# Patient Record
Sex: MALE | Race: BLACK OR AFRICAN AMERICAN | NOT HISPANIC OR LATINO | Employment: UNEMPLOYED | URBAN - METROPOLITAN AREA
[De-identification: names, ages, dates, MRNs, and addresses within clinical notes are randomized per-mention and may not be internally consistent; named-entity substitution may affect disease eponyms.]

---

## 2018-07-19 ENCOUNTER — HOSPITAL ENCOUNTER (OUTPATIENT)
Dept: MEDSURG UNIT | Facility: HOSPITAL | Age: 33
End: 2018-07-20
Attending: INTERNAL MEDICINE | Admitting: INTERNAL MEDICINE

## 2018-07-19 LAB — POC TROPONIN: 0.01 NG/ML (ref 0–0.08)

## 2018-07-20 LAB
ABS NEUT (OLG): 3.4 X10(3)/MCL (ref 2.1–9.2)
BASOPHILS NFR BLD AUTO: 0 % (ref 0–2)
BUN SERPL-MCNC: 16 MG/DL (ref 7–18)
CALCIUM SERPL-MCNC: 9.1 MG/DL (ref 8.5–10.1)
CHLORIDE SERPL-SCNC: 105 MMOL/L (ref 98–107)
CO2 SERPL-SCNC: 26 MMOL/L (ref 21–32)
CREAT SERPL-MCNC: 1.02 MG/DL (ref 0.6–1.3)
CREAT/UREA NIT SERPL: 16
EOSINOPHIL # BLD AUTO: 0.2 X10(3)/MCL
EOSINOPHIL NFR BLD AUTO: 2 %
ERYTHROCYTE [DISTWIDTH] IN BLOOD BY AUTOMATED COUNT: 13.8 % (ref 11.5–17)
GLUCOSE SERPL-MCNC: 121 MG/DL (ref 74–106)
HCT VFR BLD AUTO: 39.4 % (ref 42–52)
HGB BLD-MCNC: 12.9 GM/DL (ref 14–18)
LYMPHOCYTES # BLD AUTO: 2.8 X10(3)/MCL
LYMPHOCYTES NFR BLD AUTO: 39 % (ref 13–40)
MCH RBC QN AUTO: 31.9 PG (ref 27–31)
MCHC RBC AUTO-ENTMCNC: 32.7 GM/DL (ref 33–36)
MCV RBC AUTO: 97.7 FL (ref 80–94)
MONOCYTES # BLD AUTO: 0.8 X10(3)/MCL
MONOCYTES NFR BLD AUTO: 11 % (ref 2–11)
NEUTROPHILS # BLD AUTO: 3.4 X10(3)/MCL (ref 2.1–9.2)
NEUTROPHILS NFR BLD AUTO: 47 % (ref 47–80)
PLATELET # BLD AUTO: 260 X10(3)/MCL (ref 130–400)
PMV BLD AUTO: 8 FL (ref 7.4–10.4)
POTASSIUM SERPL-SCNC: 3.9 MMOL/L (ref 3.5–5.1)
RBC # BLD AUTO: 4.03 X10(6)/MCL (ref 4.7–6.1)
SODIUM SERPL-SCNC: 140 MMOL/L (ref 136–145)
TROPONIN I SERPL-MCNC: <0.02 NG/ML (ref 0.02–0.06)
WBC # SPEC AUTO: 7.2 X10(3)/MCL (ref 4.5–11.5)

## 2019-01-03 ENCOUNTER — HOSPITAL ENCOUNTER (OUTPATIENT)
Dept: MEDSURG UNIT | Facility: HOSPITAL | Age: 34
End: 2019-01-04
Attending: INTERNAL MEDICINE | Admitting: INTERNAL MEDICINE

## 2019-01-03 LAB
ABS NEUT (OLG): 3.77 X10(3)/MCL (ref 2.1–9.2)
ALBUMIN SERPL-MCNC: 3.6 GM/DL (ref 3.4–5)
ALBUMIN/GLOB SERPL: 1.1 RATIO (ref 1.1–2)
ALP SERPL-CCNC: 57 UNIT/L (ref 50–136)
ALT SERPL-CCNC: 19 UNIT/L (ref 12–78)
APPEARANCE, UA: CLEAR
AST SERPL-CCNC: 10 UNIT/L (ref 15–37)
BACTERIA SPEC CULT: ABNORMAL /HPF
BASOPHILS # BLD AUTO: 0 X10(3)/MCL (ref 0–0.2)
BASOPHILS NFR BLD AUTO: 1 %
BILIRUB SERPL-MCNC: 0.2 MG/DL (ref 0.2–1)
BILIRUB UR QL STRIP: NEGATIVE
BILIRUBIN DIRECT+TOT PNL SERPL-MCNC: 0 MG/DL (ref 0–0.5)
BILIRUBIN DIRECT+TOT PNL SERPL-MCNC: 0.2 MG/DL (ref 0–0.8)
BUN SERPL-MCNC: 8 MG/DL (ref 7–18)
CALCIUM SERPL-MCNC: 8.8 MG/DL (ref 8.5–10.1)
CHLORIDE SERPL-SCNC: 107 MMOL/L (ref 98–107)
CK MB SERPL-MCNC: <0.5 NG/ML (ref 0.5–3.6)
CK SERPL-CCNC: 63 UNIT/L (ref 39–308)
CO2 SERPL-SCNC: 23 MMOL/L (ref 21–32)
COLOR UR: YELLOW
CREAT SERPL-MCNC: 1.03 MG/DL (ref 0.7–1.3)
EOSINOPHIL # BLD AUTO: 0.2 X10(3)/MCL (ref 0–0.9)
EOSINOPHIL NFR BLD AUTO: 3 %
ERYTHROCYTE [DISTWIDTH] IN BLOOD BY AUTOMATED COUNT: 13.6 % (ref 11.5–17)
GLOBULIN SER-MCNC: 3.4 GM/DL (ref 2.4–3.5)
GLUCOSE (UA): NEGATIVE
GLUCOSE SERPL-MCNC: 105 MG/DL (ref 74–106)
HCT VFR BLD AUTO: 42 % (ref 42–52)
HGB BLD-MCNC: 13 GM/DL (ref 14–18)
HGB UR QL STRIP: NEGATIVE
INR PPP: 1 (ref 0–1.3)
KETONES UR QL STRIP: ABNORMAL
LEUKOCYTE ESTERASE UR QL STRIP: ABNORMAL
LYMPHOCYTES # BLD AUTO: 2.5 X10(3)/MCL (ref 0.6–4.6)
LYMPHOCYTES NFR BLD AUTO: 36 %
MAGNESIUM SERPL-MCNC: 2.1 MG/DL (ref 1.8–2.4)
MCH RBC QN AUTO: 31.2 PG (ref 27–31)
MCHC RBC AUTO-ENTMCNC: 31 GM/DL (ref 33–36)
MCV RBC AUTO: 100.7 FL (ref 80–94)
MONOCYTES # BLD AUTO: 0.5 X10(3)/MCL (ref 0.1–1.3)
MONOCYTES NFR BLD AUTO: 7 %
NEUTROPHILS # BLD AUTO: 3.77 X10(3)/MCL (ref 2.1–9.2)
NEUTROPHILS NFR BLD AUTO: 53 %
NITRITE UR QL STRIP: NEGATIVE
PH UR STRIP: 5.5 [PH] (ref 5–9)
PLATELET # BLD AUTO: 305 X10(3)/MCL (ref 130–400)
PMV BLD AUTO: 9.6 FL (ref 9.4–12.4)
POC TROPONIN: 0 NG/ML (ref 0–0.08)
POC TROPONIN: 0 NG/ML (ref 0–0.08)
POTASSIUM SERPL-SCNC: 3.9 MMOL/L (ref 3.5–5.1)
PROT SERPL-MCNC: 7 GM/DL (ref 6.4–8.2)
PROT UR QL STRIP: ABNORMAL
PROTHROMBIN TIME: 13.1 SECOND(S) (ref 12.2–14.7)
RBC # BLD AUTO: 4.17 X10(6)/MCL (ref 4.7–6.1)
RBC #/AREA URNS HPF: 1 /HPF (ref 0–2)
SODIUM SERPL-SCNC: 140 MMOL/L (ref 136–145)
SP GR UR STRIP: 1.03 (ref 1–1.03)
SQUAMOUS EPITHELIAL, UA: 5 /HPF (ref 0–4)
TROPONIN I SERPL-MCNC: <0.02 NG/ML (ref 0.02–0.49)
TROPONIN I SERPL-MCNC: <0.02 NG/ML (ref 0.02–0.49)
UROBILINOGEN UR STRIP-ACNC: 1
WBC # SPEC AUTO: 7 X10(3)/MCL (ref 4.5–11.5)
WBC #/AREA URNS HPF: 45 /HPF (ref 0–3)

## 2019-01-04 LAB — TROPONIN I SERPL-MCNC: <0.02 NG/ML (ref 0.02–0.49)

## 2021-06-09 ENCOUNTER — HOSPITAL ENCOUNTER (OUTPATIENT)
Dept: MEDSURG UNIT | Facility: HOSPITAL | Age: 36
End: 2021-06-10
Attending: SURGERY | Admitting: SURGERY

## 2021-06-09 LAB
ABS NEUT (OLG): 10.31 X10(3)/MCL (ref 2.1–9.2)
ALBUMIN SERPL-MCNC: 4.5 GM/DL (ref 3.5–5)
ALBUMIN/GLOB SERPL: 1 RATIO (ref 1.1–2)
ALP SERPL-CCNC: 68 UNIT/L (ref 40–150)
ALT SERPL-CCNC: 24 UNIT/L (ref 0–55)
AMPHET UR QL SCN: NEGATIVE
AMYLASE SERPL-CCNC: 69 UNIT/L (ref 25–125)
APPEARANCE, UA: CLEAR
AST SERPL-CCNC: 29 UNIT/L (ref 5–34)
BACTERIA SPEC CULT: ABNORMAL
BARBITURATE SCN PRESENT UR: NEGATIVE
BASOPHILS # BLD AUTO: 0.04 X10(3)/MCL (ref 0–0.2)
BASOPHILS NFR BLD AUTO: 0.3 % (ref 0–0.9)
BENZODIAZ UR QL SCN: NEGATIVE
BILIRUB SERPL-MCNC: 0.3 MG/DL (ref 0.2–1.2)
BILIRUB UR QL STRIP: NEGATIVE
BILIRUBIN DIRECT+TOT PNL SERPL-MCNC: <0.1 MG/DL (ref 0–0.5)
BILIRUBIN DIRECT+TOT PNL SERPL-MCNC: >0.2 MG/DL (ref 0–0.8)
BUN SERPL-MCNC: 10.1 MG/DL (ref 8.9–20.6)
CALCIUM SERPL-MCNC: 10.1 MG/DL (ref 8.4–10.2)
CANNABINOIDS UR QL SCN: ABNORMAL
CHLORIDE SERPL-SCNC: 104 MMOL/L (ref 98–107)
CO2 SERPL-SCNC: 23 MMOL/L (ref 22–29)
COCAINE UR QL SCN: NEGATIVE
COLOR UR: YELLOW
CREAT SERPL-MCNC: 1.05 MG/DL (ref 0.72–1.25)
EOSINOPHIL # BLD AUTO: 0.07 X10(3)/MCL (ref 0–0.9)
EOSINOPHIL NFR BLD AUTO: 0.5 % (ref 0–6.5)
ERYTHROCYTE [DISTWIDTH] IN BLOOD BY AUTOMATED COUNT: 14.9 % (ref 11.5–17)
GLOBULIN SER-MCNC: 4.7 GM/DL (ref 2.4–3.5)
GLUCOSE (UA): NEGATIVE
GLUCOSE SERPL-MCNC: 129 MG/DL (ref 74–100)
HCT VFR BLD AUTO: 49.7 % (ref 42–52)
HGB BLD-MCNC: 16.1 GM/DL (ref 14–18)
HGB UR QL STRIP: NEGATIVE
IMM GRANULOCYTES # BLD AUTO: 0.08 10*3/UL (ref 0–0.02)
IMM GRANULOCYTES NFR BLD AUTO: 0.6 % (ref 0–0.43)
KETONES UR QL STRIP: NEGATIVE
LACTATE SERPL-SCNC: 1.4 MMOL/L (ref 0.5–2.2)
LEUKOCYTE ESTERASE UR QL STRIP: NEGATIVE
LIPASE SERPL-CCNC: 14 U/L
LYMPHOCYTES # BLD AUTO: 2.35 X10(3)/MCL (ref 0.6–4.6)
LYMPHOCYTES NFR BLD AUTO: 17.7 % (ref 16.2–38.3)
MCH RBC QN AUTO: 31.8 PG (ref 27–31)
MCHC RBC AUTO-ENTMCNC: 32.4 GM/DL (ref 33–36)
MCV RBC AUTO: 98 FL (ref 80–94)
MDMA UR QL SCN: NEGATIVE
METHADONE UR QL SCN: NEGATIVE
MONOCYTES # BLD AUTO: 0.46 X10(3)/MCL (ref 0.1–1.3)
MONOCYTES NFR BLD AUTO: 3.5 % (ref 4.7–11.3)
MUCOUS THREADS URNS QL MICRO: ABNORMAL /LPF
NEUTROPHILS # BLD AUTO: 10.31 X10(3)/MCL (ref 2.1–9.2)
NEUTROPHILS NFR BLD AUTO: 77.4 % (ref 49.1–73.4)
NITRITE UR QL STRIP: NEGATIVE
NRBC BLD AUTO-RTO: 0 % (ref 0–0.2)
OPIATES UR QL SCN: ABNORMAL
PCP UR QL: NEGATIVE
PH UR STRIP.AUTO: 5.5 [PH] (ref 5–8)
PH UR STRIP: 5.5 [PH] (ref 5–7)
PLATELET # BLD AUTO: 198 X10(3)/MCL (ref 130–400)
PMV BLD AUTO: 9.9 FL (ref 7.4–10.4)
POTASSIUM SERPL-SCNC: 4.9 MMOL/L (ref 3.5–5.1)
PROT SERPL-MCNC: 9.2 GM/DL (ref 6.4–8.3)
PROT UR QL STRIP: ABNORMAL
RBC # BLD AUTO: 5.07 X10(6)/MCL (ref 4.7–6.1)
RBC #/AREA URNS HPF: 0 /[HPF]
SARS-COV-2 AG RESP QL IA.RAPID: NEGATIVE
SODIUM SERPL-SCNC: 140 MMOL/L (ref 136–145)
SP GR UR STRIP: 1.02 (ref 1–1.03)
SQUAMOUS EPITHELIAL, UA: ABNORMAL /LPF
UROBILINOGEN UR STRIP-ACNC: NEGATIVE
WBC # SPEC AUTO: 13.3 X10(3)/MCL (ref 4.5–11.5)
WBC #/AREA URNS HPF: ABNORMAL /HPF

## 2021-06-10 LAB
ABS NEUT (OLG): 8.47 X10(3)/MCL (ref 2.1–9.2)
ALBUMIN SERPL-MCNC: 3.7 GM/DL (ref 3.5–5)
ALBUMIN/GLOB SERPL: 1.1 RATIO (ref 1.1–2)
ALP SERPL-CCNC: 64 UNIT/L (ref 40–150)
ALT SERPL-CCNC: 18 UNIT/L (ref 0–55)
AST SERPL-CCNC: 15 UNIT/L (ref 5–34)
BASOPHILS # BLD AUTO: 0 X10(3)/MCL (ref 0–0.2)
BASOPHILS NFR BLD AUTO: 0 %
BILIRUB SERPL-MCNC: 0.5 MG/DL
BILIRUBIN DIRECT+TOT PNL SERPL-MCNC: 0.2 MG/DL (ref 0–0.5)
BILIRUBIN DIRECT+TOT PNL SERPL-MCNC: 0.3 MG/DL (ref 0–0.8)
BUN SERPL-MCNC: 9.3 MG/DL (ref 8.9–20.6)
CALCIUM SERPL-MCNC: 9.7 MG/DL (ref 8.4–10.2)
CHLORIDE SERPL-SCNC: 102 MMOL/L (ref 98–107)
CO2 SERPL-SCNC: 23 MMOL/L (ref 22–29)
CREAT SERPL-MCNC: 0.83 MG/DL (ref 0.73–1.18)
EOSINOPHIL # BLD AUTO: 0 X10(3)/MCL (ref 0–0.9)
EOSINOPHIL NFR BLD AUTO: 0 %
ERYTHROCYTE [DISTWIDTH] IN BLOOD BY AUTOMATED COUNT: 14.2 % (ref 11.5–17)
GLOBULIN SER-MCNC: 3.4 GM/DL (ref 2.4–3.5)
GLUCOSE SERPL-MCNC: 96 MG/DL (ref 74–100)
HCT VFR BLD AUTO: 42.2 % (ref 42–52)
HGB BLD-MCNC: 14.1 GM/DL (ref 14–18)
LACTATE SERPL-SCNC: 1 MMOL/L (ref 0.5–2.2)
LYMPHOCYTES # BLD AUTO: 3.6 X10(3)/MCL (ref 0.6–4.6)
LYMPHOCYTES NFR BLD AUTO: 27 %
MCH RBC QN AUTO: 31.7 PG (ref 27–31)
MCHC RBC AUTO-ENTMCNC: 33.4 GM/DL (ref 33–36)
MCV RBC AUTO: 94.8 FL (ref 80–94)
MONOCYTES # BLD AUTO: 0.9 X10(3)/MCL (ref 0.1–1.3)
MONOCYTES NFR BLD AUTO: 7 %
NEUTROPHILS # BLD AUTO: 8.47 X10(3)/MCL (ref 2.1–9.2)
NEUTROPHILS NFR BLD AUTO: 65 %
PLATELET # BLD AUTO: 272 X10(3)/MCL (ref 130–400)
PMV BLD AUTO: 10 FL (ref 9.4–12.4)
POTASSIUM SERPL-SCNC: 3.7 MMOL/L (ref 3.5–5.1)
PROT SERPL-MCNC: 7.1 GM/DL (ref 6.4–8.3)
RBC # BLD AUTO: 4.45 X10(6)/MCL (ref 4.7–6.1)
SODIUM SERPL-SCNC: 135 MMOL/L (ref 136–145)
WBC # SPEC AUTO: 13.1 X10(3)/MCL (ref 4.5–11.5)

## 2022-04-06 ENCOUNTER — HISTORICAL (OUTPATIENT)
Dept: ADMINISTRATIVE | Facility: HOSPITAL | Age: 37
End: 2022-04-06

## 2022-04-11 ENCOUNTER — HISTORICAL (OUTPATIENT)
Dept: ADMINISTRATIVE | Facility: HOSPITAL | Age: 37
End: 2022-04-11
Payer: MEDICAID

## 2022-04-28 VITALS
DIASTOLIC BLOOD PRESSURE: 57 MMHG | BODY MASS INDEX: 20.97 KG/M2 | SYSTOLIC BLOOD PRESSURE: 101 MMHG | WEIGHT: 130.5 LBS | HEIGHT: 66 IN
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
## 2023-03-23 ENCOUNTER — HOSPITAL ENCOUNTER (INPATIENT)
Facility: HOSPITAL | Age: 38
LOS: 4 days | Discharge: HOME | DRG: 167 | End: 2023-03-27
Attending: EMERGENCY MEDICINE | Admitting: SURGERY
Payer: MEDICAID

## 2023-03-23 ENCOUNTER — ANESTHESIA (OUTPATIENT)
Dept: SURGERY | Facility: HOSPITAL | Age: 38
DRG: 167 | End: 2023-03-23
Payer: MEDICAID

## 2023-03-23 ENCOUNTER — ANESTHESIA EVENT (OUTPATIENT)
Dept: SURGERY | Facility: HOSPITAL | Age: 38
DRG: 167 | End: 2023-03-23
Payer: MEDICAID
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
## 2023-03-23 LAB
ABO + RH BLD: NORMAL
ABO + RH BLD: NORMAL
ABORH RETYPE: NORMAL
ALBUMIN SERPL-MCNC: 2.8 G/DL (ref 3.5–5)
ALBUMIN/GLOB SERPL: 0.8 RATIO (ref 1.1–2)
ALP SERPL-CCNC: 59 UNIT/L (ref 40–150)
ALT SERPL-CCNC: 14 UNIT/L (ref 0–55)
APTT PPP: 29.7 SECONDS (ref 23.2–33.7)
AST SERPL-CCNC: 13 UNIT/L (ref 5–34)
BASOPHILS # BLD AUTO: 0.02 X10(3)/MCL (ref 0–0.2)
BASOPHILS NFR BLD AUTO: 0.2 %
BILIRUBIN DIRECT+TOT PNL SERPL-MCNC: 0.2 MG/DL
BLD PROD TYP BPU: NORMAL
BLD PROD TYP BPU: NORMAL
BLOOD UNIT EXPIRATION DATE: NORMAL
BLOOD UNIT EXPIRATION DATE: NORMAL
BLOOD UNIT TYPE CODE: 5100
BLOOD UNIT TYPE CODE: 5100
BUN SERPL-MCNC: 12.9 MG/DL (ref 8.9–20.6)
CALCIUM SERPL-MCNC: 8.6 MG/DL (ref 8.4–10.2)
CHLORIDE SERPL-SCNC: 106 MMOL/L (ref 98–107)
CO2 SERPL-SCNC: 20 MMOL/L (ref 22–29)
CREAT SERPL-MCNC: 1.2 MG/DL (ref 0.73–1.18)
CROSSMATCH INTERPRETATION: NORMAL
CROSSMATCH INTERPRETATION: NORMAL
DISPENSE STATUS: NORMAL
DISPENSE STATUS: NORMAL
EOSINOPHIL # BLD AUTO: 0.13 X10(3)/MCL (ref 0–0.9)
EOSINOPHIL NFR BLD AUTO: 1.5 %
ERYTHROCYTE [DISTWIDTH] IN BLOOD BY AUTOMATED COUNT: 14.3 % (ref 11.5–17)
ETHANOL SERPL-MCNC: <10 MG/DL
GFR SERPLBLD CREATININE-BSD FMLA CKD-EPI: >60 MLS/MIN/1.73/M2
GLOBULIN SER-MCNC: 3.6 GM/DL (ref 2.4–3.5)
GLUCOSE SERPL-MCNC: 185 MG/DL (ref 74–100)
GROUP & RH: NORMAL
HCT VFR BLD AUTO: 33.6 % (ref 42–52)
HGB BLD-MCNC: 11.1 G/DL (ref 14–18)
IMM GRANULOCYTES # BLD AUTO: 0.03 X10(3)/MCL (ref 0–0.04)
IMM GRANULOCYTES NFR BLD AUTO: 0.3 %
INDIRECT COOMBS GEL: NORMAL
INR BLD: 1.19 (ref 0–1.3)
LACTATE SERPL-SCNC: 5 MMOL/L (ref 0.5–2.2)
LYMPHOCYTES # BLD AUTO: 3.16 X10(3)/MCL (ref 0.6–4.6)
LYMPHOCYTES NFR BLD AUTO: 36.4 %
MCH RBC QN AUTO: 30.6 PG (ref 27–31)
MCHC RBC AUTO-ENTMCNC: 33 G/DL (ref 33–36)
MCV RBC AUTO: 92.6 FL (ref 80–94)
MONOCYTES # BLD AUTO: 0.85 X10(3)/MCL (ref 0.1–1.3)
MONOCYTES NFR BLD AUTO: 9.8 %
NEUTROPHILS # BLD AUTO: 4.48 X10(3)/MCL (ref 2.1–9.2)
NEUTROPHILS NFR BLD AUTO: 51.8 %
NRBC BLD AUTO-RTO: 0 %
PLATELET # BLD AUTO: 320 X10(3)/MCL (ref 130–400)
PMV BLD AUTO: 9.1 FL (ref 7.4–10.4)
POTASSIUM SERPL-SCNC: 3.5 MMOL/L (ref 3.5–5.1)
PROT SERPL-MCNC: 6.4 GM/DL (ref 6.4–8.3)
PROTHROMBIN TIME: 15 SECONDS (ref 12.5–14.5)
RBC # BLD AUTO: 3.63 X10(6)/MCL (ref 4.7–6.1)
SODIUM SERPL-SCNC: 140 MMOL/L (ref 136–145)
SPECIMEN OUTDATE: NORMAL
UNIT NUMBER: NORMAL
UNIT NUMBER: NORMAL
WBC # SPEC AUTO: 8.7 X10(3)/MCL (ref 4.5–11.5)

## 2023-03-23 PROCEDURE — 85730 THROMBOPLASTIN TIME PARTIAL: CPT | Performed by: SURGERY

## 2023-03-23 PROCEDURE — 36430 TRANSFUSION BLD/BLD COMPNT: CPT

## 2023-03-23 PROCEDURE — 90471 IMMUNIZATION ADMIN: CPT | Performed by: SURGERY

## 2023-03-23 PROCEDURE — 99152 MOD SED SAME PHYS/QHP 5/>YRS: CPT

## 2023-03-23 PROCEDURE — 71000039 HC RECOVERY, EACH ADD'L HOUR: Performed by: SURGERY

## 2023-03-23 PROCEDURE — 86850 RBC ANTIBODY SCREEN: CPT | Performed by: SURGERY

## 2023-03-23 PROCEDURE — 99291 CRITICAL CARE FIRST HOUR: CPT

## 2023-03-23 PROCEDURE — 63600175 PHARM REV CODE 636 W HCPCS: Performed by: SURGERY

## 2023-03-23 PROCEDURE — P9016 RBC LEUKOCYTES REDUCED: HCPCS | Performed by: SURGERY

## 2023-03-23 PROCEDURE — 82077 ASSAY SPEC XCP UR&BREATH IA: CPT | Performed by: SURGERY

## 2023-03-23 PROCEDURE — 63600175 PHARM REV CODE 636 W HCPCS: Performed by: ANESTHESIOLOGY

## 2023-03-23 PROCEDURE — 25000003 PHARM REV CODE 250: Performed by: NURSE ANESTHETIST, CERTIFIED REGISTERED

## 2023-03-23 PROCEDURE — 96374 THER/PROPH/DIAG INJ IV PUSH: CPT

## 2023-03-23 PROCEDURE — 36000707: Performed by: SURGERY

## 2023-03-23 PROCEDURE — 85610 PROTHROMBIN TIME: CPT | Performed by: SURGERY

## 2023-03-23 PROCEDURE — 90715 TDAP VACCINE 7 YRS/> IM: CPT | Performed by: SURGERY

## 2023-03-23 PROCEDURE — 37000009 HC ANESTHESIA EA ADD 15 MINS: Performed by: SURGERY

## 2023-03-23 PROCEDURE — 32551 INSERTION OF CHEST TUBE: CPT | Mod: LT

## 2023-03-23 PROCEDURE — 63600175 PHARM REV CODE 636 W HCPCS: Performed by: NURSE ANESTHETIST, CERTIFIED REGISTERED

## 2023-03-23 PROCEDURE — G0390 TRAUMA RESPONS W/HOSP CRITI: HCPCS

## 2023-03-23 PROCEDURE — 36000706: Performed by: SURGERY

## 2023-03-23 PROCEDURE — 86923 COMPATIBILITY TEST ELECTRIC: CPT | Mod: 91 | Performed by: SURGERY

## 2023-03-23 PROCEDURE — 80053 COMPREHEN METABOLIC PANEL: CPT | Performed by: SURGERY

## 2023-03-23 PROCEDURE — 71000033 HC RECOVERY, INTIAL HOUR: Performed by: SURGERY

## 2023-03-23 PROCEDURE — 11000001 HC ACUTE MED/SURG PRIVATE ROOM

## 2023-03-23 PROCEDURE — 85025 COMPLETE CBC W/AUTO DIFF WBC: CPT | Performed by: SURGERY

## 2023-03-23 PROCEDURE — 25500020 PHARM REV CODE 255: Performed by: SURGERY

## 2023-03-23 PROCEDURE — 83605 ASSAY OF LACTIC ACID: CPT | Performed by: SURGERY

## 2023-03-23 PROCEDURE — 37000008 HC ANESTHESIA 1ST 15 MINUTES: Performed by: SURGERY

## 2023-03-23 RX ORDER — PROPOFOL 10 MG/ML
VIAL (ML) INTRAVENOUS
Status: DISPENSED
Start: 2023-03-23 | End: 2023-03-24

## 2023-03-23 RX ORDER — TALC
6 POWDER (GRAM) TOPICAL NIGHTLY PRN
Status: DISCONTINUED | OUTPATIENT
Start: 2023-03-23 | End: 2023-03-27 | Stop reason: HOSPADM

## 2023-03-23 RX ORDER — SODIUM CHLORIDE 0.9 % (FLUSH) 0.9 %
10 SYRINGE (ML) INJECTION
Status: DISCONTINUED | OUTPATIENT
Start: 2023-03-23 | End: 2023-03-24 | Stop reason: HOSPADM

## 2023-03-23 RX ORDER — GABAPENTIN 300 MG/1
300 CAPSULE ORAL 3 TIMES DAILY
Status: DISCONTINUED | OUTPATIENT
Start: 2023-03-23 | End: 2023-03-27 | Stop reason: HOSPADM

## 2023-03-23 RX ORDER — HYDROMORPHONE HYDROCHLORIDE 2 MG/ML
0.2 INJECTION, SOLUTION INTRAMUSCULAR; INTRAVENOUS; SUBCUTANEOUS EVERY 5 MIN PRN
Status: DISCONTINUED | OUTPATIENT
Start: 2023-03-23 | End: 2023-03-24 | Stop reason: HOSPADM

## 2023-03-23 RX ORDER — PHENYLEPHRINE HCL IN 0.9% NACL 1 MG/10 ML
SYRINGE (ML) INTRAVENOUS
Status: DISCONTINUED | OUTPATIENT
Start: 2023-03-23 | End: 2023-03-23

## 2023-03-23 RX ORDER — SODIUM CHLORIDE, SODIUM LACTATE, POTASSIUM CHLORIDE, CALCIUM CHLORIDE 600; 310; 30; 20 MG/100ML; MG/100ML; MG/100ML; MG/100ML
INJECTION, SOLUTION INTRAVENOUS
Status: COMPLETED | OUTPATIENT
Start: 2023-03-23 | End: 2023-03-23

## 2023-03-23 RX ORDER — DOCUSATE SODIUM 100 MG/1
100 CAPSULE, LIQUID FILLED ORAL 2 TIMES DAILY
Status: DISCONTINUED | OUTPATIENT
Start: 2023-03-23 | End: 2023-03-27 | Stop reason: HOSPADM

## 2023-03-23 RX ORDER — POLYETHYLENE GLYCOL 3350 17 G/17G
17 POWDER, FOR SOLUTION ORAL 2 TIMES DAILY
Status: DISCONTINUED | OUTPATIENT
Start: 2023-03-23 | End: 2023-03-25

## 2023-03-23 RX ORDER — DEXAMETHASONE SODIUM PHOSPHATE 4 MG/ML
INJECTION, SOLUTION INTRA-ARTICULAR; INTRALESIONAL; INTRAMUSCULAR; INTRAVENOUS; SOFT TISSUE
Status: DISCONTINUED | OUTPATIENT
Start: 2023-03-23 | End: 2023-03-23

## 2023-03-23 RX ORDER — MIDAZOLAM HYDROCHLORIDE 1 MG/ML
INJECTION INTRAMUSCULAR; INTRAVENOUS
Status: DISCONTINUED | OUTPATIENT
Start: 2023-03-23 | End: 2023-03-23

## 2023-03-23 RX ORDER — ENOXAPARIN SODIUM 100 MG/ML
40 INJECTION SUBCUTANEOUS EVERY 12 HOURS
Status: DISCONTINUED | OUTPATIENT
Start: 2023-03-23 | End: 2023-03-27 | Stop reason: HOSPADM

## 2023-03-23 RX ORDER — PROPOFOL 10 MG/ML
VIAL (ML) INTRAVENOUS
Status: DISCONTINUED | OUTPATIENT
Start: 2023-03-23 | End: 2023-03-23

## 2023-03-23 RX ORDER — FENTANYL CITRATE 50 UG/ML
INJECTION, SOLUTION INTRAMUSCULAR; INTRAVENOUS
Status: DISCONTINUED | OUTPATIENT
Start: 2023-03-23 | End: 2023-03-23

## 2023-03-23 RX ORDER — ONDANSETRON HYDROCHLORIDE 2 MG/ML
INJECTION, SOLUTION INTRAMUSCULAR; INTRAVENOUS
Status: DISCONTINUED | OUTPATIENT
Start: 2023-03-23 | End: 2023-03-23

## 2023-03-23 RX ORDER — LIDOCAINE HYDROCHLORIDE 20 MG/ML
INJECTION, SOLUTION INFILTRATION; PERINEURAL
Status: DISPENSED
Start: 2023-03-23 | End: 2023-03-24

## 2023-03-23 RX ORDER — CEFAZOLIN SODIUM 2 G/50ML
SOLUTION INTRAVENOUS
Status: COMPLETED | OUTPATIENT
Start: 2023-03-23 | End: 2023-03-23

## 2023-03-23 RX ORDER — OXYCODONE HYDROCHLORIDE 5 MG/1
10 TABLET ORAL EVERY 4 HOURS PRN
Status: DISCONTINUED | OUTPATIENT
Start: 2023-03-23 | End: 2023-03-27 | Stop reason: HOSPADM

## 2023-03-23 RX ORDER — ROCURONIUM BROMIDE 10 MG/ML
INJECTION, SOLUTION INTRAVENOUS
Status: DISCONTINUED | OUTPATIENT
Start: 2023-03-23 | End: 2023-03-23

## 2023-03-23 RX ORDER — ONDANSETRON 2 MG/ML
4 INJECTION INTRAMUSCULAR; INTRAVENOUS DAILY PRN
Status: DISCONTINUED | OUTPATIENT
Start: 2023-03-23 | End: 2023-03-24 | Stop reason: HOSPADM

## 2023-03-23 RX ORDER — ACETAMINOPHEN 325 MG/1
650 TABLET ORAL EVERY 4 HOURS
Status: DISCONTINUED | OUTPATIENT
Start: 2023-03-23 | End: 2023-03-27 | Stop reason: HOSPADM

## 2023-03-23 RX ORDER — CEFAZOLIN SODIUM 1 G/3ML
INJECTION, POWDER, FOR SOLUTION INTRAMUSCULAR; INTRAVENOUS
Status: DISPENSED
Start: 2023-03-23 | End: 2023-03-24

## 2023-03-23 RX ORDER — ADHESIVE BANDAGE
30 BANDAGE TOPICAL DAILY PRN
Status: DISCONTINUED | OUTPATIENT
Start: 2023-03-23 | End: 2023-03-27 | Stop reason: HOSPADM

## 2023-03-23 RX ORDER — PROPOFOL 10 MG/ML
INJECTION, EMULSION INTRAVENOUS
Status: COMPLETED | OUTPATIENT
Start: 2023-03-23 | End: 2023-03-23

## 2023-03-23 RX ORDER — METHOCARBAMOL 750 MG/1
750 TABLET, FILM COATED ORAL 3 TIMES DAILY
Status: DISCONTINUED | OUTPATIENT
Start: 2023-03-23 | End: 2023-03-27 | Stop reason: HOSPADM

## 2023-03-23 RX ORDER — OXYCODONE HYDROCHLORIDE 5 MG/1
5 TABLET ORAL EVERY 4 HOURS PRN
Status: DISCONTINUED | OUTPATIENT
Start: 2023-03-23 | End: 2023-03-27 | Stop reason: HOSPADM

## 2023-03-23 RX ORDER — SODIUM CHLORIDE 9 MG/ML
INJECTION, SOLUTION INTRAVENOUS CONTINUOUS
Status: DISCONTINUED | OUTPATIENT
Start: 2023-03-23 | End: 2023-03-27 | Stop reason: HOSPADM

## 2023-03-23 RX ORDER — SUCCINYLCHOLINE CHLORIDE 20 MG/ML
INJECTION INTRAMUSCULAR; INTRAVENOUS
Status: DISCONTINUED | OUTPATIENT
Start: 2023-03-23 | End: 2023-03-23

## 2023-03-23 RX ORDER — FENTANYL CITRATE 50 UG/ML
INJECTION, SOLUTION INTRAMUSCULAR; INTRAVENOUS
Status: DISCONTINUED
Start: 2023-03-23 | End: 2023-03-23 | Stop reason: WASHOUT

## 2023-03-23 RX ADMIN — MIDAZOLAM HYDROCHLORIDE 5 MG: 1 INJECTION, SOLUTION INTRAMUSCULAR; INTRAVENOUS at 09:03

## 2023-03-23 RX ADMIN — HYDROMORPHONE HYDROCHLORIDE 0.2 MG: 2 INJECTION INTRAMUSCULAR; INTRAVENOUS; SUBCUTANEOUS at 10:03

## 2023-03-23 RX ADMIN — PROPOFOL 120 MG: 10 INJECTION, EMULSION INTRAVENOUS at 08:03

## 2023-03-23 RX ADMIN — ROCURONIUM BROMIDE 45 MG: 10 SOLUTION INTRAVENOUS at 09:03

## 2023-03-23 RX ADMIN — PROPOFOL 70 MG: 10 INJECTION, EMULSION INTRAVENOUS at 09:03

## 2023-03-23 RX ADMIN — HYDROMORPHONE HYDROCHLORIDE 0.2 MG: 2 INJECTION INTRAMUSCULAR; INTRAVENOUS; SUBCUTANEOUS at 11:03

## 2023-03-23 RX ADMIN — ONDANSETRON HYDROCHLORIDE 4 MG: 2 INJECTION, SOLUTION INTRAMUSCULAR; INTRAVENOUS at 09:03

## 2023-03-23 RX ADMIN — FENTANYL CITRATE 100 MCG: 50 INJECTION, SOLUTION INTRAMUSCULAR; INTRAVENOUS at 09:03

## 2023-03-23 RX ADMIN — Medication 100 MCG: at 09:03

## 2023-03-23 RX ADMIN — SODIUM CHLORIDE, SODIUM GLUCONATE, SODIUM ACETATE, POTASSIUM CHLORIDE AND MAGNESIUM CHLORIDE: 526; 502; 368; 37; 30 INJECTION, SOLUTION INTRAVENOUS at 09:03

## 2023-03-23 RX ADMIN — CEFAZOLIN SODIUM 2 G: 2 SOLUTION INTRAVENOUS at 08:03

## 2023-03-23 RX ADMIN — SODIUM CHLORIDE, POTASSIUM CHLORIDE, SODIUM LACTATE AND CALCIUM CHLORIDE 1000 ML: 600; 310; 30; 20 INJECTION, SOLUTION INTRAVENOUS at 08:03

## 2023-03-23 RX ADMIN — DEXAMETHASONE SODIUM PHOSPHATE 4 MG: 4 INJECTION, SOLUTION INTRA-ARTICULAR; INTRALESIONAL; INTRAMUSCULAR; INTRAVENOUS; SOFT TISSUE at 09:03

## 2023-03-23 RX ADMIN — SUCCINYLCHOLINE CHLORIDE 160 MG: 20 INJECTION, SOLUTION INTRAMUSCULAR; INTRAVENOUS at 09:03

## 2023-03-23 RX ADMIN — TETANUS TOXOID, REDUCED DIPHTHERIA TOXOID AND ACELLULAR PERTUSSIS VACCINE, ADSORBED 0.5 ML: 5; 2.5; 8; 8; 2.5 SUSPENSION INTRAMUSCULAR at 08:03

## 2023-03-23 RX ADMIN — ROCURONIUM BROMIDE 5 MG: 10 SOLUTION INTRAVENOUS at 09:03

## 2023-03-23 RX ADMIN — IOPAMIDOL 100 ML: 755 INJECTION, SOLUTION INTRAVENOUS at 09:03

## 2023-03-23 RX ADMIN — SUGAMMADEX 200 MG: 100 INJECTION, SOLUTION INTRAVENOUS at 09:03

## 2023-03-24 LAB
ALBUMIN SERPL-MCNC: 3 G/DL (ref 3.5–5)
ALBUMIN/GLOB SERPL: 0.8 RATIO (ref 1.1–2)
ALP SERPL-CCNC: 64 UNIT/L (ref 40–150)
ALT SERPL-CCNC: 19 UNIT/L (ref 0–55)
AST SERPL-CCNC: 22 UNIT/L (ref 5–34)
BASOPHILS # BLD AUTO: 0.03 X10(3)/MCL (ref 0–0.2)
BASOPHILS NFR BLD AUTO: 0.2 %
BILIRUBIN DIRECT+TOT PNL SERPL-MCNC: 0.4 MG/DL
BUN SERPL-MCNC: 7.2 MG/DL (ref 8.9–20.6)
CALCIUM SERPL-MCNC: 8.6 MG/DL (ref 8.4–10.2)
CHLORIDE SERPL-SCNC: 108 MMOL/L (ref 98–107)
CO2 SERPL-SCNC: 26 MMOL/L (ref 22–29)
CREAT SERPL-MCNC: 0.82 MG/DL (ref 0.73–1.18)
EOSINOPHIL # BLD AUTO: 0 X10(3)/MCL (ref 0–0.9)
EOSINOPHIL NFR BLD AUTO: 0 %
ERYTHROCYTE [DISTWIDTH] IN BLOOD BY AUTOMATED COUNT: 15.2 % (ref 11.5–17)
GFR SERPLBLD CREATININE-BSD FMLA CKD-EPI: >60 MLS/MIN/1.73/M2
GLOBULIN SER-MCNC: 3.6 GM/DL (ref 2.4–3.5)
GLUCOSE SERPL-MCNC: 163 MG/DL (ref 74–100)
HCT VFR BLD AUTO: 35.4 % (ref 42–52)
HGB BLD-MCNC: 11.8 G/DL (ref 14–18)
IMM GRANULOCYTES # BLD AUTO: 0.08 X10(3)/MCL (ref 0–0.04)
IMM GRANULOCYTES NFR BLD AUTO: 0.4 %
LACTATE SERPL-SCNC: 2.5 MMOL/L (ref 0.5–2.2)
LACTATE SERPL-SCNC: 4.1 MMOL/L (ref 0.5–2.2)
LYMPHOCYTES # BLD AUTO: 1.33 X10(3)/MCL (ref 0.6–4.6)
LYMPHOCYTES NFR BLD AUTO: 7.3 %
MAGNESIUM SERPL-MCNC: 2.1 MG/DL (ref 1.6–2.6)
MCH RBC QN AUTO: 30.4 PG (ref 27–31)
MCHC RBC AUTO-ENTMCNC: 33.3 G/DL (ref 33–36)
MCV RBC AUTO: 91.2 FL (ref 80–94)
MONOCYTES # BLD AUTO: 0.95 X10(3)/MCL (ref 0.1–1.3)
MONOCYTES NFR BLD AUTO: 5.2 %
NEUTROPHILS # BLD AUTO: 15.72 X10(3)/MCL (ref 2.1–9.2)
NEUTROPHILS NFR BLD AUTO: 86.9 %
NRBC BLD AUTO-RTO: 0 %
PHOSPHATE SERPL-MCNC: 3.3 MG/DL (ref 2.3–4.7)
PLATELET # BLD AUTO: 341 X10(3)/MCL (ref 130–400)
PMV BLD AUTO: 9.4 FL (ref 7.4–10.4)
POTASSIUM SERPL-SCNC: 4.4 MMOL/L (ref 3.5–5.1)
PROT SERPL-MCNC: 6.6 GM/DL (ref 6.4–8.3)
RBC # BLD AUTO: 3.88 X10(6)/MCL (ref 4.7–6.1)
SODIUM SERPL-SCNC: 142 MMOL/L (ref 136–145)
WBC # SPEC AUTO: 18.1 X10(3)/MCL (ref 4.5–11.5)

## 2023-03-24 PROCEDURE — 85025 COMPLETE CBC W/AUTO DIFF WBC: CPT | Performed by: NURSE PRACTITIONER

## 2023-03-24 PROCEDURE — 25000003 PHARM REV CODE 250: Performed by: NURSE PRACTITIONER

## 2023-03-24 PROCEDURE — 83605 ASSAY OF LACTIC ACID: CPT | Performed by: STUDENT IN AN ORGANIZED HEALTH CARE EDUCATION/TRAINING PROGRAM

## 2023-03-24 PROCEDURE — 27000221 HC OXYGEN, UP TO 24 HOURS

## 2023-03-24 PROCEDURE — 80053 COMPREHEN METABOLIC PANEL: CPT | Performed by: NURSE PRACTITIONER

## 2023-03-24 PROCEDURE — 83605 ASSAY OF LACTIC ACID: CPT | Performed by: NURSE PRACTITIONER

## 2023-03-24 PROCEDURE — 83735 ASSAY OF MAGNESIUM: CPT | Performed by: NURSE PRACTITIONER

## 2023-03-24 PROCEDURE — 63600175 PHARM REV CODE 636 W HCPCS: Performed by: NURSE PRACTITIONER

## 2023-03-24 PROCEDURE — 25000003 PHARM REV CODE 250: Performed by: STUDENT IN AN ORGANIZED HEALTH CARE EDUCATION/TRAINING PROGRAM

## 2023-03-24 PROCEDURE — 11000001 HC ACUTE MED/SURG PRIVATE ROOM

## 2023-03-24 PROCEDURE — 84100 ASSAY OF PHOSPHORUS: CPT | Performed by: NURSE PRACTITIONER

## 2023-03-24 PROCEDURE — 94761 N-INVAS EAR/PLS OXIMETRY MLT: CPT

## 2023-03-24 RX ORDER — KETOROLAC TROMETHAMINE 30 MG/ML
15 INJECTION, SOLUTION INTRAMUSCULAR; INTRAVENOUS EVERY 6 HOURS
Status: COMPLETED | OUTPATIENT
Start: 2023-03-24 | End: 2023-03-26

## 2023-03-24 RX ORDER — MORPHINE SULFATE 4 MG/ML
4 INJECTION, SOLUTION INTRAMUSCULAR; INTRAVENOUS EVERY 4 HOURS PRN
Status: DISCONTINUED | OUTPATIENT
Start: 2023-03-24 | End: 2023-03-26

## 2023-03-24 RX ORDER — MUPIROCIN 20 MG/G
OINTMENT TOPICAL 2 TIMES DAILY
Status: DISCONTINUED | OUTPATIENT
Start: 2023-03-24 | End: 2023-03-27 | Stop reason: HOSPADM

## 2023-03-24 RX ADMIN — GABAPENTIN 300 MG: 300 CAPSULE ORAL at 03:03

## 2023-03-24 RX ADMIN — KETOROLAC TROMETHAMINE 15 MG: 30 INJECTION, SOLUTION INTRAMUSCULAR at 12:03

## 2023-03-24 RX ADMIN — GABAPENTIN 300 MG: 300 CAPSULE ORAL at 10:03

## 2023-03-24 RX ADMIN — ENOXAPARIN SODIUM 40 MG: 40 INJECTION SUBCUTANEOUS at 10:03

## 2023-03-24 RX ADMIN — Medication 6 MG: at 12:03

## 2023-03-24 RX ADMIN — ACETAMINOPHEN 325MG 650 MG: 325 TABLET ORAL at 01:03

## 2023-03-24 RX ADMIN — POLYETHYLENE GLYCOL 3350 17 G: 17 POWDER, FOR SOLUTION ORAL at 10:03

## 2023-03-24 RX ADMIN — DOCUSATE SODIUM 100 MG: 100 CAPSULE, LIQUID FILLED ORAL at 10:03

## 2023-03-24 RX ADMIN — KETOROLAC TROMETHAMINE 15 MG: 30 INJECTION, SOLUTION INTRAMUSCULAR at 06:03

## 2023-03-24 RX ADMIN — GABAPENTIN 300 MG: 300 CAPSULE ORAL at 12:03

## 2023-03-24 RX ADMIN — SODIUM CHLORIDE: 9 INJECTION, SOLUTION INTRAVENOUS at 12:03

## 2023-03-24 RX ADMIN — ACETAMINOPHEN 325MG 650 MG: 325 TABLET ORAL at 06:03

## 2023-03-24 RX ADMIN — Medication 6 MG: at 10:03

## 2023-03-24 RX ADMIN — METHOCARBAMOL 750 MG: 750 TABLET ORAL at 09:03

## 2023-03-24 RX ADMIN — METHOCARBAMOL 750 MG: 750 TABLET ORAL at 10:03

## 2023-03-24 RX ADMIN — OXYCODONE HYDROCHLORIDE 10 MG: 5 TABLET ORAL at 03:03

## 2023-03-24 RX ADMIN — MUPIROCIN: 20 OINTMENT TOPICAL at 10:03

## 2023-03-24 RX ADMIN — METHOCARBAMOL 750 MG: 750 TABLET ORAL at 12:03

## 2023-03-24 RX ADMIN — MORPHINE SULFATE 4 MG: 4 INJECTION, SOLUTION INTRAMUSCULAR; INTRAVENOUS at 07:03

## 2023-03-24 RX ADMIN — METHOCARBAMOL 750 MG: 750 TABLET ORAL at 03:03

## 2023-03-24 RX ADMIN — SODIUM CHLORIDE: 9 INJECTION, SOLUTION INTRAVENOUS at 10:03

## 2023-03-24 RX ADMIN — ACETAMINOPHEN 325MG 650 MG: 325 TABLET ORAL at 10:03

## 2023-03-24 RX ADMIN — SODIUM CHLORIDE 1000 ML: 9 INJECTION, SOLUTION INTRAVENOUS at 05:03

## 2023-03-24 RX ADMIN — MORPHINE SULFATE 4 MG: 4 INJECTION, SOLUTION INTRAMUSCULAR; INTRAVENOUS at 10:03

## 2023-03-24 RX ADMIN — MORPHINE SULFATE 4 MG: 4 INJECTION, SOLUTION INTRAMUSCULAR; INTRAVENOUS at 01:03

## 2023-03-24 RX ADMIN — KETOROLAC TROMETHAMINE 15 MG: 30 INJECTION, SOLUTION INTRAMUSCULAR at 01:03

## 2023-03-24 RX ADMIN — KETOROLAC TROMETHAMINE 15 MG: 30 INJECTION, SOLUTION INTRAMUSCULAR at 05:03

## 2023-03-24 RX ADMIN — SODIUM CHLORIDE: 9 INJECTION, SOLUTION INTRAVENOUS at 06:03

## 2023-03-24 RX ADMIN — OXYCODONE HYDROCHLORIDE 5 MG: 5 TABLET ORAL at 08:03

## 2023-03-24 NOTE — TRANSFER OF CARE
"Anesthesia Transfer of Care Note    Patient: Mikayla Rivera Unkn    Procedure(s) Performed: Procedure(s) (LRB):  LAPAROTOMY, EXPLORATORY (N/A)    Patient location: PACU    Anesthesia Type: general    Transport from OR: Transported from OR on room air with adequate spontaneous ventilation    Post pain: adequate analgesia    Post assessment: no apparent anesthetic complications    Post vital signs: stable    Level of consciousness: awake    Nausea/Vomiting: no nausea/vomiting    Complications: none    Transfer of care protocol was followed      Last vitals:   Visit Vitals  /76   Pulse 99   Temp 36.5 °C (97.7 °F)   Resp 20   Ht 5' 8" (1.727 m)   Wt 68 kg (150 lb)   SpO2 99%   BMI 22.81 kg/m²     "

## 2023-03-24 NOTE — ANESTHESIA POSTPROCEDURE EVALUATION
Anesthesia Post Evaluation    Patient: Ranjeet Ball    Procedure(s) Performed: Procedure(s) (LRB):  LAPAROTOMY, EXPLORATORY (N/A)    Final Anesthesia Type: general      Patient location during evaluation: PACU  Patient participation: Yes- Able to Participate  Level of consciousness: awake and alert  Post-procedure vital signs: reviewed and stable  Pain management: adequate  Airway patency: patent  PAULETTE mitigation strategies: Multimodal analgesia    Anesthetic complications: no      Cardiovascular status: stable  Respiratory status: unassisted  Hydration status: euvolemic  Follow-up not needed.          Vitals Value Taken Time   /75 03/24/23 1226   Temp 36.4 °C (97.6 °F) 03/24/23 1337   Pulse 76 03/24/23 1226   Resp 20 03/24/23 1226   SpO2 99 % 03/24/23 1226         Event Time   Out of Recovery 03/24/2023 00:00:00         Pain/Rocio Score: Pain Rating Prior to Med Admin: 2 (3/24/2023  1:38 PM)  Pain Rating Post Med Admin: 10 (3/24/2023  2:13 AM)  Rocio Score: 9 (3/23/2023 11:13 PM)

## 2023-03-24 NOTE — ED NOTES
36 Straight CT place with 250ml of dark red drainage noted in CT container that is connected to 20cm of suction
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
## 2023-03-24 NOTE — SUBJECTIVE & OBJECTIVE
No current facility-administered medications on file prior to encounter.     No current outpatient medications on file prior to encounter.       Review of patient's allergies indicates:  Not on File    No past medical history on file.  No past surgical history on file.  Family History    None       Tobacco Use    Smoking status: Not on file    Smokeless tobacco: Not on file   Substance and Sexual Activity    Alcohol use: Not on file    Drug use: Not on file    Sexual activity: Not on file     Review of Systems   Constitutional:  Negative for chills and fever.   HENT:  Negative for ear pain and trouble swallowing.    Eyes:  Negative for pain and redness.   Respiratory:  Positive for chest tightness and shortness of breath. Negative for cough.    Cardiovascular:  Positive for chest pain. Negative for palpitations and leg swelling.   Gastrointestinal:  Negative for abdominal distention, abdominal pain, nausea and vomiting.   Genitourinary:  Negative for difficulty urinating.   Musculoskeletal:  Negative for back pain and neck pain.   Skin:  Negative for color change, pallor and wound.   Neurological:  Negative for dizziness, syncope, speech difficulty, weakness, light-headedness, numbness and headaches.   Psychiatric/Behavioral:  Negative for agitation and suicidal ideas.    All other systems reviewed and are negative.  Objective:     Vital Signs (Most Recent):    Vital Signs (24h Range):           There is no height or weight on file to calculate BMI.    Physical Exam  Constitutional:       Appearance: Normal appearance.   HENT:      Head: Normocephalic and atraumatic.      Nose: Nose normal.   Eyes:      Pupils: Pupils are equal, round, and reactive to light.   Cardiovascular:      Rate and Rhythm: Tachycardia present.      Pulses: Normal pulses.      Comments: Normal peripheral pulses  Pulmonary:      Effort: No respiratory distress.      Comments: Absent L lung sounds  Chest:      Chest wall: Tenderness present.    Abdominal:      General: Abdomen is flat. Bowel sounds are normal. There is no distension.      Palpations: Abdomen is soft.      Tenderness: There is no abdominal tenderness.   Musculoskeletal:         General: Tenderness present. No swelling, deformity or signs of injury.      Cervical back: Normal range of motion and neck supple. No tenderness.      Comments: ribs   Skin:     General: Skin is warm and dry.      Capillary Refill: Capillary refill takes less than 2 seconds.      Findings: No lesion.   Neurological:      General: No focal deficit present.      Mental Status: He is alert and oriented to person, place, and time. Mental status is at baseline.   Psychiatric:         Mood and Affect: Mood normal.         Behavior: Behavior normal.         Thought Content: Thought content normal.         Judgment: Judgment normal.       Significant Labs:  I have reviewed all pertinent lab results within the past 24 hours.    Significant Diagnostics:  I have reviewed all pertinent imaging results/findings within the past 24 hours.
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
## 2023-03-24 NOTE — ANESTHESIA PROCEDURE NOTES
Intubation    Date/Time: 3/23/2023 9:18 PM  Performed by: Mir Naranjo CRNA  Authorized by: Luis Hodge MD     Intubation:     Induction:  Rapid sequence induction    Intubated:  Postinduction    Mask Ventilation:  Not attempted    Attempts:  1    Attempted By:  CRNA    Method of Intubation:  Direct    Blade:  John 2    Laryngeal View Grade: Grade I - full view of cords      Difficult Airway Encountered?: No      Complications:  None    Airway Device:  Oral endotracheal tube    Airway Device Size:  8.0    Style/Cuff Inflation:  Cuffed    Tube secured:  23    Secured at:  The lips    Placement Verified By:  Capnometry    Complicating Factors:  None    Findings Post-Intubation:  BS equal bilateral
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
## 2023-03-24 NOTE — PLAN OF CARE
Transported to CT x2 staff members and then to room 837. Report given to XIN Hughes. Informed by ER that family was present and they were going to send them to the waiting room. An attempt was made to locate them in waiting room by XIN Vale. No family present. ER provided contact number of sister Franca. It was updated on pt demographics.

## 2023-03-25 LAB
ALBUMIN SERPL-MCNC: 2.4 G/DL (ref 3.5–5)
ALBUMIN/GLOB SERPL: 0.9 RATIO (ref 1.1–2)
ALP SERPL-CCNC: 52 UNIT/L (ref 40–150)
ALT SERPL-CCNC: 14 UNIT/L (ref 0–55)
AST SERPL-CCNC: 17 UNIT/L (ref 5–34)
BASOPHILS # BLD AUTO: 0.02 X10(3)/MCL (ref 0–0.2)
BASOPHILS NFR BLD AUTO: 0.2 %
BILIRUBIN DIRECT+TOT PNL SERPL-MCNC: 0.3 MG/DL
BUN SERPL-MCNC: 4.6 MG/DL (ref 8.9–20.6)
CALCIUM SERPL-MCNC: 7.9 MG/DL (ref 8.4–10.2)
CHLORIDE SERPL-SCNC: 108 MMOL/L (ref 98–107)
CO2 SERPL-SCNC: 24 MMOL/L (ref 22–29)
CREAT SERPL-MCNC: 0.66 MG/DL (ref 0.73–1.18)
EOSINOPHIL # BLD AUTO: 0.1 X10(3)/MCL (ref 0–0.9)
EOSINOPHIL NFR BLD AUTO: 0.9 %
ERYTHROCYTE [DISTWIDTH] IN BLOOD BY AUTOMATED COUNT: 14.9 % (ref 11.5–17)
GFR SERPLBLD CREATININE-BSD FMLA CKD-EPI: >60 MLS/MIN/1.73/M2
GLOBULIN SER-MCNC: 2.7 GM/DL (ref 2.4–3.5)
GLUCOSE SERPL-MCNC: 88 MG/DL (ref 74–100)
HCT VFR BLD AUTO: 32.4 % (ref 42–52)
HGB BLD-MCNC: 10.6 G/DL (ref 14–18)
IMM GRANULOCYTES # BLD AUTO: 0.03 X10(3)/MCL (ref 0–0.04)
IMM GRANULOCYTES NFR BLD AUTO: 0.3 %
LACTATE SERPL-SCNC: 1.2 MMOL/L (ref 0.5–2.2)
LYMPHOCYTES # BLD AUTO: 3.4 X10(3)/MCL (ref 0.6–4.6)
LYMPHOCYTES NFR BLD AUTO: 32 %
MCH RBC QN AUTO: 30.2 PG (ref 27–31)
MCHC RBC AUTO-ENTMCNC: 32.7 G/DL (ref 33–36)
MCV RBC AUTO: 92.3 FL (ref 80–94)
MONOCYTES # BLD AUTO: 0.69 X10(3)/MCL (ref 0.1–1.3)
MONOCYTES NFR BLD AUTO: 6.5 %
NEUTROPHILS # BLD AUTO: 6.37 X10(3)/MCL (ref 2.1–9.2)
NEUTROPHILS NFR BLD AUTO: 60.1 %
NRBC BLD AUTO-RTO: 0 %
PLATELET # BLD AUTO: 327 X10(3)/MCL (ref 130–400)
PMV BLD AUTO: 10.4 FL (ref 7.4–10.4)
POTASSIUM SERPL-SCNC: 3.9 MMOL/L (ref 3.5–5.1)
PROT SERPL-MCNC: 5.1 GM/DL (ref 6.4–8.3)
RBC # BLD AUTO: 3.51 X10(6)/MCL (ref 4.7–6.1)
SODIUM SERPL-SCNC: 140 MMOL/L (ref 136–145)
WBC # SPEC AUTO: 10.6 X10(3)/MCL (ref 4.5–11.5)

## 2023-03-25 PROCEDURE — 25000003 PHARM REV CODE 250: Performed by: NURSE PRACTITIONER

## 2023-03-25 PROCEDURE — 83605 ASSAY OF LACTIC ACID: CPT | Performed by: NURSE PRACTITIONER

## 2023-03-25 PROCEDURE — 25000003 PHARM REV CODE 250: Performed by: STUDENT IN AN ORGANIZED HEALTH CARE EDUCATION/TRAINING PROGRAM

## 2023-03-25 PROCEDURE — 11000001 HC ACUTE MED/SURG PRIVATE ROOM

## 2023-03-25 PROCEDURE — 80053 COMPREHEN METABOLIC PANEL: CPT | Performed by: NURSE PRACTITIONER

## 2023-03-25 PROCEDURE — 85025 COMPLETE CBC W/AUTO DIFF WBC: CPT | Performed by: NURSE PRACTITIONER

## 2023-03-25 PROCEDURE — 97165 OT EVAL LOW COMPLEX 30 MIN: CPT

## 2023-03-25 PROCEDURE — 97162 PT EVAL MOD COMPLEX 30 MIN: CPT

## 2023-03-25 PROCEDURE — 25000003 PHARM REV CODE 250

## 2023-03-25 PROCEDURE — 63600175 PHARM REV CODE 636 W HCPCS: Performed by: NURSE PRACTITIONER

## 2023-03-25 RX ORDER — POLYETHYLENE GLYCOL 3350 17 G/17G
34 POWDER, FOR SOLUTION ORAL 2 TIMES DAILY
Status: DISCONTINUED | OUTPATIENT
Start: 2023-03-25 | End: 2023-03-27 | Stop reason: HOSPADM

## 2023-03-25 RX ADMIN — METHOCARBAMOL 750 MG: 750 TABLET ORAL at 02:03

## 2023-03-25 RX ADMIN — MUPIROCIN: 20 OINTMENT TOPICAL at 08:03

## 2023-03-25 RX ADMIN — MUPIROCIN: 20 OINTMENT TOPICAL at 09:03

## 2023-03-25 RX ADMIN — KETOROLAC TROMETHAMINE 15 MG: 30 INJECTION, SOLUTION INTRAMUSCULAR at 01:03

## 2023-03-25 RX ADMIN — ENOXAPARIN SODIUM 40 MG: 40 INJECTION SUBCUTANEOUS at 08:03

## 2023-03-25 RX ADMIN — GABAPENTIN 300 MG: 300 CAPSULE ORAL at 02:03

## 2023-03-25 RX ADMIN — POLYETHYLENE GLYCOL 3350 34 G: 17 POWDER, FOR SOLUTION ORAL at 09:03

## 2023-03-25 RX ADMIN — ACETAMINOPHEN 325MG 650 MG: 325 TABLET ORAL at 05:03

## 2023-03-25 RX ADMIN — DOCUSATE SODIUM 100 MG: 100 CAPSULE, LIQUID FILLED ORAL at 09:03

## 2023-03-25 RX ADMIN — GABAPENTIN 300 MG: 300 CAPSULE ORAL at 09:03

## 2023-03-25 RX ADMIN — ENOXAPARIN SODIUM 40 MG: 40 INJECTION SUBCUTANEOUS at 09:03

## 2023-03-25 RX ADMIN — KETOROLAC TROMETHAMINE 15 MG: 30 INJECTION, SOLUTION INTRAMUSCULAR at 07:03

## 2023-03-25 RX ADMIN — KETOROLAC TROMETHAMINE 15 MG: 30 INJECTION, SOLUTION INTRAMUSCULAR at 12:03

## 2023-03-25 RX ADMIN — METHOCARBAMOL 750 MG: 750 TABLET ORAL at 09:03

## 2023-03-25 RX ADMIN — GABAPENTIN 300 MG: 300 CAPSULE ORAL at 08:03

## 2023-03-25 RX ADMIN — METHOCARBAMOL 750 MG: 750 TABLET ORAL at 08:03

## 2023-03-25 RX ADMIN — ACETAMINOPHEN 325MG 650 MG: 325 TABLET ORAL at 09:03

## 2023-03-25 RX ADMIN — Medication 6 MG: at 08:03

## 2023-03-25 RX ADMIN — ACETAMINOPHEN 325MG 650 MG: 325 TABLET ORAL at 02:03

## 2023-03-25 RX ADMIN — DOCUSATE SODIUM 100 MG: 100 CAPSULE, LIQUID FILLED ORAL at 08:03

## 2023-03-25 RX ADMIN — POLYETHYLENE GLYCOL 3350 34 G: 17 POWDER, FOR SOLUTION ORAL at 08:03

## 2023-03-25 RX ADMIN — SODIUM CHLORIDE: 9 INJECTION, SOLUTION INTRAVENOUS at 03:03

## 2023-03-25 RX ADMIN — KETOROLAC TROMETHAMINE 15 MG: 30 INJECTION, SOLUTION INTRAMUSCULAR at 05:03

## 2023-03-25 RX ADMIN — OXYCODONE HYDROCHLORIDE 5 MG: 5 TABLET ORAL at 05:03
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
## 2023-03-25 NOTE — PLAN OF CARE
Problem: Physical Therapy  Goal: Physical Therapy Goal  Description: Goals to be met by: 23     Patient will increase functional independence with mobility by performin. Sit to stand transfer with Modified [place]  2. Bed to chair transfer with Modified [place] using No Assistive Device  3. Gait  x 350 feet with Modified [place] using No Assistive Device.     Outcome: Ongoing, Progressing
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
## 2023-03-26 LAB
ALBUMIN SERPL-MCNC: 2.5 G/DL (ref 3.5–5)
ALBUMIN/GLOB SERPL: 0.9 RATIO (ref 1.1–2)
ALP SERPL-CCNC: 58 UNIT/L (ref 40–150)
ALT SERPL-CCNC: 12 UNIT/L (ref 0–55)
AST SERPL-CCNC: 13 UNIT/L (ref 5–34)
BASOPHILS # BLD AUTO: 0.04 X10(3)/MCL (ref 0–0.2)
BASOPHILS NFR BLD AUTO: 0.4 %
BILIRUBIN DIRECT+TOT PNL SERPL-MCNC: 0.4 MG/DL
BUN SERPL-MCNC: 4 MG/DL (ref 8.9–20.6)
CALCIUM SERPL-MCNC: 8.5 MG/DL (ref 8.4–10.2)
CHLORIDE SERPL-SCNC: 108 MMOL/L (ref 98–107)
CO2 SERPL-SCNC: 24 MMOL/L (ref 22–29)
CREAT SERPL-MCNC: 0.62 MG/DL (ref 0.73–1.18)
EOSINOPHIL # BLD AUTO: 0.11 X10(3)/MCL (ref 0–0.9)
EOSINOPHIL NFR BLD AUTO: 1 %
ERYTHROCYTE [DISTWIDTH] IN BLOOD BY AUTOMATED COUNT: 14.6 % (ref 11.5–17)
GFR SERPLBLD CREATININE-BSD FMLA CKD-EPI: >60 MLS/MIN/1.73/M2
GLOBULIN SER-MCNC: 2.9 GM/DL (ref 2.4–3.5)
GLUCOSE SERPL-MCNC: 98 MG/DL (ref 74–100)
HCT VFR BLD AUTO: 33.9 % (ref 42–52)
HGB BLD-MCNC: 11.2 G/DL (ref 14–18)
IMM GRANULOCYTES # BLD AUTO: 0.03 X10(3)/MCL (ref 0–0.04)
IMM GRANULOCYTES NFR BLD AUTO: 0.3 %
LYMPHOCYTES # BLD AUTO: 3.19 X10(3)/MCL (ref 0.6–4.6)
LYMPHOCYTES NFR BLD AUTO: 29.8 %
MCH RBC QN AUTO: 30.5 PG (ref 27–31)
MCHC RBC AUTO-ENTMCNC: 33 G/DL (ref 33–36)
MCV RBC AUTO: 92.4 FL (ref 80–94)
MONOCYTES # BLD AUTO: 0.7 X10(3)/MCL (ref 0.1–1.3)
MONOCYTES NFR BLD AUTO: 6.5 %
NEUTROPHILS # BLD AUTO: 6.64 X10(3)/MCL (ref 2.1–9.2)
NEUTROPHILS NFR BLD AUTO: 62 %
NRBC BLD AUTO-RTO: 0 %
PLATELET # BLD AUTO: 356 X10(3)/MCL (ref 130–400)
PMV BLD AUTO: 10.3 FL (ref 7.4–10.4)
POTASSIUM SERPL-SCNC: 3.9 MMOL/L (ref 3.5–5.1)
PROT SERPL-MCNC: 5.4 GM/DL (ref 6.4–8.3)
RBC # BLD AUTO: 3.67 X10(6)/MCL (ref 4.7–6.1)
SODIUM SERPL-SCNC: 141 MMOL/L (ref 136–145)
WBC # SPEC AUTO: 10.7 X10(3)/MCL (ref 4.5–11.5)

## 2023-03-26 PROCEDURE — 25000003 PHARM REV CODE 250: Performed by: NURSE PRACTITIONER

## 2023-03-26 PROCEDURE — 80053 COMPREHEN METABOLIC PANEL: CPT | Performed by: NURSE PRACTITIONER

## 2023-03-26 PROCEDURE — 25000003 PHARM REV CODE 250: Performed by: STUDENT IN AN ORGANIZED HEALTH CARE EDUCATION/TRAINING PROGRAM

## 2023-03-26 PROCEDURE — 85025 COMPLETE CBC W/AUTO DIFF WBC: CPT | Performed by: NURSE PRACTITIONER

## 2023-03-26 PROCEDURE — 25000003 PHARM REV CODE 250

## 2023-03-26 PROCEDURE — 11000001 HC ACUTE MED/SURG PRIVATE ROOM

## 2023-03-26 PROCEDURE — 63600175 PHARM REV CODE 636 W HCPCS: Performed by: NURSE PRACTITIONER

## 2023-03-26 RX ADMIN — ACETAMINOPHEN 325MG 650 MG: 325 TABLET ORAL at 05:03

## 2023-03-26 RX ADMIN — METHOCARBAMOL 750 MG: 750 TABLET ORAL at 08:03

## 2023-03-26 RX ADMIN — KETOROLAC TROMETHAMINE 15 MG: 30 INJECTION, SOLUTION INTRAMUSCULAR at 05:03

## 2023-03-26 RX ADMIN — SODIUM CHLORIDE: 9 INJECTION, SOLUTION INTRAVENOUS at 06:03

## 2023-03-26 RX ADMIN — GABAPENTIN 300 MG: 300 CAPSULE ORAL at 09:03

## 2023-03-26 RX ADMIN — METHOCARBAMOL 750 MG: 750 TABLET ORAL at 09:03

## 2023-03-26 RX ADMIN — OXYCODONE HYDROCHLORIDE 5 MG: 5 TABLET ORAL at 12:03

## 2023-03-26 RX ADMIN — GABAPENTIN 300 MG: 300 CAPSULE ORAL at 02:03

## 2023-03-26 RX ADMIN — ENOXAPARIN SODIUM 40 MG: 40 INJECTION SUBCUTANEOUS at 08:03

## 2023-03-26 RX ADMIN — KETOROLAC TROMETHAMINE 15 MG: 30 INJECTION, SOLUTION INTRAMUSCULAR at 12:03

## 2023-03-26 RX ADMIN — ACETAMINOPHEN 325MG 650 MG: 325 TABLET ORAL at 08:03

## 2023-03-26 RX ADMIN — ENOXAPARIN SODIUM 40 MG: 40 INJECTION SUBCUTANEOUS at 09:03

## 2023-03-26 RX ADMIN — OXYCODONE HYDROCHLORIDE 5 MG: 5 TABLET ORAL at 02:03

## 2023-03-26 RX ADMIN — SODIUM CHLORIDE: 9 INJECTION, SOLUTION INTRAVENOUS at 12:03

## 2023-03-26 RX ADMIN — OXYCODONE HYDROCHLORIDE 10 MG: 5 TABLET ORAL at 08:03

## 2023-03-26 RX ADMIN — POLYETHYLENE GLYCOL 3350 34 G: 17 POWDER, FOR SOLUTION ORAL at 09:03

## 2023-03-26 RX ADMIN — OXYCODONE HYDROCHLORIDE 5 MG: 5 TABLET ORAL at 05:03

## 2023-03-26 RX ADMIN — MUPIROCIN: 20 OINTMENT TOPICAL at 08:03

## 2023-03-26 RX ADMIN — DOCUSATE SODIUM 100 MG: 100 CAPSULE, LIQUID FILLED ORAL at 08:03

## 2023-03-26 RX ADMIN — DOCUSATE SODIUM 100 MG: 100 CAPSULE, LIQUID FILLED ORAL at 09:03

## 2023-03-26 RX ADMIN — GABAPENTIN 300 MG: 300 CAPSULE ORAL at 08:03

## 2023-03-26 RX ADMIN — KETOROLAC TROMETHAMINE 15 MG: 30 INJECTION, SOLUTION INTRAMUSCULAR at 06:03

## 2023-03-26 RX ADMIN — METHOCARBAMOL 750 MG: 750 TABLET ORAL at 02:03

## 2023-03-26 RX ADMIN — MUPIROCIN: 20 OINTMENT TOPICAL at 09:03

## 2023-03-26 RX ADMIN — ACETAMINOPHEN 325MG 650 MG: 325 TABLET ORAL at 02:03

## 2023-03-26 RX ADMIN — ACETAMINOPHEN 325MG 650 MG: 325 TABLET ORAL at 12:03
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
## 2023-03-27 VITALS
HEIGHT: 68 IN | DIASTOLIC BLOOD PRESSURE: 92 MMHG | HEART RATE: 84 BPM | OXYGEN SATURATION: 99 % | RESPIRATION RATE: 20 BRPM | BODY MASS INDEX: 22.73 KG/M2 | TEMPERATURE: 98 F | WEIGHT: 150 LBS | SYSTOLIC BLOOD PRESSURE: 148 MMHG

## 2023-03-27 LAB
ALBUMIN SERPL-MCNC: 2.4 G/DL (ref 3.5–5)
ALBUMIN/GLOB SERPL: 0.6 RATIO (ref 1.1–2)
ALP SERPL-CCNC: 69 UNIT/L (ref 40–150)
ALT SERPL-CCNC: 28 UNIT/L (ref 0–55)
AST SERPL-CCNC: 32 UNIT/L (ref 5–34)
BASOPHILS # BLD AUTO: 0.04 X10(3)/MCL (ref 0–0.2)
BASOPHILS NFR BLD AUTO: 0.4 %
BILIRUBIN DIRECT+TOT PNL SERPL-MCNC: 0.3 MG/DL
BUN SERPL-MCNC: 6 MG/DL (ref 8.9–20.6)
CALCIUM SERPL-MCNC: 8.6 MG/DL (ref 8.4–10.2)
CHLORIDE SERPL-SCNC: 106 MMOL/L (ref 98–107)
CO2 SERPL-SCNC: 24 MMOL/L (ref 22–29)
CREAT SERPL-MCNC: 0.71 MG/DL (ref 0.73–1.18)
CRP SERPL-MCNC: 20.5 MG/L
EOSINOPHIL # BLD AUTO: 0.14 X10(3)/MCL (ref 0–0.9)
EOSINOPHIL NFR BLD AUTO: 1.3 %
ERYTHROCYTE [DISTWIDTH] IN BLOOD BY AUTOMATED COUNT: 14.3 % (ref 11.5–17)
GFR SERPLBLD CREATININE-BSD FMLA CKD-EPI: >60 MLS/MIN/1.73/M2
GLOBULIN SER-MCNC: 3.7 GM/DL (ref 2.4–3.5)
GLUCOSE SERPL-MCNC: 125 MG/DL (ref 74–100)
HCT VFR BLD AUTO: 35 % (ref 42–52)
HGB BLD-MCNC: 11.4 G/DL (ref 14–18)
IMM GRANULOCYTES # BLD AUTO: 0.05 X10(3)/MCL (ref 0–0.04)
IMM GRANULOCYTES NFR BLD AUTO: 0.5 %
LYMPHOCYTES # BLD AUTO: 2.59 X10(3)/MCL (ref 0.6–4.6)
LYMPHOCYTES NFR BLD AUTO: 23.7 %
MCH RBC QN AUTO: 29.8 PG (ref 27–31)
MCHC RBC AUTO-ENTMCNC: 32.6 G/DL (ref 33–36)
MCV RBC AUTO: 91.4 FL (ref 80–94)
MONOCYTES # BLD AUTO: 0.6 X10(3)/MCL (ref 0.1–1.3)
MONOCYTES NFR BLD AUTO: 5.5 %
NEUTROPHILS # BLD AUTO: 7.49 X10(3)/MCL (ref 2.1–9.2)
NEUTROPHILS NFR BLD AUTO: 68.6 %
NRBC BLD AUTO-RTO: 0 %
PLATELET # BLD AUTO: 371 X10(3)/MCL (ref 130–400)
PMV BLD AUTO: 10.6 FL (ref 7.4–10.4)
POTASSIUM SERPL-SCNC: 4 MMOL/L (ref 3.5–5.1)
PREALB SERPL-MCNC: 11.9 MG/DL (ref 18–45)
PROT SERPL-MCNC: 6.1 GM/DL (ref 6.4–8.3)
RBC # BLD AUTO: 3.83 X10(6)/MCL (ref 4.7–6.1)
SODIUM SERPL-SCNC: 138 MMOL/L (ref 136–145)
WBC # SPEC AUTO: 10.9 X10(3)/MCL (ref 4.5–11.5)

## 2023-03-27 PROCEDURE — 85025 COMPLETE CBC W/AUTO DIFF WBC: CPT | Performed by: NURSE PRACTITIONER

## 2023-03-27 PROCEDURE — 86140 C-REACTIVE PROTEIN: CPT | Performed by: NURSE PRACTITIONER

## 2023-03-27 PROCEDURE — 84134 ASSAY OF PREALBUMIN: CPT | Performed by: NURSE PRACTITIONER

## 2023-03-27 PROCEDURE — 25000003 PHARM REV CODE 250: Performed by: NURSE PRACTITIONER

## 2023-03-27 PROCEDURE — 63600175 PHARM REV CODE 636 W HCPCS: Performed by: NURSE PRACTITIONER

## 2023-03-27 PROCEDURE — 80053 COMPREHEN METABOLIC PANEL: CPT | Performed by: NURSE PRACTITIONER

## 2023-03-27 PROCEDURE — 99223 PR INITIAL HOSPITAL CARE,LEVL III: ICD-10-PCS | Mod: ,,, | Performed by: SURGERY

## 2023-03-27 PROCEDURE — 99223 1ST HOSP IP/OBS HIGH 75: CPT | Mod: ,,, | Performed by: SURGERY

## 2023-03-27 PROCEDURE — 97116 GAIT TRAINING THERAPY: CPT | Mod: CQ

## 2023-03-27 RX ORDER — GABAPENTIN 300 MG/1
300 CAPSULE ORAL 3 TIMES DAILY
Qty: 21 CAPSULE | Refills: 0 | Status: SHIPPED | OUTPATIENT
Start: 2023-03-27 | End: 2023-11-07 | Stop reason: ALTCHOICE

## 2023-03-27 RX ORDER — OXYCODONE HYDROCHLORIDE 10 MG/1
10 TABLET ORAL EVERY 4 HOURS PRN
Qty: 10 TABLET | Refills: 0 | Status: SHIPPED | OUTPATIENT
Start: 2023-03-27 | End: 2023-11-07 | Stop reason: ALTCHOICE

## 2023-03-27 RX ORDER — METHOCARBAMOL 750 MG/1
750 TABLET, FILM COATED ORAL 3 TIMES DAILY
Qty: 21 TABLET | Refills: 0 | Status: SHIPPED | OUTPATIENT
Start: 2023-03-27 | End: 2023-04-03

## 2023-03-27 RX ADMIN — ENOXAPARIN SODIUM 40 MG: 40 INJECTION SUBCUTANEOUS at 09:03

## 2023-03-27 RX ADMIN — MUPIROCIN: 20 OINTMENT TOPICAL at 09:03

## 2023-03-27 RX ADMIN — SODIUM CHLORIDE: 9 INJECTION, SOLUTION INTRAVENOUS at 01:03

## 2023-03-27 RX ADMIN — METHOCARBAMOL 750 MG: 750 TABLET ORAL at 09:03

## 2023-03-27 RX ADMIN — GABAPENTIN 300 MG: 300 CAPSULE ORAL at 09:03

## 2023-03-27 RX ADMIN — METHOCARBAMOL 750 MG: 750 TABLET ORAL at 05:03

## 2023-03-27 RX ADMIN — OXYCODONE HYDROCHLORIDE 10 MG: 5 TABLET ORAL at 04:03

## 2023-03-27 RX ADMIN — GABAPENTIN 300 MG: 300 CAPSULE ORAL at 05:03

## 2023-03-27 RX ADMIN — OXYCODONE HYDROCHLORIDE 10 MG: 5 TABLET ORAL at 09:03

## 2023-03-27 RX ADMIN — ACETAMINOPHEN 325MG 650 MG: 325 TABLET ORAL at 03:03

## 2023-03-27 RX ADMIN — DOCUSATE SODIUM 100 MG: 100 CAPSULE, LIQUID FILLED ORAL at 09:03

## 2023-03-27 RX ADMIN — OXYCODONE HYDROCHLORIDE 10 MG: 5 TABLET ORAL at 01:03

## 2023-03-27 RX ADMIN — ACETAMINOPHEN 325MG 650 MG: 325 TABLET ORAL at 04:03

## 2023-03-27 NOTE — NURSING
Patient discharged home. All post op instructions, Rx and follow ups given and explained to patient. Patient stated he understood instructions. Prescriptions were given to patient to be filled. Extra wound care supplies was given to patient and I educated patient. Patient in stable condition. All questions answered.
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
## 2023-03-29 ENCOUNTER — PATIENT OUTREACH (OUTPATIENT)
Dept: ADMINISTRATIVE | Facility: CLINIC | Age: 38
End: 2023-03-29
Payer: MEDICAID

## 2023-03-29 DIAGNOSIS — K40.90 INGUINAL HERNIA: Primary | ICD-10-CM

## 2023-03-29 NOTE — PROGRESS NOTES
C3 nurse attempted to contact Ranjeet Ball for a TCC post hospital discharge follow up call but number on file is invalid.  C3 nurse contacted alternate contact which is patient's sister and provided contact information for patient to return the call.  Patient's sister stated she would give him the message. The patient has a scheduled HOS appointment with [facility] Surgery on 4/4/23 @ 1000 for staple removal.

## 2023-03-29 NOTE — PROGRESS NOTES
Patient returned call to TCM voicemail system. C3 nurse attempted to reach patient but there was no answer.  Voicemail left.

## 2023-03-30 NOTE — PROGRESS NOTES
C3 nurse attempted to contact Ranjeet Lizzy for a TCC post hospital discharge follow up call at sister's number.  No answer, left voicemail. The patient has a scheduled HOSFU appointment with [facility] Surgery on 4/4/23 @ 1000 for staple removal.

## 2023-04-04 ENCOUNTER — DOCUMENTATION ONLY (OUTPATIENT)
Dept: SURGERY | Facility: HOSPITAL | Age: 38
End: 2023-04-04
Payer: MEDICAID
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
## 2023-04-18 ENCOUNTER — DOCUMENTATION ONLY (OUTPATIENT)
Dept: SURGERY | Facility: HOSPITAL | Age: 38
End: 2023-04-18
Payer: MEDICAID

## 2023-04-18 NOTE — PROGRESS NOTES
Presented to clinic for suture visible in superior aspect of midline incision.  Trimmed small portion of suture.  Wound well healed.  Knot not visible.  Abdomen is soft nontender.  No other complaints.

## 2023-05-02 ENCOUNTER — OFFICE VISIT (OUTPATIENT)
Dept: INTERNAL MEDICINE | Facility: CLINIC | Age: 38
End: 2023-05-02
Payer: MEDICAID

## 2023-05-02 ENCOUNTER — OFFICE VISIT (OUTPATIENT)
Dept: SURGERY | Facility: CLINIC | Age: 38
End: 2023-05-02
Payer: MEDICAID

## 2023-05-02 VITALS
OXYGEN SATURATION: 99 % | HEART RATE: 102 BPM | DIASTOLIC BLOOD PRESSURE: 82 MMHG | BODY MASS INDEX: 18.84 KG/M2 | TEMPERATURE: 99 F | HEIGHT: 66 IN | SYSTOLIC BLOOD PRESSURE: 130 MMHG | WEIGHT: 117.19 LBS

## 2023-05-02 VITALS
BODY MASS INDEX: 19.19 KG/M2 | DIASTOLIC BLOOD PRESSURE: 78 MMHG | WEIGHT: 119.38 LBS | TEMPERATURE: 98 F | HEIGHT: 66 IN | HEART RATE: 108 BPM | OXYGEN SATURATION: 100 % | RESPIRATION RATE: 18 BRPM | SYSTOLIC BLOOD PRESSURE: 124 MMHG

## 2023-05-02 DIAGNOSIS — R39.11 URINARY HESITANCY: ICD-10-CM

## 2023-05-02 DIAGNOSIS — Z00.00 WELLNESS EXAMINATION: ICD-10-CM

## 2023-05-02 DIAGNOSIS — F41.9 ANXIETY: Primary | ICD-10-CM

## 2023-05-02 DIAGNOSIS — N50.819 PAIN IN TESTICLE, UNSPECIFIED LATERALITY: Primary | ICD-10-CM

## 2023-05-02 PROCEDURE — 99214 OFFICE O/P EST MOD 30 MIN: CPT | Mod: PBBFAC

## 2023-05-02 PROCEDURE — 3074F PR MOST RECENT SYSTOLIC BLOOD PRESSURE < 130 MM HG: ICD-10-PCS | Mod: CPTII,,, | Performed by: NURSE PRACTITIONER

## 2023-05-02 PROCEDURE — 99203 OFFICE O/P NEW LOW 30 MIN: CPT | Mod: S$PBB,,, | Performed by: NURSE PRACTITIONER

## 2023-05-02 PROCEDURE — 1159F PR MEDICATION LIST DOCUMENTED IN MEDICAL RECORD: ICD-10-PCS | Mod: CPTII,,, | Performed by: NURSE PRACTITIONER

## 2023-05-02 PROCEDURE — 3008F BODY MASS INDEX DOCD: CPT | Mod: CPTII,,, | Performed by: NURSE PRACTITIONER

## 2023-05-02 PROCEDURE — 3078F PR MOST RECENT DIASTOLIC BLOOD PRESSURE < 80 MM HG: ICD-10-PCS | Mod: CPTII,,, | Performed by: NURSE PRACTITIONER

## 2023-05-02 PROCEDURE — 3008F PR BODY MASS INDEX (BMI) DOCUMENTED: ICD-10-PCS | Mod: CPTII,,, | Performed by: NURSE PRACTITIONER

## 2023-05-02 PROCEDURE — 3074F SYST BP LT 130 MM HG: CPT | Mod: CPTII,,, | Performed by: NURSE PRACTITIONER

## 2023-05-02 PROCEDURE — 1160F PR REVIEW ALL MEDS BY PRESCRIBER/CLIN PHARMACIST DOCUMENTED: ICD-10-PCS | Mod: CPTII,,, | Performed by: NURSE PRACTITIONER

## 2023-05-02 PROCEDURE — 1160F RVW MEDS BY RX/DR IN RCRD: CPT | Mod: CPTII,,, | Performed by: NURSE PRACTITIONER

## 2023-05-02 PROCEDURE — 1159F MED LIST DOCD IN RCRD: CPT | Mod: CPTII,,, | Performed by: NURSE PRACTITIONER

## 2023-05-02 PROCEDURE — 99214 OFFICE O/P EST MOD 30 MIN: CPT | Mod: PBBFAC,27 | Performed by: NURSE PRACTITIONER

## 2023-05-02 PROCEDURE — 3078F DIAST BP <80 MM HG: CPT | Mod: CPTII,,, | Performed by: NURSE PRACTITIONER

## 2023-05-02 PROCEDURE — 99203 PR OFFICE/OUTPT VISIT, NEW, LEVL III, 30-44 MIN: ICD-10-PCS | Mod: S$PBB,,, | Performed by: NURSE PRACTITIONER

## 2023-05-02 NOTE — PROGRESS NOTES
I have reviewed the notes, assessments, and/or procedures performed by the resident, I concur with her/his documentation of Ranjeet Ball.     Nafisa Villatoro MD
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
## 2023-05-02 NOTE — PROGRESS NOTES
Pt in Surgery clinic today for R inguinal hernia.  Evaluated today per Dr. JOE Dickey.  Will have scrotal U/S and RTC in 1 month.
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
## 2023-05-23 ENCOUNTER — APPOINTMENT (OUTPATIENT)
Dept: LAB | Facility: HOSPITAL | Age: 38
End: 2023-05-23
Payer: MEDICAID

## 2023-05-23 LAB
APPEARANCE UR: CLEAR
BACTERIA #/AREA URNS AUTO: ABNORMAL /HPF
BILIRUB UR QL STRIP.AUTO: NEGATIVE MG/DL
COLOR UR: ABNORMAL
GLUCOSE UR QL STRIP.AUTO: NORMAL MG/DL
HYALINE CASTS #/AREA URNS LPF: ABNORMAL /LPF
KETONES UR QL STRIP.AUTO: NEGATIVE MG/DL
LEUKOCYTE ESTERASE UR QL STRIP.AUTO: 75 UNIT/L
MUCOUS THREADS URNS QL MICRO: ABNORMAL /LPF
NITRITE UR QL STRIP.AUTO: NEGATIVE
PH UR STRIP.AUTO: 7 [PH]
PROT UR QL STRIP.AUTO: NEGATIVE MG/DL
RBC #/AREA URNS AUTO: ABNORMAL /HPF
RBC UR QL AUTO: NEGATIVE UNIT/L
SP GR UR STRIP.AUTO: 1.02
SQUAMOUS #/AREA URNS LPF: ABNORMAL /HPF
UROBILINOGEN UR STRIP-ACNC: NORMAL MG/DL
WBC #/AREA URNS AUTO: ABNORMAL /HPF

## 2023-05-25 LAB — BACTERIA UR CULT: NORMAL

## 2023-06-06 ENCOUNTER — OFFICE VISIT (OUTPATIENT)
Dept: SURGERY | Facility: CLINIC | Age: 38
End: 2023-06-06
Payer: MEDICAID

## 2023-06-06 VITALS
HEART RATE: 72 BPM | RESPIRATION RATE: 18 BRPM | SYSTOLIC BLOOD PRESSURE: 122 MMHG | HEIGHT: 65 IN | DIASTOLIC BLOOD PRESSURE: 78 MMHG | WEIGHT: 123.63 LBS | BODY MASS INDEX: 20.6 KG/M2 | OXYGEN SATURATION: 96 %

## 2023-06-06 DIAGNOSIS — N50.89 TESTICULAR SWELLING: Primary | ICD-10-CM

## 2023-06-06 PROCEDURE — 99214 OFFICE O/P EST MOD 30 MIN: CPT | Mod: PBBFAC
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
## 2023-06-06 NOTE — PROGRESS NOTES
Pt seen by Dr. Tinsley. Pt will be referred to Urology and also have orders placed for CT scan to be done. Pt education given both written and verbal.

## 2023-06-14 ENCOUNTER — HOSPITAL ENCOUNTER (OUTPATIENT)
Dept: RADIOLOGY | Facility: HOSPITAL | Age: 38
Discharge: HOME | End: 2023-06-14
Payer: MEDICAID

## 2023-06-14 DIAGNOSIS — N50.819 PAIN IN TESTICLE, UNSPECIFIED LATERALITY: ICD-10-CM

## 2023-06-14 PROCEDURE — 76870 US EXAM SCROTUM: CPT | Mod: TC

## 2023-08-18 ENCOUNTER — OFFICE VISIT (OUTPATIENT)
Dept: UROLOGY | Facility: CLINIC | Age: 38
End: 2023-08-18
Payer: MEDICAID

## 2023-08-18 VITALS
DIASTOLIC BLOOD PRESSURE: 67 MMHG | HEART RATE: 69 BPM | RESPIRATION RATE: 20 BRPM | BODY MASS INDEX: 20.43 KG/M2 | WEIGHT: 122.63 LBS | OXYGEN SATURATION: 99 % | TEMPERATURE: 98 F | SYSTOLIC BLOOD PRESSURE: 102 MMHG | HEIGHT: 65 IN

## 2023-08-18 DIAGNOSIS — N50.89 TESTICULAR SWELLING: ICD-10-CM

## 2023-08-18 DIAGNOSIS — N45.1 EPIDIDYMITIS: Primary | ICD-10-CM

## 2023-08-18 PROCEDURE — 99214 OFFICE O/P EST MOD 30 MIN: CPT | Mod: S$PBB,,, | Performed by: NURSE PRACTITIONER

## 2023-08-18 PROCEDURE — 99214 PR OFFICE/OUTPT VISIT, EST, LEVL IV, 30-39 MIN: ICD-10-PCS | Mod: S$PBB,,, | Performed by: NURSE PRACTITIONER

## 2023-08-18 PROCEDURE — 3044F HG A1C LEVEL LT 7.0%: CPT | Mod: CPTII,,, | Performed by: NURSE PRACTITIONER

## 2023-08-18 PROCEDURE — 3008F PR BODY MASS INDEX (BMI) DOCUMENTED: ICD-10-PCS | Mod: CPTII,,, | Performed by: NURSE PRACTITIONER

## 2023-08-18 PROCEDURE — 1159F MED LIST DOCD IN RCRD: CPT | Mod: CPTII,,, | Performed by: NURSE PRACTITIONER

## 2023-08-18 PROCEDURE — 3078F PR MOST RECENT DIASTOLIC BLOOD PRESSURE < 80 MM HG: ICD-10-PCS | Mod: CPTII,,, | Performed by: NURSE PRACTITIONER

## 2023-08-18 PROCEDURE — 3074F PR MOST RECENT SYSTOLIC BLOOD PRESSURE < 130 MM HG: ICD-10-PCS | Mod: CPTII,,, | Performed by: NURSE PRACTITIONER

## 2023-08-18 PROCEDURE — 3008F BODY MASS INDEX DOCD: CPT | Mod: CPTII,,, | Performed by: NURSE PRACTITIONER

## 2023-08-18 PROCEDURE — 99214 OFFICE O/P EST MOD 30 MIN: CPT | Mod: PBBFAC | Performed by: NURSE PRACTITIONER

## 2023-08-18 PROCEDURE — 3044F PR MOST RECENT HEMOGLOBIN A1C LEVEL <7.0%: ICD-10-PCS | Mod: CPTII,,, | Performed by: NURSE PRACTITIONER

## 2023-08-18 PROCEDURE — 3074F SYST BP LT 130 MM HG: CPT | Mod: CPTII,,, | Performed by: NURSE PRACTITIONER

## 2023-08-18 PROCEDURE — 3078F DIAST BP <80 MM HG: CPT | Mod: CPTII,,, | Performed by: NURSE PRACTITIONER

## 2023-08-18 PROCEDURE — 1159F PR MEDICATION LIST DOCUMENTED IN MEDICAL RECORD: ICD-10-PCS | Mod: CPTII,,, | Performed by: NURSE PRACTITIONER

## 2023-08-18 RX ORDER — LEVOFLOXACIN 500 MG/1
500 TABLET, FILM COATED ORAL DAILY
Qty: 7 TABLET | Refills: 0 | Status: SHIPPED | OUTPATIENT
Start: 2023-08-18 | End: 2023-08-18 | Stop reason: SDUPTHER

## 2023-08-18 RX ORDER — LEVOFLOXACIN 500 MG/1
500 TABLET, FILM COATED ORAL DAILY
Qty: 7 TABLET | Refills: 0 | Status: SHIPPED | OUTPATIENT
Start: 2023-08-18 | End: 2023-11-07 | Stop reason: ALTCHOICE
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
## 2023-11-07 ENCOUNTER — HOSPITAL ENCOUNTER (OUTPATIENT)
Dept: RADIOLOGY | Facility: HOSPITAL | Age: 38
Discharge: HOME | End: 2023-11-07
Attending: NURSE PRACTITIONER
Payer: MEDICAID

## 2023-11-07 ENCOUNTER — OFFICE VISIT (OUTPATIENT)
Dept: FAMILY MEDICINE | Facility: CLINIC | Age: 38
End: 2023-11-07
Payer: MEDICAID

## 2023-11-07 ENCOUNTER — TELEPHONE (OUTPATIENT)
Dept: FAMILY MEDICINE | Facility: CLINIC | Age: 38
End: 2023-11-07

## 2023-11-07 VITALS
TEMPERATURE: 98 F | WEIGHT: 122.38 LBS | DIASTOLIC BLOOD PRESSURE: 68 MMHG | HEART RATE: 73 BPM | SYSTOLIC BLOOD PRESSURE: 106 MMHG | OXYGEN SATURATION: 99 % | RESPIRATION RATE: 16 BRPM | BODY MASS INDEX: 19.67 KG/M2 | HEIGHT: 66 IN

## 2023-11-07 DIAGNOSIS — M25.539 PAIN IN WRIST, UNSPECIFIED LATERALITY: ICD-10-CM

## 2023-11-07 DIAGNOSIS — M25.539 PAIN IN WRIST, UNSPECIFIED LATERALITY: Primary | ICD-10-CM

## 2023-11-07 DIAGNOSIS — M25.539 WRIST PAIN: ICD-10-CM

## 2023-11-07 DIAGNOSIS — Z76.89 ENCOUNTER TO ESTABLISH CARE: ICD-10-CM

## 2023-11-07 DIAGNOSIS — I25.2 HISTORY OF MI (MYOCARDIAL INFARCTION): ICD-10-CM

## 2023-11-07 PROCEDURE — 3008F PR BODY MASS INDEX (BMI) DOCUMENTED: ICD-10-PCS | Mod: CPTII,,, | Performed by: NURSE PRACTITIONER

## 2023-11-07 PROCEDURE — 73110 X-RAY EXAM OF WRIST: CPT | Mod: TC,RT

## 2023-11-07 PROCEDURE — 1159F PR MEDICATION LIST DOCUMENTED IN MEDICAL RECORD: ICD-10-PCS | Mod: CPTII,,, | Performed by: NURSE PRACTITIONER

## 2023-11-07 PROCEDURE — 3044F PR MOST RECENT HEMOGLOBIN A1C LEVEL <7.0%: ICD-10-PCS | Mod: CPTII,,, | Performed by: NURSE PRACTITIONER

## 2023-11-07 PROCEDURE — 1159F MED LIST DOCD IN RCRD: CPT | Mod: CPTII,,, | Performed by: NURSE PRACTITIONER

## 2023-11-07 PROCEDURE — 3044F HG A1C LEVEL LT 7.0%: CPT | Mod: CPTII,,, | Performed by: NURSE PRACTITIONER

## 2023-11-07 PROCEDURE — 3074F SYST BP LT 130 MM HG: CPT | Mod: CPTII,,, | Performed by: NURSE PRACTITIONER

## 2023-11-07 PROCEDURE — 3074F PR MOST RECENT SYSTOLIC BLOOD PRESSURE < 130 MM HG: ICD-10-PCS | Mod: CPTII,,, | Performed by: NURSE PRACTITIONER

## 2023-11-07 PROCEDURE — 73110 X-RAY EXAM OF WRIST: CPT | Mod: TC,LT

## 2023-11-07 PROCEDURE — 3008F BODY MASS INDEX DOCD: CPT | Mod: CPTII,,, | Performed by: NURSE PRACTITIONER

## 2023-11-07 PROCEDURE — 99214 PR OFFICE/OUTPT VISIT, EST, LEVL IV, 30-39 MIN: ICD-10-PCS | Mod: ,,, | Performed by: NURSE PRACTITIONER

## 2023-11-07 PROCEDURE — 3078F DIAST BP <80 MM HG: CPT | Mod: CPTII,,, | Performed by: NURSE PRACTITIONER

## 2023-11-07 PROCEDURE — 3078F PR MOST RECENT DIASTOLIC BLOOD PRESSURE < 80 MM HG: ICD-10-PCS | Mod: CPTII,,, | Performed by: NURSE PRACTITIONER

## 2023-11-07 PROCEDURE — 99214 OFFICE O/P EST MOD 30 MIN: CPT | Mod: ,,, | Performed by: NURSE PRACTITIONER

## 2023-11-07 RX ORDER — ASPIRIN 81 MG/1
81 TABLET ORAL DAILY
COMMUNITY

## 2023-11-07 RX ORDER — ATORVASTATIN CALCIUM 40 MG/1
40 TABLET, FILM COATED ORAL DAILY
COMMUNITY

## 2023-11-07 NOTE — PROGRESS NOTES
SUBJECTIVE:     History of Present Illness      Chief Complaint: Establish Care (No complaints at this time)    HPI:  Patient is a 37 y.o. year old male who presents to clinic new patient.  Patient reports history of MI x2 in 2018 18 does follow up with Cardiology Dr. Mandel.  On statin and aspirin.  Today patient is complaining of bilateral wrist  pain breaking his wrist approximately a year ago    Review of Systems:    Review of Systems    12 point review of systems conducted, negative except as stated in the history of present illness. See HPI for details.     Previous History      Review of patient's allergies indicates:  No Known Allergies    Past Medical History:   Diagnosis Date    Hyperlipidemia     Myocardial infarction      Current Outpatient Medications   Medication Instructions    aspirin (ECOTRIN) 81 mg, Oral, Daily    atorvastatin (LIPITOR) 40 mg, Oral, Daily     Past Surgical History:   Procedure Laterality Date    CORONARY ARTERY BYPASS GRAFT      LAPAROTOMY, EXPLORATORY N/A 03/23/2023    Procedure: LAPAROTOMY, EXPLORATORY;  Surgeon: Alex Juárez MD;  Location: [facility];  Service: General;  Laterality: N/A;     Family History   Problem Relation Age of Onset    Diabetes Mother     Heart disease Mother     Cancer Father     Diabetes Sister     Heart disease Brother     Stroke Maternal Grandmother     Heart disease Maternal Grandfather        Social History     Tobacco Use    Smoking status: Every Day     Current packs/day: 0.15     Types: Cigarettes    Smokeless tobacco: Never    Tobacco comments:     States smokes 1-2 cigarettes per day   Substance Use Topics    Alcohol use: Yes     Comment: 1-2 times yearly    Drug use: Yes     Frequency: 7.0 times per week     Types: Marijuana     Comment: daily        Health Maintenance      Health Maintenance   Topic Date Due    Hepatitis C Screening  Never done    Lipid Panel  05/23/2028    TETANUS VACCINE  03/23/2033       OBJECTIVE:     Physical Exam      Vital  "Signs Reviewed   Visit Vitals  /68 (BP Location: Left arm, Patient Position: Sitting)   Pulse 73   Temp 97.9 °F (36.6 °C) (Oral)   Resp 16   Ht 5' 6" (1.676 m)   Wt 55.5 kg (122 lb 6.4 oz)   SpO2 99%   BMI 19.76 kg/m²       Physical Exam    Physical Exam:  General: Alert, well nourished, no acute distress, non-toxic appearing.   Eyes: Anicteric sclera, without conjunctival injection, normal lids, no purulent drainage, EOMs grossly intact.   Ears: No tragal tenderness. Tympanic membranes intact, pearly grey, without effusion or erythema and with a positive light reflex.   Mouth: Posterior pharynx without erythema. No exudate, ulcerations, or lesion. No tonsillar swelling.   Neck: Supple, full ROM, no rigidity, no cervical adenopathy.   Cardio: Normal rate and rhythm    Resp: Respirations even and unlabored, clear to auscultation bilaterally.   Abd: No ecchymosis or distension. Normal bowel sounds in all 4 quadrants. No tenderness to palpation. No rebound tenderness or guarding. No CVA tenderness.   Skin: No rashes or open lesions noted.   MSK: No swelling. No abrasions or signs of trauma. Ambulating without assistance.   Neuro: Alert,oriented No focal deficits noted. Facial expressions even.   Psych: Cooperative, Normal affect      Procedures    Procedures     Labs     Results for orders placed or performed in visit on 05/23/23   Comprehensive Metabolic Panel   Result Value Ref Range    Sodium Level 140 136 - 145 mmol/L    Potassium Level 4.0 3.5 - 5.1 mmol/L    Chloride 107 98 - 107 mmol/L    Carbon Dioxide 28 22 - 29 mmol/L    Glucose Level 84 74 - 100 mg/dL    Blood Urea Nitrogen 11.6 8.9 - 20.6 mg/dL    Creatinine 0.89 0.73 - 1.18 mg/dL    Calcium Level Total 9.7 8.4 - 10.2 mg/dL    Protein Total 7.4 6.4 - 8.3 gm/dL    Albumin Level 3.8 3.5 - 5.0 g/dL    Globulin 3.6 (H) 2.4 - 3.5 gm/dL    Albumin/Globulin Ratio 1.1 1.1 - 2.0 ratio    Bilirubin Total 0.1 <=1.5 mg/dL    Alkaline Phosphatase 70 40 - 150 " unit/L    Alanine Aminotransferase 25 0 - 55 unit/L    Aspartate Aminotransferase 16 5 - 34 unit/L    eGFR >60 mls/min/1.73/m2   Hemoglobin A1C   Result Value Ref Range    Hemoglobin A1c 5.0 <=7.0 %    Estimated Average Glucose 96.8 mg/dL   Lipid Panel   Result Value Ref Range    Cholesterol Total 148 <=200 mg/dL    HDL Cholesterol 37 35 - 60 mg/dL    Triglyceride 92 34 - 140 mg/dL    Cholesterol/HDL Ratio 4 0 - 5    Very Low Density Lipoprotein 18     LDL Cholesterol 93.00 50.00 - 140.00 mg/dL   Vitamin D   Result Value Ref Range    Vit D 25 OH 15.5 (L) 30.0 - 80.0 ng/mL   TSH   Result Value Ref Range    TSH 0.715 0.350 - 4.940 uIU/mL   PSA, Screening   Result Value Ref Range    Prostate Specific Antigen 0.40 <=4.00 ng/mL   CBC with Differential   Result Value Ref Range    WBC 9.03 4.50 - 11.50 x10(3)/mcL    RBC 4.37 (L) 4.70 - 6.10 x10(6)/mcL    Hgb 13.1 (L) 14.0 - 18.0 g/dL    Hct 42.2 42.0 - 52.0 %    MCV 96.6 (H) 80.0 - 94.0 fL    MCH 30.0 27.0 - 31.0 pg    MCHC 31.0 (L) 33.0 - 36.0 g/dL    RDW 13.6 11.5 - 17.0 %    Platelet 403 (H) 130 - 400 x10(3)/mcL    MPV 9.7 7.4 - 10.4 fL    Neut % 47.4 %    Lymph % 44.2 %    Mono % 5.2 %    Eos % 2.5 %    Basophil % 0.4 %    Lymph # 3.99 0.6 - 4.6 x10(3)/mcL    Neut # 4.27 2.1 - 9.2 x10(3)/mcL    Mono # 0.47 0.1 - 1.3 x10(3)/mcL    Eos # 0.23 0 - 0.9 x10(3)/mcL    Baso # 0.04 <=0.2 x10(3)/mcL    IG# 0.03 0 - 0.04 x10(3)/mcL    IG% 0.3 %    NRBC% 0.0 %   Microalbumin/Creatinine Ratio, Urine   Result Value Ref Range    Urine Microalbumin <5.0 <=30.0 ug/ml    Urine Creatinine 90.8 63.0 - 166.0 mg/dL    Microalbumin Creatinine Ratio <5.5 0.0 - 30.0 mg/gm Cr       Chemistry:  Lab Results   Component Value Date     05/23/2023    K 4.0 05/23/2023    CHLORIDE 107 05/23/2023    BUN 11.6 05/23/2023    CREATININE 0.89 05/23/2023    EGFRNORACEVR >60 05/23/2023    GLUCOSE 84 05/23/2023    CALCIUM 9.7 05/23/2023    ALKPHOS 70 05/23/2023    LABPROT 7.4 05/23/2023    ALBUMIN 3.8  05/23/2023    AST 16 05/23/2023    ALT 25 05/23/2023    MG 2.10 03/24/2023    PHOS 3.3 03/24/2023    MPBKVCOH74MT 15.5 (L) 05/23/2023    TSH 0.715 05/23/2023    PSA 0.40 05/23/2023        Lab Results   Component Value Date    HGBA1C 5.0 05/23/2023        Hematology:  Lab Results   Component Value Date    WBC 9.03 05/23/2023    HGB 13.1 (L) 05/23/2023    HCT 42.2 05/23/2023     (H) 05/23/2023       Lipid Panel:  Lab Results   Component Value Date    CHOL 148 05/23/2023    HDL 37 05/23/2023    LDL 93.00 05/23/2023    TRIG 92 05/23/2023    TOTALCHOLEST 4 05/23/2023        Urine:  Lab Results   Component Value Date    COLORUA Light-Yellow 05/23/2023    APPEARANCEUA Clear 05/23/2023    SGUA 1.017 05/23/2023    PHUA 7.0 05/23/2023    PROTEINUA Negative 05/23/2023    GLUCOSEUA Normal 05/23/2023    KETONESUA Negative 05/23/2023    BLOODUA Negative 05/23/2023    NITRITESUA Negative 05/23/2023    LEUKOCYTESUR 75 (A) 05/23/2023    RBCUA 0-5 05/23/2023    WBCUA 21-50 (A) 05/23/2023    BACTERIA None Seen 05/23/2023    SQEPUA Few (A) 05/23/2023    HYALINECASTS None Seen 05/23/2023    CREATRANDUR 90.8 05/23/2023         Assessment            ICD-10-CM ICD-9-CM   1. Pain in wrist, unspecified laterality  M25.539 719.43   2. Encounter to establish care  Z76.89 V65.8   3. History of MI (myocardial infarction)  I25.2 412       Plan       1. Pain in wrist, unspecified laterality  - X-Ray Wrist Complete Bilateral; Future    2. Encounter to establish care  - CBC Auto Differential; Future  - Comprehensive Metabolic Panel; Future  - Lipid Panel; Future  - TSH; Future  - Hemoglobin A1C; Future  - Vitamin D; Future    3. History of MI (myocardial infarction)  Continue statin and aspirin per cardiologist.  Patient reports last cardiology appointment was 1 month ago.  Denies any chest pain shortness and breath  Orders Placed This Encounter    X-Ray Wrist Complete Bilateral    CBC Auto Differential    Comprehensive Metabolic Panel     Lipid Panel    TSH    Hemoglobin A1C    Vitamin D      Medication List with Changes/Refills   Current Medications    ASPIRIN (ECOTRIN) 81 MG EC TABLET    Take 81 mg by mouth once daily.    ATORVASTATIN (LIPITOR) 40 MG TABLET    Take 40 mg by mouth once daily.   Discontinued Medications    GABAPENTIN (NEURONTIN) 300 MG CAPSULE    Take 1 capsule (300 mg total) by mouth 3 (three) times daily. for 7 days    LEVOFLOXACIN (LEVAQUIN) 500 MG TABLET    Take 1 tablet (500 mg total) by mouth once daily.    OXYCODONE (ROXICODONE) 10 MG TAB IMMEDIATE RELEASE TABLET    Take 1 tablet (10 mg total) by mouth every 4 (four) hours as needed for Pain.       Follow up in about 5 weeks (around 12/12/2023) for Wellness, LABS Prior.   Follow up in about 5 weeks (around 12/12/2023) for Wellness, LABS Prior. In addition to their scheduled follow up, the patient has also been instructed to follow up on as needed basis.   Future Appointments   Date Time Provider Department Center   11/27/2023  1:30 PM Sonu Romano NP [facility] [place]    12/14/2023  8:00 AM Matty Gomez, FNP [facility]

## 2023-11-07 NOTE — TELEPHONE ENCOUNTER
----- Message from CANDELARIO Tompkins sent at 11/7/2023  2:05 PM CST -----  Please inform patient of results.    1.There is an old healed fracture of the distal radius.  There is deformity at the fracture.  No acute fractures are seen.   post traumatic arthritis recommend over-the-counter Tylenol /ibuprofen    All other results within acceptable ranges.

## 2023-11-07 NOTE — PROGRESS NOTES
Please inform patient of results.    1.There is an old healed fracture of the distal radius.  There is deformity at the fracture.  No acute fractures are seen.   post traumatic arthritis recommend over-the-counter Tylenol /ibuprofen    All other results within acceptable ranges. Ipledge Number (Optional): [id number] Ipledge Number (Optional): [id number]

## 2023-11-07 NOTE — TELEPHONE ENCOUNTER
Reported to patient xray results.  Instructed may alternate ibuprofen/tylenol.  Voices understanding and agrees.

## 2023-11-27 ENCOUNTER — HOSPITAL ENCOUNTER (EMERGENCY)
Facility: HOSPITAL | Age: 38
Discharge: HOME | End: 2023-11-27
Attending: EMERGENCY MEDICINE
Payer: MEDICAID

## 2023-11-27 ENCOUNTER — OFFICE VISIT (OUTPATIENT)
Dept: UROLOGY | Facility: CLINIC | Age: 38
End: 2023-11-27
Payer: MEDICAID

## 2023-11-27 VITALS
SYSTOLIC BLOOD PRESSURE: 105 MMHG | TEMPERATURE: 98 F | BODY MASS INDEX: 19.74 KG/M2 | DIASTOLIC BLOOD PRESSURE: 68 MMHG | OXYGEN SATURATION: 97 % | WEIGHT: 122.81 LBS | HEART RATE: 67 BPM | HEIGHT: 66 IN

## 2023-11-27 VITALS
TEMPERATURE: 98 F | BODY MASS INDEX: 20.09 KG/M2 | HEIGHT: 66 IN | DIASTOLIC BLOOD PRESSURE: 69 MMHG | SYSTOLIC BLOOD PRESSURE: 113 MMHG | HEART RATE: 96 BPM | RESPIRATION RATE: 18 BRPM | WEIGHT: 125 LBS | OXYGEN SATURATION: 100 %

## 2023-11-27 DIAGNOSIS — S52.309A CLOSED FRACTURE OF SHAFT OF RADIUS (ALONE): Primary | ICD-10-CM

## 2023-11-27 DIAGNOSIS — S52.321A CLOSED DISPLACED TRANSVERSE FRACTURE OF SHAFT OF RIGHT RADIUS, INITIAL ENCOUNTER: Primary | ICD-10-CM

## 2023-11-27 DIAGNOSIS — R39.11 URINARY HESITANCY: Primary | ICD-10-CM

## 2023-11-27 DIAGNOSIS — N45.1 EPIDIDYMITIS: ICD-10-CM

## 2023-11-27 DIAGNOSIS — T14.90XA TRAUMA: ICD-10-CM

## 2023-11-27 LAB
BILIRUB SERPL-MCNC: NORMAL MG/DL
BLOOD URINE, POC: NEGATIVE
COLOR, POC UA: NORMAL
GLUCOSE UR QL STRIP: NEGATIVE
KETONES UR QL STRIP: NEGATIVE
LEUKOCYTE ESTERASE URINE, POC: NEGATIVE
NITRITE, POC UA: NEGATIVE
PH, POC UA: 6
PROTEIN, POC: 100
SPECIFIC GRAVITY, POC UA: 1.03
UROBILINOGEN, POC UA: 0.2

## 2023-11-27 PROCEDURE — 3074F PR MOST RECENT SYSTOLIC BLOOD PRESSURE < 130 MM HG: ICD-10-PCS | Mod: CPTII,,, | Performed by: NURSE PRACTITIONER

## 2023-11-27 PROCEDURE — 99284 EMERGENCY DEPT VISIT MOD MDM: CPT | Mod: 25,27

## 2023-11-27 PROCEDURE — 25000003 PHARM REV CODE 250

## 2023-11-27 PROCEDURE — 1160F PR REVIEW ALL MEDS BY PRESCRIBER/CLIN PHARMACIST DOCUMENTED: ICD-10-PCS | Mod: CPTII,,, | Performed by: NURSE PRACTITIONER

## 2023-11-27 PROCEDURE — 1159F MED LIST DOCD IN RCRD: CPT | Mod: CPTII,,, | Performed by: NURSE PRACTITIONER

## 2023-11-27 PROCEDURE — 3078F DIAST BP <80 MM HG: CPT | Mod: CPTII,,, | Performed by: NURSE PRACTITIONER

## 2023-11-27 PROCEDURE — 3078F PR MOST RECENT DIASTOLIC BLOOD PRESSURE < 80 MM HG: ICD-10-PCS | Mod: CPTII,,, | Performed by: NURSE PRACTITIONER

## 2023-11-27 PROCEDURE — 29105 APPLICATION LONG ARM SPLINT: CPT | Mod: RT

## 2023-11-27 PROCEDURE — 3008F PR BODY MASS INDEX (BMI) DOCUMENTED: ICD-10-PCS | Mod: CPTII,,, | Performed by: NURSE PRACTITIONER

## 2023-11-27 PROCEDURE — 1159F PR MEDICATION LIST DOCUMENTED IN MEDICAL RECORD: ICD-10-PCS | Mod: CPTII,,, | Performed by: NURSE PRACTITIONER

## 2023-11-27 PROCEDURE — 3008F BODY MASS INDEX DOCD: CPT | Mod: CPTII,,, | Performed by: NURSE PRACTITIONER

## 2023-11-27 PROCEDURE — 3074F SYST BP LT 130 MM HG: CPT | Mod: CPTII,,, | Performed by: NURSE PRACTITIONER

## 2023-11-27 PROCEDURE — 3044F HG A1C LEVEL LT 7.0%: CPT | Mod: CPTII,,, | Performed by: NURSE PRACTITIONER

## 2023-11-27 PROCEDURE — 3044F PR MOST RECENT HEMOGLOBIN A1C LEVEL <7.0%: ICD-10-PCS | Mod: CPTII,,, | Performed by: NURSE PRACTITIONER

## 2023-11-27 PROCEDURE — 1160F RVW MEDS BY RX/DR IN RCRD: CPT | Mod: CPTII,,, | Performed by: NURSE PRACTITIONER

## 2023-11-27 PROCEDURE — 99213 OFFICE O/P EST LOW 20 MIN: CPT | Mod: S$PBB,,, | Performed by: NURSE PRACTITIONER

## 2023-11-27 PROCEDURE — 99214 OFFICE O/P EST MOD 30 MIN: CPT | Mod: PBBFAC | Performed by: NURSE PRACTITIONER

## 2023-11-27 PROCEDURE — 99213 PR OFFICE/OUTPT VISIT, EST, LEVL III, 20-29 MIN: ICD-10-PCS | Mod: S$PBB,,, | Performed by: NURSE PRACTITIONER

## 2023-11-27 PROCEDURE — 81001 URINALYSIS AUTO W/SCOPE: CPT | Mod: PBBFAC | Performed by: NURSE PRACTITIONER

## 2023-11-27 RX ORDER — HYDROCODONE BITARTRATE AND ACETAMINOPHEN 10; 325 MG/1; MG/1
1 TABLET ORAL
Status: COMPLETED | OUTPATIENT
Start: 2023-11-27 | End: 2023-11-27

## 2023-11-27 RX ORDER — LEVOFLOXACIN 500 MG/1
500 TABLET, FILM COATED ORAL DAILY
Qty: 7 TABLET | Refills: 0 | Status: SHIPPED | OUTPATIENT
Start: 2023-11-27 | End: 2023-12-14 | Stop reason: ALTCHOICE

## 2023-11-27 RX ORDER — HYDROCODONE BITARTRATE AND ACETAMINOPHEN 7.5; 325 MG/1; MG/1
1 TABLET ORAL EVERY 6 HOURS PRN
Qty: 20 TABLET | Refills: 0 | Status: SHIPPED | OUTPATIENT
Start: 2023-11-27 | End: 2023-12-02

## 2023-11-27 RX ADMIN — HYDROCODONE BITARTRATE AND ACETAMINOPHEN 1 TABLET: 10; 325 TABLET ORAL at 05:11

## 2023-11-27 NOTE — Clinical Note
"Ranjeet Ratliffalan Ball was seen and treated in our emergency department on 11/27/2023.  He may return to work on 12/04/2023.       If you have any questions or concerns, please don't hesitate to call.      per Dr Buckley/ AB, RN    "

## 2023-11-27 NOTE — ED PROVIDER NOTES
Encounter Date: 11/27/2023       History     Chief Complaint   Patient presents with    Arm Pain     Pt c/o R arm pain with swelling after a fall on 11/24/23.      This 36 y/o presents to the ED with c/o right arm pain and swelling after falling and striking it on the 25th.       Review of patient's allergies indicates:  No Known Allergies  Past Medical History:   Diagnosis Date    Hyperlipidemia     Myocardial infarction      Past Surgical History:   Procedure Laterality Date    CORONARY ARTERY BYPASS GRAFT      LAPAROTOMY, EXPLORATORY N/A 03/23/2023    Procedure: LAPAROTOMY, EXPLORATORY;  Surgeon: Alex Juárez MD;  Location: [facility];  Service: General;  Laterality: N/A;     Family History   Problem Relation Age of Onset    Diabetes Mother     Heart disease Mother     Cancer Father     Diabetes Sister     Heart disease Brother     Stroke Maternal Grandmother     Heart disease Maternal Grandfather      Social History     Tobacco Use    Smoking status: Every Day     Current packs/day: 0.15     Types: Cigarettes    Smokeless tobacco: Never    Tobacco comments:     States smokes 1-2 cigarettes per day   Substance Use Topics    Alcohol use: Yes     Comment: 1-2 times yearly    Drug use: Yes     Frequency: 7.0 times per week     Types: Marijuana     Comment: daily     Review of Systems   Constitutional:  Negative for fever.   HENT:  Negative for sore throat.    Respiratory:  Negative for shortness of breath.    Cardiovascular:  Negative for chest pain.   Gastrointestinal:  Negative for nausea.   Genitourinary:  Negative for dysuria.   Musculoskeletal:  Positive for joint swelling. Negative for back pain.   Skin:  Negative for rash.   Neurological:  Negative for weakness.   Hematological:  Does not bruise/bleed easily.       Physical Exam     Initial Vitals [11/27/23 1629]   BP Pulse Resp Temp SpO2   113/69 96 18 98.1 °F (36.7 °C) 100 %      MAP       --         Physical Exam    Nursing note and vitals  reviewed.  Constitutional: He appears well-developed and well-nourished.   HENT:   Head: Normocephalic and atraumatic.   Mouth/Throat: Mucous membranes are normal.   Eyes: EOM are normal. Pupils are equal, round, and reactive to light.   Neck: Neck supple.   Normal range of motion.  Cardiovascular:  Normal rate, regular rhythm, normal heart sounds and intact distal pulses.           Pulmonary/Chest: Breath sounds normal.   Abdominal: Abdomen is soft. Bowel sounds are normal.   Musculoskeletal:         General: Tenderness (right elbow and swelling to proximal forearm) present. Normal range of motion.      Cervical back: Normal range of motion and neck supple.     Neurological: He is alert and oriented to person, place, and time. He has normal strength.   Skin: Skin is warm and dry. Capillary refill takes less than 2 seconds.   Psychiatric: He has a normal mood and affect. His behavior is normal. Judgment and thought content normal.         ED Course   Procedures  Labs Reviewed - No data to display       Imaging Results              X-Ray Forearm Right (Final result)  Result time 11/27/23 17:07:26      Final result by Nacho Coley MD (11/27/23 17:07:26)                   Impression:      Mildly displaced fracture of the proximal radial shaft.      Electronically signed by: Nacho Coley MD  Date:    11/27/2023  Time:    17:07               Narrative:    EXAMINATION:  Right elbow three views and right forearm two views    CLINICAL HISTORY:  Arm pain and swelling status post fall    COMPARISON:  None    FINDINGS:  There is displaced transverse fracture of the proximal radial shaft.  Distal fracture fragment is displaced medially and dorsally in relation to the proximal radius.  There is chronic deformity of the distal humerus noted.  There are degenerative changes of the ulnohumeral joint noted.                                       X-Ray Elbow Complete Right (Final result)  Result time 11/27/23 17:07:26       Final result by Nacho Coley MD (11/27/23 17:07:26)                   Impression:      Mildly displaced fracture of the proximal radial shaft.      Electronically signed by: Nacho Coley MD  Date:    11/27/2023  Time:    17:07               Narrative:    EXAMINATION:  Right elbow three views and right forearm two views    CLINICAL HISTORY:  Arm pain and swelling status post fall    COMPARISON:  None    FINDINGS:  There is displaced transverse fracture of the proximal radial shaft.  Distal fracture fragment is displaced medially and dorsally in relation to the proximal radius.  There is chronic deformity of the distal humerus noted.  There are degenerative changes of the ulnohumeral joint noted.                                       Medications   HYDROcodone-acetaminophen  mg per tablet 1 tablet (1 tablet Oral Given 11/27/23 1709)     Medical Decision Making  Amount and/or Complexity of Data Reviewed  Radiology: ordered.  Discussion of management or test interpretation with external provider(s): A long arm splint was applied by the RN and checked by myself afterward. Position is good with good distal CMS.    Risk  Prescription drug management.                                      Clinical Impression:  Final diagnoses:  [T14.90XA] Trauma  [S52.321A] Closed displaced transverse fracture of shaft of right radius, initial encounter (Primary)          ED Disposition Condition    Discharge Stable          ED Prescriptions       Medication Sig Dispense Start Date End Date Auth. Provider    HYDROcodone-acetaminophen (NORCO) 7.5-325 mg per tablet Take 1 tablet by mouth every 6 (six) hours as needed for Pain. 20 tablet 11/27/2023 12/2/2023 Elton Buckley MD          Follow-up Information       Follow up With Specialties Details Why Contact Info    A referral has been sent to [facility]ics. You should here from them in the next 2-3 days. If you do not go to the ER there.                  Elton Buckley MD  11/27/23 2054
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
## 2023-11-27 NOTE — PROGRESS NOTES
Patient seen by Manuel Romano NP will return in 3 months. Written and verbal discharge instructions given.

## 2023-12-04 ENCOUNTER — OFFICE VISIT (OUTPATIENT)
Dept: ORTHOPEDICS | Facility: CLINIC | Age: 38
End: 2023-12-04
Payer: MEDICAID

## 2023-12-04 VITALS
TEMPERATURE: 98 F | DIASTOLIC BLOOD PRESSURE: 62 MMHG | HEART RATE: 73 BPM | WEIGHT: 119 LBS | HEIGHT: 66 IN | SYSTOLIC BLOOD PRESSURE: 102 MMHG | BODY MASS INDEX: 19.13 KG/M2

## 2023-12-04 DIAGNOSIS — S52.309A CLOSED FRACTURE OF SHAFT OF RADIUS (ALONE): ICD-10-CM

## 2023-12-04 PROCEDURE — 3078F DIAST BP <80 MM HG: CPT | Mod: CPTII,,, | Performed by: SPECIALIST

## 2023-12-04 PROCEDURE — 3074F PR MOST RECENT SYSTOLIC BLOOD PRESSURE < 130 MM HG: ICD-10-PCS | Mod: CPTII,,, | Performed by: SPECIALIST

## 2023-12-04 PROCEDURE — 99499 UNLISTED E&M SERVICE: CPT | Mod: S$PBB,,, | Performed by: SPECIALIST

## 2023-12-04 PROCEDURE — 3044F PR MOST RECENT HEMOGLOBIN A1C LEVEL <7.0%: ICD-10-PCS | Mod: CPTII,,, | Performed by: SPECIALIST

## 2023-12-04 PROCEDURE — 3008F BODY MASS INDEX DOCD: CPT | Mod: CPTII,,, | Performed by: SPECIALIST

## 2023-12-04 PROCEDURE — 99214 OFFICE O/P EST MOD 30 MIN: CPT | Mod: PBBFAC

## 2023-12-04 PROCEDURE — 3078F PR MOST RECENT DIASTOLIC BLOOD PRESSURE < 80 MM HG: ICD-10-PCS | Mod: CPTII,,, | Performed by: SPECIALIST

## 2023-12-04 PROCEDURE — 3008F PR BODY MASS INDEX (BMI) DOCUMENTED: ICD-10-PCS | Mod: CPTII,,, | Performed by: SPECIALIST

## 2023-12-04 PROCEDURE — 3074F SYST BP LT 130 MM HG: CPT | Mod: CPTII,,, | Performed by: SPECIALIST

## 2023-12-04 PROCEDURE — 3044F HG A1C LEVEL LT 7.0%: CPT | Mod: CPTII,,, | Performed by: SPECIALIST

## 2023-12-04 PROCEDURE — 99499 NO LOS: ICD-10-PCS | Mod: S$PBB,,, | Performed by: SPECIALIST

## 2023-12-04 RX ORDER — HYDROCODONE BITARTRATE AND ACETAMINOPHEN 5; 325 MG/1; MG/1
1 TABLET ORAL EVERY 6 HOURS PRN
Qty: 20 TABLET | Refills: 0 | Status: ON HOLD | OUTPATIENT
Start: 2023-12-04 | End: 2023-12-07 | Stop reason: HOSPADM

## 2023-12-04 RX ORDER — HYDROCODONE BITARTRATE AND ACETAMINOPHEN 7.5; 325 MG/1; MG/1
1 TABLET ORAL EVERY 6 HOURS PRN
COMMUNITY
End: 2023-12-04

## 2023-12-04 RX ORDER — MUPIROCIN 20 MG/G
OINTMENT TOPICAL
Status: CANCELLED | OUTPATIENT
Start: 2023-12-04

## 2023-12-04 NOTE — H&P (VIEW-ONLY)
[facility]  Established Patient Office Visit  12/04/2023       Patient ID: Ranjeet Ball  YOB: 1985  MRN: [id number]    Chief Complaint: Pain of the Right Forearm (Patient is here today for  Right forearm/elbow pain.  It has been going on since Thanksgiving Night. He states he was jumping off of something and he fell and broke it. Describes his pain as constant aching/burning. Is currently taking Norco 7.5 and ibuprofen.   )      Past Orthopaedic Surgeries: none     HPI:  Ranjeet Ball is a 37 y.o. male with with history of left distal radius fracture with malunion now presenting with right radial shaft fracture sustained after falling on thanksgiving. He is left hand dominant. Denies any paresthesias or motor dysfunction in the hand or wrist. No open wounds at time of injury. He has a prior history of myocardial infarction with stents in place. He says he has occasional chest pain but is not on any blood thinners.     Past Medical History:    Past Medical History:   Diagnosis Date    Hyperlipidemia     Myocardial infarction      Past Surgical History:   Procedure Laterality Date    CORONARY ARTERY BYPASS GRAFT      LAPAROTOMY, EXPLORATORY N/A 03/23/2023    Procedure: LAPAROTOMY, EXPLORATORY;  Surgeon: Alex Juárez MD;  Location: [facility];  Service: General;  Laterality: N/A;     Family History   Problem Relation Age of Onset    Diabetes Mother     Heart disease Mother     Cancer Father     Diabetes Sister     Heart disease Brother     Stroke Maternal Grandmother     Heart disease Maternal Grandfather      Social History     Socioeconomic History    Marital status: Single    Number of children: 1   Tobacco Use    Smoking status: Every Day     Current packs/day: 0.15     Types: Cigarettes    Smokeless tobacco: Never    Tobacco comments:     States smokes 1-2 cigarettes per day   Substance and Sexual Activity    Alcohol use: Yes     Comment: 1-2 times yearly    Drug  use: Yes     Frequency: 7.0 times per week     Types: Marijuana     Comment: daily    Sexual activity: Yes     Partners: Female     Birth control/protection: Condom     Social Determinants of Health     Financial Resource Strain: High Risk (5/2/2023)    Overall Financial Resource Strain (CARDIA)     Difficulty of Paying Living Expenses: Very hard   Food Insecurity: Food Insecurity Present (5/2/2023)    Hunger Vital Sign     Worried About Running Out of Food in the Last Year: Often true     Ran Out of Food in the Last Year: Sometimes true   Transportation Needs: Unmet Transportation Needs (5/2/2023)    PRAPARE - Transportation     Lack of Transportation (Medical): Yes     Lack of Transportation (Non-Medical): Yes   Physical Activity: Sufficiently Active (5/2/2023)    Exercise Vital Sign     Days of Exercise per Week: 7 days     Minutes of Exercise per Session: 30 min   Stress: Stress Concern Present (5/2/2023)    [employer] [place] [employer] - Occupational Stress Questionnaire     Feeling of Stress : Rather much   Social Connections: Moderately Isolated (5/2/2023)    Social Connection and Isolation Panel [NHANES]     Frequency of Communication with Friends and Family: More than three times a week     Frequency of Social Gatherings with Friends and Family: Once a week     Attends Rastafari Services: 1 to 4 times per year     Active Member of Clubs or Organizations: No     Attends Club or Organization Meetings: Never     Marital Status: Never    Housing Stability: High Risk (5/2/2023)    Housing Stability Vital Sign     Unable to Pay for Housing in the Last Year: Yes     Number of Places Lived in the Last Year: 2     Unstable Housing in the Last Year: Yes     Medication List with Changes/Refills   Current Medications    ASPIRIN (ECOTRIN) 81 MG EC TABLET    Take 81 mg by mouth once daily.    ATORVASTATIN (LIPITOR) 40 MG TABLET    Take 40 mg by mouth once daily.    HYDROCODONE-ACETAMINOPHEN (NORCO)  7.5-325 MG PER TABLET    Take 1 tablet by mouth every 6 (six) hours as needed for Pain.    LEVOFLOXACIN (LEVAQUIN) 500 MG TABLET    Take 1 tablet (500 mg total) by mouth once daily.     Review of patient's allergies indicates:  No Known Allergies    Physical Exam:    Left wrist  Gross deformity to the left wrist with dorsal angulation   No tenderness to palpation  ROM limited by stiffness and deformity   AIN pin ulnar nerve intact   Sensory intact to all distributions   2+ radial pulse     Right upper extremity   No gross deformity, open wounds, abrasions, effusions  Significant swelling and tenderness to the forearm   Compartments soft   Motor intact AIN pin ulnar nerve   Sensation intact all distributions   ROM limited by pain   No pain at elbow or wrist   2+ radial pulse     Imaging:  Xr shows proximal radial shaft fracture with reduced radiocapitellar and ulno humeral joint, patient also has malunion of the left distal radius     Assessment and Plan:    Ranjeet Ball is a 37 y.o. 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 We will address the acute radial shaft fracture first. Patient understanding of risks benefits and alternatives to surgery. Understands risk of damage to neurovascular structures including PIN palsy with dorsal approach. He would like to move forward with surgery. He has a significant cardiac history.     Herman Alexandre MD  [employer] Orthopaedic Surgery PGY-3

## 2023-12-04 NOTE — PROGRESS NOTES
[facility]  Established Patient Office Visit  12/04/2023       Patient ID: Ranjeet Ball  YOB: 1985  MRN: [id number]    Chief Complaint: Pain of the Right Forearm (Patient is here today for  Right forearm/elbow pain.  It has been going on since Thanksgiving Night. He states he was jumping off of something and he fell and broke it. Describes his pain as constant aching/burning. Is currently taking Norco 7.5 and ibuprofen.   )      Past Orthopaedic Surgeries: none     HPI:  Ranjeet Ball is a 37 y.o. male with with history of left distal radius fracture with malunion now presenting with right radial shaft fracture sustained after falling on thanksgiving. He is left hand dominant. Denies any paresthesias or motor dysfunction in the hand or wrist. No open wounds at time of injury. He has a prior history of myocardial infarction with stents in place. He says he has occasional chest pain but is not on any blood thinners.     Past Medical History:    Past Medical History:   Diagnosis Date    Hyperlipidemia     Myocardial infarction      Past Surgical History:   Procedure Laterality Date    CORONARY ARTERY BYPASS GRAFT      LAPAROTOMY, EXPLORATORY N/A 03/23/2023    Procedure: LAPAROTOMY, EXPLORATORY;  Surgeon: Alex Juárez MD;  Location: [facility];  Service: General;  Laterality: N/A;     Family History   Problem Relation Age of Onset    Diabetes Mother     Heart disease Mother     Cancer Father     Diabetes Sister     Heart disease Brother     Stroke Maternal Grandmother     Heart disease Maternal Grandfather      Social History     Socioeconomic History    Marital status: Single    Number of children: 1   Tobacco Use    Smoking status: Every Day     Current packs/day: 0.15     Types: Cigarettes    Smokeless tobacco: Never    Tobacco comments:     States smokes 1-2 cigarettes per day   Substance and Sexual Activity    Alcohol use: Yes     Comment: 1-2 times yearly    Drug  use: Yes     Frequency: 7.0 times per week     Types: Marijuana     Comment: daily    Sexual activity: Yes     Partners: Female     Birth control/protection: Condom     Social Determinants of Health     Financial Resource Strain: High Risk (5/2/2023)    Overall Financial Resource Strain (CARDIA)     Difficulty of Paying Living Expenses: Very hard   Food Insecurity: Food Insecurity Present (5/2/2023)    Hunger Vital Sign     Worried About Running Out of Food in the Last Year: Often true     Ran Out of Food in the Last Year: Sometimes true   Transportation Needs: Unmet Transportation Needs (5/2/2023)    PRAPARE - Transportation     Lack of Transportation (Medical): Yes     Lack of Transportation (Non-Medical): Yes   Physical Activity: Sufficiently Active (5/2/2023)    Exercise Vital Sign     Days of Exercise per Week: 7 days     Minutes of Exercise per Session: 30 min   Stress: Stress Concern Present (5/2/2023)    [employer]ational [employer] - Occupational Stress Questionnaire     Feeling of Stress : Rather much   Social Connections: Moderately Isolated (5/2/2023)    Social Connection and Isolation Panel [NHANES]     Frequency of Communication with Friends and Family: More than three times a week     Frequency of Social Gatherings with Friends and Family: Once a week     Attends Quaker Services: 1 to 4 times per year     Active Member of Clubs or Organizations: No     Attends Club or Organization Meetings: Never     Marital Status: Never    Housing Stability: High Risk (5/2/2023)    Housing Stability Vital Sign     Unable to Pay for Housing in the Last Year: Yes     Number of Places Lived in the Last Year: 2     Unstable Housing in the Last Year: Yes     Medication List with Changes/Refills   Current Medications    ASPIRIN (ECOTRIN) 81 MG EC TABLET    Take 81 mg by mouth once daily.    ATORVASTATIN (LIPITOR) 40 MG TABLET    Take 40 mg by mouth once daily.    HYDROCODONE-ACETAMINOPHEN (NORCO)  7.5-325 MG PER TABLET    Take 1 tablet by mouth every 6 (six) hours as needed for Pain.    LEVOFLOXACIN (LEVAQUIN) 500 MG TABLET    Take 1 tablet (500 mg total) by mouth once daily.     Review of patient's allergies indicates:  No Known Allergies    Physical Exam:    Left wrist  Gross deformity to the left wrist with dorsal angulation   No tenderness to palpation  ROM limited by stiffness and deformity   AIN pin ulnar nerve intact   Sensory intact to all distributions   2+ radial pulse     Right upper extremity   No gross deformity, open wounds, abrasions, effusions  Significant swelling and tenderness to the forearm   Compartments soft   Motor intact AIN pin ulnar nerve   Sensation intact all distributions   ROM limited by pain   No pain at elbow or wrist   2+ radial pulse     Imaging:  Xr shows proximal radial shaft fracture with reduced radiocapitellar and ulno humeral joint, patient also has malunion of the left distal radius     Assessment and Plan:    Ranjeet Ball is a 37 y.o. 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 We will address the acute radial shaft fracture first. Patient understanding of risks benefits and alternatives to surgery. Understands risk of damage to neurovascular structures including PIN palsy with dorsal approach. He would like to move forward with surgery. He has a significant cardiac history.     Herman Alexandre MD  [employer] Orthopaedic Surgery PGY-3

## 2023-12-05 ENCOUNTER — ANESTHESIA EVENT (OUTPATIENT)
Dept: SURGERY | Facility: HOSPITAL | Age: 38
End: 2023-12-05
Payer: MEDICAID
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
## 2023-12-06 RX ORDER — OXYCODONE AND ACETAMINOPHEN 5; 325 MG/1; MG/1
2 TABLET ORAL ONCE
Status: CANCELLED | OUTPATIENT
Start: 2023-12-06 | End: 2023-12-06

## 2023-12-06 RX ORDER — HYDROMORPHONE HYDROCHLORIDE 1 MG/ML
0.2 INJECTION, SOLUTION INTRAMUSCULAR; INTRAVENOUS; SUBCUTANEOUS EVERY 5 MIN PRN
Status: CANCELLED | OUTPATIENT
Start: 2023-12-06

## 2023-12-06 RX ORDER — DIPHENHYDRAMINE HYDROCHLORIDE 50 MG/ML
25 INJECTION INTRAMUSCULAR; INTRAVENOUS ONCE AS NEEDED
Status: CANCELLED | OUTPATIENT
Start: 2023-12-06 | End: 2035-05-04

## 2023-12-06 RX ORDER — IPRATROPIUM BROMIDE AND ALBUTEROL SULFATE 2.5; .5 MG/3ML; MG/3ML
3 SOLUTION RESPIRATORY (INHALATION) ONCE AS NEEDED
Status: CANCELLED | OUTPATIENT
Start: 2023-12-06 | End: 2035-05-04

## 2023-12-06 RX ORDER — HYDROMORPHONE HYDROCHLORIDE 1 MG/ML
0.5 INJECTION, SOLUTION INTRAMUSCULAR; INTRAVENOUS; SUBCUTANEOUS EVERY 5 MIN PRN
Status: CANCELLED | OUTPATIENT
Start: 2023-12-06

## 2023-12-06 RX ORDER — MEPERIDINE HYDROCHLORIDE 25 MG/ML
12.5 INJECTION INTRAMUSCULAR; INTRAVENOUS; SUBCUTANEOUS ONCE
Status: CANCELLED | OUTPATIENT
Start: 2023-12-06 | End: 2023-12-06

## 2023-12-06 RX ORDER — ONDANSETRON 2 MG/ML
4 INJECTION INTRAMUSCULAR; INTRAVENOUS ONCE
Status: CANCELLED | OUTPATIENT
Start: 2023-12-06 | End: 2023-12-06

## 2023-12-06 RX ORDER — PROCHLORPERAZINE EDISYLATE 5 MG/ML
5 INJECTION INTRAMUSCULAR; INTRAVENOUS ONCE AS NEEDED
Status: CANCELLED | OUTPATIENT
Start: 2023-12-06 | End: 2035-05-04

## 2023-12-06 NOTE — DISCHARGE INSTRUCTIONS
· Keep follow up appointment at [facility].    · Take pain medication as prescribed.    · Keep arm elevated on pillow.    · No driving or consuming alcohol for the next 24 hours or while taking narcotic pain medicine.    · May apply ice pack to surgical area for 20 minutes at a time 6-8 times per day.    · Dressing: Leave clean and dry until follow up appointment.    · NO LIFTING OR PULLING WITH AFFECTED ARM until cleared by MD.    · Notify MD of any moderate to severe pain unrelieved by pain medicine or for any signs of infection including fever above 100.4, excessive redness or swelling, yellow/green foul- smelling drainage, nausea or vomiting. Call clinic at: [phone]. After business hours, if you are unable to reach a doctor on call at [phone] or your concern is an emergency, call 911 or report to your nearest emergency room.    ·  Thanks for choosing [facility]! Have a smooth recovery!

## 2023-12-07 ENCOUNTER — HOSPITAL ENCOUNTER (OUTPATIENT)
Facility: HOSPITAL | Age: 38
Discharge: HOME | End: 2023-12-07
Attending: ORTHOPAEDIC SURGERY | Admitting: ORTHOPAEDIC SURGERY
Payer: MEDICAID

## 2023-12-07 ENCOUNTER — ANESTHESIA (OUTPATIENT)
Dept: SURGERY | Facility: HOSPITAL | Age: 38
End: 2023-12-07
Payer: MEDICAID

## 2023-12-07 DIAGNOSIS — S52.309A CLOSED FRACTURE OF SHAFT OF RADIUS (ALONE): ICD-10-CM

## 2023-12-07 PROCEDURE — 25000003 PHARM REV CODE 250: Performed by: ANESTHESIOLOGY

## 2023-12-07 PROCEDURE — 27201423 OPTIME MED/SURG SUP & DEVICES STERILE SUPPLY: Performed by: STUDENT IN AN ORGANIZED HEALTH CARE EDUCATION/TRAINING PROGRAM

## 2023-12-07 PROCEDURE — 25515 OPTX RADIAL SHAFT FRACTURE: CPT | Mod: AS,RT,,

## 2023-12-07 PROCEDURE — 25000003 PHARM REV CODE 250: Performed by: NURSE ANESTHETIST, CERTIFIED REGISTERED

## 2023-12-07 PROCEDURE — 71000016 HC POSTOP RECOV ADDL HR: Performed by: STUDENT IN AN ORGANIZED HEALTH CARE EDUCATION/TRAINING PROGRAM

## 2023-12-07 PROCEDURE — 63600175 PHARM REV CODE 636 W HCPCS: Performed by: ANESTHESIOLOGY

## 2023-12-07 PROCEDURE — D9220A PRA ANESTHESIA: Mod: ,,, | Performed by: NURSE ANESTHETIST, CERTIFIED REGISTERED

## 2023-12-07 PROCEDURE — 25515 OPTX RADIAL SHAFT FRACTURE: CPT | Mod: RT,,, | Performed by: STUDENT IN AN ORGANIZED HEALTH CARE EDUCATION/TRAINING PROGRAM

## 2023-12-07 PROCEDURE — 36000711: Performed by: STUDENT IN AN ORGANIZED HEALTH CARE EDUCATION/TRAINING PROGRAM

## 2023-12-07 PROCEDURE — 25515 PR OPEN TREATMENT RADIAL SHAFT FRACTURE: ICD-10-PCS | Mod: RT,,, | Performed by: STUDENT IN AN ORGANIZED HEALTH CARE EDUCATION/TRAINING PROGRAM

## 2023-12-07 PROCEDURE — 71000015 HC POSTOP RECOV 1ST HR: Performed by: STUDENT IN AN ORGANIZED HEALTH CARE EDUCATION/TRAINING PROGRAM

## 2023-12-07 PROCEDURE — 63600175 PHARM REV CODE 636 W HCPCS: Performed by: STUDENT IN AN ORGANIZED HEALTH CARE EDUCATION/TRAINING PROGRAM

## 2023-12-07 PROCEDURE — D9220A PRA ANESTHESIA: ICD-10-PCS | Mod: ,,, | Performed by: NURSE ANESTHETIST, CERTIFIED REGISTERED

## 2023-12-07 PROCEDURE — 25515 PR OPEN TREATMENT RADIAL SHAFT FRACTURE: ICD-10-PCS | Mod: AS,RT,,

## 2023-12-07 PROCEDURE — 63600175 PHARM REV CODE 636 W HCPCS: Performed by: NURSE ANESTHETIST, CERTIFIED REGISTERED

## 2023-12-07 PROCEDURE — 76942 ECHO GUIDE FOR BIOPSY: CPT | Performed by: ANESTHESIOLOGY

## 2023-12-07 PROCEDURE — 37000008 HC ANESTHESIA 1ST 15 MINUTES: Performed by: STUDENT IN AN ORGANIZED HEALTH CARE EDUCATION/TRAINING PROGRAM

## 2023-12-07 PROCEDURE — 37000009 HC ANESTHESIA EA ADD 15 MINS: Performed by: STUDENT IN AN ORGANIZED HEALTH CARE EDUCATION/TRAINING PROGRAM

## 2023-12-07 PROCEDURE — 36000710: Performed by: STUDENT IN AN ORGANIZED HEALTH CARE EDUCATION/TRAINING PROGRAM

## 2023-12-07 PROCEDURE — C1713 ANCHOR/SCREW BN/BN,TIS/BN: HCPCS | Performed by: STUDENT IN AN ORGANIZED HEALTH CARE EDUCATION/TRAINING PROGRAM

## 2023-12-07 DEVICE — SCREW CORTEX 3.5MM X 18MM: Type: IMPLANTABLE DEVICE | Site: RADIUS | Status: FUNCTIONAL

## 2023-12-07 DEVICE — PLATE 8-HOLE LCP: Type: IMPLANTABLE DEVICE | Site: RADIUS | Status: FUNCTIONAL

## 2023-12-07 DEVICE — SCREW CORTEX 3.5MM X 16MM: Type: IMPLANTABLE DEVICE | Site: RADIUS | Status: FUNCTIONAL

## 2023-12-07 RX ORDER — ONDANSETRON 2 MG/ML
INJECTION INTRAMUSCULAR; INTRAVENOUS
Status: DISCONTINUED | OUTPATIENT
Start: 2023-12-07 | End: 2023-12-07

## 2023-12-07 RX ORDER — HYDROCODONE BITARTRATE AND ACETAMINOPHEN 5; 325 MG/1; MG/1
1 TABLET ORAL EVERY 6 HOURS PRN
Qty: 28 TABLET | Refills: 0 | Status: SHIPPED | OUTPATIENT
Start: 2023-12-07 | End: 2023-12-14

## 2023-12-07 RX ORDER — KETOROLAC TROMETHAMINE 30 MG/ML
INJECTION, SOLUTION INTRAMUSCULAR; INTRAVENOUS
Status: DISCONTINUED | OUTPATIENT
Start: 2023-12-07 | End: 2023-12-07

## 2023-12-07 RX ORDER — MIDAZOLAM HYDROCHLORIDE 1 MG/ML
2 INJECTION INTRAMUSCULAR; INTRAVENOUS ONCE
Status: COMPLETED | OUTPATIENT
Start: 2023-12-07 | End: 2023-12-07

## 2023-12-07 RX ORDER — DEXAMETHASONE SODIUM PHOSPHATE 4 MG/ML
INJECTION, SOLUTION INTRA-ARTICULAR; INTRALESIONAL; INTRAMUSCULAR; INTRAVENOUS; SOFT TISSUE
Status: DISCONTINUED | OUTPATIENT
Start: 2023-12-07 | End: 2023-12-07

## 2023-12-07 RX ORDER — GABAPENTIN 300 MG/1
300 CAPSULE ORAL NIGHTLY
Qty: 30 CAPSULE | Refills: 0 | Status: SHIPPED | OUTPATIENT
Start: 2023-12-07 | End: 2024-01-25 | Stop reason: SDUPTHER

## 2023-12-07 RX ORDER — PROPOFOL 10 MG/ML
INJECTION, EMULSION INTRAVENOUS CONTINUOUS PRN
Status: DISCONTINUED | OUTPATIENT
Start: 2023-12-07 | End: 2023-12-07

## 2023-12-07 RX ORDER — SODIUM CHLORIDE 9 MG/ML
INJECTION, SOLUTION INTRAVENOUS CONTINUOUS
Status: ACTIVE | OUTPATIENT
Start: 2023-12-07

## 2023-12-07 RX ORDER — SODIUM CHLORIDE, SODIUM LACTATE, POTASSIUM CHLORIDE, CALCIUM CHLORIDE 600; 310; 30; 20 MG/100ML; MG/100ML; MG/100ML; MG/100ML
INJECTION, SOLUTION INTRAVENOUS CONTINUOUS
Status: DISCONTINUED | OUTPATIENT
Start: 2023-12-07 | End: 2023-12-07 | Stop reason: HOSPADM

## 2023-12-07 RX ORDER — MIDAZOLAM HYDROCHLORIDE 1 MG/ML
4 INJECTION INTRAMUSCULAR; INTRAVENOUS ONCE
Status: COMPLETED | OUTPATIENT
Start: 2023-12-07 | End: 2023-12-07

## 2023-12-07 RX ORDER — LIDOCAINE HYDROCHLORIDE 10 MG/ML
1 INJECTION, SOLUTION EPIDURAL; INFILTRATION; INTRACAUDAL; PERINEURAL ONCE
Status: ACTIVE | OUTPATIENT
Start: 2023-12-07

## 2023-12-07 RX ORDER — CEFAZOLIN SODIUM 1 G/3ML
2 INJECTION, POWDER, FOR SOLUTION INTRAMUSCULAR; INTRAVENOUS
Status: COMPLETED | OUTPATIENT
Start: 2023-12-07 | End: 2023-12-07

## 2023-12-07 RX ORDER — ROPIVACAINE HYDROCHLORIDE 5 MG/ML
INJECTION, SOLUTION EPIDURAL; INFILTRATION; PERINEURAL
Status: COMPLETED | OUTPATIENT
Start: 2023-12-07 | End: 2023-12-07

## 2023-12-07 RX ORDER — NAPROXEN SODIUM 220 MG/1
81 TABLET, FILM COATED ORAL ONCE
Status: COMPLETED | OUTPATIENT
Start: 2023-12-07 | End: 2023-12-07

## 2023-12-07 RX ORDER — MIDAZOLAM HYDROCHLORIDE 1 MG/ML
INJECTION INTRAMUSCULAR; INTRAVENOUS
Status: DISCONTINUED | OUTPATIENT
Start: 2023-12-07 | End: 2023-12-07

## 2023-12-07 RX ORDER — MUPIROCIN 20 MG/G
OINTMENT TOPICAL
Status: DISCONTINUED | OUTPATIENT
Start: 2023-12-07 | End: 2023-12-07 | Stop reason: HOSPADM

## 2023-12-07 RX ORDER — ROCURONIUM BROMIDE 10 MG/ML
INJECTION, SOLUTION INTRAVENOUS
Status: DISCONTINUED | OUTPATIENT
Start: 2023-12-07 | End: 2023-12-07

## 2023-12-07 RX ORDER — METHOCARBAMOL 500 MG/1
500 TABLET, FILM COATED ORAL 4 TIMES DAILY
Qty: 52 TABLET | Refills: 0 | Status: SHIPPED | OUTPATIENT
Start: 2023-12-07 | End: 2023-12-20

## 2023-12-07 RX ORDER — FENTANYL CITRATE 50 UG/ML
INJECTION, SOLUTION INTRAMUSCULAR; INTRAVENOUS
Status: DISCONTINUED | OUTPATIENT
Start: 2023-12-07 | End: 2023-12-07

## 2023-12-07 RX ORDER — DEXMEDETOMIDINE HYDROCHLORIDE 100 UG/ML
INJECTION, SOLUTION INTRAVENOUS
Status: DISCONTINUED | OUTPATIENT
Start: 2023-12-07 | End: 2023-12-07

## 2023-12-07 RX ADMIN — DEXMEDETOMIDINE 20 MCG: 200 INJECTION, SOLUTION INTRAVENOUS at 01:12

## 2023-12-07 RX ADMIN — MIDAZOLAM 2 MG: 1 INJECTION INTRAMUSCULAR; INTRAVENOUS at 12:12

## 2023-12-07 RX ADMIN — MIDAZOLAM HYDROCHLORIDE 2 MG: 1 INJECTION, SOLUTION INTRAMUSCULAR; INTRAVENOUS at 11:12

## 2023-12-07 RX ADMIN — KETOROLAC TROMETHAMINE 30 MG: 30 INJECTION, SOLUTION INTRAMUSCULAR at 12:12

## 2023-12-07 RX ADMIN — ASPIRIN 81 MG: 81 TABLET, CHEWABLE ORAL at 11:12

## 2023-12-07 RX ADMIN — ROPIVACAINE HYDROCHLORIDE 30 ML: 5 INJECTION, SOLUTION EPIDURAL; INFILTRATION; PERINEURAL at 11:12

## 2023-12-07 RX ADMIN — CEFAZOLIN 2 G: 330 INJECTION, POWDER, FOR SOLUTION INTRAMUSCULAR; INTRAVENOUS at 12:12

## 2023-12-07 RX ADMIN — DEXMEDETOMIDINE 20 MCG: 200 INJECTION, SOLUTION INTRAVENOUS at 12:12

## 2023-12-07 RX ADMIN — MIDAZOLAM HYDROCHLORIDE 4 MG: 1 INJECTION, SOLUTION INTRAMUSCULAR; INTRAVENOUS at 11:12

## 2023-12-07 RX ADMIN — PROPOFOL 50 MCG/KG/MIN: 10 INJECTION, EMULSION INTRAVENOUS at 12:12

## 2023-12-07 RX ADMIN — DEXAMETHASONE SODIUM PHOSPHATE 8 MG: 4 INJECTION, SOLUTION INTRA-ARTICULAR; INTRALESIONAL; INTRAMUSCULAR; INTRAVENOUS; SOFT TISSUE at 12:12

## 2023-12-07 RX ADMIN — SODIUM CHLORIDE, POTASSIUM CHLORIDE, SODIUM LACTATE AND CALCIUM CHLORIDE: 600; 310; 30; 20 INJECTION, SOLUTION INTRAVENOUS at 11:12

## 2023-12-07 RX ADMIN — FENTANYL CITRATE 100 MCG: 50 INJECTION INTRAMUSCULAR; INTRAVENOUS at 12:12

## 2023-12-07 RX ADMIN — ONDANSETRON 4 MG: 2 INJECTION INTRAMUSCULAR; INTRAVENOUS at 12:12

## 2023-12-07 NOTE — ANESTHESIA POSTPROCEDURE EVALUATION
Anesthesia Post Evaluation    Patient: Ranjeet Ball    Procedure(s) Performed: Procedure(s) (LRB):  ORIF, FRACTURE, RADIUS OR ULNA (Right)    Final Anesthesia Type: MAC      Patient location during evaluation: OPS  Patient participation: Yes- Able to Participate  Level of consciousness: awake and alert  Post-procedure vital signs: reviewed and stable  Pain management: adequate  Airway patency: patent    PONV status at discharge: No PONV  Anesthetic complications: no      Cardiovascular status: hemodynamically stable  Respiratory status: spontaneous ventilation  Hydration status: euvolemic  Follow-up not needed.              Vitals Value Taken Time   /82 12/07/23 0912   Temp 36.5 °C (97.7 °F) 12/07/23 0912   Pulse 63 12/07/23 0912   Resp 18 12/07/23 0912   SpO2 100 % 12/07/23 0912         No case tracking events are documented in the log.      Pain/Rocio Score: No data recorded

## 2023-12-07 NOTE — TRANSFER OF CARE
Anesthesia Transfer of Care Note    Patient: Ranjeet Ball    Procedure(s) Performed: Procedure(s) (LRB):  ORIF, FRACTURE, RADIUS OR ULNA (Right)    Patient location: OPS    Anesthesia Type: MAC and regional    Transport from OR: Transported from OR on room air with adequate spontaneous ventilation    Post pain: adequate analgesia    Post assessment: no apparent anesthetic complications    Post vital signs: stable    Level of consciousness: awake    Nausea/Vomiting: no nausea/vomiting    Complications: none    Transfer of care protocol was followed      Last vitals: Visit Vitals  BP (!) 144/82   Pulse 63   Temp 36.5 °C (97.7 °F) (Oral)   Resp 18   SpO2 100%

## 2023-12-07 NOTE — ANESTHESIA PROCEDURE NOTES
Peripheral Block    Patient location during procedure: pre-op    Reason for block: primary anesthetic    Diagnosis: ORIF Rt distal radius   Start time: 12/7/2023 11:39 AM  Timeout: 12/7/2023 11:38 AM   End time: 12/7/2023 11:44 AM    Staffing  Authorizing Provider: Bindu Knox MD  Performing Provider: Bindu Knox MD    Staffing  Performed by: Bindu Knox MD  Authorized by: Bindu Knox MD    Preanesthetic Checklist  Completed: patient identified, IV checked, site marked, risks and benefits discussed, surgical consent, monitors and equipment checked, pre-op evaluation and timeout performed  Peripheral Block  Patient position: sitting  Prep: ChloraPrep  Patient monitoring: heart rate, cardiac monitor, continuous pulse ox, continuous capnometry and frequent blood pressure checks  Block type: infraclavicular  Laterality: right  Injection technique: single shot  Needle  Needle type: Stimuplex   Needle gauge: 21 G  Needle length: 4 in  Needle localization: ultrasound guidance and anatomical landmarks   -ultrasound image captured on disc.  Assessment  Injection assessment: negative aspiration and negative parasthesia  Paresthesia pain: none  Heart rate change: no  Slow fractionated injection: yes  Pain Tolerance: comfortable throughout block and no complaints  Medications:    Medications: ropivacaine (NAROPIN) injection 0.5% - Perineural   30 mL - 12/7/2023 11:39:00 AM    Additional Notes  VSS.  [facility] RN monitoring vitals throughout procedure.  Patient tolerated procedure well.

## 2023-12-07 NOTE — BRIEF OP NOTE
[facility] - Periop Services  Discharge Note  Short Stay    Procedure(s) (LRB):  ORIF, FRACTURE, RADIUS OR ULNA (Right)      OUTCOME: Patient tolerated treatment/procedure well without complication and is now ready for discharge.    DISPOSITION: Home or Self Care    FINAL DIAGNOSIS:  Radius fracture    FOLLOWUP: In clinic    DISCHARGE INSTRUCTIONS:    Discharge Procedure Orders   Lifting restrictions   Order Comments: No lifting right arm     Leave dressing on - Keep it clean, dry, and intact until clinic visit         Clinical Reference Documents Added to Patient Instructions         Document    OPEN REDUCTION AND INTERNAL FIXATION SURGERY DISCHARGE INSTRUCTIONS (ENGLISH)            TIME SPENT ON DISCHARGE: 5 minutes

## 2023-12-07 NOTE — OP NOTE
Operative Note    Patient Information:  Ranjeet Ball    Date of Surgery:  12/07/2023    Surgeon:  Khoa Abdalla MD    Assistant:  Herman Alexandre MD PGY 3  Chrissy Beard PA-C  who was necessary and essential for all aspects of the operation, including but not limited to patient positioning, surgical exposure, wound closure, and dressing placement.        Pre-operative Diagnosis:  Right radius shaft fracture    Post-operative Diagnosis:  same    Procedure Performed:  Open reduction internal fixation right radius shaft CPT 97070     Anesthesia:  See anesthesia record    Complications:  None    Blood Loss:  See anesthesia record    Specimens:  none    Implants:  Implant Name Type Inv. Item Serial No.  Lot No. LRB No. Used Action   SCREW CORTEX 3.5MM X 16MM - [id number]  SCREW CORTEX 3.5MM X 16MM  SYNTHES  Right 4 Implanted   SCREW CORTEX 3.5MM X 18MM - [id number]  SCREW CORTEX 3.5MM X 18MM  [employer]NTHES  Right 2 Implanted   PLATE 8-HOLE LCP - [id number]  PLATE 8-HOLE LCP  SYNTHES  Right 1 Implanted        Indications for Procedure:  Ranjeet Ball is a 37 y.o. male that sustained a right radial shaft fracture after falling on thanksgiving when he jumped from a height.    Risks and benefits of the procedure were discussed with the patient. I explained that surgery and the nature of their condition are not without risks. These include, but are not limited to, bleeding, infection, neurovascular compromise, malunion, nonunion, hardware complications, wound complications, scarring, cosmetic defects, need for later and/or repeated surgeries, pain, loss of ROM, loss of function, PTOA, deformity, functional abnormalities, stiffness, thromboembolic complications, compartment syndrome, loss of limb, loss of life, anesthetic complications, and other imponderables. I explained that these can occur despite the adequacy of treatments rendered, and that their risks are heightened given the nature of their condition. They  verbalized understanding. They would like to continue with surgery at this time. If appropriate family was involved with surgical discussion. The patient expressed understanding of and agreement with the plan. Informed consent was obtained and signed prior going to the operative room.    Procedure in Detail:  Patient was brought to the operating room by the anesthesia team. Anesthesia was administered per the anesthesia record. The patient was left on the stretcher and a hand table was placed on the side of the bed.     Time out was performed and all parties present agreed with correct patient, correct procedure, correct side, correct site. The operative extremity was prepped and draped in standard normal fashion.     Pre-incision antibiotics were administered prior to skin incision. The tourniquet was inflated to 250mm HG.     A longitudinal incision was made along the volar aspect of the forearm.  Dissection was carried through subcutaneous tissue to the fascia.  The fascia was carefully incised in the interval between the brachioradialis and the flexor carpi radialis muscles was developed.  Deep interval between the radial artery and radial nerve was developed.  The pronator teres muscle was peeled off of the radius in the radius shaft was exposed.  Fracture was reduced with clamps.  A Synthes 8 hole LCP plate was placed onto the bone and held with clamps.  Two cortical screws were placed proximally in neutral fashion.  Distally 1 cortical screw was placed and compression.  Then 2 cortical screws were placed distally in neutral and 1 remaining screw was placed proximally in neutral.  Fluoroscopy confirmed good fracture reduction and hardware placement.  The distal radioulnar joint was stressed and found to be stable.  The tourniquet was let down and hemostasis was achieved.  The skin was closed with 3-0 Monocryl suture for the subcutaneous layer and 3-0 nylon suture for the skin.  A sterile dressing was applied        Tourniquet was deflated. Patient was awoken from anesthesia without complications and transferred to the recovery room in stable condition.       Post-operative Plan:  Come to clinic in 2 weeks for wound check.  No lifting pushing pulling greater than 1 lb with the right upper extremity.  He can work on range of motion as tolerated.

## 2023-12-08 VITALS
TEMPERATURE: 97 F | RESPIRATION RATE: 15 BRPM | HEART RATE: 54 BPM | OXYGEN SATURATION: 99 % | DIASTOLIC BLOOD PRESSURE: 61 MMHG | SYSTOLIC BLOOD PRESSURE: 96 MMHG

## 2023-12-13 NOTE — PROGRESS NOTES
SUBJECTIVE:     History of Present Illness      Chief Complaint: Follow-up (Follow up to discuss broken arm, requesting pain medication.)    HPI:  Patient is a 37 y.o. year old male who presents to clinic follow-up after ORIF fracture of the right radius with orthopedic December 7.  Patient states that he is still in pain. He is  currently taking Norco , Robaxin and gabapentin.   Last filled 12/11/23.   Appointment with orthopedic next week.    Review of Systems:    Review of Systems    12 point review of systems conducted, negative except as stated in the history of present illness. See HPI for details.     Previous History    Matty Gomez, SYDNIP  Review of patient's allergies indicates:  No Known Allergies    Past Medical History:   Diagnosis Date    Hyperlipidemia     Myocardial infarction      Current Outpatient Medications   Medication Instructions    aspirin (ECOTRIN) 81 mg, Oral, Daily    atorvastatin (LIPITOR) 40 mg, Oral, Daily    gabapentin (NEURONTIN) 300 mg, Oral, Nightly    HYDROcodone-acetaminophen (NORCO) 5-325 mg per tablet 1 tablet, Oral, Every 6 hours PRN    ibuprofen (ADVIL,MOTRIN) 800 mg, Oral, 3 times daily    methocarbamoL (ROBAXIN) 500 mg, Oral, 4 times daily     Past Surgical History:   Procedure Laterality Date    CORONARY ARTERY BYPASS GRAFT      LAPAROTOMY, EXPLORATORY N/A 03/23/2023    Procedure: LAPAROTOMY, EXPLORATORY;  Surgeon: Alex Juárez MD;  Location: [facility];  Service: General;  Laterality: N/A;    ORIF FOREARM FRACTURE Right 12/7/2023    Procedure: ORIF, FRACTURE, RADIUS OR ULNA;  Surgeon: Khoa Abdalla MD;  Location: [facility];  Service: Orthopedics;  Laterality: Right;  Call Wilmer     Family History   Problem Relation Age of Onset    Diabetes Mother     Heart disease Mother     Cancer Father     Diabetes Sister     Heart disease Brother     Stroke Maternal Grandmother     Heart disease Maternal Grandfather        Social History     Tobacco Use    Smoking status: Every Day  "    Current packs/day: 0.15     Average packs/day: 0.2 packs/day for 24.0 years (3.6 ttl pk-yrs)     Types: Cigarettes     Start date: 12/4/1999    Smokeless tobacco: Never    Tobacco comments:     States smokes 1-2 cigarettes per day   Substance Use Topics    Alcohol use: Yes     Comment: 1-2 times yearly    Drug use: Yes     Frequency: 7.0 times per week     Types: Marijuana     Comment: daily        Health Maintenance      Health Maintenance   Topic Date Due    Hepatitis C Screening  Never done    Lipid Panel  05/23/2028    TETANUS VACCINE  03/23/2033       OBJECTIVE:     Physical Exam      Vital Signs Reviewed   Visit Vitals  BP 99/64 (BP Location: Left arm, Patient Position: Sitting)   Pulse 77   Temp 97.4 °F (36.3 °C) (Oral)   Resp 17   Ht 5' 6" (1.676 m)   Wt 55.2 kg (121 lb 9.6 oz)   SpO2 97%   BMI 19.63 kg/m²       Physical Exam    Physical Exam:  General: Alert, well nourished, no acute distress, non-toxic appearing.   Eyes: Anicteric sclera, without conjunctival injection, normal lids, no purulent drainage, EOMs grossly intact.   Ears: No tragal tenderness. Tympanic membranes intact, pearly grey, without effusion or erythema and with a positive light reflex.   Mouth: Posterior pharynx without erythema. No exudate, ulcerations, or lesion. No tonsillar swelling.   Neck: Supple, full ROM, no rigidity, no cervical adenopathy.   Cardio: Normal rate and rhythm    Resp: Respirations even and unlabored, clear to auscultation bilaterally.   Abd: No ecchymosis or distension. Normal bowel sounds in all 4 quadrants. No tenderness to palpation. No rebound tenderness or guarding. No CVA tenderness.   Skin:  Right arm in sling MSK: No swelling. No abrasions or signs of trauma. Ambulating without assistance.   Neuro: Alert,oriented No focal deficits noted. Facial expressions even.   Psych: Cooperative, Normal affect      Procedures    Procedures     Labs     Results for orders placed or performed in visit on 11/27/23 "   POCT URINE DIPSTICK WITH MICROSCOPE, AUTOMATED   Result Value Ref Range    Color, UA Dark Yellow     Spec Grav UA 1.030     pH, UA 6.0     WBC, UA Negative     Nitrite, UA Negative     Protein,      Glucose, UA Negative     Ketones, UA Negative     Urobilinogen, UA 0.2     Bilirubin, POC Small     Blood, UA Negative        Chemistry:  Lab Results   Component Value Date     05/23/2023    K 4.0 05/23/2023    CHLORIDE 107 05/23/2023    BUN 11.6 05/23/2023    CREATININE 0.89 05/23/2023    EGFRNORACEVR >60 05/23/2023    GLUCOSE 84 05/23/2023    CALCIUM 9.7 05/23/2023    ALKPHOS 70 05/23/2023    LABPROT 7.4 05/23/2023    ALBUMIN 3.8 05/23/2023    AST 16 05/23/2023    ALT 25 05/23/2023    MG 2.10 03/24/2023    PHOS 3.3 03/24/2023    JHMCFPPL19FI 15.5 (L) 05/23/2023    TSH 0.715 05/23/2023    PSA 0.40 05/23/2023        Lab Results   Component Value Date    HGBA1C 5.0 05/23/2023        Hematology:  Lab Results   Component Value Date    WBC 9.03 05/23/2023    HGB 13.1 (L) 05/23/2023    HCT 42.2 05/23/2023     (H) 05/23/2023       Lipid Panel:  Lab Results   Component Value Date    CHOL 148 05/23/2023    HDL 37 05/23/2023    LDL 93.00 05/23/2023    TRIG 92 05/23/2023    TOTALCHOLEST 4 05/23/2023        Urine:  Lab Results   Component Value Date    COLORUA Light-Yellow 05/23/2023    APPEARANCEUA Clear 05/23/2023    SGUA 1.017 05/23/2023    PHUA 7.0 05/23/2023    PROTEINUA Negative 05/23/2023    GLUCOSEUA Normal 05/23/2023    KETONESUA Negative 05/23/2023    BLOODUA Negative 05/23/2023    NITRITESUA Negative 05/23/2023    LEUKOCYTESUR 75 (A) 05/23/2023    RBCUA 0-5 05/23/2023    WBCUA 21-50 (A) 05/23/2023    BACTERIA None Seen 05/23/2023    SQEPUA Few (A) 05/23/2023    HYALINECASTS None Seen 05/23/2023    CREATRANALESSIAR 90.8 05/23/2023         Assessment            ICD-10-CM ICD-9-CM   1. S/P ORIF (open reduction internal fixation) fracture  Z98.890 V45.89    Z87.81 V15.51       Plan       1. S/P ORIF (open  reduction internal fixation) fracture    Follow up with Orthopedic as scheduled next week.  Continue gabapentin Norco and muscle relaxer for pain management we will add on ibuprofen to help with inflammation and pain control    - ibuprofen (ADVIL,MOTRIN) 800 MG tablet; Take 1 tablet (800 mg total) by mouth 3 (three) times daily.  Dispense: 90 tablet; Refill: 0  Orders Placed This Encounter    ibuprofen (ADVIL,MOTRIN) 800 MG tablet      Medication List with Changes/Refills   New Medications    IBUPROFEN (ADVIL,MOTRIN) 800 MG TABLET    Take 1 tablet (800 mg total) by mouth 3 (three) times daily.   Current Medications    ASPIRIN (ECOTRIN) 81 MG EC TABLET    Take 81 mg by mouth once daily.    ATORVASTATIN (LIPITOR) 40 MG TABLET    Take 40 mg by mouth once daily.    GABAPENTIN (NEURONTIN) 300 MG CAPSULE    Take 1 capsule (300 mg total) by mouth every evening.    HYDROCODONE-ACETAMINOPHEN (NORCO) 5-325 MG PER TABLET    Take 1 tablet by mouth every 6 (six) hours as needed for Pain.    METHOCARBAMOL (ROBAXIN) 500 MG TAB    Take 1 tablet (500 mg total) by mouth 4 (four) times daily. for 13 days   Discontinued Medications    LEVOFLOXACIN (LEVAQUIN) 500 MG TABLET    Take 1 tablet (500 mg total) by mouth once daily.       Follow up in about 6 weeks (around 1/25/2024) for Wellness.   Follow up in about 6 weeks (around 1/25/2024) for Wellness. In addition to their scheduled follow up, the patient has also been instructed to follow up on as needed basis.   Future Appointments   Date Time Provider Department Center   12/20/2023 12:50 PM RESIDENT 2, [facility] ORTHOPEDICS [facility] ORTHO Salomon Melendez   1/25/2024  2:00 PM Matty Gomez FNP [facility]   2/27/2024 10:30 AM Sonu Romano, MARGARITO [facility] UROLO Salomon Melendez

## 2023-12-14 ENCOUNTER — OFFICE VISIT (OUTPATIENT)
Dept: FAMILY MEDICINE | Facility: CLINIC | Age: 38
End: 2023-12-14
Payer: MEDICAID

## 2023-12-14 VITALS
DIASTOLIC BLOOD PRESSURE: 64 MMHG | RESPIRATION RATE: 17 BRPM | OXYGEN SATURATION: 97 % | HEART RATE: 77 BPM | TEMPERATURE: 97 F | HEIGHT: 66 IN | BODY MASS INDEX: 19.55 KG/M2 | SYSTOLIC BLOOD PRESSURE: 99 MMHG | WEIGHT: 121.63 LBS

## 2023-12-14 DIAGNOSIS — Z98.890 S/P ORIF (OPEN REDUCTION INTERNAL FIXATION) FRACTURE: Primary | ICD-10-CM

## 2023-12-14 DIAGNOSIS — Z87.81 S/P ORIF (OPEN REDUCTION INTERNAL FIXATION) FRACTURE: Primary | ICD-10-CM

## 2023-12-14 PROCEDURE — 3074F SYST BP LT 130 MM HG: CPT | Mod: CPTII,,, | Performed by: NURSE PRACTITIONER

## 2023-12-14 PROCEDURE — 3044F PR MOST RECENT HEMOGLOBIN A1C LEVEL <7.0%: ICD-10-PCS | Mod: CPTII,,, | Performed by: NURSE PRACTITIONER

## 2023-12-14 PROCEDURE — 99213 PR OFFICE/OUTPT VISIT, EST, LEVL III, 20-29 MIN: ICD-10-PCS | Mod: ,,, | Performed by: NURSE PRACTITIONER

## 2023-12-14 PROCEDURE — 99213 OFFICE O/P EST LOW 20 MIN: CPT | Mod: ,,, | Performed by: NURSE PRACTITIONER

## 2023-12-14 PROCEDURE — 3044F HG A1C LEVEL LT 7.0%: CPT | Mod: CPTII,,, | Performed by: NURSE PRACTITIONER

## 2023-12-14 PROCEDURE — 3078F PR MOST RECENT DIASTOLIC BLOOD PRESSURE < 80 MM HG: ICD-10-PCS | Mod: CPTII,,, | Performed by: NURSE PRACTITIONER

## 2023-12-14 PROCEDURE — 3078F DIAST BP <80 MM HG: CPT | Mod: CPTII,,, | Performed by: NURSE PRACTITIONER

## 2023-12-14 PROCEDURE — 1159F MED LIST DOCD IN RCRD: CPT | Mod: CPTII,,, | Performed by: NURSE PRACTITIONER

## 2023-12-14 PROCEDURE — 3008F BODY MASS INDEX DOCD: CPT | Mod: CPTII,,, | Performed by: NURSE PRACTITIONER

## 2023-12-14 PROCEDURE — 1159F PR MEDICATION LIST DOCUMENTED IN MEDICAL RECORD: ICD-10-PCS | Mod: CPTII,,, | Performed by: NURSE PRACTITIONER

## 2023-12-14 PROCEDURE — 3074F PR MOST RECENT SYSTOLIC BLOOD PRESSURE < 130 MM HG: ICD-10-PCS | Mod: CPTII,,, | Performed by: NURSE PRACTITIONER

## 2023-12-14 PROCEDURE — 3008F PR BODY MASS INDEX (BMI) DOCUMENTED: ICD-10-PCS | Mod: CPTII,,, | Performed by: NURSE PRACTITIONER

## 2023-12-14 RX ORDER — IBUPROFEN 800 MG/1
800 TABLET ORAL 3 TIMES DAILY
Qty: 90 TABLET | Refills: 0 | Status: SHIPPED | OUTPATIENT
Start: 2023-12-14 | End: 2024-01-25 | Stop reason: SDUPTHER

## 2023-12-20 ENCOUNTER — OFFICE VISIT (OUTPATIENT)
Dept: ORTHOPEDICS | Facility: CLINIC | Age: 38
End: 2023-12-20
Payer: MEDICAID

## 2023-12-20 ENCOUNTER — HOSPITAL ENCOUNTER (OUTPATIENT)
Dept: RADIOLOGY | Facility: HOSPITAL | Age: 38
Discharge: HOME | End: 2023-12-20
Attending: STUDENT IN AN ORGANIZED HEALTH CARE EDUCATION/TRAINING PROGRAM
Payer: MEDICAID

## 2023-12-20 VITALS
TEMPERATURE: 98 F | HEART RATE: 90 BPM | BODY MASS INDEX: 19.77 KG/M2 | RESPIRATION RATE: 18 BRPM | HEIGHT: 66 IN | DIASTOLIC BLOOD PRESSURE: 72 MMHG | WEIGHT: 123 LBS | OXYGEN SATURATION: 99 % | SYSTOLIC BLOOD PRESSURE: 106 MMHG

## 2023-12-20 DIAGNOSIS — M79.601 RIGHT ARM PAIN: Primary | ICD-10-CM

## 2023-12-20 DIAGNOSIS — M79.601 RIGHT ARM PAIN: ICD-10-CM

## 2023-12-20 PROCEDURE — 1159F PR MEDICATION LIST DOCUMENTED IN MEDICAL RECORD: ICD-10-PCS | Mod: CPTII,,, | Performed by: ORTHOPAEDIC SURGERY

## 2023-12-20 PROCEDURE — 73090 X-RAY EXAM OF FOREARM: CPT | Mod: TC,RT

## 2023-12-20 PROCEDURE — 3044F PR MOST RECENT HEMOGLOBIN A1C LEVEL <7.0%: ICD-10-PCS | Mod: CPTII,,, | Performed by: ORTHOPAEDIC SURGERY

## 2023-12-20 PROCEDURE — 99024 PR POST-OP FOLLOW-UP VISIT: ICD-10-PCS | Mod: ,,, | Performed by: ORTHOPAEDIC SURGERY

## 2023-12-20 PROCEDURE — 3078F DIAST BP <80 MM HG: CPT | Mod: CPTII,,, | Performed by: ORTHOPAEDIC SURGERY

## 2023-12-20 PROCEDURE — 99024 POSTOP FOLLOW-UP VISIT: CPT | Mod: ,,, | Performed by: ORTHOPAEDIC SURGERY

## 2023-12-20 PROCEDURE — 3074F PR MOST RECENT SYSTOLIC BLOOD PRESSURE < 130 MM HG: ICD-10-PCS | Mod: CPTII,,, | Performed by: ORTHOPAEDIC SURGERY

## 2023-12-20 PROCEDURE — 3078F PR MOST RECENT DIASTOLIC BLOOD PRESSURE < 80 MM HG: ICD-10-PCS | Mod: CPTII,,, | Performed by: ORTHOPAEDIC SURGERY

## 2023-12-20 PROCEDURE — 1159F MED LIST DOCD IN RCRD: CPT | Mod: CPTII,,, | Performed by: ORTHOPAEDIC SURGERY

## 2023-12-20 PROCEDURE — 99214 OFFICE O/P EST MOD 30 MIN: CPT | Mod: PBBFAC

## 2023-12-20 PROCEDURE — 3074F SYST BP LT 130 MM HG: CPT | Mod: CPTII,,, | Performed by: ORTHOPAEDIC SURGERY

## 2023-12-20 PROCEDURE — 3044F HG A1C LEVEL LT 7.0%: CPT | Mod: CPTII,,, | Performed by: ORTHOPAEDIC SURGERY

## 2023-12-20 NOTE — PROGRESS NOTES
Faculty Attestation: Ranjeet Ball  was seen at [facility] in the Orthopaedic Clinic. History of Present Illness, Physical Exam, and Assessment and Plan reviewed. Treatment plan is reasonable and appropriate. Compliance with treatment recommendations is important. Discussed with the resident at the time of the visit.  No procedure was performed.     Juma Polanco Jr, MD  Orthopaedic Surgery

## 2023-12-20 NOTE — PROGRESS NOTES
[facility]  Established Patient Office Visit  12/20/2023       Patient ID: Ranjeet Ball  YOB: 1985  MRN: [id number]    Chief Complaint: Post-op Evaluation of the Right Forearm (Closed reduction and pinning right forearm on 12/07/23. Pain 6/10 incision open to air sutures intact)      Past Orthopaedic Surgeries:  ORIF right radius 12/07/2023    HPI:  Ranjeet Ball is a 37 y.o. male status post ORIF right radial shaft 12/07/2023.  Patient doing very well since surgery.  Pain has been gradually improving.  No drainage erythema or dehiscence from his incision.  Has some occasional tingling numbness on the radial and ulnar aspects of his hand but these are sporadic.  Continues to have swelling.  Still has full function of his hand and wrist and elbow.    Past Medical History:    Past Medical History:   Diagnosis Date    Hyperlipidemia     Myocardial infarction      Past Surgical History:   Procedure Laterality Date    CORONARY ARTERY BYPASS GRAFT      LAPAROTOMY, EXPLORATORY N/A 03/23/2023    Procedure: LAPAROTOMY, EXPLORATORY;  Surgeon: Alex Juárez MD;  Location: [facility];  Service: General;  Laterality: N/A;    ORIF FOREARM FRACTURE Right 12/7/2023    Procedure: ORIF, FRACTURE, RADIUS OR ULNA;  Surgeon: Khoa Abdalla MD;  Location: [facility];  Service: Orthopedics;  Laterality: Right;  Call Wilmer     Family History   Problem Relation Age of Onset    Diabetes Mother     Heart disease Mother     Cancer Father     Diabetes Sister     Heart disease Brother     Stroke Maternal Grandmother     Heart disease Maternal Grandfather      Social History     Socioeconomic History    Marital status: Single    Number of children: 1   Tobacco Use    Smoking status: Every Day     Current packs/day: 0.15     Average packs/day: 0.2 packs/day for 24.0 years (3.6 ttl pk-yrs)     Types: Cigarettes     Start date: 12/4/1999    Smokeless tobacco: Never    Tobacco comments:     States  smokes 1-2 cigarettes per day   Substance and Sexual Activity    Alcohol use: Yes     Comment: 1-2 times yearly    Drug use: Yes     Frequency: 7.0 times per week     Types: Marijuana     Comment: daily    Sexual activity: Yes     Partners: Female     Birth control/protection: Condom     Social Determinants of Health     Financial Resource Strain: High Risk (5/2/2023)    Overall Financial Resource Strain (CARDIA)     Difficulty of Paying Living Expenses: Very hard   Food Insecurity: Food Insecurity Present (5/2/2023)    Hunger Vital Sign     Worried About Running Out of Food in the Last Year: Often true     Ran Out of Food in the Last Year: Sometimes true   Transportation Needs: Unmet Transportation Needs (5/2/2023)    PRAPARE - Transportation     Lack of Transportation (Medical): Yes     Lack of Transportation (Non-Medical): Yes   Physical Activity: Sufficiently Active (5/2/2023)    Exercise Vital Sign     Days of Exercise per Week: 7 days     Minutes of Exercise per Session: 30 min   Stress: Stress Concern Present (5/2/2023)    [employer] [place] [employer] - Occupational Stress Questionnaire     Feeling of Stress : Rather much   Social Connections: Moderately Isolated (5/2/2023)    Social Connection and Isolation Panel [NHANES]     Frequency of Communication with Friends and Family: More than three times a week     Frequency of Social Gatherings with Friends and Family: Once a week     Attends Zoroastrian Services: 1 to 4 times per year     Active Member of Clubs or Organizations: No     Attends Club or Organization Meetings: Never     Marital Status: Never    Housing Stability: High Risk (5/2/2023)    Housing Stability Vital Sign     Unable to Pay for Housing in the Last Year: Yes     Number of Places Lived in the Last Year: 2     Unstable Housing in the Last Year: Yes     Medication List with Changes/Refills   Current Medications    ASPIRIN (ECOTRIN) 81 MG EC TABLET    Take 81 mg by mouth once  daily.    ATORVASTATIN (LIPITOR) 40 MG TABLET    Take 40 mg by mouth once daily.    GABAPENTIN (NEURONTIN) 300 MG CAPSULE    Take 1 capsule (300 mg total) by mouth every evening.    IBUPROFEN (ADVIL,MOTRIN) 800 MG TABLET    Take 1 tablet (800 mg total) by mouth 3 (three) times daily.    METHOCARBAMOL (ROBAXIN) 500 MG TAB    Take 1 tablet (500 mg total) by mouth 4 (four) times daily. for 13 days     Review of patient's allergies indicates:  No Known Allergies    Physical Exam:    Right upper extremity  Well healing surgical incision with nylon sutures in place   Motor intact: ain/pin/m/u/r  Hansen: M/U/R without numbness, tingling, or asymmetry  Full ROM of shoulder, elbow, wrist, fingers  2+ radial pulse    Imaging:  X-rays show appropriate reduction of the radial shaft fracture with hardware intact, no interval displacement of radial shaft    Assessment and Plan:    Ranjeet Ball is a 37 y.o. male with radial shaft fracture status post ORIF 12/07/2023.  He is recovering appropriately.  -continue nonweightbearing for 4 more weeks  -work on range of motion of the shoulder elbow hand  -follow up in 4 weeks for repeat x-rays and advancement of weight-bearing  -patient also has a left sided distal radius malunion which she would like addressed by Dr. Abdalla after he has fully recovered from his right side    Herman Alexandre MD  [employer] Orthopaedic Surgery PGY-3          Orders Placed This Encounter    X-Ray Forearm Right

## 2024-01-17 ENCOUNTER — HOSPITAL ENCOUNTER (OUTPATIENT)
Dept: RADIOLOGY | Facility: HOSPITAL | Age: 39
Discharge: HOME | End: 2024-01-17
Attending: STUDENT IN AN ORGANIZED HEALTH CARE EDUCATION/TRAINING PROGRAM
Payer: MEDICAID

## 2024-01-17 ENCOUNTER — OFFICE VISIT (OUTPATIENT)
Dept: ORTHOPEDICS | Facility: CLINIC | Age: 39
End: 2024-01-17
Payer: MEDICAID

## 2024-01-17 VITALS
TEMPERATURE: 98 F | OXYGEN SATURATION: 100 % | RESPIRATION RATE: 20 BRPM | SYSTOLIC BLOOD PRESSURE: 108 MMHG | DIASTOLIC BLOOD PRESSURE: 74 MMHG | HEIGHT: 66 IN | WEIGHT: 120.19 LBS | HEART RATE: 83 BPM | BODY MASS INDEX: 19.32 KG/M2

## 2024-01-17 DIAGNOSIS — S52.309A CLOSED FRACTURE OF SHAFT OF RADIUS (ALONE): ICD-10-CM

## 2024-01-17 DIAGNOSIS — S52.309A CLOSED FRACTURE OF SHAFT OF RADIUS (ALONE): Primary | ICD-10-CM

## 2024-01-17 PROCEDURE — 3078F DIAST BP <80 MM HG: CPT | Mod: CPTII,,, | Performed by: ORTHOPAEDIC SURGERY

## 2024-01-17 PROCEDURE — 99499 UNLISTED E&M SERVICE: CPT | Mod: ,,, | Performed by: ORTHOPAEDIC SURGERY

## 2024-01-17 PROCEDURE — 1159F MED LIST DOCD IN RCRD: CPT | Mod: CPTII,,, | Performed by: ORTHOPAEDIC SURGERY

## 2024-01-17 PROCEDURE — 99213 OFFICE O/P EST LOW 20 MIN: CPT | Mod: PBBFAC

## 2024-01-17 PROCEDURE — 73090 X-RAY EXAM OF FOREARM: CPT | Mod: TC,RT

## 2024-01-17 PROCEDURE — 3044F HG A1C LEVEL LT 7.0%: CPT | Mod: CPTII,,, | Performed by: ORTHOPAEDIC SURGERY

## 2024-01-17 PROCEDURE — 3074F SYST BP LT 130 MM HG: CPT | Mod: CPTII,,, | Performed by: ORTHOPAEDIC SURGERY

## 2024-01-17 NOTE — PROGRESS NOTES
[facility]  Established Patient Office Visit  01/17/2024       Patient ID: Ranjeet Ball  YOB: 1985  MRN: [id number]    Chief Complaint:  Status post open reduction internal fixation right radius 12/07/2023      Past Orthopaedic Surgeries: Status post open reduction internal fixation right radius 12/07/2023    HPI:  Ranjeet Ball is a 38 y.o. male status post right radial shaft fracture 12/07/2023.  He has been doing well since surgery.  He occasionally has some radial sided wrist pain.  He is starting to use his arm for activities.  He has not done any heavy lifting.  He still has some tingling and numbness on the radial aspect of his wrist that has been unchanged since surgery.  He is minimal pain at the fracture site.  He does occasionally have right elbow pain.  He is ready to have his left distal radius malunion addressed.    12 point ROS performed and negative except as above.     Past Medical History:    Past Medical History:   Diagnosis Date    Hyperlipidemia     Myocardial infarction      Past Surgical History:   Procedure Laterality Date    CORONARY ARTERY BYPASS GRAFT      LAPAROTOMY, EXPLORATORY N/A 03/23/2023    Procedure: LAPAROTOMY, EXPLORATORY;  Surgeon: Alex Juárez MD;  Location: [facility];  Service: General;  Laterality: N/A;    ORIF FOREARM FRACTURE Right 12/7/2023    Procedure: ORIF, FRACTURE, RADIUS OR ULNA;  Surgeon: Khoa Abdalla MD;  Location: [facility];  Service: Orthopedics;  Laterality: Right;  Call [place]     Family History   Problem Relation Age of Onset    Diabetes Mother     Heart disease Mother     Cancer Father     Diabetes Sister     Heart disease Brother     Stroke Maternal Grandmother     Heart disease Maternal Grandfather      Social History     Socioeconomic History    Marital status: Single    Number of children: 1   Tobacco Use    Smoking status: Every Day     Current packs/day: 0.15     Average packs/day: 0.2 packs/day for  24.1 years (3.6 ttl pk-yrs)     Types: Cigarettes     Start date: 12/4/1999    Smokeless tobacco: Never    Tobacco comments:     States smokes 1-2 cigarettes per day   Substance and Sexual Activity    Alcohol use: Yes     Comment: 1-2 times yearly    Drug use: Yes     Frequency: 7.0 times per week     Types: Marijuana     Comment: daily    Sexual activity: Yes     Partners: Female     Birth control/protection: Condom     Social Determinants of Health     Financial Resource Strain: High Risk (5/2/2023)    Overall Financial Resource Strain (CARDIA)     Difficulty of Paying Living Expenses: Very hard   Food Insecurity: Food Insecurity Present (5/2/2023)    Hunger Vital Sign     Worried About Running Out of Food in the Last Year: Often true     Ran Out of Food in the Last Year: Sometimes true   Transportation Needs: Unmet Transportation Needs (5/2/2023)    PRAPARE - Transportation     Lack of Transportation (Medical): Yes     Lack of Transportation (Non-Medical): Yes   Physical Activity: Sufficiently Active (5/2/2023)    Exercise Vital Sign     Days of Exercise per Week: 7 days     Minutes of Exercise per Session: 30 min   Stress: Stress Concern Present (5/2/2023)    [employer] [place] - Occupational Stress Questionnaire     Feeling of Stress : Rather much   Social Connections: Moderately Isolated (5/2/2023)    Social Connection and Isolation Panel [NHANES]     Frequency of Communication with Friends and Family: More than three times a week     Frequency of Social Gatherings with Friends and Family: Once a week     Attends Druze Services: 1 to 4 times per year     Active Member of Clubs or Organizations: No     Attends Club or Organization Meetings: Never     Marital Status: Never    Housing Stability: High Risk (5/2/2023)    Housing Stability Vital Sign     Unable to Pay for Housing in the Last Year: Yes     Number of Places Lived in the Last Year: 2     Unstable Housing in the Last  Year: Yes     Medication List with Changes/Refills   Current Medications    ASPIRIN (ECOTRIN) 81 MG EC TABLET    Take 81 mg by mouth once daily.    ATORVASTATIN (LIPITOR) 40 MG TABLET    Take 40 mg by mouth once daily.    GABAPENTIN (NEURONTIN) 300 MG CAPSULE    Take 1 capsule (300 mg total) by mouth every evening.    IBUPROFEN (ADVIL,MOTRIN) 800 MG TABLET    Take 1 tablet (800 mg total) by mouth 3 (three) times daily.     Review of patient's allergies indicates:  No Known Allergies    Physical Exam:    Right upper extremity  Well-healed surgical incision  Motor intact to AIN/PIN/ulnar  Sensation intact to light touch M/U, altered sensation in the radial distribution  Pain with Finkelstein's   Hand warm well perfused    Imaging independently interpreted:  X-rays of the right forearm show significant callus formation at the fracture site, no evidence of hardware failure    Assessment and Plan:    Ranjeet Ball is a 38 y.o. male status post open reduction internal fixation right radial shaft 12/07/2023, doing well, but also with malunion of the left distal radius    -now that the right forearm is doing well, he will follow up in early March [facility] with Dr. Abdalla with exam and x-rays of the left wrist for surgical planning of his malunion  -in terms of the de Quervain's symptoms that he has in the right upper extremity, we can address these with a corticosteroid injection in the future.  At this point, want him to work on his range of motion and get back to normal activities  -weight-bearing as tolerated right upper extremity  -OTC anti-inflammatories as needed for pain control    Lv Deutsch MD  [employer] Orthopaedic Surgery PGY-3

## 2024-01-25 ENCOUNTER — OFFICE VISIT (OUTPATIENT)
Dept: FAMILY MEDICINE | Facility: CLINIC | Age: 39
End: 2024-01-25
Payer: MEDICAID

## 2024-01-25 VITALS
HEART RATE: 92 BPM | RESPIRATION RATE: 18 BRPM | WEIGHT: 126.38 LBS | SYSTOLIC BLOOD PRESSURE: 106 MMHG | TEMPERATURE: 98 F | HEIGHT: 66 IN | DIASTOLIC BLOOD PRESSURE: 56 MMHG | OXYGEN SATURATION: 100 % | BODY MASS INDEX: 20.31 KG/M2

## 2024-01-25 DIAGNOSIS — M25.539 PAIN IN WRIST, UNSPECIFIED LATERALITY: ICD-10-CM

## 2024-01-25 DIAGNOSIS — Z87.81 S/P ORIF (OPEN REDUCTION INTERNAL FIXATION) FRACTURE: ICD-10-CM

## 2024-01-25 DIAGNOSIS — E55.9 VITAMIN D DEFICIENCY: ICD-10-CM

## 2024-01-25 DIAGNOSIS — Z98.890 S/P ORIF (OPEN REDUCTION INTERNAL FIXATION) FRACTURE: ICD-10-CM

## 2024-01-25 DIAGNOSIS — Z00.00 WELLNESS EXAMINATION: Primary | ICD-10-CM

## 2024-01-25 PROCEDURE — 3008F BODY MASS INDEX DOCD: CPT | Mod: CPTII,,, | Performed by: NURSE PRACTITIONER

## 2024-01-25 PROCEDURE — 3078F DIAST BP <80 MM HG: CPT | Mod: CPTII,,, | Performed by: NURSE PRACTITIONER

## 2024-01-25 PROCEDURE — 99395 PREV VISIT EST AGE 18-39: CPT | Mod: ,,, | Performed by: NURSE PRACTITIONER

## 2024-01-25 PROCEDURE — 3074F SYST BP LT 130 MM HG: CPT | Mod: CPTII,,, | Performed by: NURSE PRACTITIONER

## 2024-01-25 PROCEDURE — 3044F HG A1C LEVEL LT 7.0%: CPT | Mod: CPTII,,, | Performed by: NURSE PRACTITIONER

## 2024-01-25 PROCEDURE — 1159F MED LIST DOCD IN RCRD: CPT | Mod: CPTII,,, | Performed by: NURSE PRACTITIONER

## 2024-01-25 RX ORDER — GABAPENTIN 300 MG/1
300 CAPSULE ORAL NIGHTLY
Qty: 30 CAPSULE | Refills: 1 | Status: SHIPPED | OUTPATIENT
Start: 2024-01-25 | End: 2024-02-07 | Stop reason: SDUPTHER

## 2024-01-25 RX ORDER — IBUPROFEN 800 MG/1
800 TABLET ORAL 3 TIMES DAILY
Qty: 90 TABLET | Refills: 0 | Status: ON HOLD | OUTPATIENT
Start: 2024-01-25 | End: 2024-04-04 | Stop reason: HOSPADM

## 2024-01-25 RX ORDER — ERGOCALCIFEROL 1.25 MG/1
50000 CAPSULE ORAL
Qty: 12 CAPSULE | Refills: 4 | Status: ON HOLD | OUTPATIENT
Start: 2024-01-25 | End: 2024-06-17 | Stop reason: HOSPADM
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
## 2024-02-07 DIAGNOSIS — Z87.81 S/P ORIF (OPEN REDUCTION INTERNAL FIXATION) FRACTURE: ICD-10-CM

## 2024-02-07 DIAGNOSIS — Z98.890 S/P ORIF (OPEN REDUCTION INTERNAL FIXATION) FRACTURE: ICD-10-CM

## 2024-02-07 RX ORDER — GABAPENTIN 300 MG/1
300 CAPSULE ORAL NIGHTLY
Qty: 30 CAPSULE | Refills: 1 | Status: SHIPPED | OUTPATIENT
Start: 2024-02-07

## 2024-02-07 RX ORDER — GABAPENTIN 300 MG/1
300 CAPSULE ORAL NIGHTLY
Qty: 30 CAPSULE | Refills: 1 | Status: SHIPPED | OUTPATIENT
Start: 2024-02-07 | End: 2024-02-07 | Stop reason: SDUPTHER

## 2024-02-27 ENCOUNTER — OFFICE VISIT (OUTPATIENT)
Dept: UROLOGY | Facility: CLINIC | Age: 39
End: 2024-02-27
Payer: MEDICAID

## 2024-02-27 VITALS
BODY MASS INDEX: 24.74 KG/M2 | RESPIRATION RATE: 20 BRPM | HEART RATE: 76 BPM | SYSTOLIC BLOOD PRESSURE: 108 MMHG | TEMPERATURE: 98 F | DIASTOLIC BLOOD PRESSURE: 65 MMHG | WEIGHT: 126 LBS | HEIGHT: 60 IN | OXYGEN SATURATION: 97 %

## 2024-02-27 DIAGNOSIS — N45.1 EPIDIDYMITIS: Primary | ICD-10-CM

## 2024-02-27 PROCEDURE — 3008F BODY MASS INDEX DOCD: CPT | Mod: CPTII,,, | Performed by: NURSE PRACTITIONER

## 2024-02-27 PROCEDURE — 1159F MED LIST DOCD IN RCRD: CPT | Mod: CPTII,,, | Performed by: NURSE PRACTITIONER

## 2024-02-27 PROCEDURE — 3078F DIAST BP <80 MM HG: CPT | Mod: CPTII,,, | Performed by: NURSE PRACTITIONER

## 2024-02-27 PROCEDURE — 3044F HG A1C LEVEL LT 7.0%: CPT | Mod: CPTII,,, | Performed by: NURSE PRACTITIONER

## 2024-02-27 PROCEDURE — 3074F SYST BP LT 130 MM HG: CPT | Mod: CPTII,,, | Performed by: NURSE PRACTITIONER

## 2024-02-27 PROCEDURE — 99214 OFFICE O/P EST MOD 30 MIN: CPT | Mod: PBBFAC | Performed by: NURSE PRACTITIONER

## 2024-02-27 PROCEDURE — 99213 OFFICE O/P EST LOW 20 MIN: CPT | Mod: S$PBB,,, | Performed by: NURSE PRACTITIONER

## 2024-02-27 RX ORDER — NITROGLYCERIN 0.4 MG/1
TABLET SUBLINGUAL
Status: ON HOLD | COMMUNITY
Start: 2024-02-07 | End: 2024-06-17 | Stop reason: HOSPADM
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
## 2024-02-27 NOTE — PROGRESS NOTES
Placed in room. Seen by Fercho Romano NP. D/C instructions given and understood.  RTC 6 months with U/S.

## 2024-03-04 ENCOUNTER — OFFICE VISIT (OUTPATIENT)
Dept: ORTHOPEDICS | Facility: CLINIC | Age: 39
End: 2024-03-04
Payer: MEDICAID

## 2024-03-04 ENCOUNTER — HOSPITAL ENCOUNTER (OUTPATIENT)
Dept: RADIOLOGY | Facility: HOSPITAL | Age: 39
Discharge: HOME | End: 2024-03-04
Attending: STUDENT IN AN ORGANIZED HEALTH CARE EDUCATION/TRAINING PROGRAM
Payer: MEDICAID

## 2024-03-04 VITALS
BODY MASS INDEX: 24.11 KG/M2 | HEART RATE: 74 BPM | OXYGEN SATURATION: 98 % | SYSTOLIC BLOOD PRESSURE: 110 MMHG | HEIGHT: 60 IN | RESPIRATION RATE: 18 BRPM | TEMPERATURE: 98 F | DIASTOLIC BLOOD PRESSURE: 63 MMHG | WEIGHT: 122.81 LBS

## 2024-03-04 DIAGNOSIS — S52.522P: ICD-10-CM

## 2024-03-04 DIAGNOSIS — S52.309A CLOSED FRACTURE OF SHAFT OF RADIUS (ALONE): ICD-10-CM

## 2024-03-04 DIAGNOSIS — S52.309A CLOSED FRACTURE OF SHAFT OF RADIUS (ALONE): Primary | ICD-10-CM

## 2024-03-04 PROCEDURE — 3078F DIAST BP <80 MM HG: CPT | Mod: CPTII,,, | Performed by: STUDENT IN AN ORGANIZED HEALTH CARE EDUCATION/TRAINING PROGRAM

## 2024-03-04 PROCEDURE — 3044F HG A1C LEVEL LT 7.0%: CPT | Mod: CPTII,,, | Performed by: STUDENT IN AN ORGANIZED HEALTH CARE EDUCATION/TRAINING PROGRAM

## 2024-03-04 PROCEDURE — 3008F BODY MASS INDEX DOCD: CPT | Mod: CPTII,,, | Performed by: STUDENT IN AN ORGANIZED HEALTH CARE EDUCATION/TRAINING PROGRAM

## 2024-03-04 PROCEDURE — 99024 POSTOP FOLLOW-UP VISIT: CPT | Mod: ,,, | Performed by: STUDENT IN AN ORGANIZED HEALTH CARE EDUCATION/TRAINING PROGRAM

## 2024-03-04 PROCEDURE — 99215 OFFICE O/P EST HI 40 MIN: CPT | Mod: PBBFAC,25

## 2024-03-04 PROCEDURE — 73090 X-RAY EXAM OF FOREARM: CPT | Mod: TC,RT

## 2024-03-04 PROCEDURE — 73110 X-RAY EXAM OF WRIST: CPT | Mod: TC,LT

## 2024-03-04 PROCEDURE — 99214 OFFICE O/P EST MOD 30 MIN: CPT | Mod: S$PBB,24,, | Performed by: STUDENT IN AN ORGANIZED HEALTH CARE EDUCATION/TRAINING PROGRAM

## 2024-03-04 PROCEDURE — 3074F SYST BP LT 130 MM HG: CPT | Mod: CPTII,,, | Performed by: STUDENT IN AN ORGANIZED HEALTH CARE EDUCATION/TRAINING PROGRAM

## 2024-03-04 PROCEDURE — 1159F MED LIST DOCD IN RCRD: CPT | Mod: CPTII,,, | Performed by: STUDENT IN AN ORGANIZED HEALTH CARE EDUCATION/TRAINING PROGRAM

## 2024-03-04 RX ORDER — MUPIROCIN 20 MG/G
OINTMENT TOPICAL
Status: CANCELLED | OUTPATIENT
Start: 2024-03-04

## 2024-03-04 RX ORDER — SODIUM CHLORIDE 9 MG/ML
INJECTION, SOLUTION INTRAVENOUS CONTINUOUS
Status: CANCELLED | OUTPATIENT
Start: 2024-03-04
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
## 2024-03-05 NOTE — PROGRESS NOTES
Faculty Attestation: Ranjeet Ball  was seen at [facility] in the Orthopaedic Clinic. Patient seen and evaluated at the time of the visit. History of Present Illness, Physical Exam, and Assessment and Plan reviewed. Treatment plan is reasonable and appropriate. Compliance with treatment recommendations is important. No procedure was performed.     Khoa Abdalla MD  Orthopaedic Surgery

## 2024-03-11 ENCOUNTER — HOSPITAL ENCOUNTER (OUTPATIENT)
Dept: RADIOLOGY | Facility: HOSPITAL | Age: 39
Discharge: HOME | End: 2024-03-11
Attending: STUDENT IN AN ORGANIZED HEALTH CARE EDUCATION/TRAINING PROGRAM
Payer: MEDICAID

## 2024-03-11 DIAGNOSIS — S52.522P: ICD-10-CM

## 2024-03-11 PROCEDURE — 73200 CT UPPER EXTREMITY W/O DYE: CPT | Mod: TC,LT

## 2024-03-26 ENCOUNTER — ANESTHESIA EVENT (OUTPATIENT)
Dept: SURGERY | Facility: HOSPITAL | Age: 39
End: 2024-03-26
Payer: COMMERCIAL

## 2024-03-26 PROBLEM — E78.5 HYPERLIPIDEMIA: Status: ACTIVE | Noted: 2024-03-26

## 2024-03-26 PROBLEM — I10 HYPERTENSION: Status: ACTIVE | Noted: 2024-03-26

## 2024-03-26 PROBLEM — E66.01 MORBID OBESITY: Status: ACTIVE | Noted: 2024-03-26

## 2024-03-26 PROBLEM — I25.10 ARTERIOSCLEROSIS OF CORONARY ARTERY: Status: ACTIVE | Noted: 2024-03-26
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
## 2024-04-01 ENCOUNTER — OFFICE VISIT (OUTPATIENT)
Dept: ORTHOPEDICS | Facility: CLINIC | Age: 39
End: 2024-04-01
Payer: MEDICAID

## 2024-04-01 VITALS
DIASTOLIC BLOOD PRESSURE: 73 MMHG | BODY MASS INDEX: 23.55 KG/M2 | HEIGHT: 60 IN | HEART RATE: 101 BPM | SYSTOLIC BLOOD PRESSURE: 129 MMHG | TEMPERATURE: 98 F | WEIGHT: 119.94 LBS

## 2024-04-01 DIAGNOSIS — S52.522P: ICD-10-CM

## 2024-04-01 DIAGNOSIS — S52.522P: Primary | ICD-10-CM

## 2024-04-01 PROBLEM — S52.309A CLOSED FRACTURE OF SHAFT OF RADIUS (ALONE): Status: ACTIVE | Noted: 2024-04-01

## 2024-04-01 PROCEDURE — 3078F DIAST BP <80 MM HG: CPT | Mod: CPTII,,, | Performed by: STUDENT IN AN ORGANIZED HEALTH CARE EDUCATION/TRAINING PROGRAM

## 2024-04-01 PROCEDURE — 3044F HG A1C LEVEL LT 7.0%: CPT | Mod: CPTII,,, | Performed by: STUDENT IN AN ORGANIZED HEALTH CARE EDUCATION/TRAINING PROGRAM

## 2024-04-01 PROCEDURE — 99213 OFFICE O/P EST LOW 20 MIN: CPT | Mod: PBBFAC

## 2024-04-01 PROCEDURE — 3008F BODY MASS INDEX DOCD: CPT | Mod: CPTII,,, | Performed by: STUDENT IN AN ORGANIZED HEALTH CARE EDUCATION/TRAINING PROGRAM

## 2024-04-01 PROCEDURE — 3074F SYST BP LT 130 MM HG: CPT | Mod: CPTII,,, | Performed by: STUDENT IN AN ORGANIZED HEALTH CARE EDUCATION/TRAINING PROGRAM

## 2024-04-01 PROCEDURE — 99213 OFFICE O/P EST LOW 20 MIN: CPT | Mod: S$PBB,,, | Performed by: STUDENT IN AN ORGANIZED HEALTH CARE EDUCATION/TRAINING PROGRAM

## 2024-04-01 PROCEDURE — 1159F MED LIST DOCD IN RCRD: CPT | Mod: CPTII,,, | Performed by: STUDENT IN AN ORGANIZED HEALTH CARE EDUCATION/TRAINING PROGRAM

## 2024-04-01 NOTE — PROGRESS NOTES
[facility]  Established Patient Office Visit  04/01/2024       Patient ID: Rnajeet Ball  YOB: 1985  MRN: [id number]    Chief Complaint: Appointment of the Left Wrist (CT results )    Orthopedic injuries:   Left wrist distal radius malunion  Past Orthopaedic Surgeries: Status post open reduction internal fixation right radius shaft 12/07/2023     HPI:  Ranjeet Ball is a 38 y.o. male he is status post the above surgery doing well.  He is here today primarily to discuss his left wrist.  He has had a left distal radius malunion.  He grades the pain 5/10.  This is his dominant arm.  He works as a .  Is unable to work as a result of this condition.  Here today for follow-up after his imaging and for surgical planning.  He is booked for 04/04/2024.    12 point ROS performed and negative except as above.     Past Medical History:    Past Medical History:   Diagnosis Date    Hyperlipidemia     Myocardial infarction      Past Surgical History:   Procedure Laterality Date    CORONARY ARTERY BYPASS GRAFT      LAPAROTOMY, EXPLORATORY N/A 03/23/2023    Procedure: LAPAROTOMY, EXPLORATORY;  Surgeon: Alex Juárez MD;  Location: [facility];  Service: General;  Laterality: N/A;    ORIF FOREARM FRACTURE Right 12/7/2023    Procedure: ORIF, FRACTURE, RADIUS OR ULNA;  Surgeon: Khoa Abdalla MD;  Location: [facility];  Service: Orthopedics;  Laterality: Right;  Call [place]     Family History   Problem Relation Age of Onset    Diabetes Mother     Heart disease Mother     Cancer Father     Diabetes Sister     Heart disease Brother     Stroke Maternal Grandmother     Heart disease Maternal Grandfather      Social History     Socioeconomic History    Marital status: Single    Number of children: 1   Tobacco Use    Smoking status: Every Day     Current packs/day: 0.15     Average packs/day: 0.2 packs/day for 24.3 years (3.6 ttl pk-yrs)     Types: Cigarettes     Start date: 12/4/1999     Smokeless tobacco: Never    Tobacco comments:     States smokes 1-2 cigarettes per day   Substance and Sexual Activity    Alcohol use: Yes     Comment: 1-2 times yearly    Drug use: Yes     Frequency: 7.0 times per week     Types: Marijuana     Comment: daily    Sexual activity: Yes     Partners: Female     Birth control/protection: Condom   Social History Narrative    ** Merged History Encounter **          Social Determinants of Health     Financial Resource Strain: High Risk (5/2/2023)    Overall Financial Resource Strain (CARDIA)     Difficulty of Paying Living Expenses: Very hard   Food Insecurity: Food Insecurity Present (5/2/2023)    Hunger Vital Sign     Worried About Running Out of Food in the Last Year: Often true     Ran Out of Food in the Last Year: Sometimes true   Transportation Needs: Unmet Transportation Needs (5/2/2023)    PRAPARE - Transportation     Lack of Transportation (Medical): Yes     Lack of Transportation (Non-Medical): Yes   Physical Activity: Sufficiently Active (5/2/2023)    Exercise Vital Sign     Days of Exercise per Week: 7 days     Minutes of Exercise per Session: 30 min   Stress: Stress Concern Present (5/2/2023)    [employer] [place] [employer] - Occupational Stress Questionnaire     Feeling of Stress : Rather much   Social Connections: Moderately Isolated (5/2/2023)    Social Connection and Isolation Panel [NHANES]     Frequency of Communication with Friends and Family: More than three times a week     Frequency of Social Gatherings with Friends and Family: Once a week     Attends Confucianism Services: 1 to 4 times per year     Active Member of Clubs or Organizations: No     Attends Club or Organization Meetings: Never     Marital Status: Never    Housing Stability: High Risk (5/2/2023)    Housing Stability Vital Sign     Unable to Pay for Housing in the Last Year: Yes     Number of Places Lived in the Last Year: 2     Unstable Housing in the Last Year: Yes      Medication List with Changes/Refills   Current Medications    ASPIRIN (ECOTRIN) 81 MG EC TABLET    Take 81 mg by mouth once daily.    ATORVASTATIN (LIPITOR) 40 MG TABLET    Take 40 mg by mouth once daily.    ERGOCALCIFEROL (ERGOCALCIFEROL) 50,000 UNIT CAP    Take 1 capsule (50,000 Units total) by mouth every 7 days.    GABAPENTIN (NEURONTIN) 300 MG CAPSULE    Take 1 capsule (300 mg total) by mouth every evening.    IBUPROFEN (ADVIL,MOTRIN) 800 MG TABLET    Take 1 tablet (800 mg total) by mouth 3 (three) times daily.    NITROGLYCERIN (NITROSTAT) 0.4 MG SL TABLET         Review of patient's allergies indicates:  No Known Allergies    Physical Exam:  Left upper extremity   Gross dorsal deformity secondary to malunion  TTP ulnar head / ulnar wrist   Yes about 70° wrist extension, 10 degree wrist flexion  Able to make full fist   Motor intact in AIN/PIN/ulnar sensation intact to light touch M/U/R distribution   Palpable radial    Imaging independently interpreted:  CT reviewed with the patient, persistent distal radius malunion with dorsal tilt    Assessment and Plan:    Ranjeet Ball is a 38 y.o. male with left distal radius malunion.    -We had a long discussion with patient regarding his current condition.  He has a difficult problem in terms of his distal radius malunion that also results in significant shortening and ulnar abutment.  There is some intra-articular involvement of the fracture at the time that has resulted in an uneven joint surface.  We offered a left distal radius osteotomy open reduction internal fixation, we discussed the possibility of ipsilateral iliac crest bone graft.  We also discussed needing a dorsal bridge plate for additional fixation that would remain in place for at least 3 months.  He has a current smoker about a quarter of a pack per day.  We discussed that his smoking status would increase his risk for wound complication and possibly present healing problems of the osteotomy.   We discussed that our primary goals with the procedure would be restoration of volar tilt and radial height.  We did discuss the risk of wound complications nonunion, persistent malunion, hardware failure, continue current condition, continued pain, damage to surrounding structures.  Certainly no guarantees were made.  Patient elected to proceed.  -Consent signed in clinic   -Surgery date 04/04/2024    Leona Medel MD  [facility] Orthopedic Surgery

## 2024-04-01 NOTE — H&P (VIEW-ONLY)
[facility]  Established Patient Office Visit  04/01/2024       Patient ID: Ranjeet Ball  YOB: 1985  MRN: [id number]    Chief Complaint: Appointment of the Left Wrist (CT results )    Orthopedic injuries:   Left wrist distal radius malunion  Past Orthopaedic Surgeries: Status post open reduction internal fixation right radius shaft 12/07/2023     HPI:  Ranjeet Ball is a 38 y.o. male he is status post the above surgery doing well.  He is here today primarily to discuss his left wrist.  He has had a left distal radius malunion.  He grades the pain 5/10.  This is his dominant arm.  He works as a .  Is unable to work as a result of this condition.  Here today for follow-up after his imaging and for surgical planning.  He is booked for 04/04/2024.    12 point ROS performed and negative except as above.     Past Medical History:    Past Medical History:   Diagnosis Date    Hyperlipidemia     Myocardial infarction      Past Surgical History:   Procedure Laterality Date    CORONARY ARTERY BYPASS GRAFT      LAPAROTOMY, EXPLORATORY N/A 03/23/2023    Procedure: LAPAROTOMY, EXPLORATORY;  Surgeon: Alex Juárez MD;  Location: [facility];  Service: General;  Laterality: N/A;    ORIF FOREARM FRACTURE Right 12/7/2023    Procedure: ORIF, FRACTURE, RADIUS OR ULNA;  Surgeon: Khoa Abdalla MD;  Location: [facility];  Service: Orthopedics;  Laterality: Right;  Call [place]     Family History   Problem Relation Age of Onset    Diabetes Mother     Heart disease Mother     Cancer Father     Diabetes Sister     Heart disease Brother     Stroke Maternal Grandmother     Heart disease Maternal Grandfather      Social History     Socioeconomic History    Marital status: Single    Number of children: 1   Tobacco Use    Smoking status: Every Day     Current packs/day: 0.15     Average packs/day: 0.2 packs/day for 24.3 years (3.6 ttl pk-yrs)     Types: Cigarettes     Start date: 12/4/1999     Smokeless tobacco: Never    Tobacco comments:     States smokes 1-2 cigarettes per day   Substance and Sexual Activity    Alcohol use: Yes     Comment: 1-2 times yearly    Drug use: Yes     Frequency: 7.0 times per week     Types: Marijuana     Comment: daily    Sexual activity: Yes     Partners: Female     Birth control/protection: Condom   Social History Narrative    ** Merged History Encounter **          Social Determinants of Health     Financial Resource Strain: High Risk (5/2/2023)    Overall Financial Resource Strain (CARDIA)     Difficulty of Paying Living Expenses: Very hard   Food Insecurity: Food Insecurity Present (5/2/2023)    Hunger Vital Sign     Worried About Running Out of Food in the Last Year: Often true     Ran Out of Food in the Last Year: Sometimes true   Transportation Needs: Unmet Transportation Needs (5/2/2023)    PRAPARE - Transportation     Lack of Transportation (Medical): Yes     Lack of Transportation (Non-Medical): Yes   Physical Activity: Sufficiently Active (5/2/2023)    Exercise Vital Sign     Days of Exercise per Week: 7 days     Minutes of Exercise per Session: 30 min   Stress: Stress Concern Present (5/2/2023)    [place] [employer] - Occupational Stress Questionnaire     Feeling of Stress : Rather much   Social Connections: Moderately Isolated (5/2/2023)    Social Connection and Isolation Panel [NHANES]     Frequency of Communication with Friends and Family: More than three times a week     Frequency of Social Gatherings with Friends and Family: Once a week     Attends [employer] Services: 1 to 4 times per year     Active Member of Clubs or Organizations: No     Attends Club or Organization Meetings: Never     Marital Status: Never    Housing Stability: High Risk (5/2/2023)    Housing Stability Vital Sign     Unable to Pay for Housing in the Last Year: Yes     Number of Places Lived in the Last Year: 2     Unstable Housing in the Last Year: Yes      Medication List with Changes/Refills   Current Medications    ASPIRIN (ECOTRIN) 81 MG EC TABLET    Take 81 mg by mouth once daily.    ATORVASTATIN (LIPITOR) 40 MG TABLET    Take 40 mg by mouth once daily.    ERGOCALCIFEROL (ERGOCALCIFEROL) 50,000 UNIT CAP    Take 1 capsule (50,000 Units total) by mouth every 7 days.    GABAPENTIN (NEURONTIN) 300 MG CAPSULE    Take 1 capsule (300 mg total) by mouth every evening.    IBUPROFEN (ADVIL,MOTRIN) 800 MG TABLET    Take 1 tablet (800 mg total) by mouth 3 (three) times daily.    NITROGLYCERIN (NITROSTAT) 0.4 MG SL TABLET         Review of patient's allergies indicates:  No Known Allergies    Physical Exam:  Left upper extremity   Gross dorsal deformity secondary to malunion  TTP ulnar head / ulnar wrist   Yes about 70° wrist extension, 10 degree wrist flexion  Able to make full fist   Motor intact in AIN/PIN/ulnar sensation intact to light touch M/U/R distribution   Palpable radial    Imaging independently interpreted:  CT reviewed with the patient, persistent distal radius malunion with dorsal tilt    Assessment and Plan:    Ranjeet Ball is a 38 y.o. male with left distal radius malunion.    -We had a long discussion with patient regarding his current condition.  He has a difficult problem in terms of his distal radius malunion that also results in significant shortening and ulnar abutment.  There is some intra-articular involvement of the fracture at the time that has resulted in an uneven joint surface.  We offered a left distal radius osteotomy open reduction internal fixation, we discussed the possibility of ipsilateral iliac crest bone graft.  We also discussed needing a dorsal bridge plate for additional fixation that would remain in place for at least 3 months.  He has a current smoker about a quarter of a pack per day.  We discussed that his smoking status would increase his risk for wound complication and possibly present healing problems of the osteotomy.   We discussed that our primary goals with the procedure would be restoration of volar tilt and radial height.  We did discuss the risk of wound complications nonunion, persistent malunion, hardware failure, continue current condition, continued pain, damage to surrounding structures.  Certainly no guarantees were made.  Patient elected to proceed.  -Consent signed in clinic   -Surgery date 04/04/2024    Leona Medel MD  [facility] Orthopedic Surgery

## 2024-04-04 ENCOUNTER — ANESTHESIA (OUTPATIENT)
Dept: SURGERY | Facility: HOSPITAL | Age: 39
End: 2024-04-04
Payer: COMMERCIAL

## 2024-04-04 ENCOUNTER — HOSPITAL ENCOUNTER (OUTPATIENT)
Facility: HOSPITAL | Age: 39
Discharge: HOME | End: 2024-04-04
Attending: ORTHOPAEDIC SURGERY | Admitting: ORTHOPAEDIC SURGERY
Payer: COMMERCIAL

## 2024-04-04 DIAGNOSIS — S52.309A CLOSED FRACTURE OF SHAFT OF RADIUS (ALONE): ICD-10-CM

## 2024-04-04 DIAGNOSIS — S52.522P: ICD-10-CM

## 2024-04-04 LAB — POCT GLUCOSE: 108 MG/DL (ref 70–110)

## 2024-04-04 PROCEDURE — 25000003 PHARM REV CODE 250: Performed by: NURSE ANESTHETIST, CERTIFIED REGISTERED

## 2024-04-04 PROCEDURE — 36000711: Performed by: STUDENT IN AN ORGANIZED HEALTH CARE EDUCATION/TRAINING PROGRAM

## 2024-04-04 PROCEDURE — 25405 REPAIR/GRAFT RADIUS OR ULNA: CPT | Mod: LT,,, | Performed by: STUDENT IN AN ORGANIZED HEALTH CARE EDUCATION/TRAINING PROGRAM

## 2024-04-04 PROCEDURE — D9220A PRA ANESTHESIA: Mod: ANES,,, | Performed by: SPECIALIST

## 2024-04-04 PROCEDURE — 63600175 PHARM REV CODE 636 W HCPCS: Performed by: NURSE ANESTHETIST, CERTIFIED REGISTERED

## 2024-04-04 PROCEDURE — 64415 NJX AA&/STRD BRCH PLXS IMG: CPT | Performed by: SPECIALIST

## 2024-04-04 PROCEDURE — 25000003 PHARM REV CODE 250: Performed by: STUDENT IN AN ORGANIZED HEALTH CARE EDUCATION/TRAINING PROGRAM

## 2024-04-04 PROCEDURE — 36000710: Performed by: STUDENT IN AN ORGANIZED HEALTH CARE EDUCATION/TRAINING PROGRAM

## 2024-04-04 PROCEDURE — C1769 GUIDE WIRE: HCPCS | Performed by: STUDENT IN AN ORGANIZED HEALTH CARE EDUCATION/TRAINING PROGRAM

## 2024-04-04 PROCEDURE — 71000015 HC POSTOP RECOV 1ST HR: Performed by: STUDENT IN AN ORGANIZED HEALTH CARE EDUCATION/TRAINING PROGRAM

## 2024-04-04 PROCEDURE — C1713 ANCHOR/SCREW BN/BN,TIS/BN: HCPCS | Performed by: STUDENT IN AN ORGANIZED HEALTH CARE EDUCATION/TRAINING PROGRAM

## 2024-04-04 PROCEDURE — 82962 GLUCOSE BLOOD TEST: CPT | Performed by: STUDENT IN AN ORGANIZED HEALTH CARE EDUCATION/TRAINING PROGRAM

## 2024-04-04 PROCEDURE — 63600175 PHARM REV CODE 636 W HCPCS

## 2024-04-04 PROCEDURE — 63600175 PHARM REV CODE 636 W HCPCS: Performed by: SPECIALIST

## 2024-04-04 PROCEDURE — 37000009 HC ANESTHESIA EA ADD 15 MINS: Performed by: STUDENT IN AN ORGANIZED HEALTH CARE EDUCATION/TRAINING PROGRAM

## 2024-04-04 PROCEDURE — 71000016 HC POSTOP RECOV ADDL HR: Performed by: STUDENT IN AN ORGANIZED HEALTH CARE EDUCATION/TRAINING PROGRAM

## 2024-04-04 PROCEDURE — D9220A PRA ANESTHESIA: Mod: CRNA,,, | Performed by: NURSE ANESTHETIST, CERTIFIED REGISTERED

## 2024-04-04 PROCEDURE — 37000008 HC ANESTHESIA 1ST 15 MINUTES: Performed by: STUDENT IN AN ORGANIZED HEALTH CARE EDUCATION/TRAINING PROGRAM

## 2024-04-04 PROCEDURE — 27201423 OPTIME MED/SURG SUP & DEVICES STERILE SUPPLY: Performed by: STUDENT IN AN ORGANIZED HEALTH CARE EDUCATION/TRAINING PROGRAM

## 2024-04-04 PROCEDURE — 71000033 HC RECOVERY, INTIAL HOUR: Performed by: STUDENT IN AN ORGANIZED HEALTH CARE EDUCATION/TRAINING PROGRAM

## 2024-04-04 PROCEDURE — 25405 REPAIR/GRAFT RADIUS OR ULNA: CPT | Mod: AS,LT,,

## 2024-04-04 DEVICE — SCREW BONE COMPR 12X3MM: Type: IMPLANTABLE DEVICE | Site: WRIST | Status: FUNCTIONAL

## 2024-04-04 DEVICE — SCREW GEMINUS NLOK 3.5X13MM: Type: IMPLANTABLE DEVICE | Site: WRIST | Status: FUNCTIONAL

## 2024-04-04 DEVICE — IMPLANTABLE DEVICE: Type: IMPLANTABLE DEVICE | Site: WRIST | Status: FUNCTIONAL

## 2024-04-04 DEVICE — PEG FIX GEMINUS N LOK 18X2.3MM: Type: IMPLANTABLE DEVICE | Site: WRIST | Status: FUNCTIONAL

## 2024-04-04 DEVICE — SCREW CORT NON LOK TI 3.5X14MM: Type: IMPLANTABLE DEVICE | Site: WRIST | Status: FUNCTIONAL

## 2024-04-04 DEVICE — PLATE GEMINUS DORSAL SPANNING: Type: IMPLANTABLE DEVICE | Site: WRIST | Status: FUNCTIONAL

## 2024-04-04 DEVICE — SCREW BONE COMPR 14X3MM: Type: IMPLANTABLE DEVICE | Site: WRIST | Status: FUNCTIONAL

## 2024-04-04 DEVICE — SCREW BONE COMPR 10X3MM: Type: IMPLANTABLE DEVICE | Site: WRIST | Status: FUNCTIONAL

## 2024-04-04 RX ORDER — GABAPENTIN 300 MG/1
300 CAPSULE ORAL 3 TIMES DAILY
Qty: 90 CAPSULE | Refills: 2 | Status: ON HOLD | OUTPATIENT
Start: 2024-04-04 | End: 2024-06-17 | Stop reason: HOSPADM

## 2024-04-04 RX ORDER — PROCHLORPERAZINE EDISYLATE 5 MG/ML
5 INJECTION INTRAMUSCULAR; INTRAVENOUS ONCE AS NEEDED
Status: DISCONTINUED | OUTPATIENT
Start: 2024-04-04 | End: 2024-04-04 | Stop reason: HOSPADM

## 2024-04-04 RX ORDER — DEXAMETHASONE SODIUM PHOSPHATE 4 MG/ML
INJECTION, SOLUTION INTRA-ARTICULAR; INTRALESIONAL; INTRAMUSCULAR; INTRAVENOUS; SOFT TISSUE
Status: DISCONTINUED | OUTPATIENT
Start: 2024-04-04 | End: 2024-04-04

## 2024-04-04 RX ORDER — ONDANSETRON HYDROCHLORIDE 2 MG/ML
4 INJECTION, SOLUTION INTRAVENOUS ONCE
Status: DISCONTINUED | OUTPATIENT
Start: 2024-04-04 | End: 2024-04-04 | Stop reason: HOSPADM

## 2024-04-04 RX ORDER — HYDROCODONE BITARTRATE AND ACETAMINOPHEN 10; 325 MG/1; MG/1
1 TABLET ORAL EVERY 8 HOURS PRN
Qty: 21 TABLET | Refills: 0 | Status: ON HOLD | OUTPATIENT
Start: 2024-04-04 | End: 2024-06-17 | Stop reason: HOSPADM

## 2024-04-04 RX ORDER — PROPOFOL 10 MG/ML
INJECTION, EMULSION INTRAVENOUS
Status: DISCONTINUED | OUTPATIENT
Start: 2024-04-04 | End: 2024-04-04

## 2024-04-04 RX ORDER — ACETAMINOPHEN 500 MG
500 TABLET ORAL EVERY 8 HOURS
Qty: 90 TABLET | Refills: 0 | Status: ON HOLD | OUTPATIENT
Start: 2024-04-04 | End: 2024-06-17 | Stop reason: HOSPADM

## 2024-04-04 RX ORDER — SODIUM CHLORIDE 9 MG/ML
INJECTION, SOLUTION INTRAVENOUS CONTINUOUS
Status: DISCONTINUED | OUTPATIENT
Start: 2024-04-04 | End: 2024-04-04 | Stop reason: HOSPADM

## 2024-04-04 RX ORDER — DIPHENHYDRAMINE HYDROCHLORIDE 50 MG/ML
25 INJECTION INTRAMUSCULAR; INTRAVENOUS ONCE AS NEEDED
Status: DISCONTINUED | OUTPATIENT
Start: 2024-04-04 | End: 2024-04-04 | Stop reason: HOSPADM

## 2024-04-04 RX ORDER — ONDANSETRON HYDROCHLORIDE 2 MG/ML
INJECTION, SOLUTION INTRAVENOUS
Status: DISCONTINUED | OUTPATIENT
Start: 2024-04-04 | End: 2024-04-04

## 2024-04-04 RX ORDER — CEFAZOLIN SODIUM 1 G/3ML
INJECTION, POWDER, FOR SOLUTION INTRAMUSCULAR; INTRAVENOUS
Status: DISCONTINUED | OUTPATIENT
Start: 2024-04-04 | End: 2024-04-04

## 2024-04-04 RX ORDER — FENTANYL CITRATE 50 UG/ML
INJECTION, SOLUTION INTRAMUSCULAR; INTRAVENOUS
Status: DISCONTINUED | OUTPATIENT
Start: 2024-04-04 | End: 2024-04-04

## 2024-04-04 RX ORDER — IPRATROPIUM BROMIDE AND ALBUTEROL SULFATE 2.5; .5 MG/3ML; MG/3ML
3 SOLUTION RESPIRATORY (INHALATION) ONCE AS NEEDED
Status: DISCONTINUED | OUTPATIENT
Start: 2024-04-04 | End: 2024-04-04 | Stop reason: HOSPADM

## 2024-04-04 RX ORDER — MEPERIDINE HYDROCHLORIDE 25 MG/ML
12.5 INJECTION INTRAMUSCULAR; INTRAVENOUS; SUBCUTANEOUS ONCE
Status: DISCONTINUED | OUTPATIENT
Start: 2024-04-04 | End: 2024-04-04 | Stop reason: HOSPADM

## 2024-04-04 RX ORDER — METHOCARBAMOL 750 MG/1
750 TABLET, FILM COATED ORAL 4 TIMES DAILY
Qty: 40 TABLET | Refills: 0 | Status: SHIPPED | OUTPATIENT
Start: 2024-04-04 | End: 2024-04-14

## 2024-04-04 RX ORDER — HYDROMORPHONE HYDROCHLORIDE 1 MG/ML
0.5 INJECTION, SOLUTION INTRAMUSCULAR; INTRAVENOUS; SUBCUTANEOUS EVERY 5 MIN PRN
Status: DISCONTINUED | OUTPATIENT
Start: 2024-04-04 | End: 2024-04-04 | Stop reason: HOSPADM

## 2024-04-04 RX ORDER — MIDAZOLAM HYDROCHLORIDE 2 MG/2ML
5 INJECTION, SOLUTION INTRAMUSCULAR; INTRAVENOUS ONCE
Status: COMPLETED | OUTPATIENT
Start: 2024-04-04 | End: 2024-04-04

## 2024-04-04 RX ORDER — ROPIVACAINE HYDROCHLORIDE 5 MG/ML
INJECTION, SOLUTION EPIDURAL; INFILTRATION; PERINEURAL
Status: COMPLETED | OUTPATIENT
Start: 2024-04-04 | End: 2024-04-04

## 2024-04-04 RX ORDER — SODIUM CHLORIDE, SODIUM LACTATE, POTASSIUM CHLORIDE, CALCIUM CHLORIDE 600; 310; 30; 20 MG/100ML; MG/100ML; MG/100ML; MG/100ML
INJECTION, SOLUTION INTRAVENOUS CONTINUOUS
Status: DISCONTINUED | OUTPATIENT
Start: 2024-04-04 | End: 2024-04-04 | Stop reason: HOSPADM

## 2024-04-04 RX ORDER — LIDOCAINE HYDROCHLORIDE 20 MG/ML
INJECTION INTRAVENOUS
Status: DISCONTINUED | OUTPATIENT
Start: 2024-04-04 | End: 2024-04-04

## 2024-04-04 RX ORDER — HYDROMORPHONE HYDROCHLORIDE 1 MG/ML
INJECTION, SOLUTION INTRAMUSCULAR; INTRAVENOUS; SUBCUTANEOUS
Status: DISCONTINUED | OUTPATIENT
Start: 2024-04-04 | End: 2024-04-04

## 2024-04-04 RX ORDER — OXYCODONE AND ACETAMINOPHEN 5; 325 MG/1; MG/1
2 TABLET ORAL ONCE
Status: DISCONTINUED | OUTPATIENT
Start: 2024-04-04 | End: 2024-04-04 | Stop reason: HOSPADM

## 2024-04-04 RX ORDER — HYDROMORPHONE HYDROCHLORIDE 1 MG/ML
0.2 INJECTION, SOLUTION INTRAMUSCULAR; INTRAVENOUS; SUBCUTANEOUS EVERY 5 MIN PRN
Status: DISCONTINUED | OUTPATIENT
Start: 2024-04-04 | End: 2024-04-04 | Stop reason: HOSPADM

## 2024-04-04 RX ORDER — DEXMEDETOMIDINE HYDROCHLORIDE 100 UG/ML
INJECTION, SOLUTION INTRAVENOUS
Status: DISCONTINUED | OUTPATIENT
Start: 2024-04-04 | End: 2024-04-04

## 2024-04-04 RX ORDER — GLYCOPYRROLATE 0.2 MG/ML
INJECTION INTRAMUSCULAR; INTRAVENOUS
Status: DISCONTINUED | OUTPATIENT
Start: 2024-04-04 | End: 2024-04-04

## 2024-04-04 RX ADMIN — ONDANSETRON 4 MG: 2 INJECTION INTRAMUSCULAR; INTRAVENOUS at 07:04

## 2024-04-04 RX ADMIN — SODIUM CHLORIDE, POTASSIUM CHLORIDE, SODIUM LACTATE AND CALCIUM CHLORIDE: 600; 310; 30; 20 INJECTION, SOLUTION INTRAVENOUS at 09:04

## 2024-04-04 RX ADMIN — DEXMEDETOMIDINE 5 MCG: 200 INJECTION, SOLUTION INTRAVENOUS at 08:04

## 2024-04-04 RX ADMIN — DEXMEDETOMIDINE 5 MCG: 200 INJECTION, SOLUTION INTRAVENOUS at 09:04

## 2024-04-04 RX ADMIN — MIDAZOLAM HYDROCHLORIDE 4 MG: 1 INJECTION, SOLUTION INTRAMUSCULAR; INTRAVENOUS at 06:04

## 2024-04-04 RX ADMIN — LIDOCAINE HYDROCHLORIDE 25 MG: 20 INJECTION INTRAVENOUS at 07:04

## 2024-04-04 RX ADMIN — FENTANYL CITRATE 25 MCG: 50 INJECTION INTRAMUSCULAR; INTRAVENOUS at 07:04

## 2024-04-04 RX ADMIN — GLYCOPYRROLATE 0.1 MG: 0.2 INJECTION INTRAMUSCULAR; INTRAVENOUS at 09:04

## 2024-04-04 RX ADMIN — ROPIVACAINE HYDROCHLORIDE 5 ML: 5 INJECTION, SOLUTION EPIDURAL; INFILTRATION; PERINEURAL at 06:04

## 2024-04-04 RX ADMIN — DEXAMETHASONE SODIUM PHOSPHATE 8 MG: 4 INJECTION, SOLUTION INTRA-ARTICULAR; INTRALESIONAL; INTRAMUSCULAR; INTRAVENOUS; SOFT TISSUE at 07:04

## 2024-04-04 RX ADMIN — PROPOFOL 200 MG: 10 INJECTION, EMULSION INTRAVENOUS at 07:04

## 2024-04-04 RX ADMIN — FENTANYL CITRATE 50 MCG: 50 INJECTION INTRAMUSCULAR; INTRAVENOUS at 07:04

## 2024-04-04 RX ADMIN — HYDROMORPHONE HYDROCHLORIDE 0.25 MCG: 1 INJECTION, SOLUTION INTRAMUSCULAR; INTRAVENOUS; SUBCUTANEOUS at 09:04

## 2024-04-04 RX ADMIN — FENTANYL CITRATE 25 MCG: 50 INJECTION INTRAMUSCULAR; INTRAVENOUS at 08:04

## 2024-04-04 RX ADMIN — SODIUM CHLORIDE, POTASSIUM CHLORIDE, SODIUM LACTATE AND CALCIUM CHLORIDE: 600; 310; 30; 20 INJECTION, SOLUTION INTRAVENOUS at 06:04

## 2024-04-04 RX ADMIN — CEFAZOLIN 2 G: 330 INJECTION, POWDER, FOR SOLUTION INTRAMUSCULAR; INTRAVENOUS at 07:04

## 2024-04-04 NOTE — ANESTHESIA PROCEDURE NOTES
Intubation    Date/Time: 4/4/2024 7:17 AM    Performed by: Polina Sorto CRNA  Authorized by: Lexi Lynn MD    Intubation:     Induction:  Intravenous    Intubated:  Postinduction    Mask Ventilation:  Easy mask    Attempts:  1    Attempted By:  CRNA    Difficult Airway Encountered?: No      Complications:  None    Airway Device:  Supraglottic airway/LMA    Airway Device Size:  4.0    Style/Cuff Inflation:  Uncuffed    Placement Verified By:  Capnometry    Complicating Factors:  None    Findings Post-Intubation:  BS equal bilateral and atraumatic/condition of teeth unchanged
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
## 2024-04-04 NOTE — ANESTHESIA PROCEDURE NOTES
Peripheral Block    Patient location during procedure: pre-op   Block not for primary anesthetic.  Reason for block: at surgeon's request and post-op pain management   Post-op Pain Location: Left arm   Start time: 4/4/2024 6:56 AM  Timeout: 4/4/2024 6:55 AM   End time: 4/4/2024 6:58 AM    Staffing  Authorizing Provider: Lexi Lynn MD  Performing Provider: Lexi Lynn MD    Staffing  Performed by: Lexi Lynn MD  Authorized by: Lexi Lynn MD    Preanesthetic Checklist  Completed: patient identified, IV checked, site marked, risks and benefits discussed, surgical consent, monitors and equipment checked, pre-op evaluation and timeout performed  Peripheral Block  Patient position: supine  Prep: ChloraPrep  Patient monitoring: heart rate, cardiac monitor, continuous pulse ox, continuous capnometry and frequent blood pressure checks  Block type: supraclavicular  Laterality: left  Injection technique: single shot  Needle  Needle type: Stimuplex   Needle gauge: 22 G  Needle length: 2 in  Needle localization: anatomical landmarks and ultrasound guidance   -ultrasound image captured on disc.  Assessment  Injection assessment: negative aspiration, negative parasthesia and local visualized surrounding nerve  Paresthesia pain: none  Heart rate change: no  Slow fractionated injection: yes    Medications:    Medications: ropivacaine (NAROPIN) injection 0.5% - Perineural   5 mL - 4/4/2024 6:57:00 AM    Additional Notes  VSS.  [facility] RN monitoring vitals throughout procedure.  Patient tolerated procedure well.       Block aborted due to lack of patient cooperation (post Versed 5 mg incrementally dosed); patient with continued abrupt movement, reaching for needle after reassurance -- > 5 ML administered and block aborted due to safety concerns.

## 2024-04-04 NOTE — DISCHARGE INSTRUCTIONS
No weight bearing or lifting with the surgical arm/hand  Keep splint on until clinic visit, keep all of the bandages clean and dry  Keep splint clean dry and intact.   Leave bandages on the donor site @ your hip for 3-4 days, you may shower at that time (no soaking in a bath, a hot tub and no swimming)                                                                                                                                                                                                                                        · Keep follow up appointment on __April 22nd @ 12:30 PM____at [facility].    · Take pain medication as prescribed.  You may alternate Ibuprofen and Tylenol every 4 hours as needed for discomfort.  If you are hurting more-you may take Norco INSTEAD of Tylenol.  Do not take Norco and Tylenol together because Norco contains Tylenol                                                                                              * Wean off of the Norco after one week    ` See included nerve block handout -Keep arm in sling until you regain full function of your shoulder and elbow    · Keep arm elevated on pillow - above the level of your heart to decrease pain and swelling.    · No driving or consuming alcohol for the next 24 hours or while taking narcotic pain medicine.    · May apply ice pack to surgical area for 20 minutes at a time 6-8 times per day.    · Dressing: Leave clean and dry until follow up appointment.    · NO LIFTING OR PULLING WITH AFFECTED HAND until cleared by MD. You may wiggle fingers.      · Notify MD of any moderate to severe pain unrelieved by pain medicine or for any signs of infection including fever above 100.4, excessive redness or swelling, yellow/green foul- smelling drainage, nausea or vomiting. Call clinic at: [phone]. After business hours, you may need to be seen at an urgent care or emergency department    · Thanks for choosing [facility]! Have a  smooth recovery!

## 2024-04-04 NOTE — BRIEF OP NOTE
[facility] - Periop Services  Brief Operative Note    Surgery Date: 4/4/2024     Surgeon(s) and Role:     * Khoa Abdalla MD - Primary    Assisting Surgeon: None    Pre-op Diagnosis:  * No pre-op diagnosis entered *    Post-op Diagnosis:  Post-Op Diagnosis Codes:     * Closed torus fracture of distal end of left radius with malunion, subsequent encounter [S52.522P]    Procedure(s) (LRB):  ORIF, WRIST (Left)    Anesthesia: General    Operative Findings: see op    Estimated Blood Loss: 50 mL         Specimens:   Specimen (24h ago, onward)      None              Discharge Note    OUTCOME: Patient tolerated treatment/procedure well without complication and is now ready for discharge.    DISPOSITION: Home or Self Care    FINAL DIAGNOSIS:  <principal problem not specified>    FOLLOWUP: In clinic    DISCHARGE INSTRUCTIONS:    Discharge Procedure Orders   Notify your health care provider if you experience any of the following:  temperature >100.4     Notify your health care provider if you experience any of the following:  severe uncontrolled pain     Notify your health care provider if you experience any of the following:  redness, tenderness, or signs of infection (pain, swelling, redness, odor or green/yellow discharge around incision site)     Leave dressing on - Keep it clean, dry, and intact until clinic visit     Weight bearing restrictions (specify):   Order Comments: [facility] operative

## 2024-04-04 NOTE — TRANSFER OF CARE
Anesthesia Transfer of Care Note    Patient: Ranjeet Ball    Procedure(s) Performed: Procedure(s) (LRB):  ORIF, WRIST (Left)    Patient location: PACU    Anesthesia Type: general    Transport from OR: Transported from OR on room air with adequate spontaneous ventilation    Post pain: adequate analgesia    Post assessment: no apparent anesthetic complications and tolerated procedure well    Post vital signs: stable    Level of consciousness: sedated    Nausea/Vomiting: no nausea/vomiting    Complications: none    Transfer of care protocol was followed      Last vitals: Visit Vitals  /72   Pulse 60   Temp 36 °C (96.8 °F) (Temporal)   Resp 20   Wt 55 kg (121 lb 3.2 oz)   SpO2 100%   BMI 23.67 kg/m²

## 2024-04-05 VITALS
DIASTOLIC BLOOD PRESSURE: 82 MMHG | BODY MASS INDEX: 23.67 KG/M2 | WEIGHT: 121.19 LBS | HEART RATE: 61 BPM | OXYGEN SATURATION: 96 % | SYSTOLIC BLOOD PRESSURE: 154 MMHG | RESPIRATION RATE: 20 BRPM | TEMPERATURE: 98 F

## 2024-04-21 NOTE — PROGRESS NOTES
[facility]  Established Patient Office Visit  04/22/2024       Patient ID: Ranjeet Ball  YOB: 1985  MRN: [id number]    Chief Complaint: Post-op Evaluation of the Left Wrist (ORIF  left wrist 4/4/2024 pain 8/10 takes Ibuprofen as needed for pain, splint intact )      Orthopaedic Injuries: LEFT distal radius malunion     Orthopaedic Surgeries: Open reduction internal fixation LEFT distal radius malunion correction with iliac crest bone autograft 4/4/24 Rm    HPI:  Ranjeet Ball is a 38 y.o. male s/p above. He is just over 2 weeks out.     This is his 1st postop visit.  He is doing well.  He is happy with the correction visually.    12 point ROS performed and negative except as above.     Past Medical History:    Past Medical History:   Diagnosis Date    Hyperlipidemia     Myocardial infarction      Past Surgical History:   Procedure Laterality Date    CORONARY ARTERY BYPASS GRAFT      LAPAROTOMY, EXPLORATORY N/A 03/23/2023    Procedure: LAPAROTOMY, EXPLORATORY;  Surgeon: Alex Juárez MD;  Location: [facility];  Service: General;  Laterality: N/A;    OPEN REDUCTION AND INTERNAL FIXATION (ORIF) OF FRACTURE OF DISTAL RADIUS Left 4/4/2024    Procedure: ORIF, FRACTURE, RADIUS, DISTAL;  Surgeon: Khoa Abdalla MD;  Location: [facility];  Service: Orthopedics;  Laterality: Left;    ORIF FOREARM FRACTURE Right 12/7/2023    Procedure: ORIF, FRACTURE, RADIUS OR ULNA;  Surgeon: Khoa Abdalla MD;  Location: [facility];  Service: Orthopedics;  Laterality: Right;  Call Wilmer     Family History   Problem Relation Name Age of Onset    Diabetes Mother      Heart disease Mother      Cancer Father      Diabetes Sister      Heart disease Brother      Stroke Maternal Grandmother      Heart disease Maternal Grandfather       Social History     Socioeconomic History    Marital status: Single    Number of children: 1   Tobacco Use    Smoking status: Every Day     Current packs/day: 0.15      Average packs/day: 0.2 packs/day for 24.4 years (3.7 ttl pk-yrs)     Types: Cigarettes     Start date: 12/4/1999    Smokeless tobacco: Never    Tobacco comments:     States smokes 1-2 cigarettes per day   Substance and Sexual Activity    Alcohol use: Yes     Comment: 1-2 times yearly    Drug use: Yes     Frequency: 7.0 times per week     Types: Marijuana     Comment: daily    Sexual activity: Yes     Partners: Female     Birth control/protection: Condom   Social History Narrative    ** Merged History Encounter **          Social Determinants of Health     Financial Resource Strain: High Risk (5/2/2023)    Overall Financial Resource Strain (CARDIA)     Difficulty of Paying Living Expenses: Very hard   Food Insecurity: Food Insecurity Present (5/2/2023)    Hunger Vital Sign     Worried About Running Out of Food in the Last Year: Often true     Ran Out of Food in the Last Year: Sometimes true   Transportation Needs: Unmet Transportation Needs (5/2/2023)    PRAPARE - Transportation     Lack of Transportation (Medical): Yes     Lack of Transportation (Non-Medical): Yes   Physical Activity: Sufficiently Active (5/2/2023)    Exercise Vital Sign     Days of Exercise per Week: 7 days     Minutes of Exercise per Session: 30 min   Stress: Stress Concern Present (5/2/2023)    [employer] - Occupational Stress Questionnaire     Feeling of Stress : Rather much   Social Connections: Moderately Isolated (5/2/2023)    Social Connection and Isolation Panel [NHANES]     Frequency of Communication with Friends and Family: More than three times a week     Frequency of Social Gatherings with Friends and Family: Once a week     Attends Islam Services: 1 to 4 times per year     Active Member of Clubs or Organizations: No     Attends Club or Organization Meetings: Never     Marital Status: Never    Housing Stability: High Risk (5/2/2023)    Housing Stability Vital Sign     Unable to Pay for Housing in  the Last Year: Yes     Number of Places Lived in the Last Year: 2     Unstable Housing in the Last Year: Yes     Medication List with Changes/Refills   Current Medications    ACETAMINOPHEN (TYLENOL) 500 MG TABLET    Take 1 tablet (500 mg total) by mouth every 8 (eight) hours.    ASPIRIN (ECOTRIN) 81 MG EC TABLET    Take 81 mg by mouth once daily.    ATORVASTATIN (LIPITOR) 40 MG TABLET    Take 40 mg by mouth once daily.    ERGOCALCIFEROL (ERGOCALCIFEROL) 50,000 UNIT CAP    Take 1 capsule (50,000 Units total) by mouth every 7 days.    GABAPENTIN (NEURONTIN) 300 MG CAPSULE    Take 1 capsule (300 mg total) by mouth every evening.    GABAPENTIN (NEURONTIN) 300 MG CAPSULE    Take 1 capsule (300 mg total) by mouth 3 (three) times daily.    HYDROCODONE-ACETAMINOPHEN (NORCO)  MG PER TABLET    Take 1 tablet by mouth every 8 (eight) hours as needed for Pain.    NITROGLYCERIN (NITROSTAT) 0.4 MG SL TABLET         Review of patient's allergies indicates:  No Known Allergies    Physical Exam:    Left wrist:   Surgical incision is healing well   We will remove sutures today   Wiggles all fingers   Hand warm well perfused    Imaging independently interpreted:  X-ray left wrist hardware appropriately place, no evidence of complication    Assessment and Plan:    Ranjeet Ball is a 38 y.o. male 2 weeks status post left distal radius malunion correction with osteotomy, iliac crest bone graft, dorsal bridge plate.  He has delayed healing of a left distal radius fracture.    -Velcro wrist brace   -okay for finger range of motion   -Exogen ordered     WB Status:NWB L wrist   Follow up:4 weeks   XR/to-do next visit:XR L wrist - will see him back another 4 weeks and then start looking for a surgery date for dorsal bridge plate removal.     Leona Medel MD  [facility] PGY3          Orders Placed This Encounter    X-Ray Wrist Complete Left

## 2024-04-22 ENCOUNTER — OFFICE VISIT (OUTPATIENT)
Dept: ORTHOPEDICS | Facility: CLINIC | Age: 39
End: 2024-04-22
Payer: MEDICAID

## 2024-04-22 ENCOUNTER — HOSPITAL ENCOUNTER (OUTPATIENT)
Dept: RADIOLOGY | Facility: HOSPITAL | Age: 39
Discharge: HOME | End: 2024-04-22
Attending: STUDENT IN AN ORGANIZED HEALTH CARE EDUCATION/TRAINING PROGRAM
Payer: MEDICAID

## 2024-04-22 VITALS
RESPIRATION RATE: 18 BRPM | WEIGHT: 124.63 LBS | SYSTOLIC BLOOD PRESSURE: 127 MMHG | BODY MASS INDEX: 24.47 KG/M2 | HEIGHT: 60 IN | OXYGEN SATURATION: 98 % | HEART RATE: 85 BPM | DIASTOLIC BLOOD PRESSURE: 83 MMHG | TEMPERATURE: 98 F

## 2024-04-22 DIAGNOSIS — Z98.890 POSTOPERATIVE STATE: Primary | ICD-10-CM

## 2024-04-22 DIAGNOSIS — S52.509G: ICD-10-CM

## 2024-04-22 DIAGNOSIS — Z98.890 POSTOPERATIVE STATE: ICD-10-CM

## 2024-04-22 DIAGNOSIS — S52.522P: ICD-10-CM

## 2024-04-22 PROCEDURE — 3079F DIAST BP 80-89 MM HG: CPT | Mod: CPTII,,, | Performed by: SPECIALIST

## 2024-04-22 PROCEDURE — 1159F MED LIST DOCD IN RCRD: CPT | Mod: CPTII,,, | Performed by: SPECIALIST

## 2024-04-22 PROCEDURE — 3074F SYST BP LT 130 MM HG: CPT | Mod: CPTII,,, | Performed by: SPECIALIST

## 2024-04-22 PROCEDURE — 73110 X-RAY EXAM OF WRIST: CPT | Mod: TC,LT

## 2024-04-22 PROCEDURE — 99499 UNLISTED E&M SERVICE: CPT | Mod: ,,, | Performed by: SPECIALIST

## 2024-04-22 PROCEDURE — 3044F HG A1C LEVEL LT 7.0%: CPT | Mod: CPTII,,, | Performed by: SPECIALIST

## 2024-04-22 PROCEDURE — 99214 OFFICE O/P EST MOD 30 MIN: CPT | Mod: PBBFAC,25

## 2024-04-22 RX ORDER — HYDROCODONE BITARTRATE AND ACETAMINOPHEN 5; 325 MG/1; MG/1
1 TABLET ORAL EVERY 6 HOURS PRN
Qty: 21 TABLET | Refills: 0 | Status: SHIPPED | OUTPATIENT
Start: 2024-04-22

## 2024-05-19 NOTE — PROGRESS NOTES
[facility]  Established Patient Office Visit  05/20/2024       Patient ID: Ranjeet Ball  YOB: 1985  MRN: [id number]    Chief Complaint: No chief complaint on file.      Orthopaedic Injuries: LEFT distal radius malunion     Orthopaedic Surgeries:   Open reduction internal fixation LEFT distal radius malunion correction with iliac crest bone autograft 4/4/24 Rm    HPI:  Ranjeet Ball is a 38 y.o. male s/p above.  He has now a little over 6 weeks postop.    12 point ROS performed and negative except as above.     Past Medical History:    Past Medical History:   Diagnosis Date    Hyperlipidemia     Myocardial infarction      Past Surgical History:   Procedure Laterality Date    CORONARY ARTERY BYPASS GRAFT      LAPAROTOMY, EXPLORATORY N/A 03/23/2023    Procedure: LAPAROTOMY, EXPLORATORY;  Surgeon: Alex Juárez MD;  Location: [facility];  Service: General;  Laterality: N/A;    OPEN REDUCTION AND INTERNAL FIXATION (ORIF) OF FRACTURE OF DISTAL RADIUS Left 4/4/2024    Procedure: ORIF, FRACTURE, RADIUS, DISTAL;  Surgeon: Khoa Abdalla MD;  Location: [facility];  Service: Orthopedics;  Laterality: Left;    ORIF FOREARM FRACTURE Right 12/7/2023    Procedure: ORIF, FRACTURE, RADIUS OR ULNA;  Surgeon: Khoa Abdalla MD;  Location: [facility];  Service: Orthopedics;  Laterality: Right;  Call Wilmer     Family History   Problem Relation Name Age of Onset    Diabetes Mother      Heart disease Mother      Cancer Father      Diabetes Sister      Heart disease Brother      Stroke Maternal Grandmother      Heart disease Maternal Grandfather       Social History     Socioeconomic History    Marital status: Single    Number of children: 1   Tobacco Use    Smoking status: Every Day     Current packs/day: 0.15     Average packs/day: 0.2 packs/day for 24.5 years (3.7 ttl pk-yrs)     Types: Cigarettes     Start date: 12/4/1999    Smokeless tobacco: Never    Tobacco comments:     States smokes  1-2 cigarettes per day   Substance and Sexual Activity    Alcohol use: Yes     Comment: 1-2 times yearly    Drug use: Yes     Frequency: 7.0 times per week     Types: Marijuana     Comment: daily    Sexual activity: Yes     Partners: Female     Birth control/protection: Condom   Social History Narrative    ** Merged History Encounter **          Social Determinants of Health     Financial Resource Strain: High Risk (5/2/2023)    Overall Financial Resource Strain (CARDIA)     Difficulty of Paying Living Expenses: Very hard   Food Insecurity: Food Insecurity Present (5/2/2023)    Hunger Vital Sign     Worried About Running Out of Food in the Last Year: Often true     Ran Out of Food in the Last Year: Sometimes true   Transportation Needs: Unmet Transportation Needs (5/2/2023)    PRAPARE - Transportation     Lack of Transportation (Medical): Yes     Lack of Transportation (Non-Medical): Yes   Physical Activity: Sufficiently Active (5/2/2023)    Exercise Vital Sign     Days of Exercise per Week: 7 days     Minutes of Exercise per Session: 30 min   Stress: Stress Concern Present (5/2/2023)    [employer] - Occupational Stress Questionnaire     Feeling of Stress : Rather much   Housing Stability: High Risk (5/2/2023)    Housing Stability Vital Sign     Unable to Pay for Housing in the Last Year: Yes     Number of Places Lived in the Last Year: 2     Unstable Housing in the Last Year: Yes     Medication List with Changes/Refills   Current Medications    ACETAMINOPHEN (TYLENOL) 500 MG TABLET    Take 1 tablet (500 mg total) by mouth every 8 (eight) hours.    ASPIRIN (ECOTRIN) 81 MG EC TABLET    Take 81 mg by mouth once daily.    ATORVASTATIN (LIPITOR) 40 MG TABLET    Take 40 mg by mouth once daily.    ERGOCALCIFEROL (ERGOCALCIFEROL) 50,000 UNIT CAP    Take 1 capsule (50,000 Units total) by mouth every 7 days.    GABAPENTIN (NEURONTIN) 300 MG CAPSULE    Take 1 capsule (300 mg total) by mouth every  evening.    GABAPENTIN (NEURONTIN) 300 MG CAPSULE    Take 1 capsule (300 mg total) by mouth 3 (three) times daily.    HYDROCODONE-ACETAMINOPHEN (NORCO)  MG PER TABLET    Take 1 tablet by mouth every 8 (eight) hours as needed for Pain.    HYDROCODONE-ACETAMINOPHEN (NORCO) 5-325 MG PER TABLET    Take 1 tablet by mouth every 6 (six) hours as needed for Pain.    NITROGLYCERIN (NITROSTAT) 0.4 MG SL TABLET         Review of patient's allergies indicates:  No Known Allergies    Physical Exam:  Left wrist:   Surgical incision is healing well   Wiggles all fingers   Hand warm well perfused    Imaging independently interpreted:  X-ray left wrist hardware appropriately place, no evidence of complication    Assessment and Plan:    Ranjeet Ball is a 38 y.o. male 2 weeks status post left distal radius malunion correction with osteotomy, iliac crest bone graft, dorsal bridge plate.  He has delayed healing of a left distal radius fracture.    -we will send him for a CT left wrist to be completed in around 4 weeks (06/20/2024)  -we will see him back in clinic after the CT scan is complete  -if fracture looks healed we will consider bridge plate removal in 6 weeks (2 weeks from anticipated clinic appointment, 12 weeks postop)  -discussed he should start exogen   -discussed smoking cessation    WB Status:NWB L wrist   Follow up:4 weeks   XR/to-do next visit:XR L wrist - will see him back another 4 weeks to review CT results     Leona Medel MD  [facility] PGY3

## 2024-05-20 ENCOUNTER — OFFICE VISIT (OUTPATIENT)
Dept: ORTHOPEDICS | Facility: CLINIC | Age: 39
End: 2024-05-20
Payer: MEDICAID

## 2024-05-20 ENCOUNTER — HOSPITAL ENCOUNTER (OUTPATIENT)
Dept: RADIOLOGY | Facility: HOSPITAL | Age: 39
Discharge: HOME | End: 2024-05-20
Attending: STUDENT IN AN ORGANIZED HEALTH CARE EDUCATION/TRAINING PROGRAM
Payer: MEDICAID

## 2024-05-20 VITALS
DIASTOLIC BLOOD PRESSURE: 73 MMHG | RESPIRATION RATE: 18 BRPM | OXYGEN SATURATION: 96 % | HEART RATE: 101 BPM | TEMPERATURE: 99 F | WEIGHT: 121 LBS | HEIGHT: 60 IN | BODY MASS INDEX: 23.75 KG/M2 | SYSTOLIC BLOOD PRESSURE: 118 MMHG

## 2024-05-20 DIAGNOSIS — S52.522P: Primary | ICD-10-CM

## 2024-05-20 DIAGNOSIS — S52.522P: ICD-10-CM

## 2024-05-20 PROCEDURE — 3044F HG A1C LEVEL LT 7.0%: CPT | Mod: CPTII,,, | Performed by: STUDENT IN AN ORGANIZED HEALTH CARE EDUCATION/TRAINING PROGRAM

## 2024-05-20 PROCEDURE — 3074F SYST BP LT 130 MM HG: CPT | Mod: CPTII,,, | Performed by: STUDENT IN AN ORGANIZED HEALTH CARE EDUCATION/TRAINING PROGRAM

## 2024-05-20 PROCEDURE — 73110 X-RAY EXAM OF WRIST: CPT | Mod: TC,LT

## 2024-05-20 PROCEDURE — 3078F DIAST BP <80 MM HG: CPT | Mod: CPTII,,, | Performed by: STUDENT IN AN ORGANIZED HEALTH CARE EDUCATION/TRAINING PROGRAM

## 2024-05-20 PROCEDURE — 1159F MED LIST DOCD IN RCRD: CPT | Mod: CPTII,,, | Performed by: STUDENT IN AN ORGANIZED HEALTH CARE EDUCATION/TRAINING PROGRAM

## 2024-05-20 PROCEDURE — 99024 POSTOP FOLLOW-UP VISIT: CPT | Mod: ,,, | Performed by: STUDENT IN AN ORGANIZED HEALTH CARE EDUCATION/TRAINING PROGRAM

## 2024-05-20 PROCEDURE — 99214 OFFICE O/P EST MOD 30 MIN: CPT | Mod: PBBFAC,25

## 2024-05-20 RX ORDER — ERGOCALCIFEROL 1.25 MG/1
50000 CAPSULE ORAL
Qty: 8 CAPSULE | Refills: 0 | Status: SHIPPED | OUTPATIENT
Start: 2024-05-20

## 2024-05-27 ENCOUNTER — HOSPITAL ENCOUNTER (OUTPATIENT)
Dept: RADIOLOGY | Facility: HOSPITAL | Age: 39
Discharge: HOME | End: 2024-05-27
Attending: NURSE PRACTITIONER
Payer: MEDICAID

## 2024-05-27 DIAGNOSIS — N45.1 EPIDIDYMITIS: ICD-10-CM

## 2024-05-27 PROCEDURE — 76870 US EXAM SCROTUM: CPT | Mod: TC

## 2024-05-29 ENCOUNTER — HOSPITAL ENCOUNTER (OUTPATIENT)
Dept: RADIOLOGY | Facility: HOSPITAL | Age: 39
Discharge: HOME | End: 2024-05-29
Attending: STUDENT IN AN ORGANIZED HEALTH CARE EDUCATION/TRAINING PROGRAM
Payer: MEDICAID

## 2024-05-29 DIAGNOSIS — S52.522P: ICD-10-CM

## 2024-05-29 PROCEDURE — 73200 CT UPPER EXTREMITY W/O DYE: CPT | Mod: TC,LT

## 2024-06-13 ENCOUNTER — HOSPITAL ENCOUNTER (INPATIENT)
Facility: HOSPITAL | Age: 39
LOS: 4 days | Discharge: HOME | DRG: 823 | End: 2024-06-17
Attending: EMERGENCY MEDICINE | Admitting: INTERNAL MEDICINE
Payer: MEDICAID

## 2024-06-13 DIAGNOSIS — R59.1 LYMPHADENOPATHY: ICD-10-CM

## 2024-06-13 DIAGNOSIS — R05.9 COUGH: ICD-10-CM

## 2024-06-13 DIAGNOSIS — C34.90 MALIGNANT NEOPLASM OF LUNG, UNSPECIFIED LATERALITY, UNSPECIFIED PART OF LUNG: ICD-10-CM

## 2024-06-13 DIAGNOSIS — J18.9 PNEUMONIA OF RIGHT UPPER LOBE DUE TO INFECTIOUS ORGANISM: Primary | ICD-10-CM

## 2024-06-13 DIAGNOSIS — R04.2 HEMOPTYSIS: ICD-10-CM

## 2024-06-13 DIAGNOSIS — R07.9 CHEST PAIN: ICD-10-CM

## 2024-06-13 PROBLEM — K92.0 HEMATEMESIS WITH NAUSEA: Status: ACTIVE | Noted: 2024-06-13

## 2024-06-13 LAB
ALBUMIN SERPL-MCNC: 3 G/DL (ref 3.5–5)
ALP SERPL-CCNC: 70 UNIT/L (ref 40–150)
ALT SERPL-CCNC: 26 UNIT/L (ref 0–55)
AMPHET UR QL SCN: NEGATIVE
ANION GAP SERPL CALC-SCNC: 11 MEQ/L
AST SERPL-CCNC: 19 UNIT/L (ref 5–34)
B PERT.PT PRMT NPH QL NAA+NON-PROBE: NOT DETECTED
BACTERIA #/AREA URNS AUTO: ABNORMAL /HPF
BARBITURATE SCN PRESENT UR: NEGATIVE
BASOPHILS # BLD AUTO: 0.05 X10(3)/MCL
BASOPHILS NFR BLD AUTO: 0.4 %
BENZODIAZ UR QL SCN: NEGATIVE
BILIRUB DIRECT SERPL-MCNC: 0.2 MG/DL (ref 0–?)
BILIRUB SERPL-MCNC: 0.3 MG/DL
BILIRUB UR QL STRIP.AUTO: NEGATIVE
BILIRUBIN DIRECT+TOT PNL SERPL-MCNC: 0.1 MG/DL (ref 0–0.8)
BUN SERPL-MCNC: 6.2 MG/DL (ref 8.9–20.6)
C PNEUM DNA NPH QL NAA+NON-PROBE: NOT DETECTED
CALCIUM SERPL-MCNC: 9.7 MG/DL (ref 8.4–10.2)
CANNABINOIDS UR QL SCN: POSITIVE
CHLORIDE SERPL-SCNC: 107 MMOL/L (ref 98–107)
CLARITY UR: CLEAR
CO2 SERPL-SCNC: 23 MMOL/L (ref 22–29)
COCAINE UR QL SCN: NEGATIVE
COLOR UR AUTO: YELLOW
CREAT SERPL-MCNC: 0.84 MG/DL (ref 0.73–1.18)
CREAT/UREA NIT SERPL: 7
D DIMER PPP IA.FEU-MCNC: 1.25 UG/ML FEU (ref 0–0.5)
EOSINOPHIL # BLD AUTO: 0.09 X10(3)/MCL (ref 0–0.9)
EOSINOPHIL NFR BLD AUTO: 0.8 %
ERYTHROCYTE [DISTWIDTH] IN BLOOD BY AUTOMATED COUNT: 13.8 % (ref 11.5–17)
FENTANYL UR QL SCN: NEGATIVE
FERRITIN SERPL-MCNC: 105.99 NG/ML (ref 21.81–274.66)
FLUAV AG UPPER RESP QL IA.RAPID: NOT DETECTED
FLUBV AG UPPER RESP QL IA.RAPID: NOT DETECTED
GFR SERPLBLD CREATININE-BSD FMLA CKD-EPI: >60 ML/MIN/1.73/M2
GLUCOSE SERPL-MCNC: 113 MG/DL (ref 74–100)
GLUCOSE UR QL STRIP: NORMAL
HADV DNA NPH QL NAA+NON-PROBE: NOT DETECTED
HAV IGM SERPL QL IA: NONREACTIVE
HBV CORE IGM SERPL QL IA: NONREACTIVE
HBV SURFACE AG SERPL QL IA: NONREACTIVE
HCOV 229E RNA NPH QL NAA+NON-PROBE: NOT DETECTED
HCOV HKU1 RNA NPH QL NAA+NON-PROBE: NOT DETECTED
HCOV NL63 RNA NPH QL NAA+NON-PROBE: NOT DETECTED
HCOV OC43 RNA NPH QL NAA+NON-PROBE: NOT DETECTED
HCT VFR BLD AUTO: 34.8 % (ref 42–52)
HCV AB SERPL QL IA: NONREACTIVE
HGB BLD-MCNC: 11.5 G/DL (ref 14–18)
HGB UR QL STRIP: NEGATIVE
HIV 1+2 AB+HIV1 P24 AG SERPL QL IA: NONREACTIVE
HMPV RNA NPH QL NAA+NON-PROBE: NOT DETECTED
HOLD SPECIMEN: NORMAL
HPIV1 RNA NPH QL NAA+NON-PROBE: NOT DETECTED
HPIV2 RNA NPH QL NAA+NON-PROBE: NOT DETECTED
HPIV3 RNA NPH QL NAA+NON-PROBE: NOT DETECTED
HPIV4 RNA NPH QL NAA+NON-PROBE: NOT DETECTED
HYALINE CASTS #/AREA URNS LPF: ABNORMAL /LPF
IMM GRANULOCYTES # BLD AUTO: 0.03 X10(3)/MCL (ref 0–0.04)
IMM GRANULOCYTES NFR BLD AUTO: 0.3 %
IRON SATN MFR SERPL: 11 % (ref 20–50)
IRON SERPL-MCNC: 26 UG/DL (ref 65–175)
KETONES UR QL STRIP: NEGATIVE
LACTATE SERPL-SCNC: 1.4 MMOL/L (ref 0.5–2.2)
LEUKOCYTE ESTERASE UR QL STRIP: NEGATIVE
LYMPHOCYTES # BLD AUTO: 2.26 X10(3)/MCL (ref 0.6–4.6)
LYMPHOCYTES NFR BLD AUTO: 20 %
M PNEUMO DNA NPH QL NAA+NON-PROBE: NOT DETECTED
MAGNESIUM SERPL-MCNC: 2.1 MG/DL (ref 1.6–2.6)
MCH RBC QN AUTO: 31.7 PG (ref 27–31)
MCHC RBC AUTO-ENTMCNC: 33 G/DL (ref 33–36)
MCV RBC AUTO: 95.9 FL (ref 80–94)
MDMA UR QL SCN: NEGATIVE
MONOCYTES # BLD AUTO: 0.53 X10(3)/MCL (ref 0.1–1.3)
MONOCYTES NFR BLD AUTO: 4.7 %
MRSA PCR SCRN (OHS): NOT DETECTED
MUCOUS THREADS URNS QL MICRO: ABNORMAL /LPF
NEUTROPHILS # BLD AUTO: 8.34 X10(3)/MCL (ref 2.1–9.2)
NEUTROPHILS NFR BLD AUTO: 73.8 %
NITRITE UR QL STRIP: NEGATIVE
NRBC BLD AUTO-RTO: 0 %
OPIATES UR QL SCN: NEGATIVE
PCP UR QL: NEGATIVE
PH UR STRIP: 7 [PH]
PH UR: 7 [PH] (ref 3–11)
PHOSPHATE SERPL-MCNC: 3.2 MG/DL (ref 2.3–4.7)
PLATELET # BLD AUTO: 518 X10(3)/MCL (ref 130–400)
PMV BLD AUTO: 9.7 FL (ref 7.4–10.4)
POTASSIUM SERPL-SCNC: 3.6 MMOL/L (ref 3.5–5.1)
PROT SERPL-MCNC: 8.1 GM/DL (ref 6.4–8.3)
PROT UR QL STRIP: ABNORMAL
RBC # BLD AUTO: 3.63 X10(6)/MCL (ref 4.7–6.1)
RBC #/AREA URNS AUTO: ABNORMAL /HPF
RSV A 5' UTR RNA NPH QL NAA+PROBE: NOT DETECTED
RSV RNA NPH QL NAA+NON-PROBE: NOT DETECTED
RV+EV RNA NPH QL NAA+NON-PROBE: NOT DETECTED
SARS-COV-2 RNA RESP QL NAA+PROBE: NOT DETECTED
SODIUM SERPL-SCNC: 141 MMOL/L (ref 136–145)
SP GR UR STRIP.AUTO: >1.05 (ref 1–1.03)
SPECIFIC GRAVITY, URINE AUTO (.000) (OHS): >1.05 (ref 1–1.03)
SQUAMOUS #/AREA URNS LPF: ABNORMAL /HPF
T PALLIDUM AB SER QL: NONREACTIVE
TIBC SERPL-MCNC: 213 UG/DL (ref 69–240)
TIBC SERPL-MCNC: 239 UG/DL (ref 250–450)
TRANSFERRIN SERPL-MCNC: 216 MG/DL (ref 174–364)
UROBILINOGEN UR STRIP-ACNC: ABNORMAL
VIT B12 SERPL-MCNC: 658 PG/ML (ref 213–816)
WBC # SPEC AUTO: 11.3 X10(3)/MCL (ref 4.5–11.5)
WBC #/AREA URNS AUTO: ABNORMAL /HPF

## 2024-06-13 PROCEDURE — 83540 ASSAY OF IRON: CPT

## 2024-06-13 PROCEDURE — 80076 HEPATIC FUNCTION PANEL: CPT

## 2024-06-13 PROCEDURE — 86480 TB TEST CELL IMMUN MEASURE: CPT

## 2024-06-13 PROCEDURE — 87581 M.PNEUMON DNA AMP PROBE: CPT

## 2024-06-13 PROCEDURE — 87070 CULTURE OTHR SPECIMN AEROBIC: CPT

## 2024-06-13 PROCEDURE — 86780 TREPONEMA PALLIDUM: CPT

## 2024-06-13 PROCEDURE — 96365 THER/PROPH/DIAG IV INF INIT: CPT

## 2024-06-13 PROCEDURE — 99285 EMERGENCY DEPT VISIT HI MDM: CPT | Mod: 25

## 2024-06-13 PROCEDURE — 87641 MR-STAPH DNA AMP PROBE: CPT

## 2024-06-13 PROCEDURE — 85379 FIBRIN DEGRADATION QUANT: CPT | Performed by: EMERGENCY MEDICINE

## 2024-06-13 PROCEDURE — 87206 SMEAR FLUORESCENT/ACID STAI: CPT

## 2024-06-13 PROCEDURE — 11000001 HC ACUTE MED/SURG PRIVATE ROOM

## 2024-06-13 PROCEDURE — 84100 ASSAY OF PHOSPHORUS: CPT

## 2024-06-13 PROCEDURE — 96367 TX/PROPH/DG ADDL SEQ IV INF: CPT

## 2024-06-13 PROCEDURE — 80074 ACUTE HEPATITIS PANEL: CPT

## 2024-06-13 PROCEDURE — 87389 HIV-1 AG W/HIV-1&-2 AB AG IA: CPT

## 2024-06-13 PROCEDURE — 85025 COMPLETE CBC W/AUTO DIFF WBC: CPT | Performed by: EMERGENCY MEDICINE

## 2024-06-13 PROCEDURE — 63600175 PHARM REV CODE 636 W HCPCS: Performed by: EMERGENCY MEDICINE

## 2024-06-13 PROCEDURE — 83735 ASSAY OF MAGNESIUM: CPT

## 2024-06-13 PROCEDURE — 87385 HISTOPLASMA CAPSUL AG IA: CPT

## 2024-06-13 PROCEDURE — 87899 AGENT NOS ASSAY W/OPTIC: CPT | Performed by: INTERNAL MEDICINE

## 2024-06-13 PROCEDURE — 82607 VITAMIN B-12: CPT

## 2024-06-13 PROCEDURE — 25500020 PHARM REV CODE 255: Performed by: EMERGENCY MEDICINE

## 2024-06-13 PROCEDURE — 87899 AGENT NOS ASSAY W/OPTIC: CPT

## 2024-06-13 PROCEDURE — 0241U COVID/RSV/FLU A&B PCR: CPT

## 2024-06-13 PROCEDURE — 81015 MICROSCOPIC EXAM OF URINE: CPT

## 2024-06-13 PROCEDURE — 27000207 HC ISOLATION

## 2024-06-13 PROCEDURE — 87040 BLOOD CULTURE FOR BACTERIA: CPT | Performed by: EMERGENCY MEDICINE

## 2024-06-13 PROCEDURE — 87798 DETECT AGENT NOS DNA AMP: CPT

## 2024-06-13 PROCEDURE — 82164 ANGIOTENSIN I ENZYME TEST: CPT

## 2024-06-13 PROCEDURE — 82728 ASSAY OF FERRITIN: CPT

## 2024-06-13 PROCEDURE — 93005 ELECTROCARDIOGRAM TRACING: CPT

## 2024-06-13 PROCEDURE — 87449 NOS EACH ORGANISM AG IA: CPT

## 2024-06-13 PROCEDURE — 25000003 PHARM REV CODE 250: Performed by: EMERGENCY MEDICINE

## 2024-06-13 PROCEDURE — 83605 ASSAY OF LACTIC ACID: CPT | Performed by: EMERGENCY MEDICINE

## 2024-06-13 PROCEDURE — 25000003 PHARM REV CODE 250

## 2024-06-13 PROCEDURE — 80307 DRUG TEST PRSMV CHEM ANLYZR: CPT

## 2024-06-13 PROCEDURE — 80048 BASIC METABOLIC PNL TOTAL CA: CPT | Performed by: EMERGENCY MEDICINE

## 2024-06-13 RX ORDER — ERGOCALCIFEROL 1.25 MG/1
50000 CAPSULE ORAL
Status: DISCONTINUED | OUTPATIENT
Start: 2024-06-13 | End: 2024-06-13

## 2024-06-13 RX ORDER — GLUCAGON 1 MG
1 KIT INJECTION
Status: DISCONTINUED | OUTPATIENT
Start: 2024-06-13 | End: 2024-06-17 | Stop reason: HOSPADM

## 2024-06-13 RX ORDER — PANTOPRAZOLE SODIUM 40 MG/1
40 TABLET, DELAYED RELEASE ORAL DAILY
Status: DISCONTINUED | OUTPATIENT
Start: 2024-06-13 | End: 2024-06-17 | Stop reason: HOSPADM

## 2024-06-13 RX ORDER — IBUPROFEN 200 MG
16 TABLET ORAL
Status: DISCONTINUED | OUTPATIENT
Start: 2024-06-13 | End: 2024-06-17 | Stop reason: HOSPADM

## 2024-06-13 RX ORDER — ATORVASTATIN CALCIUM 40 MG/1
40 TABLET, FILM COATED ORAL DAILY
Status: DISCONTINUED | OUTPATIENT
Start: 2024-06-13 | End: 2024-06-17 | Stop reason: HOSPADM

## 2024-06-13 RX ORDER — SODIUM CHLORIDE 0.9 % (FLUSH) 0.9 %
10 SYRINGE (ML) INJECTION EVERY 12 HOURS PRN
Status: DISCONTINUED | OUTPATIENT
Start: 2024-06-13 | End: 2024-06-17 | Stop reason: HOSPADM

## 2024-06-13 RX ORDER — GABAPENTIN 300 MG/1
300 CAPSULE ORAL NIGHTLY
Status: DISCONTINUED | OUTPATIENT
Start: 2024-06-13 | End: 2024-06-17 | Stop reason: HOSPADM

## 2024-06-13 RX ORDER — HYDROCODONE BITARTRATE AND ACETAMINOPHEN 5; 325 MG/1; MG/1
1 TABLET ORAL EVERY 6 HOURS PRN
Status: DISCONTINUED | OUTPATIENT
Start: 2024-06-13 | End: 2024-06-14

## 2024-06-13 RX ORDER — IBUPROFEN 200 MG
24 TABLET ORAL
Status: DISCONTINUED | OUTPATIENT
Start: 2024-06-13 | End: 2024-06-17 | Stop reason: HOSPADM

## 2024-06-13 RX ORDER — NALOXONE HCL 0.4 MG/ML
0.02 VIAL (ML) INJECTION
Status: DISCONTINUED | OUTPATIENT
Start: 2024-06-13 | End: 2024-06-17 | Stop reason: HOSPADM

## 2024-06-13 RX ADMIN — IOHEXOL 100 ML: 350 INJECTION, SOLUTION INTRAVENOUS at 11:06

## 2024-06-13 RX ADMIN — AZITHROMYCIN DIHYDRATE 500 MG: 500 INJECTION, POWDER, LYOPHILIZED, FOR SOLUTION INTRAVENOUS at 12:06

## 2024-06-13 RX ADMIN — GABAPENTIN 300 MG: 300 CAPSULE ORAL at 08:06

## 2024-06-13 RX ADMIN — PANTOPRAZOLE SODIUM 40 MG: 40 TABLET, DELAYED RELEASE ORAL at 03:06

## 2024-06-13 RX ADMIN — CEFTRIAXONE SODIUM 1 G: 1 INJECTION, POWDER, FOR SOLUTION INTRAMUSCULAR; INTRAVENOUS at 12:06

## 2024-06-13 RX ADMIN — ATORVASTATIN CALCIUM 40 MG: 40 TABLET, FILM COATED ORAL at 03:06

## 2024-06-13 NOTE — ED PROVIDER NOTES
"ED PROVIDER NOTE  6/13/2024    CHIEF COMPLAINT:   Chief Complaint   Patient presents with    Hemoptysis    Hematemesis     PT REPORTS VOMITING BLD, BLOOD IN STOOL AND COUGHING UP BLOOD X 3 DAYS.  REORTS TAKES 81MG ASA DAILY.  CO ABD, CHEST AND BACK PAIN.  SOB AND COUGH.  HX OF MI W STENTS,  EKG OBTAINED.      Chest Pain       HISTORY OF PRESENT ILLNESS:   Ranjeet Ball is a 38 y.o. male who presents with chief complaint Coughing and vomiting blood.  Onset was 3 days ago whenever he began having hemoptysis and hematemesis.  He reports having "stinging" pain in his abdomen and back, with the back pain being aggravated with deep breathing.  Also endorses having some shortness of breath.  Denies fever, black or bloody stools.    The history is provided by the patient.         REVIEW OF SYSTEMS: as noted in the HPI.  NURSING NOTES REVIEWED      PAST MEDICAL/SURGICAL HISTORY:   Past Medical History:   Diagnosis Date    Hyperlipidemia     Myocardial infarction       Past Surgical History:   Procedure Laterality Date    CORONARY ARTERY BYPASS GRAFT      LAPAROTOMY, EXPLORATORY N/A 03/23/2023    Procedure: LAPAROTOMY, EXPLORATORY;  Surgeon: Alex Juárez MD;  Location: [facility];  Service: General;  Laterality: N/A;    OPEN REDUCTION AND INTERNAL FIXATION (ORIF) OF FRACTURE OF DISTAL RADIUS Left 4/4/2024    Procedure: ORIF, FRACTURE, RADIUS, DISTAL;  Surgeon: Khoa Abdalla MD;  Location: [facility];  Service: Orthopedics;  Laterality: Left;    ORIF FOREARM FRACTURE Right 12/7/2023    Procedure: ORIF, FRACTURE, RADIUS OR ULNA;  Surgeon: Khoa Abdalla MD;  Location: [facility];  Service: Orthopedics;  Laterality: Right;  Call [place]       FAMILY HISTORY:   Family History   Problem Relation Name Age of Onset    Diabetes Mother      Heart disease Mother      Cancer Father      Diabetes Sister      Heart disease Brother      Stroke Maternal Grandmother      Heart disease Maternal Grandfather         SOCIAL HISTORY:   Social " History     Tobacco Use    Smoking status: Every Day     Current packs/day: 0.15     Average packs/day: 0.2 packs/day for 24.5 years (3.7 ttl pk-yrs)     Types: Cigarettes     Start date: 12/4/1999    Smokeless tobacco: Never    Tobacco comments:     States smokes 1-2 cigarettes per day   Substance Use Topics    Alcohol use: Yes     Comment: 1-2 times yearly    Drug use: Yes     Frequency: 7.0 times per week     Types: Marijuana     Comment: daily       ALLERGIES: Review of patient's allergies indicates:  No Known Allergies    PHYSICAL EXAM:  Initial Vitals [06/13/24 1028]   BP Pulse Resp Temp SpO2   116/78 97 18 97.2 °F (36.2 °C) 100 %      MAP       --         Physical Exam    Nursing note and vitals reviewed.  Constitutional: He appears well-developed and well-nourished. No distress.   HENT:   Head: Normocephalic and atraumatic.   Nose: Nose normal.   Mouth/Throat: Oropharynx is clear and moist and mucous membranes are normal.   Eyes: Conjunctivae and EOM are normal. Pupils are equal, round, and reactive to light.   Neck: Neck supple. No tracheal deviation present.   Cardiovascular:  Regular rhythm, normal heart sounds, intact distal pulses and normal pulses.           Pulmonary/Chest: Effort normal and breath sounds normal. No respiratory distress.   Abdominal: Abdomen is soft. There is no abdominal tenderness. There is no rebound and no guarding.   Musculoskeletal:         General: Normal range of motion.      Cervical back: Neck supple.     Neurological: He is alert and oriented to person, place, and time. GCS eye subscore is 4. GCS verbal subscore is 5. GCS motor subscore is 6.   CN II-XII intact. Moves all extremities. No gross sensory or motor deficits.   Skin: Skin is warm, dry and intact.   Psychiatric: He has a normal mood and affect. His speech is normal and behavior is normal. Judgment and thought content normal. Cognition and memory are normal.         RESULTS:  Labs Reviewed   D DIMER, QUANTITATIVE -  Abnormal; Notable for the following components:       Result Value    D-Dimer 1.25 (*)     All other components within normal limits   BASIC METABOLIC PANEL - Abnormal; Notable for the following components:    Glucose 113 (*)     Blood Urea Nitrogen 6.2 (*)     All other components within normal limits   CBC WITH DIFFERENTIAL - Abnormal; Notable for the following components:    RBC 3.63 (*)     Hgb 11.5 (*)     Hct 34.8 (*)     MCV 95.9 (*)     MCH 31.7 (*)     Platelet 518 (*)     All other components within normal limits   HEPATIC FUNCTION PANEL - Abnormal; Notable for the following components:    Albumin 3.0 (*)     All other components within normal limits   URINALYSIS, REFLEX TO URINE CULTURE - Abnormal; Notable for the following components:    Specific Gravity, UA >1.050 (*)     Protein, UA 1+ (*)     Urobilinogen, UA 2+ (*)     Bacteria, UA Trace (*)     Squamous Epithelial Cells, UA Few (*)     Mucous, UA Occ (*)     All other components within normal limits   IRON AND TIBC - Abnormal; Notable for the following components:    Iron Level 26 (*)     Iron Binding Capacity Total 239 (*)     Iron Saturation 11 (*)     All other components within normal limits   LACTIC ACID, PLASMA - Normal   COVID/RSV/FLU A&B PCR - Normal    Narrative:     The Xpert Xpress SARS-CoV-2/FLU/RSV plus is a rapid, multiplexed real-time PCR test intended for the simultaneous qualitative detection and differentiation of SARS-CoV-2, Influenza A, Influenza B, and respiratory syncytial virus (RSV) viral RNA in either nasopharyngeal swab or nasal swab specimens.         HEPATITIS PANEL, ACUTE - Normal   HIV 1 / 2 ANTIBODY - Normal   SYPHILIS ANTIBODY (WITH REFLEX RPR) - Normal   MAGNESIUM - Normal   PHOSPHORUS - Normal   FERRITIN - Normal   VITAMIN B12 - Normal   BLOOD CULTURE OLG   BLOOD CULTURE OLG   AFB SMEAR OLG   EXTRA TUBES    Narrative:     The following orders were created for panel order EXTRA TUBES.  Procedure                                Abnormality         Status                     ---------                               -----------         ------                     Red Top Hold[[id number]]                                    Final result               Light Green Top Hold[[id number]]                            Final result               Lavender Top Hold[[id number]]                               Final result               Lavender Top Hold[[id number]]                               Final result               Gold Top Hold[[id number]]                                   Final result               Pink Top Hold[[id number]]                                   Final result                 Please view results for these tests on the individual orders.   RED TOP HOLD   LIGHT GREEN TOP HOLD   LAVENDER TOP HOLD   LAVENDER TOP HOLD   GOLD TOP HOLD   PINK TOP HOLD   CBC W/ AUTO DIFFERENTIAL    Narrative:     The following orders were created for panel order CBC auto differential.  Procedure                               Abnormality         Status                     ---------                               -----------         ------                     CBC with Differential[[id number]]       Abnormal            Final result                 Please view results for these tests on the individual orders.   ANGIOTENSIN CONVERTING ENZYME   HISTOPLASMA ANTIGEN, URINE   BLASTOMYCES ANTIGEN, URINE   QUANTIFERON GOLD TB   LEGIONELLA ANTIGEN, URINE RANDOM   [facility] ORDERABLE     Imaging Results              CTA Chest Non-Coronary (PE Studies) (Final result)  Result time 06/13/24 11:45:08      Final result by Miah Gonsalez MD (06/13/24 11:45:08)                   Impression:      1. Negative for pulmonary thromboembolic disease.  2. Mediastinal and right hilar adenopathy suspicious for malignancy.  3. Likely right upper lobe pneumonia.      Electronically signed by: Miah Gonsalez  Date:    06/13/2024  Time:    11:45               Narrative:     EXAMINATION:  CTA CHEST NON CORONARY (PE STUDIES)    CLINICAL HISTORY:  Pulmonary embolism (PE) suspected, positive D-dimer;    TECHNIQUE:  Helical acquisition through the chest with IV contrast targeting the pulmonary arteries. Multiplanar and 3D MIP reconstructed images were provided for review.  mGycm. Automatic exposure control, adjustment of mA/kV or iterative reconstruction technique was used to reduce radiation.    COMPARISON:  23 March 2023.    FINDINGS:  There is good opacification of the pulmonary arterial tree. No pulmonary embolus seen.  There is mediastinal and right hilar adenopathy which narrows pulmonary arteries and veins.  A right hilar conglomerate on image 53 series 4 measures approximately 6 x 4.5 cm.  A paratracheal lymph node on image 34 series 4 measures 2.5 cm short axis.  There is also a prominent right supraclavicular lymph node on image 7 series 4.    No left hilar adenopathy.  No axillary adenopathy.    Heart is normal in size. No pericardial effusion.    No pleural effusion.  There are right upper lobe opacities suspicious for pneumonia.  Scarring left lower lobe.  Paraseptal predominant emphysema    No acute findings in the imaged upper abdomen.  No acute osseous findings.                                       X-Ray Chest PA And Lateral (Final result)  Result time 06/13/24 11:32:43      Final result by Genaro Maier MD (06/13/24 11:32:43)                   Impression:      As above.      Electronically signed by: Genaro Maier  Date:    06/13/2024  Time:    11:32               Narrative:    EXAMINATION:  XR CHEST PA AND LATERAL    CLINICAL HISTORY:  Cough, unspecified    TECHNIQUE:  Two views of the chest    COMPARISON:  03/26/2023    FINDINGS:  Appearance of right hilar masslike density with peripheral opacification which may represent postobstructive pneumonia.  Findings likely infectious process in this age group, however malignancy not excluded.  Recommend further  evaluation with contrast enhanced CT of the chest.                                      PROCEDURES:  Critical Care    Date/Time: 6/14/2024 8:28 AM    Performed by: Arnaldo Garg DO  Authorized by: Elton Mcdonald MD  Direct patient critical care time: 15 minutes  Ordering / reviewing critical care time: 5 minutes  Documentation critical care time: 5 minutes  Total critical care time (exclusive of procedural time) : 25 minutes  Critical care time was exclusive of separately billable procedures and treating other patients.  Critical care was necessary to treat or prevent imminent or life-threatening deterioration of the following conditions: respiratory failure.  Critical care was time spent personally by me on the following activities: development of treatment plan with patient or surrogate, interpretation of cardiac output measurements, evaluation of patient's response to treatment, examination of patient, obtaining history from patient or surrogate, ordering and review of laboratory studies, ordering and performing treatments and interventions, ordering and review of radiographic studies, re-evaluation of patient's condition and pulse oximetry.          ECG:  EKG Readings: (Independently Interpreted)   Initial Reading: No STEMI. Rhythm: Normal Sinus Rhythm. Heart Rate: 92. Ectopy: No Ectopy. Conduction: Normal. Axis: Normal.       ED COURSE AND MEDICAL DECISION MAKING:  Medications   atorvastatin tablet 40 mg (40 mg Oral Given 6/13/24 1532)   gabapentin capsule 300 mg (300 mg Oral Given 6/13/24 2020)   HYDROcodone-acetaminophen 5-325 mg per tablet 1 tablet (has no administration in time range)   sodium chloride 0.9% flush 10 mL (has no administration in time range)   naloxone 0.4 mg/mL injection 0.02 mg (has no administration in time range)   glucose chewable tablet 16 g (has no administration in time range)   glucose chewable tablet 24 g (has no administration in time range)   glucagon (human recombinant)  injection 1 mg (has no administration in time range)   dextrose 10% bolus 125 mL 125 mL (has no administration in time range)   dextrose 10% bolus 250 mL 250 mL (has no administration in time range)   cefTRIAXone (ROCEPHIN) 2 g in dextrose 5 % in water (D5W) 100 mL IVPB (MB+) (has no administration in time range)   azithromycin (ZITHROMAX) 500 mg in dextrose 5 % (D5W) 250 mL IVPB (Vial-Mate) (has no administration in time range)   pantoprazole EC tablet 40 mg (40 mg Oral Given 6/13/24 1532)   iohexoL (OMNIPAQUE 350) injection 100 mL (100 mLs Intravenous Given 6/13/24 1134)   cefTRIAXone (Rocephin) 1 g in dextrose 5 % in water (D5W) 100 mL IVPB (MB+) (0 g Intravenous Stopped 6/13/24 1254)   azithromycin (ZITHROMAX) 500 mg in dextrose 5 % (D5W) 250 mL IVPB (Vial-Mate) (0 mg Intravenous Stopped 6/13/24 1356)     ED Course as of 06/14/24 0828   Thu Jun 13, 2024   1048 D-Dimer(!): 1.25 [IB]   1049 WBC: 11.30 [IB]   1049 Hemoglobin(!): 11.5  Not significantly changed from 5 months ago [IB]   1049 Platelet Count(!): 518 [IB]   1102 Creatinine: 0.84 [IB]      ED Course User Index  [IB] Arnaldo Garg, DO        Medical Decision Making  38-year-old male who presents with hemoptysis and hematemesis over the past couple of days.  Differential diagnosis includes peptic ulcer disease, bronchitis, pneumonia, PE.  CT shows mild leukocytosis 11.3 with a hemoglobin 11.5 and thrombocytosis of 518.  BMP grossly unremarkable.  D-dimer elevated at 1.25 so CTA chest was obtained which showed no evidence of PE there is mediastinal and right hilar adenopathy suspicious for malignancy and likely right upper lobe pneumonia.  Sepsis orders initiated and he was given Rocephin and azithromycin and medicine consulted for admission.  I have spoken with the patient and/or caregivers. I have explained the patient's condition, diagnoses and treatment plan based on the information available to me at this time. I have answered the patient's and/or  caregiver's questions and addressed any concerns. The patient and/or caregivers have as good an understanding of the patient's diagnosis, condition and treatment plan as can be expected at this point. The patient has been stabilized within the capability of the emergency department. The patient will be transported for further care and management or will be moved to an observation or inpatient service. I have communicated with the staff or medical practitioner taking over this patient's care.    Amount and/or Complexity of Data Reviewed  Labs: ordered. Decision-making details documented in ED Course.  Radiology: ordered.  ECG/medicine tests: ordered and independent interpretation performed.    Risk  Prescription drug management.  Decision regarding hospitalization.        CLINICAL IMPRESSION:  1. Pneumonia of right upper lobe due to infectious organism    2. Cough    3. Chest pain    4. Malignant neoplasm of lung, unspecified laterality, unspecified part of lung        DISPOSITION:   ED Disposition Condition    Admit                     Arnaldo Garg DO  06/14/24 0828
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
## 2024-06-13 NOTE — CONSULTS
"[facility] General Surgery - Purple Team  Inpatient Consult     Date of Admission: 6/13/2024  Date of Consult: 6/13/2024  Consulting Service: IM  Reason for Consult: LNBx     Subjective:     HPI:   Mr. Ranjeet Ball is a 38 year old male with past medical history of AMI x 2, cardiac arrest, PCI w/ PIEDAD, HLD who presents with shortness of breath, hemoptysis, night sweats, 15 lbs weight loss. Patient states that he is unable to say exactly when the weight loss began or the night sweats. Thinks it coincides with his time in the hospital. Smokes 3-4 cigarettes daily, "max of 5" since age 12. Denies alcohol use. Smokes marijuana, no other drug use. States history of cancer in dad and aunt but unsure of diagnosis or age at diagnosis. Upon work up for PE: CT revealed pneumonia, cervical, hilar and mediastinal lymphadenopathy concerning for malignancy.      PMH:  Past Medical History:   Diagnosis Date    Hyperlipidemia     Myocardial infarction          PSH:  Past Surgical History:   Procedure Laterality Date    CORONARY ARTERY BYPASS GRAFT      LAPAROTOMY, EXPLORATORY N/A 03/23/2023    Procedure: LAPAROTOMY, EXPLORATORY;  Surgeon: Alex Juárez MD;  Location: [facility];  Service: General;  Laterality: N/A;    OPEN REDUCTION AND INTERNAL FIXATION (ORIF) OF FRACTURE OF DISTAL RADIUS Left 4/4/2024    Procedure: ORIF, FRACTURE, RADIUS, DISTAL;  Surgeon: Khoa Abdalla MD;  Location: [facility];  Service: Orthopedics;  Laterality: Left;    ORIF FOREARM FRACTURE Right 12/7/2023    Procedure: ORIF, FRACTURE, RADIUS OR ULNA;  Surgeon: Khoa Abdalla MD;  Location: [facility] OR;  Service: Orthopedics;  Laterality: Right;  Call Wilmer         Medications:  Current Facility-Administered Medications on File Prior to Encounter   Medication Dose Route Frequency Provider Last Rate Last Admin    0.9%  NaCl infusion   Intravenous Continuous Lexi Lynn MD        0.9%  NaCl infusion   Intravenous Continuous Lexi Lynn MD        " LIDOcaine (PF) 10 mg/ml (1%) injection 10 mg  1 mL Intradermal Once Lexi Lynn MD         Current Outpatient Medications on File Prior to Encounter   Medication Sig Dispense Refill    acetaminophen (TYLENOL) 500 MG tablet Take 1 tablet (500 mg total) by mouth every 8 (eight) hours. 90 tablet 0    aspirin (ECOTRIN) 81 MG EC tablet Take 81 mg by mouth once daily.      atorvastatin (LIPITOR) 40 MG tablet Take 40 mg by mouth once daily. (Patient not taking: Reported on 5/20/2024)      ergocalciferol (ERGOCALCIFEROL) 50,000 unit Cap Take 1 capsule (50,000 Units total) by mouth every 7 days. (Patient not taking: Reported on 5/20/2024) 12 capsule 4    ergocalciferol (ERGOCALCIFEROL) 50,000 unit Cap Take 1 capsule (50,000 Units total) by mouth every 7 days. 8 capsule 0    gabapentin (NEURONTIN) 300 MG capsule Take 1 capsule (300 mg total) by mouth every evening. (Patient not taking: Reported on 4/22/2024) 30 capsule 1    gabapentin (NEURONTIN) 300 MG capsule Take 1 capsule (300 mg total) by mouth 3 (three) times daily. (Patient not taking: Reported on 4/22/2024) 90 capsule 2    HYDROcodone-acetaminophen (NORCO)  mg per tablet Take 1 tablet by mouth every 8 (eight) hours as needed for Pain. (Patient not taking: Reported on 4/22/2024) 21 tablet 0    HYDROcodone-acetaminophen (NORCO) 5-325 mg per tablet Take 1 tablet by mouth every 6 (six) hours as needed for Pain. (Patient not taking: Reported on 5/20/2024) 21 tablet 0    nitroGLYCERIN (NITROSTAT) 0.4 MG SL tablet  (Patient not taking: Reported on 4/22/2024)          Allergies:  Review of patient's allergies indicates:  No Known Allergies      Social Hx:  Social History     Tobacco Use   Smoking Status Every Day    Current packs/day: 0.15    Average packs/day: 0.2 packs/day for 24.5 years (3.7 ttl pk-yrs)    Types: Cigarettes    Start date: 12/4/1999   Smokeless Tobacco Never   Tobacco Comments    States smokes 1-2 cigarettes per day      Social History      Substance and Sexual Activity   Alcohol Use Yes    Comment: 1-2 times yearly         Relevant Family Hx:  Family History   Problem Relation Name Age of Onset    Diabetes Mother      Heart disease Mother      Cancer Father      Diabetes Sister      Heart disease Brother      Stroke Maternal Grandmother      Heart disease Maternal Grandfather           Objective:     Vitals:  BP: 113/69 mmHg   Pulse: 74-97   Resp: 14-30   Temp: 97.7 F      Physical Exam:  Gen: NAD  Neuro: awake, alert, answering questions appropriately  HEENT: well circumscribed, spherical mass consistent with enlarged lymph node in right supraclavicular chain. Separate from overlying skin.   CV: RRR, good distal pulses  Resp: non-labored breathing, TERRENCE  Abd: soft, ND, NT  Ext: moves all 4 spontaneously and purposefully  Skin: warm, well perfused     Labs:  WBC 11.3  Hgb 11.5     Imagin. Negative for pulmonary thromboembolic disease.  2. Mediastinal and right hilar adenopathy suspicious for malignancy.  3. Likely right upper lobe pneumonia.     Assessment/Plan:  38 year old male presenting with hemoptysis, weight loss, and night sweats. Imaging concerning for underlying malignancy as there is mediastinal and hilar lymphadenopathy. General surgery consulted for lymph node biopsy.     - agree will likely need excisional biopsy of lymph node  - timing of excision dependent on OR availability   - further rec's to follow. Please have patient NPOmn     Aj Webster MD   [employer] General Surgery, PGY-3  2024 4:36 PM

## 2024-06-14 ENCOUNTER — ANESTHESIA (OUTPATIENT)
Dept: SURGERY | Facility: HOSPITAL | Age: 39
End: 2024-06-14
Payer: MEDICAID

## 2024-06-14 ENCOUNTER — ANESTHESIA EVENT (OUTPATIENT)
Dept: SURGERY | Facility: HOSPITAL | Age: 39
End: 2024-06-14
Payer: MEDICAID

## 2024-06-14 LAB
ACE SERPL-CCNC: 13 U/L (ref 16–85)
ALBUMIN SERPL-MCNC: 2.7 G/DL (ref 3.5–5)
ALBUMIN/GLOB SERPL: 0.6 RATIO (ref 1.1–2)
ALP SERPL-CCNC: 70 UNIT/L (ref 40–150)
ALT SERPL-CCNC: 45 UNIT/L (ref 0–55)
ANION GAP SERPL CALC-SCNC: 8 MEQ/L
AST SERPL-CCNC: 39 UNIT/L (ref 5–34)
BASOPHILS # BLD AUTO: 0.03 X10(3)/MCL
BASOPHILS NFR BLD AUTO: 0.3 %
BILIRUB SERPL-MCNC: 0.4 MG/DL
BUN SERPL-MCNC: 4.9 MG/DL (ref 8.9–20.6)
CALCIUM SERPL-MCNC: 9.5 MG/DL (ref 8.4–10.2)
CHLORIDE SERPL-SCNC: 107 MMOL/L (ref 98–107)
CO2 SERPL-SCNC: 23 MMOL/L (ref 22–29)
CREAT SERPL-MCNC: 0.71 MG/DL (ref 0.73–1.18)
CREAT/UREA NIT SERPL: 7
EOSINOPHIL # BLD AUTO: 0.24 X10(3)/MCL (ref 0–0.9)
EOSINOPHIL NFR BLD AUTO: 2.3 %
ERYTHROCYTE [DISTWIDTH] IN BLOOD BY AUTOMATED COUNT: 13.3 % (ref 11.5–17)
FOLATE SERPL-MCNC: 9.3 NG/ML (ref 7–31.4)
GFR SERPLBLD CREATININE-BSD FMLA CKD-EPI: >60 ML/MIN/1.73/M2
GLOBULIN SER-MCNC: 4.9 GM/DL (ref 2.4–3.5)
GLUCOSE SERPL-MCNC: 100 MG/DL (ref 74–100)
GRAM STN SPEC: NORMAL
HCT VFR BLD AUTO: 33.7 % (ref 42–52)
HGB BLD-MCNC: 11.2 G/DL (ref 14–18)
IMM GRANULOCYTES # BLD AUTO: 0.03 X10(3)/MCL (ref 0–0.04)
IMM GRANULOCYTES NFR BLD AUTO: 0.3 %
KOH PREP SPEC: NORMAL
LEGIONELLA AG UR QL: NEGATIVE
LYMPHOCYTES # BLD AUTO: 2.21 X10(3)/MCL (ref 0.6–4.6)
LYMPHOCYTES NFR BLD AUTO: 21.5 %
M AVIUM PARATB TISS QL ZN STN: NORMAL
MAYO GENERIC ORDERABLE RESULT: NORMAL
MCH RBC QN AUTO: 30.9 PG (ref 27–31)
MCHC RBC AUTO-ENTMCNC: 33.2 G/DL (ref 33–36)
MCV RBC AUTO: 93.1 FL (ref 80–94)
MONOCYTES # BLD AUTO: 0.66 X10(3)/MCL (ref 0.1–1.3)
MONOCYTES NFR BLD AUTO: 6.4 %
NEUTROPHILS # BLD AUTO: 7.09 X10(3)/MCL (ref 2.1–9.2)
NEUTROPHILS NFR BLD AUTO: 69.2 %
NRBC BLD AUTO-RTO: 0 %
OHS QRS DURATION: 78 MS
OHS QTC CALCULATION: 430 MS
PATH REV: NORMAL
PLATELET # BLD AUTO: 604 X10(3)/MCL (ref 130–400)
PMV BLD AUTO: 8.5 FL (ref 7.4–10.4)
POTASSIUM SERPL-SCNC: 3.6 MMOL/L (ref 3.5–5.1)
PROT SERPL-MCNC: 7.6 GM/DL (ref 6.4–8.3)
RBC # BLD AUTO: 3.62 X10(6)/MCL (ref 4.7–6.1)
SODIUM SERPL-SCNC: 138 MMOL/L (ref 136–145)
WBC # SPEC AUTO: 10.26 X10(3)/MCL (ref 4.5–11.5)

## 2024-06-14 PROCEDURE — 63600175 PHARM REV CODE 636 W HCPCS: Performed by: NURSE ANESTHETIST, CERTIFIED REGISTERED

## 2024-06-14 PROCEDURE — 27000207 HC ISOLATION

## 2024-06-14 PROCEDURE — 36000706: Performed by: SURGERY

## 2024-06-14 PROCEDURE — 85025 COMPLETE CBC W/AUTO DIFF WBC: CPT

## 2024-06-14 PROCEDURE — 80053 COMPREHEN METABOLIC PANEL: CPT

## 2024-06-14 PROCEDURE — 25000003 PHARM REV CODE 250: Performed by: NURSE ANESTHETIST, CERTIFIED REGISTERED

## 2024-06-14 PROCEDURE — 63600175 PHARM REV CODE 636 W HCPCS: Performed by: ANESTHESIOLOGY

## 2024-06-14 PROCEDURE — 25500020 PHARM REV CODE 255: Performed by: INTERNAL MEDICINE

## 2024-06-14 PROCEDURE — 37000008 HC ANESTHESIA 1ST 15 MINUTES: Performed by: SURGERY

## 2024-06-14 PROCEDURE — 82746 ASSAY OF FOLIC ACID SERUM: CPT

## 2024-06-14 PROCEDURE — 37000009 HC ANESTHESIA EA ADD 15 MINS: Performed by: SURGERY

## 2024-06-14 PROCEDURE — 88331 PATH CONSLTJ SURG 1 BLK 1SPC: CPT | Mod: TC | Performed by: SURGERY

## 2024-06-14 PROCEDURE — D9220A PRA ANESTHESIA: Mod: ANES,,, | Performed by: ANESTHESIOLOGY

## 2024-06-14 PROCEDURE — 36415 COLL VENOUS BLD VENIPUNCTURE: CPT

## 2024-06-14 PROCEDURE — 63600175 PHARM REV CODE 636 W HCPCS

## 2024-06-14 PROCEDURE — 11000001 HC ACUTE MED/SURG PRIVATE ROOM

## 2024-06-14 PROCEDURE — 87220 TISSUE EXAM FOR FUNGI: CPT | Performed by: SURGERY

## 2024-06-14 PROCEDURE — D9220A PRA ANESTHESIA: Mod: CRNA,,, | Performed by: NURSE ANESTHETIST, CERTIFIED REGISTERED

## 2024-06-14 PROCEDURE — 36000707: Performed by: SURGERY

## 2024-06-14 PROCEDURE — 87206 SMEAR FLUORESCENT/ACID STAI: CPT | Performed by: SURGERY

## 2024-06-14 PROCEDURE — 25000003 PHARM REV CODE 250: Performed by: SURGERY

## 2024-06-14 PROCEDURE — 87070 CULTURE OTHR SPECIMN AEROBIC: CPT | Performed by: SURGERY

## 2024-06-14 PROCEDURE — 07B10ZX EXCISION OF RIGHT NECK LYMPHATIC, OPEN APPROACH, DIAGNOSTIC: ICD-10-PCS | Performed by: INTERNAL MEDICINE

## 2024-06-14 PROCEDURE — 87075 CULTR BACTERIA EXCEPT BLOOD: CPT | Performed by: SURGERY

## 2024-06-14 PROCEDURE — 87205 SMEAR GRAM STAIN: CPT | Performed by: SURGERY

## 2024-06-14 PROCEDURE — 25000003 PHARM REV CODE 250

## 2024-06-14 PROCEDURE — 71000033 HC RECOVERY, INTIAL HOUR: Performed by: SURGERY

## 2024-06-14 RX ORDER — SODIUM CHLORIDE 0.9 % (FLUSH) 0.9 %
10 SYRINGE (ML) INJECTION
Status: DISCONTINUED | OUTPATIENT
Start: 2024-06-14 | End: 2024-06-17 | Stop reason: HOSPADM

## 2024-06-14 RX ORDER — SODIUM CHLORIDE, SODIUM LACTATE, POTASSIUM CHLORIDE, CALCIUM CHLORIDE 600; 310; 30; 20 MG/100ML; MG/100ML; MG/100ML; MG/100ML
INJECTION, SOLUTION INTRAVENOUS CONTINUOUS
Status: DISCONTINUED | OUTPATIENT
Start: 2024-06-14 | End: 2024-06-17 | Stop reason: HOSPADM

## 2024-06-14 RX ORDER — MIDAZOLAM HYDROCHLORIDE 2 MG/2ML
2 INJECTION, SOLUTION INTRAMUSCULAR; INTRAVENOUS ONCE AS NEEDED
Status: CANCELLED | OUTPATIENT
Start: 2024-06-14 | End: 2035-11-11

## 2024-06-14 RX ORDER — ONDANSETRON HYDROCHLORIDE 2 MG/ML
4 INJECTION, SOLUTION INTRAVENOUS DAILY PRN
Status: DISCONTINUED | OUTPATIENT
Start: 2024-06-14 | End: 2024-06-14

## 2024-06-14 RX ORDER — DEXAMETHASONE SODIUM PHOSPHATE 4 MG/ML
INJECTION, SOLUTION INTRA-ARTICULAR; INTRALESIONAL; INTRAMUSCULAR; INTRAVENOUS; SOFT TISSUE
Status: DISCONTINUED | OUTPATIENT
Start: 2024-06-14 | End: 2024-06-14

## 2024-06-14 RX ORDER — MORPHINE SULFATE 2 MG/ML
INJECTION, SOLUTION INTRAMUSCULAR; INTRAVENOUS
Status: DISCONTINUED
Start: 2024-06-14 | End: 2024-06-14 | Stop reason: WASHOUT

## 2024-06-14 RX ORDER — FENTANYL CITRATE 50 UG/ML
INJECTION, SOLUTION INTRAMUSCULAR; INTRAVENOUS
Status: DISCONTINUED | OUTPATIENT
Start: 2024-06-14 | End: 2024-06-14

## 2024-06-14 RX ORDER — OXYCODONE HYDROCHLORIDE 5 MG/1
5 TABLET ORAL
Status: DISCONTINUED | OUTPATIENT
Start: 2024-06-14 | End: 2024-06-14

## 2024-06-14 RX ORDER — TRAMADOL HYDROCHLORIDE 50 MG/1
50 TABLET ORAL EVERY 6 HOURS PRN
Status: DISCONTINUED | OUTPATIENT
Start: 2024-06-14 | End: 2024-06-17 | Stop reason: HOSPADM

## 2024-06-14 RX ORDER — MORPHINE SULFATE 2 MG/ML
2 INJECTION, SOLUTION INTRAMUSCULAR; INTRAVENOUS EVERY 5 MIN PRN
Status: DISCONTINUED | OUTPATIENT
Start: 2024-06-14 | End: 2024-06-14

## 2024-06-14 RX ORDER — LIDOCAINE HYDROCHLORIDE 10 MG/ML
INJECTION INFILTRATION; PERINEURAL
Status: DISCONTINUED | OUTPATIENT
Start: 2024-06-14 | End: 2024-06-14 | Stop reason: HOSPADM

## 2024-06-14 RX ORDER — KETOROLAC TROMETHAMINE 30 MG/ML
INJECTION, SOLUTION INTRAMUSCULAR; INTRAVENOUS
Status: DISCONTINUED | OUTPATIENT
Start: 2024-06-14 | End: 2024-06-14

## 2024-06-14 RX ORDER — LIDOCAINE HYDROCHLORIDE 20 MG/ML
INJECTION INTRAVENOUS
Status: DISCONTINUED | OUTPATIENT
Start: 2024-06-14 | End: 2024-06-14

## 2024-06-14 RX ORDER — HYDROCODONE BITARTRATE AND ACETAMINOPHEN 5; 325 MG/1; MG/1
1 TABLET ORAL EVERY 6 HOURS PRN
Status: DISCONTINUED | OUTPATIENT
Start: 2024-06-14 | End: 2024-06-17 | Stop reason: HOSPADM

## 2024-06-14 RX ORDER — PROPOFOL 10 MG/ML
INJECTION, EMULSION INTRAVENOUS
Status: DISCONTINUED | OUTPATIENT
Start: 2024-06-14 | End: 2024-06-14

## 2024-06-14 RX ORDER — MEPERIDINE HYDROCHLORIDE 25 MG/ML
12.5 INJECTION INTRAMUSCULAR; INTRAVENOUS; SUBCUTANEOUS EVERY 10 MIN PRN
Status: DISCONTINUED | OUTPATIENT
Start: 2024-06-14 | End: 2024-06-14

## 2024-06-14 RX ORDER — DEXMEDETOMIDINE HYDROCHLORIDE 100 UG/ML
INJECTION, SOLUTION INTRAVENOUS
Status: DISCONTINUED | OUTPATIENT
Start: 2024-06-14 | End: 2024-06-14

## 2024-06-14 RX ORDER — ONDANSETRON HYDROCHLORIDE 2 MG/ML
INJECTION, SOLUTION INTRAVENOUS
Status: DISCONTINUED | OUTPATIENT
Start: 2024-06-14 | End: 2024-06-14

## 2024-06-14 RX ORDER — ONDANSETRON HYDROCHLORIDE 2 MG/ML
INJECTION, SOLUTION INTRAVENOUS
Status: DISCONTINUED
Start: 2024-06-14 | End: 2024-06-14 | Stop reason: WASHOUT

## 2024-06-14 RX ADMIN — GABAPENTIN 300 MG: 300 CAPSULE ORAL at 09:06

## 2024-06-14 RX ADMIN — PROPOFOL 200 MG: 10 INJECTION, EMULSION INTRAVENOUS at 01:06

## 2024-06-14 RX ADMIN — AZITHROMYCIN MONOHYDRATE 500 MG: 500 INJECTION, POWDER, LYOPHILIZED, FOR SOLUTION INTRAVENOUS at 04:06

## 2024-06-14 RX ADMIN — FENTANYL CITRATE 25 MCG: 50 INJECTION INTRAMUSCULAR; INTRAVENOUS at 01:06

## 2024-06-14 RX ADMIN — ATORVASTATIN CALCIUM 40 MG: 40 TABLET, FILM COATED ORAL at 04:06

## 2024-06-14 RX ADMIN — KETOROLAC TROMETHAMINE 30 MG: 30 INJECTION, SOLUTION INTRAMUSCULAR at 01:06

## 2024-06-14 RX ADMIN — SODIUM CHLORIDE, POTASSIUM CHLORIDE, SODIUM LACTATE AND CALCIUM CHLORIDE: 600; 310; 30; 20 INJECTION, SOLUTION INTRAVENOUS at 01:06

## 2024-06-14 RX ADMIN — DEXAMETHASONE SODIUM PHOSPHATE 8 MG: 4 INJECTION, SOLUTION INTRA-ARTICULAR; INTRALESIONAL; INTRAMUSCULAR; INTRAVENOUS; SOFT TISSUE at 01:06

## 2024-06-14 RX ADMIN — FENTANYL CITRATE 50 MCG: 50 INJECTION INTRAMUSCULAR; INTRAVENOUS at 01:06

## 2024-06-14 RX ADMIN — PANTOPRAZOLE SODIUM 40 MG: 40 TABLET, DELAYED RELEASE ORAL at 04:06

## 2024-06-14 RX ADMIN — LIDOCAINE HYDROCHLORIDE 80 MG: 20 INJECTION INTRAVENOUS at 01:06

## 2024-06-14 RX ADMIN — CEFTRIAXONE SODIUM 2 G: 2 INJECTION, POWDER, FOR SOLUTION INTRAMUSCULAR; INTRAVENOUS at 03:06

## 2024-06-14 RX ADMIN — ONDANSETRON 4 MG: 2 INJECTION INTRAMUSCULAR; INTRAVENOUS at 01:06

## 2024-06-14 RX ADMIN — IOHEXOL 100 ML: 350 INJECTION, SOLUTION INTRAVENOUS at 04:06

## 2024-06-14 RX ADMIN — DEXMEDETOMIDINE 20 MCG: 200 INJECTION, SOLUTION INTRAVENOUS at 02:06
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
## 2024-06-14 NOTE — ANESTHESIA POSTPROCEDURE EVALUATION
Anesthesia Post Evaluation    Patient: Ranjeet Ball    Procedure(s) Performed: Procedure(s) (LRB):  BIOPSY, LYMPH NODE (Right)    Final Anesthesia Type: general      Patient location during evaluation: med/surg floor  Post-procedure vital signs: reviewed and stable  Airway patency: patent      Anesthetic complications: no      Cardiovascular status: hemodynamically stable  Respiratory status: spontaneous ventilation  Follow-up not needed.              Vitals Value Taken Time   BP 97/58 06/14/24 1426   Temp 36.2 °C (97.2 °F) 06/14/24 1421   Pulse 79 06/14/24 1426   Resp 28 06/14/24 1426   SpO2 99 % 06/14/24 1426         No case tracking events are documented in the log.      Pain/Rocio Score: Rocio Score: 4 (6/14/2024  2:21 PM)

## 2024-06-14 NOTE — PROGRESS NOTES
[facility] General Surgery - Gold Team  Daily Progress Note    Subjective:  No acute events overnight. Afebrile. Hemodynamically stable.   No episodes of hemoptysis since admission yesterday. Feeling okay. No complaints.   NPO currently.   Denies fever, chills, chest pain, SOB, nausea, vomiting.     Objective:  Vitals:  Vitals:    06/14/24 0747   BP: 119/64   Pulse: 83   Resp:    Temp: 98.7 °F (37.1 °C)      Intake/Output:  I/O last 3 completed shifts:  In: 350 [IV Piggyback:350]  Out: 825 [Urine:825]  No intake/output data recorded.    Physical Exam:  Gen: NAD  Neuro: awake, alert, answering questions appropriately  HEENT: well circumscribed, spherical mass consistent with enlarged lymph node in right supraclavicular chain. Separate from overlying skin.   CV: RRR, good distal pulses  Resp: non-labored breathing, TERRENCE  Abd: soft, ND, NT  Ext: moves all 4 spontaneously and purposefully  Skin: warm, well perfused    Labs:  WBC 10.26 (11.3 on 6/13)  Hgb 11.2 (11.5 on 6/13)    Imaging:  No new imaging    Micro/Path/Other:  Quantiferon gold, histoplasmosis, legionella, blastomyces, s. Pneumonia pending  HIV, syphilis, adenovirus mycoplasma, Bordetella pertussis negative  Respiratory culture pending  Blood culture pending  AFB smear pending    Assessment/Plan:  38 year old male presenting with hemoptysis, weight loss, and night sweats. Imaging concerning for underlying malignancy as there is mediastinal and hilar lymphadenopathy. General surgery consulted for lymph node biopsy.      - To OR today for excisional biopsy of right supraclavicular lymph node. After discussing risks, benefits, and alternatives to surgery, patient elects to proceed. All questions and concerns addressed. Consent obtained. Laterality marked.   - NPO pending surgery today. Okay for diet post-operatively.   - Rest of care per primary team.    Eri Hood DO  [facility] General Surgery, PGY1  06/14/2024 9:14 AM

## 2024-06-14 NOTE — OP NOTE
[facility] GENERAL SURGERY [facility] OPERATIVE REPORT    Patient Name: Ranjeet Ball  Date of Admission: 6/13/2024  YOB: 1985  Date of procedure: 06/14/2024    Attending Surgeon: Renato Alvarado M.D.    Resident Surgeon(s): Aj Webster, PGY-3; Eri Hood, PGY-1    Pre-operative diagnosis:  1. Lymphadenopathy  2. Pneumonia      Post-operative diagnosis:   1. Same    Procedure:  1. Excisional biopsy of right supraclavicular lymph node    Anesthesia: General    Complications: None    Specimens: [unfilled]    Blood loss: 3 mL    Indication:  Ranjeet Ball is a 38 y.o. male with hx of AMI x 2, PCI w/ PIEDAD, HLD, cardiac arrest who presented to the hospital with shortness of breath, hemoptysis, night sweat, unintentional weight loss, and concerning finding for underlying malignancy on CT scan. General surgery consulted for excisional biopsy of large, palpable right supraclavicular lymph node.     Findings: large well circumscribed lymph node excised from right supraclavicular inferior deep cervical nodes without complication.     Procedure in detail:  The patient was brought to the operating theater and placed in a supine position.  General endotracheal anesthesia was induced.  The right neck was prepped and draped in the usual sterile fashion.  Timeout was performed.  Perioperative antibiotics were administered.     Attention was turned the right neck. There was a visible well circumscribed spherical mass consistent with an enlarged lymph node present in the inferior deep cervical nodes just superior to the clavicle. An incision with a 15 blade scalpel was made in congruence with Juan's lines overlying the visible and palpable mass. Electrocautery was used to carry this incision down through the soft tissues. The platysma was divided and the mass identified. We then circumferentially dissected away the lymph node from the surrounding tissues with cautery. There were no vessels entering that needed clipping  or ligating. The specimen was removed and sent to pathology for permanent, frozen, fresh lymphoma protocol, and micro. The wound bed was irrigated with sterile saline. The platysma was re-approximated with several figure-of-eight 2-0 vicryl sutures. The dermis was closed with 3-0 vicryl sutures in deep dermal fashion. The skin was closed with 4-0 Monocryl in a running subcuticular fashion. Dermabond was applied. The patient tolerated the procedure well without complication. They were extubated and sent to recovery in stable condition.     Dr. Alvarado was present and scrubbed for the entirety of the case.     Aj Webster MD  [facility] General Surgery, PGY-3

## 2024-06-14 NOTE — ANESTHESIA PROCEDURE NOTES
Intubation    Date/Time: 6/14/2024 1:41 PM    Performed by: Gillian Ozuna CRNA  Authorized by: Bindu Knox MD    Intubation:     Induction:  Intravenous    Intubated:  Postinduction    Mask Ventilation:  Not attempted    Attempts:  1    Attempted By:  CRNA    Difficult Airway Encountered?: No      Complications:  None    Airway Device:  Supraglottic airway/LMA    Airway Device Size:  4.0    Style/Cuff Inflation:  Uncuffed    Placement Verified By:  Capnometry    Complicating Factors:  None    Findings Post-Intubation:  BS equal bilateral and atraumatic/condition of teeth unchanged

## 2024-06-14 NOTE — TRANSFER OF CARE
Anesthesia Transfer of Care Note    Patient: Ranjeet Ball    Procedure(s) Performed: Procedure(s) (LRB):  BIOPSY, LYMPH NODE (Right)    Patient location: PACU    Anesthesia Type: general    Transport from OR: Transported from OR on room air with adequate spontaneous ventilation    Post pain: adequate analgesia    Post assessment: no apparent anesthetic complications and tolerated procedure well    Post vital signs: stable    Level of consciousness: sedated    Nausea/Vomiting: no nausea/vomiting    Complications: none    Transfer of care protocol was followed

## 2024-06-14 NOTE — PROGRESS NOTES
Inpatient Nutrition Evaluation    Admit Date: 6/13/2024   Total duration of encounter: 1 day   Patient Age: 38 y.o.    Nutrition Recommendation/Prescription     ADAT to Heart Healthy diet  Boost Plus (provides 360 kcal, 14 g protein per serving) TID  Monitor Weight Weekly     Nutrition Assessment     Chart Review    Reason Seen: continuous nutrition monitoring    Malnutrition Screening Tool Results   Have you recently lost weight without trying?: No  Have you been eating poorly because of a decreased appetite?: No   MST Score: 0   Diagnosis:  Hemoptysis, Weight loss, Hematemesis, Hematochezia, Mediastinal & supraclavicular lymphadenopathy    Relevant Medical History: HLD, MI     Scheduled Medications:  atorvastatin, 40 mg, Daily  azithromycin, 500 mg, Q24H  cefTRIAXone (Rocephin) IV (PEDS and ADULTS), 2 g, Q24H  gabapentin, 300 mg, QHS  pantoprazole, 40 mg, Daily    Continuous Infusions:   PRN Medications:   Current Facility-Administered Medications:     dextrose 10%, 12.5 g, Intravenous, PRN    dextrose 10%, 25 g, Intravenous, PRN    glucagon (human recombinant), 1 mg, Intramuscular, PRN    glucose, 16 g, Oral, PRN    glucose, 24 g, Oral, PRN    HYDROcodone-acetaminophen, 1 tablet, Oral, Q6H PRN    naloxone, 0.02 mg, Intravenous, PRN    sodium chloride 0.9%, 10 mL, Intravenous, Q12H PRN    Recent Labs   Lab 06/13/24  1041 06/14/24  0404    138   K 3.6 3.6   CALCIUM 9.7 9.5   PHOS 3.2  --    MG 2.10  --    CO2 23 23   BUN 6.2* 4.9*   CREATININE 0.84 0.71*   EGFRNORACEVR >60 >60   GLUCOSE 113* 100   BILITOT 0.3 0.4   ALKPHOS 70 70   ALT 26 45   AST 19 39*   ALBUMIN 3.0* 2.7*   WBC 11.30 10.26   HGB 11.5* 11.2*   HCT 34.8* 33.7*     Nutrition Orders:  Diet NPO Except for: Sips with Medication      Appetite/Oral Intake: fair/NPO  Factors Affecting Nutritional Intake: decreased appetite and NPO  Social Needs Impacting Access to Food: none identified  Food/Gnosticism/Cultural Preferences: none reported  Food  "Allergies: no known food allergies  Last Bowel Movement: 24  Wound(s):  skin intact    Comments    24 -- Pt reports fair appetite at this time & over the last "couple days" but not > 1 week; no n/v reported this am; Currently NPO for procedure; pt reports UBW as 130 lb > 1 year ago, most recent weight ~ 122 lb, no significant wt loss indicated    Anthropometrics    Height: 5' 6" (167.6 cm), Height Method: Stated  Last Weight: 54.4 kg (120 lb) (24 0952), Weight Method: Bed Scale  BMI (Calculated): 19.4  BMI Classification: normal (BMI 18.5-24.9)        Ideal Body Weight (IBW), Male: 142 lb     % Ideal Body Weight, Male (lb): 81.06 %                 Usual Body Weight (UBW), k kg  % Usual Body Weight: 92.45  % Weight Change From Usual Weight: -7.74 %  Usual Weight Provided By: patient    Wt Readings from Last 5 Encounters:   24 54.4 kg (120 lb)   24 54.9 kg (121 lb)   24 56.5 kg (124 lb 9.6 oz)   24 55 kg (121 lb 3.2 oz)   24 54.4 kg (119 lb 14.9 oz)     Weight Change(s) Since Admission:   Wt Readings from Last 1 Encounters:   24 0952 54.4 kg (120 lb)   24 1539 52.2 kg (115 lb 1.6 oz)   24 1028 52.7 kg (116 lb 2.9 oz)   Admit Weight: 52.7 kg (116 lb 2.9 oz) (24 1028), Weight Method: Standard Scale    Patient Education     Not applicable.    Nutrition Goals & Monitoring     Dietitian will monitor: food and beverage intake, weight change, and gastrointestinal profile  Discharge planning: too early to determine; pending clinical course  Nutrition Risk/Follow-Up: low (follow-up in 5-7 days)  Patients assigned 'low nutrition risk' status do not qualify for a full nutritional assessment but will be monitored and re-evaluated in a 5-7 day time period. Please consult if re-evaluation needed sooner.   "

## 2024-06-15 LAB
ALBUMIN SERPL-MCNC: 2.6 G/DL (ref 3.5–5)
ALBUMIN/GLOB SERPL: 0.5 RATIO (ref 1.1–2)
ALP SERPL-CCNC: 72 UNIT/L (ref 40–150)
ALT SERPL-CCNC: 35 UNIT/L (ref 0–55)
ANION GAP SERPL CALC-SCNC: 10 MEQ/L
AST SERPL-CCNC: 21 UNIT/L (ref 5–34)
B DERMAT AG UR QL IA: NOT DETECTED
BACTERIA SPEC CULT: NORMAL
BASOPHILS # BLD AUTO: 0.04 X10(3)/MCL
BASOPHILS NFR BLD AUTO: 0.2 %
BILIRUB SERPL-MCNC: 0.3 MG/DL
BUN SERPL-MCNC: 10 MG/DL (ref 8.9–20.6)
CALCIUM SERPL-MCNC: 9.7 MG/DL (ref 8.4–10.2)
CHLORIDE SERPL-SCNC: 104 MMOL/L (ref 98–107)
CO2 SERPL-SCNC: 23 MMOL/L (ref 22–29)
CREAT SERPL-MCNC: 0.81 MG/DL (ref 0.73–1.18)
CREAT/UREA NIT SERPL: 12
EOSINOPHIL # BLD AUTO: 0.02 X10(3)/MCL (ref 0–0.9)
EOSINOPHIL NFR BLD AUTO: 0.1 %
ERYTHROCYTE [DISTWIDTH] IN BLOOD BY AUTOMATED COUNT: 13.2 % (ref 11.5–17)
GFR SERPLBLD CREATININE-BSD FMLA CKD-EPI: >60 ML/MIN/1.73/M2
GLOBULIN SER-MCNC: 4.8 GM/DL (ref 2.4–3.5)
GLUCOSE SERPL-MCNC: 138 MG/DL (ref 74–100)
GRAM STN SPEC: NORMAL
H CAPSUL AG UR QL IA: NOT DETECTED
H CAPSUL AG UR-MCNC: NOT DETECTED NG/ML
HCT VFR BLD AUTO: 34.6 % (ref 42–52)
HGB BLD-MCNC: 11.3 G/DL (ref 14–18)
HOLD SPECIMEN: NORMAL
IMM GRANULOCYTES # BLD AUTO: 0.06 X10(3)/MCL (ref 0–0.04)
IMM GRANULOCYTES NFR BLD AUTO: 0.4 %
LYMPHOCYTES # BLD AUTO: 2.02 X10(3)/MCL (ref 0.6–4.6)
LYMPHOCYTES NFR BLD AUTO: 12.6 %
M AVIUM PARATB TISS QL ZN STN: NORMAL
M BLASTOMYCES AG VALUE: NOT DETECTED NG/ML
MCH RBC QN AUTO: 30.7 PG (ref 27–31)
MCHC RBC AUTO-ENTMCNC: 32.7 G/DL (ref 33–36)
MCV RBC AUTO: 94 FL (ref 80–94)
MONOCYTES # BLD AUTO: 0.6 X10(3)/MCL (ref 0.1–1.3)
MONOCYTES NFR BLD AUTO: 3.7 %
NEUTROPHILS # BLD AUTO: 13.29 X10(3)/MCL (ref 2.1–9.2)
NEUTROPHILS NFR BLD AUTO: 83 %
NRBC BLD AUTO-RTO: 0 %
PLATELET # BLD AUTO: 664 X10(3)/MCL (ref 130–400)
PMV BLD AUTO: 9.1 FL (ref 7.4–10.4)
POTASSIUM SERPL-SCNC: 4.2 MMOL/L (ref 3.5–5.1)
PROT SERPL-MCNC: 7.4 GM/DL (ref 6.4–8.3)
RBC # BLD AUTO: 3.68 X10(6)/MCL (ref 4.7–6.1)
SODIUM SERPL-SCNC: 137 MMOL/L (ref 136–145)
WBC # BLD AUTO: 16.03 X10(3)/MCL (ref 4.5–11.5)

## 2024-06-15 PROCEDURE — 63600175 PHARM REV CODE 636 W HCPCS

## 2024-06-15 PROCEDURE — 94640 AIRWAY INHALATION TREATMENT: CPT

## 2024-06-15 PROCEDURE — 11000001 HC ACUTE MED/SURG PRIVATE ROOM

## 2024-06-15 PROCEDURE — 25000242 PHARM REV CODE 250 ALT 637 W/ HCPCS

## 2024-06-15 PROCEDURE — 27000207 HC ISOLATION

## 2024-06-15 PROCEDURE — 87206 SMEAR FLUORESCENT/ACID STAI: CPT

## 2024-06-15 PROCEDURE — 85025 COMPLETE CBC W/AUTO DIFF WBC: CPT

## 2024-06-15 PROCEDURE — 25000003 PHARM REV CODE 250: Performed by: STUDENT IN AN ORGANIZED HEALTH CARE EDUCATION/TRAINING PROGRAM

## 2024-06-15 PROCEDURE — 80053 COMPREHEN METABOLIC PANEL: CPT

## 2024-06-15 PROCEDURE — 25000003 PHARM REV CODE 250

## 2024-06-15 PROCEDURE — 36415 COLL VENOUS BLD VENIPUNCTURE: CPT

## 2024-06-15 RX ORDER — SODIUM CHLORIDE FOR INHALATION 3 %
4 VIAL, NEBULIZER (ML) INHALATION ONCE
Status: COMPLETED | OUTPATIENT
Start: 2024-06-15 | End: 2024-06-15

## 2024-06-15 RX ADMIN — GABAPENTIN 300 MG: 300 CAPSULE ORAL at 08:06

## 2024-06-15 RX ADMIN — PANTOPRAZOLE SODIUM 40 MG: 40 TABLET, DELAYED RELEASE ORAL at 08:06

## 2024-06-15 RX ADMIN — ATORVASTATIN CALCIUM 40 MG: 40 TABLET, FILM COATED ORAL at 08:06

## 2024-06-15 RX ADMIN — SODIUM CHLORIDE 30 MG/ML INHALATION SOLUTION 4 ML: 30 SOLUTION INHALANT at 08:06

## 2024-06-15 RX ADMIN — HYDROCODONE BITARTRATE AND ACETAMINOPHEN 1 TABLET: 5; 325 TABLET ORAL at 03:06

## 2024-06-15 RX ADMIN — HYDROCODONE BITARTRATE AND ACETAMINOPHEN 1 TABLET: 5; 325 TABLET ORAL at 02:06

## 2024-06-15 RX ADMIN — HYDROCODONE BITARTRATE AND ACETAMINOPHEN 1 TABLET: 5; 325 TABLET ORAL at 08:06

## 2024-06-15 RX ADMIN — CEFTRIAXONE SODIUM 2 G: 2 INJECTION, POWDER, FOR SOLUTION INTRAMUSCULAR; INTRAVENOUS at 02:06

## 2024-06-15 RX ADMIN — AZITHROMYCIN MONOHYDRATE 500 MG: 500 INJECTION, POWDER, LYOPHILIZED, FOR SOLUTION INTRAVENOUS at 03:06
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
## 2024-06-15 NOTE — PROGRESS NOTES
" [facility]   Progress Note  Admit Date: 6/13/2024  HD#2  POD#1 Day Post-Op    Subjective:   Interval history:  No acute events overnight, AF, VSS.  Reports feeling okay this morning.  Denies any nausea, vomiting, fevers, chills, chest pain, or SOB.  Tolerating a diet without any issue.      Objective:     VITAL SIGNS: 24 HR MIN & MAX LAST   Temp  Min: 97.2 °F (36.2 °C)  Max: 99.7 °F (37.6 °C)  97.6 °F (36.4 °C)   BP  Min: 97/58  Max: 123/61  115/71    Pulse  Min: 60  Max: 87  74    Resp  Min: 14  Max: 28  18    SpO2  Min: 97 %  Max: 100 %  100 %      HT: 5' 6" (167.6 cm)  WT: 54.4 kg (120 lb)  BMI: 19.4     Intake/output:      Intake/Output Summary (Last 24 hours) at 6/15/2024 0815  Last data filed at 6/15/2024 0642  Gross per 24 hour   Intake 1680 ml   Output 550 ml   Net 1130 ml      Physical examination:  Gen: NAD, AAOx3, answering questions appropriately  CV: RR  Resp: NWOB  Abd: S/NT/ND  Ext: moving all extremities spontaneously and purposefully  Skin/wounds: Right shoulder incision clean/dry/intact.     Labs:  Renal:  Recent Labs     06/13/24  1041 06/14/24  0404 06/15/24  0401   BUN 6.2* 4.9* 10.0   CREATININE 0.84 0.71* 0.81       FENGI:  Recent Labs     06/13/24  1041 06/14/24  0404 06/15/24  0401    138 137   K 3.6 3.6 4.2    107 104   CO2 23 23 23   CALCIUM 9.7 9.5 9.7   MG 2.10  --   --    PHOS 3.2  --   --    ALBUMIN 3.0* 2.7* 2.6*   BILITOT 0.3 0.4 0.3   AST 19 39* 21   ALKPHOS 70 70 72   ALT 26 45 35     Heme:  Recent Labs     06/13/24  1041 06/14/24  0404 06/15/24  0401   HGB 11.5* 11.2* 11.3*   HCT 34.8* 33.7* 34.6*   * 604* 664*     ID:  Recent Labs     06/13/24  1041 06/14/24  0404 06/15/24  0401   WBC 11.30 10.26 16.03*     CBG:  Recent Labs     06/13/24  1041 06/14/24  0404 06/15/24  0401   GLUCOSE 113* 100 138*      Cardiovascular:    Imaging:  CT Abdomen Pelvis With IV Contrast Routine Oral Contrast   Final Result      1. A left adrenal nodule is new compared to " prior, metastatic disease is possible.  PET-CT may further stage.   2. Rectal wall thickening, recommend correlation with colonoscopy to evaluate for malignancy.   3. Small amount of pelvic free fluid.         Electronically signed by: Miah Gonsalez   Date:    06/14/2024   Time:    16:21      CTA Chest Non-Coronary (PE Studies)   Final Result      1. Negative for pulmonary thromboembolic disease.   2. Mediastinal and right hilar adenopathy suspicious for malignancy.   3. Likely right upper lobe pneumonia.         Electronically signed by: Miah Gonsalez   Date:    06/13/2024   Time:    11:45      X-Ray Chest PA And Lateral   Final Result      As above.         Electronically signed by: Genaro Maier   Date:    06/13/2024   Time:    11:32         I have reviewed all pertinent imaging results/findings within the past 24 hours.    Micro/Path/Other:  Microbiology Results (last 7 days)       Procedure Component Value Units Date/Time    AFB Smear [[id number]] Collected: 06/14/24 1403    Order Status: Completed Specimen: Tissue from Lymph Node Updated: 06/15/24 0807     AFB Smear No AFB seen (Direct smear only) - Concentration to follow    Tissue Culture - Aerobic [[id number]] Collected: 06/14/24 1403    Order Status: Completed Specimen: Tissue from Lymph Node Updated: 06/15/24 0632     Tissue - Aerobic Culture No Growth At 24 Hours    Gram Stain [[id number]] Collected: 06/14/24 1403    Order Status: Completed Specimen: Tissue from Lymph Node Updated: 06/14/24 1954     GRAM STAIN No WBCs, No bacteria seen    KOH Prep [[id number]] Collected: 06/14/24 1403    Order Status: Completed Specimen: Tissue from Lymph Node Updated: 06/14/24 1953     EVER Prep No fungal elements seen    Blood culture #1 **CANNOT BE ORDERED STAT** [[id number]]  (Normal) Collected: 06/13/24 1221    Order Status: Completed Specimen: Blood from Antecubital, Left Updated: 06/14/24 1800     Blood Culture No Growth At 24 Hours    Blood culture #2 **CANNOT BE  ORDERED STAT** [[id number]]  (Normal) Collected: 06/13/24 1221    Order Status: Completed Specimen: Blood from Antecubital, Right Updated: 06/14/24 1800     Blood Culture No Growth At 24 Hours    Anaerobic Culture [[id number]] Collected: 06/14/24 1403    Order Status: Sent Specimen: Tissue from Lymph Node Updated: 06/14/24 1437    Fungal Culture [[id number]] Collected: 06/14/24 1403    Order Status: Sent Specimen: Tissue from Lymph Node Updated: 06/14/24 1422    AFB Smear [[id number]] Collected: 06/13/24 1525    Order Status: Completed Specimen: Sputum, Expectorated Updated: 06/14/24 0840     AFB Smear No AFB seen (Direct smear only)    AFB Smear [[id number]]     Order Status: Sent Specimen: Sputum, Expectorated     Clostridium Diff Toxin, A & B, EIA [[id number]]     Order Status: Canceled Specimen: Stool     Respiratory Culture [[id number]] Collected: 06/13/24 1500    Order Status: Completed Specimen: Sputum, Expectorated Updated: 06/14/24 0657     Respiratory Culture Normal respiratory crow     GRAM STAIN Quality 1+      Many Gram positive cocci      Many Gram Positive Rods      Many Gram Negative Rods    AFB Smear [[id number]]     Order Status: Sent Specimen: Sputum, Expectorated            Assessment & Plan:   38 year old male presenting with hemoptysis, weight loss, and night sweats. Imaging concerning for underlying malignancy as there is mediastinal and hilar lymphadenopathy. General surgery consulted for lymph node biopsy.  POD1 s/p excisional biopsy of right supraclavicular lymph node on 6/14/24    - Patient doing well post-operatively from a surgical standpoint.   - Rest of care per primary.   - Surgery will sign off at this time. Please call with any questions or concerns.       Doug Byers MD    6/15/2024 8:15 AM

## 2024-06-16 LAB
ALBUMIN SERPL-MCNC: 2.8 G/DL (ref 3.5–5)
ALBUMIN/GLOB SERPL: 0.6 RATIO (ref 1.1–2)
ALP SERPL-CCNC: 75 UNIT/L (ref 40–150)
ALT SERPL-CCNC: 41 UNIT/L (ref 0–55)
ANION GAP SERPL CALC-SCNC: 7 MEQ/L
AST SERPL-CCNC: 25 UNIT/L (ref 5–34)
BASOPHILS # BLD AUTO: 0.03 X10(3)/MCL
BASOPHILS NFR BLD AUTO: 0.3 %
BILIRUB SERPL-MCNC: 0.3 MG/DL
BUN SERPL-MCNC: 9.9 MG/DL (ref 8.9–20.6)
CALCIUM SERPL-MCNC: 9.8 MG/DL (ref 8.4–10.2)
CHLORIDE SERPL-SCNC: 106 MMOL/L (ref 98–107)
CO2 SERPL-SCNC: 25 MMOL/L (ref 22–29)
CREAT SERPL-MCNC: 0.76 MG/DL (ref 0.73–1.18)
CREAT/UREA NIT SERPL: 13
EOSINOPHIL # BLD AUTO: 0.23 X10(3)/MCL (ref 0–0.9)
EOSINOPHIL NFR BLD AUTO: 2.5 %
ERYTHROCYTE [DISTWIDTH] IN BLOOD BY AUTOMATED COUNT: 13.2 % (ref 11.5–17)
GAMMA INTERFERON BACKGROUND BLD IA-ACNC: 0.01 IU/ML
GFR SERPLBLD CREATININE-BSD FMLA CKD-EPI: >60 ML/MIN/1.73/M2
GLOBULIN SER-MCNC: 5 GM/DL (ref 2.4–3.5)
GLUCOSE SERPL-MCNC: 111 MG/DL (ref 74–100)
HCT VFR BLD AUTO: 35.5 % (ref 42–52)
HGB BLD-MCNC: 11.5 G/DL (ref 14–18)
HOLD SPECIMEN: NORMAL
IMM GRANULOCYTES # BLD AUTO: 0.02 X10(3)/MCL (ref 0–0.04)
IMM GRANULOCYTES NFR BLD AUTO: 0.2 %
LYMPHOCYTES # BLD AUTO: 2.78 X10(3)/MCL (ref 0.6–4.6)
LYMPHOCYTES NFR BLD AUTO: 29.7 %
M TB IFN-G BLD-IMP: NEGATIVE
M TB IFN-G CD4+ BCKGRND COR BLD-ACNC: 0.04 IU/ML
M TB IFN-G CD4+CD8+ BCKGRND COR BLD-ACNC: 0.03 IU/ML
MCH RBC QN AUTO: 30.3 PG (ref 27–31)
MCHC RBC AUTO-ENTMCNC: 32.4 G/DL (ref 33–36)
MCV RBC AUTO: 93.4 FL (ref 80–94)
MITOGEN IGNF BCKGRD COR BLD-ACNC: 9.99 IU/ML
MONOCYTES # BLD AUTO: 0.5 X10(3)/MCL (ref 0.1–1.3)
MONOCYTES NFR BLD AUTO: 5.3 %
NEUTROPHILS # BLD AUTO: 5.81 X10(3)/MCL (ref 2.1–9.2)
NEUTROPHILS NFR BLD AUTO: 62 %
NRBC BLD AUTO-RTO: 0 %
PLATELET # BLD AUTO: 689 X10(3)/MCL (ref 130–400)
PMV BLD AUTO: 8.8 FL (ref 7.4–10.4)
POTASSIUM SERPL-SCNC: 4.1 MMOL/L (ref 3.5–5.1)
PROT SERPL-MCNC: 7.8 GM/DL (ref 6.4–8.3)
RBC # BLD AUTO: 3.8 X10(6)/MCL (ref 4.7–6.1)
SODIUM SERPL-SCNC: 138 MMOL/L (ref 136–145)
WBC # BLD AUTO: 9.37 X10(3)/MCL (ref 4.5–11.5)

## 2024-06-16 PROCEDURE — 25000003 PHARM REV CODE 250: Performed by: STUDENT IN AN ORGANIZED HEALTH CARE EDUCATION/TRAINING PROGRAM

## 2024-06-16 PROCEDURE — 63600175 PHARM REV CODE 636 W HCPCS

## 2024-06-16 PROCEDURE — 36415 COLL VENOUS BLD VENIPUNCTURE: CPT

## 2024-06-16 PROCEDURE — 80053 COMPREHEN METABOLIC PANEL: CPT

## 2024-06-16 PROCEDURE — 87206 SMEAR FLUORESCENT/ACID STAI: CPT

## 2024-06-16 PROCEDURE — 27000207 HC ISOLATION

## 2024-06-16 PROCEDURE — 99900035 HC TECH TIME PER 15 MIN (STAT)

## 2024-06-16 PROCEDURE — 11000001 HC ACUTE MED/SURG PRIVATE ROOM

## 2024-06-16 PROCEDURE — 25000003 PHARM REV CODE 250

## 2024-06-16 PROCEDURE — 85025 COMPLETE CBC W/AUTO DIFF WBC: CPT

## 2024-06-16 RX ORDER — AZITHROMYCIN 250 MG/1
500 TABLET, FILM COATED ORAL DAILY
Status: DISCONTINUED | OUTPATIENT
Start: 2024-06-17 | End: 2024-06-17 | Stop reason: HOSPADM

## 2024-06-16 RX ORDER — SODIUM CHLORIDE FOR INHALATION 3 %
4 VIAL, NEBULIZER (ML) INHALATION EVERY 6 HOURS
Status: DISCONTINUED | OUTPATIENT
Start: 2024-06-16 | End: 2024-06-16

## 2024-06-16 RX ORDER — POLYETHYLENE GLYCOL 3350 17 G/17G
17 POWDER, FOR SOLUTION ORAL 2 TIMES DAILY PRN
Status: DISCONTINUED | OUTPATIENT
Start: 2024-06-16 | End: 2024-06-17 | Stop reason: HOSPADM

## 2024-06-16 RX ADMIN — HYDROCODONE BITARTRATE AND ACETAMINOPHEN 1 TABLET: 5; 325 TABLET ORAL at 10:06

## 2024-06-16 RX ADMIN — HYDROCODONE BITARTRATE AND ACETAMINOPHEN 1 TABLET: 5; 325 TABLET ORAL at 05:06

## 2024-06-16 RX ADMIN — GABAPENTIN 300 MG: 300 CAPSULE ORAL at 08:06

## 2024-06-16 RX ADMIN — ATORVASTATIN CALCIUM 40 MG: 40 TABLET, FILM COATED ORAL at 08:06

## 2024-06-16 RX ADMIN — HYDROCODONE BITARTRATE AND ACETAMINOPHEN 1 TABLET: 5; 325 TABLET ORAL at 08:06

## 2024-06-16 RX ADMIN — PANTOPRAZOLE SODIUM 40 MG: 40 TABLET, DELAYED RELEASE ORAL at 08:06

## 2024-06-16 RX ADMIN — CEFTRIAXONE SODIUM 2 G: 2 INJECTION, POWDER, FOR SOLUTION INTRAMUSCULAR; INTRAVENOUS at 02:06
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
## 2024-06-17 ENCOUNTER — APPOINTMENT (OUTPATIENT)
Dept: RADIATION THERAPY | Facility: HOSPITAL | Age: 39
End: 2024-06-17
Attending: RADIOLOGY
Payer: MEDICAID

## 2024-06-17 VITALS
DIASTOLIC BLOOD PRESSURE: 69 MMHG | RESPIRATION RATE: 18 BRPM | HEIGHT: 66 IN | WEIGHT: 120 LBS | OXYGEN SATURATION: 100 % | HEART RATE: 74 BPM | TEMPERATURE: 99 F | SYSTOLIC BLOOD PRESSURE: 127 MMHG | BODY MASS INDEX: 19.29 KG/M2

## 2024-06-17 DIAGNOSIS — C34.91 MALIGNANT NEOPLASM OF UNSPECIFIED PART OF RIGHT BRONCHUS OR LUNG: Primary | ICD-10-CM

## 2024-06-17 PROBLEM — C34.90 MALIGNANT NEOPLASM OF LUNG: Status: ACTIVE | Noted: 2024-06-17

## 2024-06-17 LAB
ALBUMIN SERPL-MCNC: 3 G/DL (ref 3.5–5)
ALBUMIN/GLOB SERPL: 0.6 RATIO (ref 1.1–2)
ALP SERPL-CCNC: 81 UNIT/L (ref 40–150)
ALT SERPL-CCNC: 38 UNIT/L (ref 0–55)
ANION GAP SERPL CALC-SCNC: 11 MEQ/L
AST SERPL-CCNC: 18 UNIT/L (ref 5–34)
BACTERIA SPEC ANAEROBE CULT: NORMAL
BASOPHILS # BLD AUTO: 0.05 X10(3)/MCL
BASOPHILS NFR BLD AUTO: 0.6 %
BILIRUB SERPL-MCNC: 0.4 MG/DL
BUN SERPL-MCNC: 14 MG/DL (ref 8.9–20.6)
CALCIUM SERPL-MCNC: 10.2 MG/DL (ref 8.4–10.2)
CHLORIDE SERPL-SCNC: 103 MMOL/L (ref 98–107)
CO2 SERPL-SCNC: 25 MMOL/L (ref 22–29)
CREAT SERPL-MCNC: 0.81 MG/DL (ref 0.73–1.18)
CREAT/UREA NIT SERPL: 17
EOSINOPHIL # BLD AUTO: 0.25 X10(3)/MCL (ref 0–0.9)
EOSINOPHIL NFR BLD AUTO: 2.8 %
ERYTHROCYTE [DISTWIDTH] IN BLOOD BY AUTOMATED COUNT: 13.5 % (ref 11.5–17)
GFR SERPLBLD CREATININE-BSD FMLA CKD-EPI: >60 ML/MIN/1.73/M2
GLOBULIN SER-MCNC: 5.2 GM/DL (ref 2.4–3.5)
GLUCOSE SERPL-MCNC: 114 MG/DL (ref 74–100)
HCT VFR BLD AUTO: 38.3 % (ref 42–52)
HGB BLD-MCNC: 12.3 G/DL (ref 14–18)
HOLD SPECIMEN: NORMAL
IMM GRANULOCYTES # BLD AUTO: 0.02 X10(3)/MCL (ref 0–0.04)
IMM GRANULOCYTES NFR BLD AUTO: 0.2 %
LYMPHOCYTES # BLD AUTO: 2.9 X10(3)/MCL (ref 0.6–4.6)
LYMPHOCYTES NFR BLD AUTO: 32.5 %
M AVIUM PARATB TISS QL ZN STN: NORMAL
M AVIUM PARATB TISS QL ZN STN: NORMAL
MCH RBC QN AUTO: 30 PG (ref 27–31)
MCHC RBC AUTO-ENTMCNC: 32.1 G/DL (ref 33–36)
MCV RBC AUTO: 93.4 FL (ref 80–94)
MONOCYTES # BLD AUTO: 0.63 X10(3)/MCL (ref 0.1–1.3)
MONOCYTES NFR BLD AUTO: 7.1 %
NEUTROPHILS # BLD AUTO: 5.06 X10(3)/MCL (ref 2.1–9.2)
NEUTROPHILS NFR BLD AUTO: 56.8 %
NRBC BLD AUTO-RTO: 0 %
PLATELET # BLD AUTO: 745 X10(3)/MCL (ref 130–400)
PMV BLD AUTO: 8.8 FL (ref 7.4–10.4)
POTASSIUM SERPL-SCNC: 4.2 MMOL/L (ref 3.5–5.1)
PROT SERPL-MCNC: 8.2 GM/DL (ref 6.4–8.3)
RBC # BLD AUTO: 4.1 X10(6)/MCL (ref 4.7–6.1)
SODIUM SERPL-SCNC: 139 MMOL/L (ref 136–145)
WBC # BLD AUTO: 8.91 X10(3)/MCL (ref 4.5–11.5)

## 2024-06-17 PROCEDURE — 25000003 PHARM REV CODE 250: Performed by: STUDENT IN AN ORGANIZED HEALTH CARE EDUCATION/TRAINING PROGRAM

## 2024-06-17 PROCEDURE — 63700000 PHARM REV CODE 250 ALT 637 W/O HCPCS

## 2024-06-17 PROCEDURE — 25000003 PHARM REV CODE 250

## 2024-06-17 PROCEDURE — 36415 COLL VENOUS BLD VENIPUNCTURE: CPT

## 2024-06-17 PROCEDURE — 80053 COMPREHEN METABOLIC PANEL: CPT

## 2024-06-17 PROCEDURE — 77334 RADIATION TREATMENT AID(S): CPT | Performed by: RADIOLOGY

## 2024-06-17 PROCEDURE — 85025 COMPLETE CBC W/AUTO DIFF WBC: CPT

## 2024-06-17 RX ADMIN — PANTOPRAZOLE SODIUM 40 MG: 40 TABLET, DELAYED RELEASE ORAL at 08:06

## 2024-06-17 RX ADMIN — ATORVASTATIN CALCIUM 40 MG: 40 TABLET, FILM COATED ORAL at 08:06

## 2024-06-17 RX ADMIN — HYDROCODONE BITARTRATE AND ACETAMINOPHEN 1 TABLET: 5; 325 TABLET ORAL at 08:06

## 2024-06-17 RX ADMIN — AZITHROMYCIN DIHYDRATE 500 MG: 250 TABLET, FILM COATED ORAL at 08:06

## 2024-06-17 NOTE — PLAN OF CARE
Problem: Adult Inpatient Plan of Care  Goal: Plan of Care Review  Outcome: Adequate for Care Transition  Goal: Patient-Specific Goal (Individualized)  Outcome: Adequate for Care Transition  Goal: Absence of Hospital-Acquired Illness or Injury  Outcome: Adequate for Care Transition  Goal: Optimal Comfort and Wellbeing  Outcome: Adequate for Care Transition  Goal: Readiness for Transition of Care  Outcome: Adequate for Care Transition     Problem: Infection  Goal: Absence of Infection Signs and Symptoms  Outcome: Adequate for Care Transition     Problem: Wound  Goal: Optimal Coping  Outcome: Adequate for Care Transition  Goal: Optimal Functional Ability  Outcome: Adequate for Care Transition  Goal: Absence of Infection Signs and Symptoms  Outcome: Adequate for Care Transition  Goal: Improved Oral Intake  Outcome: Adequate for Care Transition  Goal: Optimal Pain Control and Function  Outcome: Adequate for Care Transition  Goal: Skin Health and Integrity  Outcome: Adequate for Care Transition  Goal: Optimal Wound Healing  Outcome: Adequate for Care Transition
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
## 2024-06-18 LAB
BACTERIA BLD CULT: NORMAL
BACTERIA BLD CULT: NORMAL
BACTERIA TISS AEROBE CULT: ABNORMAL
DHEA SERPL-MCNC: NORMAL
ESTROGEN SERPL-MCNC: NORMAL PG/ML
INSULIN SERPL-ACNC: NORMAL U[IU]/ML
LAB AP CLINICAL INFORMATION: NORMAL
LAB AP GROSS DESCRIPTION: NORMAL
LAB AP INTRA OP: NORMAL
LAB AP REPORT FOOTNOTES: NORMAL
T3RU NFR SERPL: NORMAL %

## 2024-06-19 DIAGNOSIS — C34.90 MALIGNANT NEOPLASM OF LUNG, UNSPECIFIED LATERALITY, UNSPECIFIED PART OF LUNG: Primary | ICD-10-CM

## 2024-06-21 ENCOUNTER — TELEPHONE (OUTPATIENT)
Dept: HEMATOLOGY/ONCOLOGY | Facility: CLINIC | Age: 39
End: 2024-06-21
Payer: MEDICAID

## 2024-06-22 PROBLEM — C7A.1 LARGE CELL NEUROENDOCRINE CARCINOMA: Status: ACTIVE | Noted: 2024-06-22

## 2024-06-22 PROBLEM — C7A.8 NEUROENDOCRINE CARCINOMA OF LUNG: Status: ACTIVE | Noted: 2024-06-22

## 2024-06-22 PROBLEM — D63.8 ANEMIA, CHRONIC DISEASE: Status: ACTIVE | Noted: 2024-06-22

## 2024-06-22 PROBLEM — K62.9 RECTAL ABNORMALITY: Status: ACTIVE | Noted: 2024-06-22

## 2024-06-22 PROBLEM — C80.1: Status: ACTIVE | Noted: 2024-06-22

## 2024-06-22 PROBLEM — D75.839 THROMBOCYTOSIS: Status: ACTIVE | Noted: 2024-06-22

## 2024-06-22 PROBLEM — E27.8 ADRENAL MASS, LEFT: Status: ACTIVE | Noted: 2024-06-22

## 2024-06-22 PROBLEM — R10.9 ABDOMINAL PAIN: Status: ACTIVE | Noted: 2024-06-22

## 2024-06-22 PROBLEM — C78.01: Status: ACTIVE | Noted: 2024-06-22

## 2024-06-22 PROBLEM — C77.0 SECONDARY MALIGNANCY OF SUPRACLAVICULAR LYMPH NODES: Status: ACTIVE | Noted: 2024-06-22

## 2024-06-22 PROBLEM — C77.1 SECONDARY MALIGNANCY OF MEDIASTINAL LYMPH NODES: Status: ACTIVE | Noted: 2024-06-22

## 2024-06-22 PROBLEM — K92.1 BLOODY STOOL: Status: ACTIVE | Noted: 2024-06-22

## 2024-06-22 PROBLEM — R06.00 DYSPNEA: Status: ACTIVE | Noted: 2024-06-22

## 2024-06-22 PROBLEM — K92.0 HEMATEMESIS: Status: ACTIVE | Noted: 2024-06-22
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
## 2024-06-24 ENCOUNTER — OFFICE VISIT (OUTPATIENT)
Dept: HEMATOLOGY/ONCOLOGY | Facility: CLINIC | Age: 39
End: 2024-06-24
Attending: INTERNAL MEDICINE
Payer: MEDICAID

## 2024-06-24 ENCOUNTER — DOCUMENTATION ONLY (OUTPATIENT)
Dept: HEMATOLOGY/ONCOLOGY | Facility: CLINIC | Age: 39
End: 2024-06-24

## 2024-06-24 VITALS
RESPIRATION RATE: 18 BRPM | SYSTOLIC BLOOD PRESSURE: 114 MMHG | HEIGHT: 66 IN | HEART RATE: 98 BPM | WEIGHT: 115 LBS | TEMPERATURE: 98 F | BODY MASS INDEX: 18.48 KG/M2 | OXYGEN SATURATION: 97 % | DIASTOLIC BLOOD PRESSURE: 73 MMHG

## 2024-06-24 DIAGNOSIS — C77.0 SECONDARY MALIGNANCY OF SUPRACLAVICULAR LYMPH NODES: ICD-10-CM

## 2024-06-24 DIAGNOSIS — C7A.1 LARGE CELL NEUROENDOCRINE CARCINOMA: Primary | ICD-10-CM

## 2024-06-24 DIAGNOSIS — C7A.8 NEUROENDOCRINE CARCINOMA OF LUNG: ICD-10-CM

## 2024-06-24 DIAGNOSIS — C78.01 MALIGNANT NEOPLASM METASTATIC TO HILUS OF LUNG WITH UNKNOWN PRIMARY SITE, RIGHT: ICD-10-CM

## 2024-06-24 DIAGNOSIS — C80.1 MALIGNANT NEOPLASM METASTATIC TO HILUS OF LUNG WITH UNKNOWN PRIMARY SITE, RIGHT: ICD-10-CM

## 2024-06-24 DIAGNOSIS — D63.8 ANEMIA, CHRONIC DISEASE: ICD-10-CM

## 2024-06-24 DIAGNOSIS — R04.2 HEMOPTYSIS: ICD-10-CM

## 2024-06-24 DIAGNOSIS — C34.90 MALIGNANT NEOPLASM OF LUNG, UNSPECIFIED LATERALITY, UNSPECIFIED PART OF LUNG: ICD-10-CM

## 2024-06-24 DIAGNOSIS — K62.9 RECTAL ABNORMALITY: ICD-10-CM

## 2024-06-24 DIAGNOSIS — K92.1 BLOODY STOOL: ICD-10-CM

## 2024-06-24 DIAGNOSIS — R10.10 PAIN OF UPPER ABDOMEN: ICD-10-CM

## 2024-06-24 DIAGNOSIS — C77.1 SECONDARY MALIGNANCY OF MEDIASTINAL LYMPH NODES: ICD-10-CM

## 2024-06-24 DIAGNOSIS — K92.0 HEMATEMESIS WITH NAUSEA: ICD-10-CM

## 2024-06-24 DIAGNOSIS — R06.00 DYSPNEA, UNSPECIFIED TYPE: ICD-10-CM

## 2024-06-24 DIAGNOSIS — D75.839 THROMBOCYTOSIS: ICD-10-CM

## 2024-06-24 DIAGNOSIS — E27.8 ADRENAL MASS, LEFT: ICD-10-CM

## 2024-06-24 LAB
CRP SERPL-MCNC: 17.5 MG/L
ERYTHROCYTE [SEDIMENTATION RATE] IN BLOOD: 104 MM/HR (ref 0–15)
FERRITIN SERPL-MCNC: 154.38 NG/ML (ref 21.81–274.66)
IRON SATN MFR SERPL: 39 % (ref 20–50)
IRON SERPL-MCNC: 104 UG/DL (ref 65–175)
TIBC SERPL-MCNC: 160 UG/DL (ref 69–240)
TIBC SERPL-MCNC: 264 UG/DL (ref 250–450)
TRANSFERRIN SERPL-MCNC: 250 MG/DL (ref 174–364)

## 2024-06-24 PROCEDURE — 1111F DSCHRG MED/CURRENT MED MERGE: CPT | Mod: CPTII,,, | Performed by: INTERNAL MEDICINE

## 2024-06-24 PROCEDURE — 85652 RBC SED RATE AUTOMATED: CPT | Performed by: INTERNAL MEDICINE

## 2024-06-24 PROCEDURE — 3078F DIAST BP <80 MM HG: CPT | Mod: CPTII,,, | Performed by: INTERNAL MEDICINE

## 2024-06-24 PROCEDURE — 1159F MED LIST DOCD IN RCRD: CPT | Mod: CPTII,,, | Performed by: INTERNAL MEDICINE

## 2024-06-24 PROCEDURE — 3074F SYST BP LT 130 MM HG: CPT | Mod: CPTII,,, | Performed by: INTERNAL MEDICINE

## 2024-06-24 PROCEDURE — 3044F HG A1C LEVEL LT 7.0%: CPT | Mod: CPTII,,, | Performed by: INTERNAL MEDICINE

## 2024-06-24 PROCEDURE — 1160F RVW MEDS BY RX/DR IN RCRD: CPT | Mod: CPTII,,, | Performed by: INTERNAL MEDICINE

## 2024-06-24 PROCEDURE — 36415 COLL VENOUS BLD VENIPUNCTURE: CPT | Performed by: INTERNAL MEDICINE

## 2024-06-24 PROCEDURE — 99214 OFFICE O/P EST MOD 30 MIN: CPT | Mod: PBBFAC | Performed by: INTERNAL MEDICINE

## 2024-06-24 PROCEDURE — 3008F BODY MASS INDEX DOCD: CPT | Mod: CPTII,,, | Performed by: INTERNAL MEDICINE

## 2024-06-24 PROCEDURE — 83540 ASSAY OF IRON: CPT | Performed by: INTERNAL MEDICINE

## 2024-06-24 PROCEDURE — 99205 OFFICE O/P NEW HI 60 MIN: CPT | Mod: S$PBB,,, | Performed by: INTERNAL MEDICINE

## 2024-06-24 PROCEDURE — 86140 C-REACTIVE PROTEIN: CPT | Performed by: INTERNAL MEDICINE

## 2024-06-24 PROCEDURE — 82728 ASSAY OF FERRITIN: CPT | Performed by: INTERNAL MEDICINE

## 2024-06-24 RX ORDER — ASPIRIN 81 MG/1
81 TABLET ORAL DAILY
COMMUNITY

## 2024-06-24 NOTE — Clinical Note
Start chemotherapy ASAP without waiting for MediPort placement, without waiting for any blood tests, without waiting for any scans, without waiting for radiation therapy  Refer to Internal Medicine for him to get established with a PCP

## 2024-06-24 NOTE — Clinical Note
FDG PET-CT for staging of lung cancer ASAP Brain MRI with and without contrast ASAP  EGD and colonoscopy for evaluation of hematemesis and rectal bleeding, ASAP (GI) Has been referred to Radiation Oncology for palliative radiation therapy to control hemoptysis Refer to surgery for MediPort placement for chemotherapy Please send recent right supraclavicular lymph node biopsy for NGS testing and PD-L1 as well Chemo teaching with nursing staff within a week Check JAK2 mutation, CALR mutation, MPL mutation, BCR-ABL1 fusion transcript testing in blood ESR, CRP Serum iron, TIBC, ferritin Follow-up with me in 3 weeks

## 2024-06-25 ENCOUNTER — APPOINTMENT (OUTPATIENT)
Dept: HEMATOLOGY/ONCOLOGY | Facility: CLINIC | Age: 39
End: 2024-06-25
Payer: MEDICAID

## 2024-06-25 ENCOUNTER — ANESTHESIA EVENT (OUTPATIENT)
Dept: SURGERY | Facility: HOSPITAL | Age: 39
End: 2024-06-25
Payer: MEDICAID

## 2024-06-25 ENCOUNTER — OFFICE VISIT (OUTPATIENT)
Dept: SURGERY | Facility: CLINIC | Age: 39
End: 2024-06-25
Attending: INTERNAL MEDICINE
Payer: MEDICAID

## 2024-06-25 VITALS
BODY MASS INDEX: 18.74 KG/M2 | SYSTOLIC BLOOD PRESSURE: 121 MMHG | RESPIRATION RATE: 20 BRPM | WEIGHT: 116.63 LBS | TEMPERATURE: 98 F | OXYGEN SATURATION: 99 % | HEIGHT: 66 IN | DIASTOLIC BLOOD PRESSURE: 81 MMHG | HEART RATE: 85 BPM

## 2024-06-25 DIAGNOSIS — C77.0 SECONDARY MALIGNANCY OF SUPRACLAVICULAR LYMPH NODES: ICD-10-CM

## 2024-06-25 DIAGNOSIS — C78.01 MALIGNANT NEOPLASM METASTATIC TO HILUS OF LUNG WITH UNKNOWN PRIMARY SITE, RIGHT: Primary | ICD-10-CM

## 2024-06-25 DIAGNOSIS — C80.1 MALIGNANT NEOPLASM METASTATIC TO HILUS OF LUNG WITH UNKNOWN PRIMARY SITE, RIGHT: Primary | ICD-10-CM

## 2024-06-25 PROCEDURE — 99215 OFFICE O/P EST HI 40 MIN: CPT | Mod: PBBFAC

## 2024-06-25 RX ORDER — HEPARIN SODIUM 5000 [USP'U]/ML
5000 INJECTION, SOLUTION INTRAVENOUS; SUBCUTANEOUS ONCE
OUTPATIENT
Start: 2024-06-25

## 2024-06-25 RX ORDER — CEFAZOLIN SODIUM 2 G/50ML
2 SOLUTION INTRAVENOUS
OUTPATIENT
Start: 2024-06-25

## 2024-06-25 RX ORDER — SODIUM CHLORIDE 9 MG/ML
INJECTION, SOLUTION INTRAVENOUS CONTINUOUS
OUTPATIENT
Start: 2024-06-25

## 2024-06-25 NOTE — PROGRESS NOTES
Patient was seen by Dr. Webster and will have Mediport insertion done tomorrow.  Consent is signed and patient was instructed to go to [facility] upon leaving [facility] per [place] LPN, [facility].  RTC prn.

## 2024-06-25 NOTE — PROGRESS NOTES
[facility] Follow-Up Note    HPI:  Mr. Ranjeet Ball is a 38 year old male with past medical history of AMI x 2, cardiac arrest, PCI w/ PIEDAD, HLD who presented to [facility] ED with shortness of breath, hemoptysis, night sweats, 15 lbs weight loss. General surgery was consulted 6/13/2024, underwent excisional biopsy of a right supraclavicular lymph node 6/14/2024. Path returned large cell carcinoma. Hem/onc saw patient yesterday and presented at [facility]. Dr. Delatorre recommending urgent chemo/radiation. Patient presents today for scheduling of mediport for chemotherapy access.     Subjective:  Doing ok. Still losing weight but eating well. No SOB at baseline. Spoke with Dr. Delatorre yesterday. Is familiar with mediport and wishes to have it placed. Did not understand prognosis or path results, but I did explain these to him.     Objective:  Gen: NAD, AAOx3  CV: extremities wwp, regular rate, palpable radials  Pulm: nonlabored, no increased wob, equal chest rise b/l   Abd: s/nt/nd   Wounds:right supraclavicular incision c/d/I. Healing well w/o infection    Path: large cell carcinoma    A/P:    38 y.o. male with newly diagnosed large cell carcinoma of right lung. Needs urgent chemo/radiation therapy.     - will plan for mediport placement tomorrow for urgent chemo. Patient does wish to attempt chemo.  I did thoroughly discuss the risks, benefits, alternatives of MediPort placement.  Written consent obtained.  Patient did have some reservations.  I returned to the room and I did clearly say multiple times that he does not have to proceed with placement if he does not wish to; the decision to proceed with surgery is his to make. Offered delaying procedure as well. After further discussions, he did confidently state he wishes to proceed with mediport placement.   - does have heart history but recently underwent procedure 2 weeks prior without complication and this is medically urgent so do not believe he  needs further work up  - will plan for left v right subclavian v IJ mediport in OR tomorrow due to urgency of initiation of chemotherapy per hem/onc. NPOmn.     Aj Webster MD  [employer] [facility], PGY-3
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
## 2024-06-25 NOTE — NURSING
"Met with patient today after his consult appointment with Dr. Delatorre. Patient attended appointment accompanied by his sister. Provided patient with RN Ken contact and explained role in patient's care. NCCN Distress Screen completed with a reported distress score of 8. Patient indicates his distress is related to his diagnosis, and feeling sad. Says he gets aggravated when he doesn't understand. Also reports that he has several beers and "shots" last night. Patient asked XIN Rogers "what kind of cancer is this."  RN explained that Neuroendocrine cancers are a type of cancer that starts in specialized cells and can be anywhere in the body. Provided information on community and in-house counseling/support services. Answered all patient questions regarding treatment and expectations for surgery consult and scans. Instructed patient to drink eight 8 oz glasses (or equivalent)  of water a day to improve obtaining blood samples and decrease side effects during treatment. Provided cancer center packet with clinic team, contact numbers, and information on cancer treatment. Patient verbalized understanding and agrees to contact XIN Rogers if any needs or concerns arise.     Oncology Navigation   Intake  Cancer Type: Neuroendocrine  Appointment Date: 06/24/24     Treatment  Current Status: Active; Staging work-up       Medical Oncologist: Artemio Delatorre MD  Consult Date: 06/24/24  Chemotherapy: Planned  Chemotherapy Regimen: Chemotherapy regimen: Carboplatin AUC 5-day 1  Etoposide 100 mg/m² days 1, 2, 3    Atezolizumab 1200 mg day 1  **Every 21 days x 4 cycles;  Followed by:  Maintenance atezolizumab 1200 mg day 1, every 21 days, continue until progression or intolerable toxicity    Radiation Therapy: Planned  Radiation Oncologist: palliative radiotherapy to chest for control of hemoptysis    Procedures: PET scan; MRI  MRI Schedule Date: 07/03/24  PET Scan Schedule Date: 06/27/24          Radiation Oncologist: palliative " radiotherapy to chest for control of hemoptysis    Support Systems: Spouse/significant other  Barriers of Care: Other  Other Barriers: Difficulty understanding diagnosis; requires basic information     Acuity  Stage: 2  Systemic Treatment - predicted or initiated: Chemotherapy Regimen with Multiple drugs (+1)  Treatment Tolerability: Has not started treatment yet/treatment fully completed and side effects resolved  ECO  Comorbidities in Medical History: 1  Hospitalization Within the Past Month: 1   Needed: 0  Support: 0  Verbalizes Financial Concerns: 0  Transportation: 0  Mental Health: PHQ Score: 0  Verbalizes the need for more education: 1  Navigation Acuity: 7     Follow Up  No follow-ups on file.

## 2024-06-26 ENCOUNTER — TELEPHONE (OUTPATIENT)
Dept: FAMILY MEDICINE | Facility: CLINIC | Age: 39
End: 2024-06-26
Payer: MEDICAID

## 2024-06-26 ENCOUNTER — ANESTHESIA (OUTPATIENT)
Dept: SURGERY | Facility: HOSPITAL | Age: 39
End: 2024-06-26
Payer: MEDICAID

## 2024-06-26 ENCOUNTER — TELEPHONE (OUTPATIENT)
Dept: HEMATOLOGY/ONCOLOGY | Facility: CLINIC | Age: 39
End: 2024-06-26
Payer: MEDICAID

## 2024-06-26 ENCOUNTER — DOCUMENTATION ONLY (OUTPATIENT)
Dept: HEMATOLOGY/ONCOLOGY | Facility: CLINIC | Age: 39
End: 2024-06-26
Payer: MEDICAID

## 2024-06-26 NOTE — TELEPHONE ENCOUNTER
Called daughter she was requesting a copy of insurance card looking at patient chart we don't have one in chart called to let her know that and how to get the number by calling ins.

## 2024-06-26 NOTE — NURSING
"XIN Rogers received request to reach out to patient/family regarding concerns about starting treatment and getting a second opinion. XIN Rogers spoke with patient's sister, Franca, and cousin Sharlene per phone. Family had much focus on patient's diagnosis and the stage. Educated patient on meaning of neuroendocrine carcinoma, the expectation of radiation and chemotherapy treatment as recommended by medical oncologist. Discussed family's focus on patient's stage of cancer. Patient's sister, Franca, responded that stage IV means the person "is dead". Patient's cousin.Sharlene, responded that she is aware that someone can be treated with stage IV and still live for several years. Family says they just want to make sure they have done all they can do and they are planning to schedule an appt with MD Dumont in [place]. Family is aware that they will have to pay out of pocket for the appt. XIN Rogers explained that Dr. Delatorre follows NCCN guidelines for treatments. Informed family that facilities like [facility] may offer different recommendations with cancer trials. Family requested to cancel present appts they they will call to schedule appts once they make their decision. XIN Rogers provided contact number.   "

## 2024-06-26 NOTE — TELEPHONE ENCOUNTER
Called patient to confirm appointment for 6/27/24. Spoke with patient sister Franca Ball. Patient sister requested to cancel appointment. Patient sister Franca stated that they were trying to get an appointment with MD Dumont for a second opinion before starting any treatment. Notified Bertrand Hollins NP.

## 2024-06-26 NOTE — TELEPHONE ENCOUNTER
----- Message from Aziza Buchanan LPN sent at 6/26/2024 12:39 PM CDT -----  Regarding: FW: advice    ----- Message -----  From: Yrn Dunn  Sent: 6/26/2024  12:20 PM CDT  To: [facility] St[facility] Staff  Subject: advice                                           Who Called: Franca Ball    Caller is requesting assistance/information from provider's office.    Symptoms (please be specific):    How long has patient had these symptoms:    List of preferred pharmacies on file (remove unneeded): [unfilled]  If different, enter pharmacy into here including location and phone number:       Preferred Method of Contact: Phone Call  Patient's Preferred Phone Number on File: [phone]  Best Call Back Number, if different:  Additional Information: Pt sister is requesting email copy of pt's insurance. Please advise

## 2024-06-28 LAB — FUNGUS SPEC CULT: NORMAL

## 2024-07-01 ENCOUNTER — HOSPITAL ENCOUNTER (OUTPATIENT)
Dept: RADIOLOGY | Facility: HOSPITAL | Age: 39
Discharge: HOME | End: 2024-07-01
Payer: MEDICAID

## 2024-07-01 ENCOUNTER — OFFICE VISIT (OUTPATIENT)
Dept: ORTHOPEDICS | Facility: CLINIC | Age: 39
End: 2024-07-01
Payer: MEDICAID

## 2024-07-01 VITALS
BODY MASS INDEX: 19.07 KG/M2 | HEART RATE: 93 BPM | RESPIRATION RATE: 18 BRPM | OXYGEN SATURATION: 100 % | TEMPERATURE: 98 F | DIASTOLIC BLOOD PRESSURE: 78 MMHG | HEIGHT: 66 IN | WEIGHT: 118.63 LBS | SYSTOLIC BLOOD PRESSURE: 115 MMHG

## 2024-07-01 DIAGNOSIS — S52.509G: Primary | ICD-10-CM

## 2024-07-01 DIAGNOSIS — S52.509G: ICD-10-CM

## 2024-07-01 PROCEDURE — 3044F HG A1C LEVEL LT 7.0%: CPT | Mod: CPTII,,, | Performed by: SPECIALIST

## 2024-07-01 PROCEDURE — 1159F MED LIST DOCD IN RCRD: CPT | Mod: CPTII,,, | Performed by: SPECIALIST

## 2024-07-01 PROCEDURE — 73110 X-RAY EXAM OF WRIST: CPT | Mod: TC,LT

## 2024-07-01 PROCEDURE — 99024 POSTOP FOLLOW-UP VISIT: CPT | Mod: ,,, | Performed by: SPECIALIST

## 2024-07-01 PROCEDURE — 3078F DIAST BP <80 MM HG: CPT | Mod: CPTII,,, | Performed by: SPECIALIST

## 2024-07-01 PROCEDURE — 99214 OFFICE O/P EST MOD 30 MIN: CPT | Mod: PBBFAC,25

## 2024-07-01 PROCEDURE — 3074F SYST BP LT 130 MM HG: CPT | Mod: CPTII,,, | Performed by: SPECIALIST

## 2024-07-01 NOTE — PROGRESS NOTES
[facility]  Established Patient Office Visit  07/01/2024       Patient ID: Ranjeet Ball  YOB: 1985  MRN: [id number]    Chief Complaint: Follow-up and Results of the Left Wrist (CT results left wrist no c/o pain, wears wrist brace)      Orthopaedic Injuries: LEFT distal radius malunion     Orthopaedic Surgeries:   Open reduction internal fixation LEFT distal radius malunion correction with iliac crest bone autograft 4/4/24 Rm    HPI:  Ranjeet Ball is a 38 y.o. male s/p above.  He has now 12 weeks postop.  At last clinic visit, CT wrist was ordered with plan for obtaining on 06/20/2024 the patient obtained 3 weeks early on 05/29/2024.  Patient was also prescribed exogen bone stimulator which he has obtained and has been using daily but has not used for the past couple of days due to recent hospitalization.  Patient reports he was recently diagnosed with lung cancer and was recommended chemo but wants a 2nd opinion before starting.  Patient has since completely stopped tobacco use    12 point ROS performed and negative except as above.     Past Medical History:    Past Medical History:   Diagnosis Date    Hyperlipidemia     Myocardial infarction      Past Surgical History:   Procedure Laterality Date    CORONARY ARTERY BYPASS GRAFT      LAPAROTOMY, EXPLORATORY N/A 03/23/2023    Procedure: LAPAROTOMY, EXPLORATORY;  Surgeon: Alex Juárez MD;  Location: [facility];  Service: General;  Laterality: N/A;    LYMPH NODE BIOPSY Right 6/14/2024    Procedure: BIOPSY, LYMPH NODE;  Surgeon: Renato Alvarado Jr., MD;  Location: [facility] OR;  Service: General;  Laterality: Right;  right supraclavicular lymph node    OPEN REDUCTION AND INTERNAL FIXATION (ORIF) OF FRACTURE OF DISTAL RADIUS Left 4/4/2024    Procedure: ORIF, FRACTURE, RADIUS, DISTAL;  Surgeon: Khoa Abdalla MD;  Location: [facility] OR;  Service: Orthopedics;  Laterality: Left;    ORIF FOREARM FRACTURE Right 12/7/2023     Procedure: ORIF, FRACTURE, RADIUS OR ULNA;  Surgeon: Khoa Abdalla MD;  Location: [facility];  Service: Orthopedics;  Laterality: Right;  Call Wilmer     Family History   Problem Relation Name Age of Onset    Diabetes Mother      Heart disease Mother      Cancer Father      Diabetes Sister      Heart disease Brother      Stroke Maternal Grandmother      Heart disease Maternal Grandfather       Social History     Socioeconomic History    Marital status: Single    Number of children: 1   Tobacco Use    Smoking status: Former     Current packs/day: 0.15     Average packs/day: 0.2 packs/day for 24.6 years (3.7 ttl pk-yrs)     Types: Cigarettes     Start date: 12/4/1999    Smokeless tobacco: Never    Tobacco comments:     Pt states he quit cigs   Substance and Sexual Activity    Alcohol use: Yes     Comment: occasionally    Drug use: Yes     Frequency: 3.0 times per week     Types: Marijuana    Sexual activity: Yes     Partners: Female     Birth control/protection: Condom   Social History Narrative    ** Merged History Encounter **          Social Determinants of Health     Financial Resource Strain: High Risk (5/2/2023)    Overall Financial Resource Strain (CARDIA)     Difficulty of Paying Living Expenses: Very hard   Food Insecurity: Food Insecurity Present (5/2/2023)    Hunger Vital Sign     Worried About Running Out of Food in the Last Year: Often true     Ran Out of Food in the Last Year: Sometimes true   Transportation Needs: Unmet Transportation Needs (5/2/2023)    PRAPARE - Transportation     Lack of Transportation (Medical): Yes     Lack of Transportation (Non-Medical): Yes   Physical Activity: Sufficiently Active (5/2/2023)    Exercise Vital Sign     Days of Exercise per Week: 7 days     Minutes of Exercise per Session: 30 min   Stress: Stress Concern Present (5/2/2023)    [employer] - Occupational Stress Questionnaire     Feeling of Stress : Rather much   Housing Stability: High  Risk (5/2/2023)    Housing Stability Vital Sign     Unable to Pay for Housing in the Last Year: Yes     Number of Places Lived in the Last Year: 2     Unstable Housing in the Last Year: Yes     Medication List with Changes/Refills   Current Medications    ASPIRIN (ECOTRIN) 81 MG EC TABLET    Take 81 mg by mouth once daily.    ATORVASTATIN (LIPITOR) 40 MG TABLET    Take 40 mg by mouth once daily.    ERGOCALCIFEROL (ERGOCALCIFEROL) 50,000 UNIT CAP    Take 1 capsule (50,000 Units total) by mouth every 7 days.    GABAPENTIN (NEURONTIN) 300 MG CAPSULE    Take 1 capsule (300 mg total) by mouth every evening.    HYDROCODONE-ACETAMINOPHEN (NORCO) 5-325 MG PER TABLET    Take 1 tablet by mouth every 6 (six) hours as needed for Pain.     Review of patient's allergies indicates:  No Known Allergies    Physical Exam:  Left wrist:   Surgical incision is healing well   Wiggles all fingers   Hand warm well perfused    Imaging independently interpreted:  X-ray left wrist today hardware appropriately place, no evidence of complication, clinical healing and callus formation compared to imaging in 6 weeks  CT left wrist on 5/29/24 reveals healing wrist fracture with bridging callus over 2 of 4 cortices    Assessment and Plan:    Ranjeet Ball is a 38 y.o. male 2 weeks status post left distal radius malunion correction with osteotomy, iliac crest bone graft, dorsal bridge plate.  He has delayed healing of a left distal radius fracture.    -we will get a repeat CT left wrist scheduled in the next week or 2  -will have patient follow up in 3 weeks after obtaining CT  -if fracture looks healed we will consider bridge plate removal  -continue exogen use      WB Status: NWB L wrist   Follow up:3 weeks   XR/to-do next visit:XR L wrist - will see him back in another 3 weeks to review new CT results     Bruce Graham MD  [employer] [facility] PGY3          Orders Placed This Encounter    X-Ray Wrist Complete Left    CT Wrist Without Contrast Left

## 2024-07-02 NOTE — PROGRESS NOTES
Faculty Attestation: Ranjeet Ball  was seen at [facility] in the Orthopaedic Clinic. Discussed with the resident at the time of the visit. History of Present Illness, Physical Exam, and Assessment and Plan reviewed. Treatment plan is reasonable and appropriate. Compliance with treatment recommendations is important. No procedure was performed.     Wayne Valencia MD  Orthopaedic Surgery

## 2024-07-03 ENCOUNTER — HOSPITAL ENCOUNTER (OUTPATIENT)
Dept: RADIOLOGY | Facility: HOSPITAL | Age: 39
Discharge: HOME | End: 2024-07-03
Payer: MEDICAID

## 2024-07-03 DIAGNOSIS — C34.90 MALIGNANT NEOPLASM OF LUNG, UNSPECIFIED LATERALITY, UNSPECIFIED PART OF LUNG: ICD-10-CM

## 2024-07-03 PROCEDURE — 25500020 PHARM REV CODE 255

## 2024-07-03 PROCEDURE — 70553 MRI BRAIN STEM W/O & W/DYE: CPT | Mod: TC

## 2024-07-03 PROCEDURE — A9577 INJ MULTIHANCE: HCPCS

## 2024-07-03 RX ADMIN — GADOBENATE DIMEGLUMINE 11 ML: 529 INJECTION, SOLUTION INTRAVENOUS at 02:07

## 2024-07-16 ENCOUNTER — HOSPITAL ENCOUNTER (OUTPATIENT)
Dept: RADIOLOGY | Facility: HOSPITAL | Age: 39
Discharge: HOME | End: 2024-07-16
Attending: RADIOLOGY
Payer: MEDICAID

## 2024-07-16 DIAGNOSIS — C34.91 MALIGNANT NEOPLASM OF UNSPECIFIED PART OF RIGHT BRONCHUS OR LUNG: ICD-10-CM

## 2024-07-16 PROCEDURE — 78815 PET IMAGE W/CT SKULL-THIGH: CPT | Mod: TC

## 2024-07-16 PROCEDURE — A9552 F18 FDG: HCPCS | Performed by: RADIOLOGY

## 2024-07-16 RX ORDER — FLUDEOXYGLUCOSE F18 500 MCI/ML
10 INJECTION INTRAVENOUS
Status: COMPLETED | OUTPATIENT
Start: 2024-07-16 | End: 2024-07-16

## 2024-07-16 RX ADMIN — FLUDEOXYGLUCOSE F-18 10 MILLICURIE: 500 INJECTION INTRAVENOUS at 10:07

## 2024-07-17 ENCOUNTER — CLINICAL SUPPORT (OUTPATIENT)
Dept: RADIATION THERAPY | Facility: HOSPITAL | Age: 39
End: 2024-07-17
Attending: STUDENT IN AN ORGANIZED HEALTH CARE EDUCATION/TRAINING PROGRAM
Payer: MEDICAID

## 2024-07-18 ENCOUNTER — TELEPHONE (OUTPATIENT)
Dept: HEMATOLOGY/ONCOLOGY | Facility: CLINIC | Age: 39
End: 2024-07-18
Payer: MEDICAID

## 2024-07-18 PROBLEM — R91.1 NODULE OF LOWER LOBE OF LEFT LUNG: Status: ACTIVE | Noted: 2024-07-18

## 2024-07-18 PROBLEM — C79.89 METASTATIC CANCER TO PELVIS: Status: ACTIVE | Noted: 2024-07-18

## 2024-07-18 NOTE — TELEPHONE ENCOUNTER
Called patient to confirm appointment on tomorrow. Patient's sister Celkatie verbally confirmed appointment.

## 2024-07-19 ENCOUNTER — OFFICE VISIT (OUTPATIENT)
Dept: HEMATOLOGY/ONCOLOGY | Facility: CLINIC | Age: 39
End: 2024-07-19
Attending: INTERNAL MEDICINE
Payer: MEDICAID

## 2024-07-19 VITALS
SYSTOLIC BLOOD PRESSURE: 123 MMHG | HEART RATE: 90 BPM | TEMPERATURE: 99 F | OXYGEN SATURATION: 100 % | HEIGHT: 66 IN | DIASTOLIC BLOOD PRESSURE: 82 MMHG | BODY MASS INDEX: 20.38 KG/M2 | WEIGHT: 126.81 LBS | RESPIRATION RATE: 18 BRPM

## 2024-07-19 DIAGNOSIS — C78.01 MALIGNANT NEOPLASM METASTATIC TO HILUS OF LUNG WITH UNKNOWN PRIMARY SITE, RIGHT: ICD-10-CM

## 2024-07-19 DIAGNOSIS — E27.8 ADRENAL MASS, LEFT: ICD-10-CM

## 2024-07-19 DIAGNOSIS — R06.00 DYSPNEA, UNSPECIFIED TYPE: Primary | ICD-10-CM

## 2024-07-19 DIAGNOSIS — C7A.1 LARGE CELL NEUROENDOCRINE CARCINOMA: ICD-10-CM

## 2024-07-19 DIAGNOSIS — K62.9 RECTAL ABNORMALITY: ICD-10-CM

## 2024-07-19 DIAGNOSIS — C7A.8 NEUROENDOCRINE CARCINOMA OF LUNG: Primary | ICD-10-CM

## 2024-07-19 DIAGNOSIS — D63.8 ANEMIA, CHRONIC DISEASE: ICD-10-CM

## 2024-07-19 DIAGNOSIS — C80.1 MALIGNANT NEOPLASM METASTATIC TO HILUS OF LUNG WITH UNKNOWN PRIMARY SITE, RIGHT: ICD-10-CM

## 2024-07-19 DIAGNOSIS — C34.90 MALIGNANT NEOPLASM OF LUNG, UNSPECIFIED LATERALITY, UNSPECIFIED PART OF LUNG: ICD-10-CM

## 2024-07-19 DIAGNOSIS — K92.0 HEMATEMESIS WITH NAUSEA: ICD-10-CM

## 2024-07-19 DIAGNOSIS — R04.2 HEMOPTYSIS: ICD-10-CM

## 2024-07-19 DIAGNOSIS — C79.89 METASTATIC CANCER TO PELVIS: ICD-10-CM

## 2024-07-19 DIAGNOSIS — C77.1 SECONDARY MALIGNANCY OF MEDIASTINAL LYMPH NODES: ICD-10-CM

## 2024-07-19 DIAGNOSIS — C77.0 SECONDARY MALIGNANCY OF SUPRACLAVICULAR LYMPH NODES: ICD-10-CM

## 2024-07-19 DIAGNOSIS — D75.839 THROMBOCYTOSIS: ICD-10-CM

## 2024-07-19 DIAGNOSIS — R91.1 NODULE OF LOWER LOBE OF LEFT LUNG: ICD-10-CM

## 2024-07-19 DIAGNOSIS — K92.1 BLOODY STOOL: ICD-10-CM

## 2024-07-19 DIAGNOSIS — C7A.8 NEUROENDOCRINE CARCINOMA OF LUNG: ICD-10-CM

## 2024-07-19 LAB
ALBUMIN SERPL-MCNC: 3.1 G/DL (ref 3.5–5)
ALBUMIN/GLOB SERPL: 0.7 RATIO (ref 1.1–2)
ALP SERPL-CCNC: 93 UNIT/L (ref 40–150)
ALT SERPL-CCNC: 14 UNIT/L (ref 0–55)
ANION GAP SERPL CALC-SCNC: 6 MEQ/L
AST SERPL-CCNC: 11 UNIT/L (ref 5–34)
BASOPHILS # BLD AUTO: 0.05 X10(3)/MCL
BASOPHILS NFR BLD AUTO: 0.5 %
BILIRUB SERPL-MCNC: 0.2 MG/DL
BUN SERPL-MCNC: 7.8 MG/DL (ref 8.9–20.6)
CALCIUM SERPL-MCNC: 9.5 MG/DL (ref 8.4–10.2)
CHLORIDE SERPL-SCNC: 109 MMOL/L (ref 98–107)
CO2 SERPL-SCNC: 25 MMOL/L (ref 22–29)
CREAT SERPL-MCNC: 0.84 MG/DL (ref 0.73–1.18)
CREAT/UREA NIT SERPL: 9
EOSINOPHIL # BLD AUTO: 0.17 X10(3)/MCL (ref 0–0.9)
EOSINOPHIL NFR BLD AUTO: 1.8 %
ERYTHROCYTE [DISTWIDTH] IN BLOOD BY AUTOMATED COUNT: 13.7 % (ref 11.5–17)
GFR SERPLBLD CREATININE-BSD FMLA CKD-EPI: >60 ML/MIN/1.73/M2
GLOBULIN SER-MCNC: 4.4 GM/DL (ref 2.4–3.5)
GLUCOSE SERPL-MCNC: 103 MG/DL (ref 74–100)
HCT VFR BLD AUTO: 37.9 % (ref 42–52)
HGB BLD-MCNC: 11.8 G/DL (ref 14–18)
IMM GRANULOCYTES # BLD AUTO: 0.03 X10(3)/MCL (ref 0–0.04)
IMM GRANULOCYTES NFR BLD AUTO: 0.3 %
LYMPHOCYTES # BLD AUTO: 2.96 X10(3)/MCL (ref 0.6–4.6)
LYMPHOCYTES NFR BLD AUTO: 30.9 %
MCH RBC QN AUTO: 29.9 PG (ref 27–31)
MCHC RBC AUTO-ENTMCNC: 31.1 G/DL (ref 33–36)
MCV RBC AUTO: 96.2 FL (ref 80–94)
MONOCYTES # BLD AUTO: 0.68 X10(3)/MCL (ref 0.1–1.3)
MONOCYTES NFR BLD AUTO: 7.1 %
NEUTROPHILS # BLD AUTO: 5.69 X10(3)/MCL (ref 2.1–9.2)
NEUTROPHILS NFR BLD AUTO: 59.4 %
NRBC BLD AUTO-RTO: 0 %
PLATELET # BLD AUTO: 588 X10(3)/MCL (ref 130–400)
PMV BLD AUTO: 8.4 FL (ref 7.4–10.4)
POTASSIUM SERPL-SCNC: 4.2 MMOL/L (ref 3.5–5.1)
PROT SERPL-MCNC: 7.5 GM/DL (ref 6.4–8.3)
RBC # BLD AUTO: 3.94 X10(6)/MCL (ref 4.7–6.1)
SODIUM SERPL-SCNC: 140 MMOL/L (ref 136–145)
WBC # BLD AUTO: 9.58 X10(3)/MCL (ref 4.5–11.5)

## 2024-07-19 PROCEDURE — 3079F DIAST BP 80-89 MM HG: CPT | Mod: CPTII,,, | Performed by: INTERNAL MEDICINE

## 2024-07-19 PROCEDURE — 3074F SYST BP LT 130 MM HG: CPT | Mod: CPTII,,, | Performed by: INTERNAL MEDICINE

## 2024-07-19 PROCEDURE — 99215 OFFICE O/P EST HI 40 MIN: CPT | Mod: S$PBB,,, | Performed by: INTERNAL MEDICINE

## 2024-07-19 PROCEDURE — 1159F MED LIST DOCD IN RCRD: CPT | Mod: CPTII,,, | Performed by: INTERNAL MEDICINE

## 2024-07-19 PROCEDURE — 85025 COMPLETE CBC W/AUTO DIFF WBC: CPT | Performed by: INTERNAL MEDICINE

## 2024-07-19 PROCEDURE — 36415 COLL VENOUS BLD VENIPUNCTURE: CPT | Performed by: INTERNAL MEDICINE

## 2024-07-19 PROCEDURE — 3008F BODY MASS INDEX DOCD: CPT | Mod: CPTII,,, | Performed by: INTERNAL MEDICINE

## 2024-07-19 PROCEDURE — 1160F RVW MEDS BY RX/DR IN RCRD: CPT | Mod: CPTII,,, | Performed by: INTERNAL MEDICINE

## 2024-07-19 PROCEDURE — 3044F HG A1C LEVEL LT 7.0%: CPT | Mod: CPTII,,, | Performed by: INTERNAL MEDICINE

## 2024-07-19 PROCEDURE — 99214 OFFICE O/P EST MOD 30 MIN: CPT | Mod: PBBFAC | Performed by: INTERNAL MEDICINE

## 2024-07-19 PROCEDURE — 80053 COMPREHEN METABOLIC PANEL: CPT | Performed by: INTERNAL MEDICINE

## 2024-07-19 NOTE — PROGRESS NOTES
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 lymphadenopathy; left lower lobe lung nodule, small, 6 mm; mildly FDG avid left adrenal nodule; possible lytic metastases anterior left ilium  -no brain metastases on baseline brain MRI 07/03/2024  -assuming lung primary, and metastases to left adrenal and lytic metastases to anterior left ilium, CT 0 cN3 M1c, stage IVB  (Pending EGD and colonoscopy)  -07/19/2024: ECOG 1; still ambivalent about pursuing palliative chemotherapy; still hoping to get a 2nd opinion at [facility] which, so far, has not materialized  >>>  Start chemotherapy ASAP without waiting for MediPort placement, without waiting for any blood tests, without waiting for any scans, without waiting for radiation therapy  Refer to Internal Medicine for him to get established with a PCP  -EGD and colonoscopy for evaluation of hematemesis and for evaluation of rectal wall thickening  -has been refer to Radiation Oncology for palliative radiotherapy to chest for control of hemoptysis (no acute indication at this time; at this time, initiation of chemotherapy is of paramount importance)  -need to start chemotherapy as for extensive stage small-cell lung cancer:  Carboplatin/etoposide/atezolizumab (see below)  -need liquid testing as well  -07/19/2024: He tells me that she will call our office Monday to advise us whether he wishes to pursue palliative chemotherapy or not    Chemotherapy regimen:  1.  Carboplatin AUC 5-day 1  2.  Etoposide 100 mg/m² days 1, 2, 3  3.  Atezolizumab 1200 mg day 1  **Every 21 days x 4 cycles;  Followed by:  Maintenance atezolizumab 1200 mg day 1, every 21 days, continue until progression or intolerable toxicity    -will re-stage with contrast-enhanced CT scans of C/A/P after 2 cycles of chemotherapy (approximately 6 weeks after start of chemotherapy, before 3rd cycle of chemotherapy)   -we will re-stage with contrast-enhanced CT scans of C/A/P after completion of 4 cycles of chemotherapy, before starting atezolizumab maintenance    -At some point, if need be and if indicated, may be treated with targeted therapy based upon NGS testing as well    -thrombocytosis  -most likely, reactive  -regardless, we will order testing rule out myeloproliferative neoplasms  -ESR, CRP elevated secondary to underlying metastatic disease  -iron stores normal  -06/25/2024:  Peripheral blood: Favor reactive thrombocytosis and secondary anemia  (negative for JAK2 V617F, CALR, and MPL mutation; negative for CML [negative for major p210 M-bcr BCR-ABL1 fusion transcripts])    Carboplatin/etoposide/atezolizumab:  Emesis risk: MODERATE on day 1 and LOW on days 2 and 3  Vesicant/irritant properties: Carboplatin and etoposide are irritants.  Infection prophylaxis: Routine primary prophylaxis with hematopoietic growth factors is not recommended (incidence of febrile neutropenia is about 5%)  Dose adjustment for baseline liver or renal dysfunction:  Each carboplatin dose should be calculated based upon renal function by use of the Hi formula. A lower starting dose of etoposide may be needed for patients with renal or liver impairment.     Monitoring parameters with  chemotherapy:  CBC with differential and platelet count weekly during treatment.  Electrolytes and liver and renal function prior to each cycle of chemotherapy.     Suggested dose modifications for toxicity:  Myelotoxicity: The dose of carboplatin should be reduced by 25% if platelets are <50,000/microL and/or ANC is <500/microL.  Nonhematologic toxicity: Chemotherapy should be held for grade 3 and 4 nonhematologic toxicities (except for neurotoxicity) and is only restarted after the toxicity has resolved to patient's baseline.     Monitoring for immune side effects with atezolizumab:  Monitoring on pembrolizumab:  Monitor LFTs (AST, ALT, and total bilirubin; at baseline and periodically during treatment;  kidney function (serum creatinine; at baseline and periodically during treatment);  thyroid function (at baseline, periodically during treatment and as clinically indicated);  monitor blood glucose (for hyperglycemia);  Monitor closely for signs/symptoms of immune-mediated adverse reactions, including  adrenal insufficiency,  diarrhea/colitis (consider initiating or repeating infectious workup in patients with corticosteroid-refractory immune-mediated colitis to exclude alternative causes),  dermatologic toxicity,  diabetes mellitus,  hypophysitis,  ocular disorders,  thyroid disorders,  pneumonitis and other immune-mediated adverse reactions.  Monitor for signs/symptoms of infusion-related reactions.    Follow-up with NP within a week for chemotherapy teaching  Follow-up visit with me in 3 weeks    Above discussed at length with the patient.  All questions answered.  Discussed labs, scans, pathology report, and staging of cancer, and gave him copies of relevant records.    Plan of management discussed in detail.  Potential side effects of chemotherapy discussed.  Formal chemotherapy teaching with nursing staff to follow.  Discussed the following: That he has unresectable, stage IV disease which can not be cured  but hopefully, maybe palliative with systemic therapy; response with treatment can not be guaranteed and prognosis guarded.    Discussed with him that with any further procrastination of palliative systemic chemotherapy, he will likely end up dying prematurely, without realizing the potential benefit of prolongation of survival with palliative chemotherapy.  He understands and and says that he will call our office Monday to advise us whether he wishes to pursue treatment or not.    Follow-up:  No follow-ups on file.

## 2024-07-19 NOTE — Clinical Note
Start chemotherapy ASAP without waiting for any results/procedures EGD and colonoscopy ASAP for evaluation of hematemesis and for evaluation of rectal wall thickening noted on scans (rule out primary site of malignancy) Liquid testing ASAP MediPort placement by surgery Discontinue plans for radiation therapy; start chemotherapy ASAP Check JAK2 mutation, CALR mutation, MPL mutation, BCR-ABL1 fusion transcript in blood Chemo teaching with nursing staff ASAP During chemotherapy, check CBC and CMP weekly Today, check CBC and CMP Follow-up with NP within a week for chemotherapy teaching Follow-up visit with me in 3 weeks

## 2024-07-22 ENCOUNTER — TELEPHONE (OUTPATIENT)
Dept: HEMATOLOGY/ONCOLOGY | Facility: CLINIC | Age: 39
End: 2024-07-22
Payer: MEDICAID

## 2024-07-22 NOTE — TELEPHONE ENCOUNTER
Called patient phone number [phone] to confirm appointment for 7/23/24. Spoke with patient relative. Patient relative took down message to give to patient.  Called patient sister Franca Ball. Patient sister took message and said she would have patient call clinic.

## 2024-07-23 ENCOUNTER — TELEPHONE (OUTPATIENT)
Dept: HEMATOLOGY/ONCOLOGY | Facility: CLINIC | Age: 39
End: 2024-07-23
Payer: MEDICAID

## 2024-07-23 ENCOUNTER — OFFICE VISIT (OUTPATIENT)
Dept: SURGERY | Facility: CLINIC | Age: 39
End: 2024-07-23
Attending: INTERNAL MEDICINE
Payer: MEDICAID

## 2024-07-23 VITALS
DIASTOLIC BLOOD PRESSURE: 79 MMHG | TEMPERATURE: 98 F | WEIGHT: 122 LBS | HEIGHT: 66 IN | BODY MASS INDEX: 19.61 KG/M2 | SYSTOLIC BLOOD PRESSURE: 121 MMHG | OXYGEN SATURATION: 100 % | RESPIRATION RATE: 20 BRPM | HEART RATE: 92 BPM

## 2024-07-23 DIAGNOSIS — C78.01 MALIGNANT NEOPLASM METASTATIC TO HILUS OF LUNG WITH UNKNOWN PRIMARY SITE, RIGHT: ICD-10-CM

## 2024-07-23 DIAGNOSIS — C80.1 MALIGNANT NEOPLASM METASTATIC TO HILUS OF LUNG WITH UNKNOWN PRIMARY SITE, RIGHT: ICD-10-CM

## 2024-07-23 DIAGNOSIS — C7A.1 LARGE CELL NEUROENDOCRINE CARCINOMA: ICD-10-CM

## 2024-07-23 DIAGNOSIS — C7A.8 NEUROENDOCRINE CARCINOMA OF LUNG: ICD-10-CM

## 2024-07-23 PROCEDURE — 77301 RADIOTHERAPY DOSE PLAN IMRT: CPT | Performed by: RADIOLOGY

## 2024-07-23 PROCEDURE — 99215 OFFICE O/P EST HI 40 MIN: CPT | Mod: PBBFAC

## 2024-07-23 PROCEDURE — 77300 RADIATION THERAPY DOSE PLAN: CPT | Performed by: RADIOLOGY

## 2024-07-23 PROCEDURE — 77338 DESIGN MLC DEVICE FOR IMRT: CPT | Performed by: RADIOLOGY

## 2024-07-23 RX ORDER — HEPARIN SODIUM 5000 [USP'U]/ML
5000 INJECTION, SOLUTION INTRAVENOUS; SUBCUTANEOUS EVERY 8 HOURS
OUTPATIENT
Start: 2024-07-23

## 2024-07-23 RX ORDER — CEFAZOLIN SODIUM 2 G/50ML
2 SOLUTION INTRAVENOUS
OUTPATIENT
Start: 2024-07-23

## 2024-07-23 RX ORDER — SODIUM CHLORIDE 9 MG/ML
INJECTION, SOLUTION INTRAVENOUS CONTINUOUS
OUTPATIENT
Start: 2024-07-23
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
## 2024-07-23 NOTE — PROGRESS NOTES
Seen by Dr. Holguin.  Surgery consent signed and witnessed. Surgery scheduled on 7/31/24.  Outpatient instruction sheet explained and given to patient.  RTC prn.
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
## 2024-07-23 NOTE — TELEPHONE ENCOUNTER
Called patient sister Franca Ball about patient missed appointment on 7/23/24. Patient sister stated patient got confused with his appointments. Franca Ball called brother on three way and I spoke with patient. Patient requested to cancel appointment and reschedule. Informed patient clinic will reschedule appointment and contact him with a new date and time. Patient verbalized understanding.

## 2024-07-23 NOTE — TELEPHONE ENCOUNTER
Called patient sister Franca Ball to confirm reschedule appointment for 7/26/24 at 8:00am .Spoke with patient sister Franca Ball. Patient sister asked if appointment can be moved up to 9:00 am because their father drives patient to his appointments. Spoke with Bertrand Hollins NP. Informed patient sister Franca Ball appointment will be on 7/26/24 at 9:00 am. Patient sister Franca Ball confirmed appointment and verbalized understanding.
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
## 2024-07-24 PROCEDURE — 77386 HC IMRT, COMPLEX: CPT | Performed by: RADIOLOGY

## 2024-07-25 ENCOUNTER — TELEPHONE (OUTPATIENT)
Dept: HEMATOLOGY/ONCOLOGY | Facility: CLINIC | Age: 39
End: 2024-07-25
Payer: MEDICAID

## 2024-07-25 PROCEDURE — 77386 HC IMRT, COMPLEX: CPT | Performed by: RADIOLOGY

## 2024-07-25 NOTE — TELEPHONE ENCOUNTER
Called patient to confirm appointment for 7/26/24. Patient did not answer the phone unable to leave a voice message. Called patient sister Franca Ball. Patient sister did not answer the phone left a voice message. Called patient family member [phone] to confirm appointment for 7/26/24. Patient family member confirmed appointment and verbalized understanding.

## 2024-07-26 ENCOUNTER — TELEPHONE (OUTPATIENT)
Dept: INFUSION THERAPY | Facility: HOSPITAL | Age: 39
End: 2024-07-26
Payer: MEDICAID

## 2024-07-26 PROCEDURE — 77386 HC IMRT, COMPLEX: CPT | Performed by: RADIOLOGY

## 2024-07-29 PROCEDURE — 77336 RADIATION PHYSICS CONSULT: CPT | Performed by: RADIOLOGY

## 2024-07-30 ENCOUNTER — PATIENT MESSAGE (OUTPATIENT)
Dept: SURGERY | Facility: HOSPITAL | Age: 39
End: 2024-07-30
Payer: MEDICAID

## 2024-07-31 ENCOUNTER — HOSPITAL ENCOUNTER (OUTPATIENT)
Facility: HOSPITAL | Age: 39
Discharge: HOME | End: 2024-07-31
Attending: SURGERY | Admitting: SURGERY
Payer: MEDICAID

## 2024-07-31 DIAGNOSIS — C7A.1 LARGE CELL NEUROENDOCRINE CARCINOMA: ICD-10-CM

## 2024-07-31 DIAGNOSIS — C7A.8 NEUROENDOCRINE CARCINOMA OF LUNG: ICD-10-CM

## 2024-07-31 PROCEDURE — 63600175 PHARM REV CODE 636 W HCPCS: Performed by: NURSE ANESTHETIST, CERTIFIED REGISTERED

## 2024-07-31 PROCEDURE — 25000003 PHARM REV CODE 250: Performed by: SURGERY

## 2024-07-31 PROCEDURE — 63600175 PHARM REV CODE 636 W HCPCS: Performed by: SURGERY

## 2024-07-31 PROCEDURE — 71000015 HC POSTOP RECOV 1ST HR: Performed by: SURGERY

## 2024-07-31 PROCEDURE — 37000009 HC ANESTHESIA EA ADD 15 MINS: Performed by: SURGERY

## 2024-07-31 PROCEDURE — 37000008 HC ANESTHESIA 1ST 15 MINUTES: Performed by: SURGERY

## 2024-07-31 PROCEDURE — 36000707: Performed by: SURGERY

## 2024-07-31 PROCEDURE — 63600175 PHARM REV CODE 636 W HCPCS: Performed by: ANESTHESIOLOGY

## 2024-07-31 PROCEDURE — 25000003 PHARM REV CODE 250: Performed by: NURSE ANESTHETIST, CERTIFIED REGISTERED

## 2024-07-31 PROCEDURE — C1788 PORT, INDWELLING, IMP: HCPCS | Performed by: SURGERY

## 2024-07-31 PROCEDURE — 63600175 PHARM REV CODE 636 W HCPCS

## 2024-07-31 PROCEDURE — 71000033 HC RECOVERY, INTIAL HOUR: Performed by: SURGERY

## 2024-07-31 PROCEDURE — 25000003 PHARM REV CODE 250: Performed by: ANESTHESIOLOGY

## 2024-07-31 PROCEDURE — 36000706: Performed by: SURGERY

## 2024-07-31 DEVICE — POWERPORT CLEARVUE IMPLANTABLE PORT WITH ATTACHABLE 8F POLYURETHANE OPEN-ENDED SINGLE-LUMEN VENOUS CATHETER INTERMEDIATE KIT
Type: IMPLANTABLE DEVICE | Site: CHEST | Status: FUNCTIONAL
Brand: POWERPORT CLEARVUE

## 2024-07-31 RX ORDER — MIDAZOLAM HYDROCHLORIDE 1 MG/ML
1 INJECTION INTRAMUSCULAR; INTRAVENOUS ONCE
Status: COMPLETED | OUTPATIENT
Start: 2024-07-31 | End: 2024-07-31

## 2024-07-31 RX ORDER — CEFAZOLIN SODIUM 1 G/3ML
2 INJECTION, POWDER, FOR SOLUTION INTRAMUSCULAR; INTRAVENOUS
Status: COMPLETED | OUTPATIENT
Start: 2024-07-31 | End: 2024-07-31

## 2024-07-31 RX ORDER — ONDANSETRON HYDROCHLORIDE 2 MG/ML
4 INJECTION, SOLUTION INTRAVENOUS ONCE
Status: COMPLETED | OUTPATIENT
Start: 2024-07-31 | End: 2024-07-31

## 2024-07-31 RX ORDER — HYDROMORPHONE HYDROCHLORIDE 1 MG/ML
0.4 INJECTION, SOLUTION INTRAMUSCULAR; INTRAVENOUS; SUBCUTANEOUS EVERY 10 MIN PRN
Status: DISCONTINUED | OUTPATIENT
Start: 2024-07-31 | End: 2024-07-31 | Stop reason: HOSPADM

## 2024-07-31 RX ORDER — HEPARIN SODIUM 5000 [USP'U]/ML
5000 INJECTION, SOLUTION INTRAVENOUS; SUBCUTANEOUS EVERY 8 HOURS
Status: DISCONTINUED | OUTPATIENT
Start: 2024-07-31 | End: 2024-07-31 | Stop reason: HOSPADM

## 2024-07-31 RX ORDER — HEPARIN 100 UNIT/ML
SYRINGE INTRAVENOUS
Status: DISCONTINUED | OUTPATIENT
Start: 2024-07-31 | End: 2024-07-31 | Stop reason: HOSPADM

## 2024-07-31 RX ORDER — SODIUM CHLORIDE 9 MG/ML
INJECTION, SOLUTION INTRAVENOUS CONTINUOUS
Status: DISCONTINUED | OUTPATIENT
Start: 2024-07-31 | End: 2024-07-31 | Stop reason: HOSPADM

## 2024-07-31 RX ORDER — LIDOCAINE HYDROCHLORIDE 10 MG/ML
INJECTION INFILTRATION; PERINEURAL
Status: DISCONTINUED | OUTPATIENT
Start: 2024-07-31 | End: 2024-07-31 | Stop reason: HOSPADM

## 2024-07-31 RX ORDER — PROPOFOL 10 MG/ML
VIAL (ML) INTRAVENOUS CONTINUOUS PRN
Status: DISCONTINUED | OUTPATIENT
Start: 2024-07-31 | End: 2024-07-31

## 2024-07-31 RX ORDER — MIDAZOLAM HYDROCHLORIDE 2 MG/2ML
1 INJECTION, SOLUTION INTRAMUSCULAR; INTRAVENOUS
Status: COMPLETED | OUTPATIENT
Start: 2024-07-31 | End: 2024-07-31

## 2024-07-31 RX ORDER — ACETAMINOPHEN 500 MG
500 TABLET ORAL
Status: COMPLETED | OUTPATIENT
Start: 2024-07-31 | End: 2024-07-31

## 2024-07-31 RX ORDER — ONDANSETRON HYDROCHLORIDE 2 MG/ML
INJECTION, SOLUTION INTRAVENOUS
Status: DISCONTINUED | OUTPATIENT
Start: 2024-07-31 | End: 2024-07-31

## 2024-07-31 RX ORDER — DEXAMETHASONE SODIUM PHOSPHATE 4 MG/ML
INJECTION, SOLUTION INTRA-ARTICULAR; INTRALESIONAL; INTRAMUSCULAR; INTRAVENOUS; SOFT TISSUE
Status: DISCONTINUED | OUTPATIENT
Start: 2024-07-31 | End: 2024-07-31

## 2024-07-31 RX ORDER — LIDOCAINE HYDROCHLORIDE 20 MG/ML
INJECTION INTRAVENOUS
Status: DISCONTINUED | OUTPATIENT
Start: 2024-07-31 | End: 2024-07-31

## 2024-07-31 RX ORDER — PHENYLEPHRINE HYDROCHLORIDE 10 MG/ML
INJECTION INTRAVENOUS
Status: DISCONTINUED | OUTPATIENT
Start: 2024-07-31 | End: 2024-07-31

## 2024-07-31 RX ORDER — SODIUM CHLORIDE, SODIUM LACTATE, POTASSIUM CHLORIDE, CALCIUM CHLORIDE 600; 310; 30; 20 MG/100ML; MG/100ML; MG/100ML; MG/100ML
INJECTION, SOLUTION INTRAVENOUS CONTINUOUS
Status: DISCONTINUED | OUTPATIENT
Start: 2024-07-31 | End: 2024-07-31 | Stop reason: HOSPADM

## 2024-07-31 RX ORDER — ONDANSETRON HYDROCHLORIDE 2 MG/ML
4 INJECTION, SOLUTION INTRAVENOUS ONCE AS NEEDED
Status: DISCONTINUED | OUTPATIENT
Start: 2024-07-31 | End: 2024-07-31 | Stop reason: HOSPADM

## 2024-07-31 RX ORDER — MEPERIDINE HYDROCHLORIDE 25 MG/ML
12.5 INJECTION INTRAMUSCULAR; INTRAVENOUS; SUBCUTANEOUS EVERY 10 MIN PRN
Status: DISCONTINUED | OUTPATIENT
Start: 2024-07-31 | End: 2024-07-31 | Stop reason: HOSPADM

## 2024-07-31 RX ORDER — DEXMEDETOMIDINE HYDROCHLORIDE 100 UG/ML
INJECTION, SOLUTION INTRAVENOUS
Status: DISCONTINUED | OUTPATIENT
Start: 2024-07-31 | End: 2024-07-31

## 2024-07-31 RX ORDER — GLUCAGON 1 MG
1 KIT INJECTION
Status: DISCONTINUED | OUTPATIENT
Start: 2024-07-31 | End: 2024-07-31 | Stop reason: HOSPADM

## 2024-07-31 RX ORDER — HEPARIN SOD,PORCINE/0.9 % NACL 1000/500ML
INTRAVENOUS SOLUTION INTRAVENOUS
Status: DISCONTINUED | OUTPATIENT
Start: 2024-07-31 | End: 2024-07-31 | Stop reason: HOSPADM

## 2024-07-31 RX ORDER — FENTANYL CITRATE 50 UG/ML
INJECTION, SOLUTION INTRAMUSCULAR; INTRAVENOUS
Status: DISCONTINUED | OUTPATIENT
Start: 2024-07-31 | End: 2024-07-31

## 2024-07-31 RX ADMIN — FENTANYL CITRATE 100 MCG: 50 INJECTION INTRAMUSCULAR; INTRAVENOUS at 08:07

## 2024-07-31 RX ADMIN — SODIUM CHLORIDE, POTASSIUM CHLORIDE, SODIUM LACTATE AND CALCIUM CHLORIDE: 600; 310; 30; 20 INJECTION, SOLUTION INTRAVENOUS at 07:07

## 2024-07-31 RX ADMIN — PROPOFOL 50 MG: 10 INJECTION, EMULSION INTRAVENOUS at 08:07

## 2024-07-31 RX ADMIN — ACETAMINOPHEN 500 MG: 500 TABLET ORAL at 06:07

## 2024-07-31 RX ADMIN — DEXMEDETOMIDINE 10 MCG: 200 INJECTION, SOLUTION INTRAVENOUS at 08:07

## 2024-07-31 RX ADMIN — PHENYLEPHRINE HYDROCHLORIDE 100 MCG: 10 INJECTION INTRAVENOUS at 08:07

## 2024-07-31 RX ADMIN — HEPARIN SODIUM 5000 UNITS: 5000 INJECTION, SOLUTION INTRAVENOUS; SUBCUTANEOUS at 06:07

## 2024-07-31 RX ADMIN — DEXMEDETOMIDINE 20 MCG: 200 INJECTION, SOLUTION INTRAVENOUS at 08:07

## 2024-07-31 RX ADMIN — FENTANYL CITRATE 50 MCG: 50 INJECTION INTRAMUSCULAR; INTRAVENOUS at 08:07

## 2024-07-31 RX ADMIN — CEFAZOLIN 2 G: 330 INJECTION, POWDER, FOR SOLUTION INTRAMUSCULAR; INTRAVENOUS at 08:07

## 2024-07-31 RX ADMIN — PHENYLEPHRINE HYDROCHLORIDE 100 MCG: 10 INJECTION INTRAVENOUS at 09:07

## 2024-07-31 RX ADMIN — ONDANSETRON 4 MG: 2 INJECTION INTRAMUSCULAR; INTRAVENOUS at 08:07

## 2024-07-31 RX ADMIN — DEXAMETHASONE SODIUM PHOSPHATE 4 MG: 4 INJECTION, SOLUTION INTRA-ARTICULAR; INTRALESIONAL; INTRAMUSCULAR; INTRAVENOUS; SOFT TISSUE at 08:07

## 2024-07-31 RX ADMIN — ONDANSETRON 4 MG: 2 INJECTION INTRAMUSCULAR; INTRAVENOUS at 06:07

## 2024-07-31 RX ADMIN — SODIUM CHLORIDE, POTASSIUM CHLORIDE, SODIUM LACTATE AND CALCIUM CHLORIDE: 600; 310; 30; 20 INJECTION, SOLUTION INTRAVENOUS at 08:07

## 2024-07-31 RX ADMIN — LIDOCAINE HYDROCHLORIDE 50 MG: 20 INJECTION INTRAVENOUS at 08:07

## 2024-07-31 RX ADMIN — MIDAZOLAM HYDROCHLORIDE 1 MG: 1 INJECTION, SOLUTION INTRAMUSCULAR; INTRAVENOUS at 07:07

## 2024-07-31 RX ADMIN — MIDAZOLAM HYDROCHLORIDE 1 MG: 1 INJECTION, SOLUTION INTRAMUSCULAR; INTRAVENOUS at 08:07

## 2024-07-31 RX ADMIN — FENTANYL CITRATE 50 MCG: 50 INJECTION INTRAMUSCULAR; INTRAVENOUS at 09:07

## 2024-07-31 RX ADMIN — PROPOFOL 200 MG: 10 INJECTION, EMULSION INTRAVENOUS at 08:07

## 2024-07-31 NOTE — TRANSFER OF CARE
Anesthesia Transfer of Care Note    Patient: Ranjeet Ball    Procedure(s) Performed: Procedure(s) (LRB):  [id number]-I-CATH (Right)    Patient location: PACU    Anesthesia Type: general    Transport from OR: Transported from OR on room air with adequate spontaneous ventilation    Post pain: adequate analgesia    Post assessment: no apparent anesthetic complications    Post vital signs: stable    Level of consciousness: sedated    Nausea/Vomiting: no nausea/vomiting    Complications: none    Transfer of care protocol was followed      Last vitals:

## 2024-07-31 NOTE — DISCHARGE SUMMARY
[facility] - Periop Services  Discharge Note  Short Stay    Procedure(s) (LRB):  [id number]-[id number]-L-CATH (Right)      OUTCOME: Patient tolerated treatment/procedure well without complication and is now ready for discharge. Catheter placement in SVC and no pneumothorax was found on post-op xray.    DISPOSITION: Home or Self Care    FINAL DIAGNOSIS: Properly placed mediport and catheter tip near the cavo-atrial junction confirmed with post-op xray.    FOLLOWUP: None    DISCHARGE INSTRUCTIONS:    Discharge Procedure Orders   Diet Adult Regular     Notify your health care provider if you experience any of the following:  temperature >100.4     Notify your health care provider if you experience any of the following:  severe uncontrolled pain     Notify your health care provider if you experience any of the following:  redness, tenderness, or signs of infection (pain, swelling, redness, odor or green/yellow discharge around incision site)     No dressing needed   Order Comments: Do not shower for 24hr. Do not take a bath or swim for the next 2 weeks. Can begin showering tomorrow     Activity as tolerated        TIME SPENT ON DISCHARGE: 10 minutes    Chris Salazar MD  [facility] General Surgery PGY-1

## 2024-07-31 NOTE — INTERVAL H&P NOTE
The patient has been examined and the H&P has been reviewed:    I concur with the findings and no changes have occurred since H&P was written.    Surgery risks, benefits and alternative options discussed and understood by patient/family.    Patient has been NPO since midnight.     Patient has held home blood thinners for appropriate amount of time: N/A    Positioning: Supine    Pre-operative Heparin Ordered: Yes    Pre-operative Antibiotics Ordered: Yes: ancef    Laterality Marked: N/A    All questions and concerns addressed. Patient confirms the procedure to be performed: [id number]-V-CATH.     Arelis Holguin MD  [facility] General Surgery, PGY-1  07/31/2024 7:03 AM        There are no hospital problems to display for this patient.

## 2024-07-31 NOTE — ANESTHESIA PROCEDURE NOTES
Intubation    Date/Time: 7/31/2024 8:48 AM    Performed by: Marcie Dobson CRNA  Authorized by: Macarena Malik MD    Intubation:     Induction:  Intravenous    Intubated:  Postinduction    Mask Ventilation:  Not attempted    Attempts:  1    Attempted By:  CRNA    Difficult Airway Encountered?: No      Complications:  None    Airway Device:  Supraglottic airway/LMA    Airway Device Size:  3.0    Secured at:  The lips    Placement Verified By:  Capnometry    Complicating Factors:  None    Findings Post-Intubation:  BS equal bilateral and atraumatic/condition of teeth unchanged  Notes:      igel

## 2024-07-31 NOTE — ANESTHESIA POSTPROCEDURE EVALUATION
Anesthesia Post Evaluation    Patient: Ranjeet Ball    Procedure(s) Performed: Procedure(s) (LRB):  [id number]-X-CATH (Right)    Final Anesthesia Type: general      Patient location during evaluation: DOSC  Post-procedure vital signs: reviewed and stable  Airway patency: patent      Anesthetic complications: no      Cardiovascular status: hemodynamically stable  Respiratory status: spontaneous ventilation  Follow-up not needed.              Vitals Value Taken Time   /75 07/31/24 1023   Temp 36.3 °C (97.4 °F) 07/31/24 1023   Pulse 83 07/31/24 1057   Resp 20 07/31/24 1023   SpO2 99 % 07/31/24 1057   Vitals shown include unfiled device data.      Event Time   Out of Recovery 10:18:00         Pain/Rocio Score: Pain Rating Prior to Med Admin: 0 (7/31/2024  6:59 AM)  Rocio Score: 10 (7/31/2024 10:23 AM)

## 2024-07-31 NOTE — DISCHARGE INSTRUCTIONS
POSTOPERATIVE INSTRUCTIONS FOLLOWING PORT-A-CATH PLACMENT    The following are post-operative instructions that will help you to recover from your surgery.  Please read over these instructions carefully and contact us if we can answer any of your questions or concerns.    Dressing  You may shower after 2 days.  Do not take a tub bath and do not soak the surgical site. Please do not remove the skin glue that cover your incision.  They will be removed at your clinic visit.  DO NOT put ointments or creams on thesurgical incisions    Activity   You should be able to return to your regular activities 2 days after your surgery.  However, do not engage in strenuous activities in which you use your upper body such as:  golf, tennis, aerobics, washing windows, raking the yard, mopping, vacuuming, heavy lifting (e.g children) until you are seen for your follow-up appointment in clinic.    Medication for pain  You may find that over the counter pain medications may be sufficient for your pain.  You will be given a prescription for pain medication for more severe pain.  You should not drive or operate machinery while taking these.  Please take narcotics with food.  Narcotics can cause, or worse, constipation.  You will need to increase your fluid intake, eat high fiber foods (such as fruits and bran) and make sure that you are up and walking. You may need to take an over the counter stool softener for constipation.    Please report the following:  Temperature greater than 100.4 degrees  Discharge or bad odor from the wound  Excessive bleeding, such as bloody dressing or extreme bruising  Redness at incision and/or drain sites  Swelling or buildup of fluid around incision  Shortness of breath    · Keep all appointments.  Resume home medications unless otherwise instructed by your doctor.    · No heavy lifting or straining over 10 pounds for 3-4 weeks.    · You may take a shower starting tomorrow evening. Wash GENTLY with soap  and water (do not scrub) over incision, rinse with water, and pat dry.    · Do not soak your wound in water for two weeks. Do not take baths, swim, or use a hot tub until your doctor says it is okay.    · Use pain medication as instructed. Do not take Tylenol (acetaminophen) with your NORCO, since NORCO contains Tylenol as well.    · You may use an ice pack as needed for 20 minutes at a time over your incision site to minimize swelling and help relieve pain.    · Do not drink alcohol or drive today, or as long as you are on pain medication.    · Notify MD of any moderate to severe pain unrelieved by pain medicine, if your incision opens and/or bleeds, or for any signs of infection including fever above 100.4, excessive redness or swelling, yellow/green foul- smelling drainage, nausea or vomiting. Clinics number is [phone]. If it is after business hours or emergency call [phone] and state Im having post op complications and need to speak to the surgeon on call.    · Thanks for choosing [facility]! Have a smooth recovery!

## 2024-07-31 NOTE — OP NOTE
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 After verifying that written informed consent had been obtained, the patient was taken to the OR and placed on the table in the supine position. MAC was administered by anesthesia w/o complications. The bilateral chest and neck were prepped and draped in the usual sterile fashion. A proper timeout was performed confirming correct patient, site, procedure, and administration of preoperative antibiotic and anticoagulation.     After identified the right internal jugular vein, a large bore needle was used to penetrate the skin and venous access was obtained under ultrasound guidance. A guidewire was inserted into the needle and confirmed to be in the superior vena cava by flouroscopy.  Next a 3 cm incision was made on the upper chest and deepened with electrocautery.  A 2 x 3 cm port pocket was created in the subcutaneous space overlying the pectoralis fascia inferior to the incision. The catheter was then tunneled through the subcutaneous tissue to exit the skin at the wire. A dilator and sheath were inserted over the guidewire into the central venous system under fluoroscopic guidance. The guidewire and dilator were removed from the sheath. The catheter  was inserted into the sheath and then the sheath was torn away. The catheter was pulled back such that the tip was at the atriocaval junction. The catheter was attached to the port with a locking washer and the the port was fixed to the pectoralis fascia with two 2-0 prolene stitches. A Mchugh needle was used to aspirate then flush the port with heparinized saline. The incision was then reapproximated with 4 3-0 Vicryl, and the skin closed with a running 4-0 Monocryl and Dermabond. The incision at the site of venous access was closed with Dermabond. The patient tolerated the procedure without any immediate complications. They were awoken from anesthesia and transferred to the recovery room in stable condition.     All suture, needle, and instrument counts were correct x2 at the conclusion of the case.     Dr. Alvarado was present for all critical portions of the case.     Estimated Blood Loss:  minimal    Complications:  none           Disposition: PACU - hemodynamically stable.           Condition: stable    Chris Salazar MD  [employer] General Surgery, PGY-1  07/31/2024

## 2024-08-01 ENCOUNTER — CLINICAL SUPPORT (OUTPATIENT)
Dept: RADIATION THERAPY | Facility: HOSPITAL | Age: 39
End: 2024-08-01
Attending: RADIOLOGY
Payer: MEDICAID

## 2024-08-01 VITALS
BODY MASS INDEX: 19.22 KG/M2 | OXYGEN SATURATION: 99 % | DIASTOLIC BLOOD PRESSURE: 78 MMHG | TEMPERATURE: 98 F | WEIGHT: 118.19 LBS | RESPIRATION RATE: 20 BRPM | HEART RATE: 83 BPM | SYSTOLIC BLOOD PRESSURE: 119 MMHG

## 2024-08-02 DIAGNOSIS — C34.90 MALIGNANT NEOPLASM OF LUNG, UNSPECIFIED LATERALITY, UNSPECIFIED PART OF LUNG: Primary | ICD-10-CM

## 2024-08-05 ENCOUNTER — TELEPHONE (OUTPATIENT)
Dept: HEMATOLOGY/ONCOLOGY | Facility: CLINIC | Age: 39
End: 2024-08-05
Payer: MEDICAID

## 2024-08-06 ENCOUNTER — OFFICE VISIT (OUTPATIENT)
Dept: HEMATOLOGY/ONCOLOGY | Facility: CLINIC | Age: 39
End: 2024-08-06
Payer: MEDICAID

## 2024-08-06 VITALS
DIASTOLIC BLOOD PRESSURE: 61 MMHG | TEMPERATURE: 98 F | WEIGHT: 120 LBS | OXYGEN SATURATION: 99 % | SYSTOLIC BLOOD PRESSURE: 99 MMHG | BODY MASS INDEX: 19.29 KG/M2 | HEIGHT: 66 IN | HEART RATE: 114 BPM

## 2024-08-06 DIAGNOSIS — Z71.9 ENCOUNTER FOR EDUCATION: ICD-10-CM

## 2024-08-06 DIAGNOSIS — C7A.8 NEUROENDOCRINE CARCINOMA OF LUNG: Primary | ICD-10-CM

## 2024-08-06 PROCEDURE — 3044F HG A1C LEVEL LT 7.0%: CPT | Mod: CPTII,,,

## 2024-08-06 PROCEDURE — 3008F BODY MASS INDEX DOCD: CPT | Mod: CPTII,,,

## 2024-08-06 PROCEDURE — 99213 OFFICE O/P EST LOW 20 MIN: CPT | Mod: PBBFAC

## 2024-08-06 PROCEDURE — 77386 HC IMRT, COMPLEX: CPT | Performed by: RADIOLOGY

## 2024-08-06 PROCEDURE — 1160F RVW MEDS BY RX/DR IN RCRD: CPT | Mod: CPTII,,,

## 2024-08-06 PROCEDURE — 3078F DIAST BP <80 MM HG: CPT | Mod: CPTII,,,

## 2024-08-06 PROCEDURE — 99215 OFFICE O/P EST HI 40 MIN: CPT | Mod: S$PBB,,,

## 2024-08-06 PROCEDURE — 3074F SYST BP LT 130 MM HG: CPT | Mod: CPTII,,,

## 2024-08-06 PROCEDURE — 1159F MED LIST DOCD IN RCRD: CPT | Mod: CPTII,,,

## 2024-08-06 RX ORDER — PROCHLORPERAZINE EDISYLATE 5 MG/ML
10 INJECTION INTRAMUSCULAR; INTRAVENOUS ONCE AS NEEDED
Status: CANCELLED | OUTPATIENT
Start: 2024-08-08

## 2024-08-06 RX ORDER — SODIUM CHLORIDE 0.9 % (FLUSH) 0.9 %
10 SYRINGE (ML) INJECTION
OUTPATIENT
Start: 2024-08-29

## 2024-08-06 RX ORDER — HEPARIN 100 UNIT/ML
500 SYRINGE INTRAVENOUS
OUTPATIENT
Start: 2024-08-28

## 2024-08-06 RX ORDER — EPINEPHRINE 0.3 MG/.3ML
0.3 INJECTION SUBCUTANEOUS ONCE AS NEEDED
OUTPATIENT
Start: 2024-08-29

## 2024-08-06 RX ORDER — SODIUM CHLORIDE 0.9 % (FLUSH) 0.9 %
10 SYRINGE (ML) INJECTION
OUTPATIENT
Start: 2024-08-28

## 2024-08-06 RX ORDER — EPINEPHRINE 0.3 MG/.3ML
0.3 INJECTION SUBCUTANEOUS ONCE AS NEEDED
Status: CANCELLED | OUTPATIENT
Start: 2024-08-08

## 2024-08-06 RX ORDER — DIPHENHYDRAMINE HYDROCHLORIDE 50 MG/ML
50 INJECTION INTRAMUSCULAR; INTRAVENOUS ONCE AS NEEDED
OUTPATIENT
Start: 2024-08-28

## 2024-08-06 RX ORDER — DIPHENHYDRAMINE HYDROCHLORIDE 50 MG/ML
50 INJECTION INTRAMUSCULAR; INTRAVENOUS ONCE AS NEEDED
OUTPATIENT
Start: 2024-08-30

## 2024-08-06 RX ORDER — PROCHLORPERAZINE EDISYLATE 5 MG/ML
10 INJECTION INTRAMUSCULAR; INTRAVENOUS ONCE AS NEEDED
OUTPATIENT
Start: 2024-08-29

## 2024-08-06 RX ORDER — PROCHLORPERAZINE EDISYLATE 5 MG/ML
10 INJECTION INTRAMUSCULAR; INTRAVENOUS ONCE AS NEEDED
OUTPATIENT
Start: 2024-08-30

## 2024-08-06 RX ORDER — SODIUM CHLORIDE 0.9 % (FLUSH) 0.9 %
10 SYRINGE (ML) INJECTION
Status: CANCELLED | OUTPATIENT
Start: 2024-08-09

## 2024-08-06 RX ORDER — DIPHENHYDRAMINE HYDROCHLORIDE 50 MG/ML
50 INJECTION INTRAMUSCULAR; INTRAVENOUS ONCE AS NEEDED
Status: CANCELLED | OUTPATIENT
Start: 2024-08-09

## 2024-08-06 RX ORDER — HEPARIN 100 UNIT/ML
500 SYRINGE INTRAVENOUS
OUTPATIENT
Start: 2024-08-30

## 2024-08-06 RX ORDER — DIPHENHYDRAMINE HYDROCHLORIDE 50 MG/ML
50 INJECTION INTRAMUSCULAR; INTRAVENOUS ONCE AS NEEDED
OUTPATIENT
Start: 2024-08-29

## 2024-08-06 RX ORDER — HEPARIN 100 UNIT/ML
500 SYRINGE INTRAVENOUS
Status: CANCELLED | OUTPATIENT
Start: 2024-08-07

## 2024-08-06 RX ORDER — DIPHENHYDRAMINE HYDROCHLORIDE 50 MG/ML
50 INJECTION INTRAMUSCULAR; INTRAVENOUS ONCE AS NEEDED
Status: CANCELLED | OUTPATIENT
Start: 2024-08-08

## 2024-08-06 RX ORDER — EPINEPHRINE 0.3 MG/.3ML
0.3 INJECTION SUBCUTANEOUS ONCE AS NEEDED
OUTPATIENT
Start: 2024-08-30

## 2024-08-06 RX ORDER — EPINEPHRINE 0.3 MG/.3ML
0.3 INJECTION SUBCUTANEOUS ONCE AS NEEDED
OUTPATIENT
Start: 2024-08-28

## 2024-08-06 RX ORDER — DEXAMETHASONE 4 MG/1
8 TABLET ORAL DAILY
Qty: 6 TABLET | Refills: 3 | Status: SHIPPED | OUTPATIENT
Start: 2024-08-06

## 2024-08-06 RX ORDER — HEPARIN 100 UNIT/ML
500 SYRINGE INTRAVENOUS
Status: CANCELLED | OUTPATIENT
Start: 2024-08-09

## 2024-08-06 RX ORDER — PROCHLORPERAZINE EDISYLATE 5 MG/ML
10 INJECTION INTRAMUSCULAR; INTRAVENOUS ONCE AS NEEDED
Status: CANCELLED | OUTPATIENT
Start: 2024-08-07

## 2024-08-06 RX ORDER — DIPHENHYDRAMINE HYDROCHLORIDE 50 MG/ML
50 INJECTION INTRAMUSCULAR; INTRAVENOUS ONCE AS NEEDED
Status: CANCELLED | OUTPATIENT
Start: 2024-08-07

## 2024-08-06 RX ORDER — PROCHLORPERAZINE EDISYLATE 5 MG/ML
10 INJECTION INTRAMUSCULAR; INTRAVENOUS ONCE AS NEEDED
OUTPATIENT
Start: 2024-08-28

## 2024-08-06 RX ORDER — HEPARIN 100 UNIT/ML
500 SYRINGE INTRAVENOUS
Status: CANCELLED | OUTPATIENT
Start: 2024-08-08

## 2024-08-06 RX ORDER — PROCHLORPERAZINE EDISYLATE 5 MG/ML
10 INJECTION INTRAMUSCULAR; INTRAVENOUS ONCE AS NEEDED
Status: CANCELLED | OUTPATIENT
Start: 2024-08-09

## 2024-08-06 RX ORDER — OLANZAPINE 5 MG/1
TABLET ORAL
Qty: 4 TABLET | Refills: 3 | Status: SHIPPED | OUTPATIENT
Start: 2024-08-06

## 2024-08-06 RX ORDER — HEPARIN 100 UNIT/ML
500 SYRINGE INTRAVENOUS
OUTPATIENT
Start: 2024-08-29

## 2024-08-06 RX ORDER — EPINEPHRINE 0.3 MG/.3ML
0.3 INJECTION SUBCUTANEOUS ONCE AS NEEDED
Status: CANCELLED | OUTPATIENT
Start: 2024-08-09

## 2024-08-06 RX ORDER — SODIUM CHLORIDE 0.9 % (FLUSH) 0.9 %
10 SYRINGE (ML) INJECTION
Status: CANCELLED | OUTPATIENT
Start: 2024-08-07

## 2024-08-06 RX ORDER — EPINEPHRINE 0.3 MG/.3ML
0.3 INJECTION SUBCUTANEOUS ONCE AS NEEDED
Status: CANCELLED | OUTPATIENT
Start: 2024-08-07

## 2024-08-06 RX ORDER — PROCHLORPERAZINE MALEATE 5 MG
10 TABLET ORAL EVERY 6 HOURS PRN
Qty: 20 TABLET | Refills: 5 | Status: SHIPPED | OUTPATIENT
Start: 2024-08-06

## 2024-08-06 RX ORDER — SODIUM CHLORIDE 0.9 % (FLUSH) 0.9 %
10 SYRINGE (ML) INJECTION
Status: CANCELLED | OUTPATIENT
Start: 2024-08-08

## 2024-08-06 RX ORDER — SODIUM CHLORIDE 0.9 % (FLUSH) 0.9 %
10 SYRINGE (ML) INJECTION
OUTPATIENT
Start: 2024-08-30

## 2024-08-07 ENCOUNTER — DOCUMENTATION ONLY (OUTPATIENT)
Dept: HEMATOLOGY/ONCOLOGY | Facility: CLINIC | Age: 39
End: 2024-08-07
Payer: MEDICAID

## 2024-08-07 ENCOUNTER — CLINICAL SUPPORT (OUTPATIENT)
Dept: HEMATOLOGY/ONCOLOGY | Facility: CLINIC | Age: 39
End: 2024-08-07
Payer: MEDICAID

## 2024-08-07 ENCOUNTER — INFUSION (OUTPATIENT)
Dept: INFUSION THERAPY | Facility: HOSPITAL | Age: 39
End: 2024-08-07
Attending: INTERNAL MEDICINE
Payer: MEDICAID

## 2024-08-07 VITALS
WEIGHT: 121.31 LBS | HEIGHT: 67 IN | OXYGEN SATURATION: 99 % | HEART RATE: 98 BPM | DIASTOLIC BLOOD PRESSURE: 58 MMHG | BODY MASS INDEX: 19.04 KG/M2 | SYSTOLIC BLOOD PRESSURE: 99 MMHG | RESPIRATION RATE: 18 BRPM | TEMPERATURE: 98 F

## 2024-08-07 DIAGNOSIS — C7A.8 NEUROENDOCRINE CARCINOMA OF LUNG: Primary | ICD-10-CM

## 2024-08-07 PROCEDURE — 96367 TX/PROPH/DG ADDL SEQ IV INF: CPT

## 2024-08-07 PROCEDURE — 63600175 PHARM REV CODE 636 W HCPCS: Performed by: INTERNAL MEDICINE

## 2024-08-07 PROCEDURE — 96375 TX/PRO/DX INJ NEW DRUG ADDON: CPT

## 2024-08-07 PROCEDURE — 25000003 PHARM REV CODE 250: Performed by: INTERNAL MEDICINE

## 2024-08-07 PROCEDURE — 96417 CHEMO IV INFUS EACH ADDL SEQ: CPT

## 2024-08-07 PROCEDURE — 96413 CHEMO IV INFUSION 1 HR: CPT

## 2024-08-07 RX ORDER — HEPARIN 100 UNIT/ML
500 SYRINGE INTRAVENOUS
Status: DISCONTINUED | OUTPATIENT
Start: 2024-08-07 | End: 2024-08-07 | Stop reason: HOSPADM

## 2024-08-07 RX ORDER — PROCHLORPERAZINE EDISYLATE 5 MG/ML
10 INJECTION INTRAMUSCULAR; INTRAVENOUS ONCE AS NEEDED
Status: DISCONTINUED | OUTPATIENT
Start: 2024-08-07 | End: 2024-08-07 | Stop reason: HOSPADM

## 2024-08-07 RX ORDER — DIPHENHYDRAMINE HYDROCHLORIDE 50 MG/ML
50 INJECTION INTRAMUSCULAR; INTRAVENOUS ONCE AS NEEDED
Status: COMPLETED | OUTPATIENT
Start: 2024-08-07 | End: 2024-08-07

## 2024-08-07 RX ORDER — EPINEPHRINE 0.3 MG/.3ML
0.3 INJECTION SUBCUTANEOUS ONCE AS NEEDED
Status: DISCONTINUED | OUTPATIENT
Start: 2024-08-07 | End: 2024-08-07 | Stop reason: HOSPADM

## 2024-08-07 RX ORDER — SODIUM CHLORIDE 0.9 % (FLUSH) 0.9 %
10 SYRINGE (ML) INJECTION
Status: DISCONTINUED | OUTPATIENT
Start: 2024-08-07 | End: 2024-08-07 | Stop reason: HOSPADM

## 2024-08-07 RX ADMIN — ETOPOSIDE 160 MG: 20 INJECTION, SOLUTION INTRAVENOUS at 10:08

## 2024-08-07 RX ADMIN — ATEZOLIZUMAB 1200 MG: 1200 INJECTION, SOLUTION INTRAVENOUS at 08:08

## 2024-08-07 RX ADMIN — APREPITANT 130 MG: 130 INJECTION, EMULSION INTRAVENOUS at 09:08

## 2024-08-07 RX ADMIN — CARBOPLATIN 560 MG: 10 INJECTION, SOLUTION INTRAVENOUS at 10:08

## 2024-08-07 RX ADMIN — SODIUM CHLORIDE: 9 INJECTION, SOLUTION INTRAVENOUS at 08:08

## 2024-08-07 RX ADMIN — DEXAMETHASONE SODIUM PHOSPHATE 0.25 MG: 4 INJECTION, SOLUTION INTRA-ARTICULAR; INTRALESIONAL; INTRAMUSCULAR; INTRAVENOUS; SOFT TISSUE at 09:08

## 2024-08-07 RX ADMIN — DIPHENHYDRAMINE HYDROCHLORIDE 50 MG: 50 INJECTION INTRAMUSCULAR; INTRAVENOUS at 09:08

## 2024-08-08 ENCOUNTER — INFUSION (OUTPATIENT)
Dept: INFUSION THERAPY | Facility: HOSPITAL | Age: 39
End: 2024-08-08
Attending: INTERNAL MEDICINE
Payer: MEDICAID

## 2024-08-08 VITALS
SYSTOLIC BLOOD PRESSURE: 97 MMHG | BODY MASS INDEX: 19.73 KG/M2 | OXYGEN SATURATION: 97 % | DIASTOLIC BLOOD PRESSURE: 58 MMHG | RESPIRATION RATE: 18 BRPM | TEMPERATURE: 98 F | HEART RATE: 93 BPM | WEIGHT: 125.69 LBS

## 2024-08-08 DIAGNOSIS — C7A.8 NEUROENDOCRINE CARCINOMA OF LUNG: Primary | ICD-10-CM

## 2024-08-08 PROCEDURE — A4216 STERILE WATER/SALINE, 10 ML: HCPCS | Performed by: INTERNAL MEDICINE

## 2024-08-08 PROCEDURE — 25000003 PHARM REV CODE 250: Performed by: INTERNAL MEDICINE

## 2024-08-08 PROCEDURE — 96413 CHEMO IV INFUSION 1 HR: CPT

## 2024-08-08 PROCEDURE — 63600175 PHARM REV CODE 636 W HCPCS: Performed by: INTERNAL MEDICINE

## 2024-08-08 PROCEDURE — 77386 HC IMRT, COMPLEX: CPT | Performed by: RADIOLOGY

## 2024-08-08 RX ORDER — HEPARIN 100 UNIT/ML
500 SYRINGE INTRAVENOUS
Status: DISCONTINUED | OUTPATIENT
Start: 2024-08-08 | End: 2024-08-08 | Stop reason: HOSPADM

## 2024-08-08 RX ORDER — SODIUM CHLORIDE 0.9 % (FLUSH) 0.9 %
10 SYRINGE (ML) INJECTION
Status: DISCONTINUED | OUTPATIENT
Start: 2024-08-08 | End: 2024-08-08 | Stop reason: HOSPADM

## 2024-08-08 RX ORDER — PROCHLORPERAZINE EDISYLATE 5 MG/ML
10 INJECTION INTRAMUSCULAR; INTRAVENOUS ONCE AS NEEDED
Status: DISCONTINUED | OUTPATIENT
Start: 2024-08-08 | End: 2024-08-08 | Stop reason: HOSPADM

## 2024-08-08 RX ORDER — DIPHENHYDRAMINE HYDROCHLORIDE 50 MG/ML
50 INJECTION INTRAMUSCULAR; INTRAVENOUS ONCE AS NEEDED
Status: DISCONTINUED | OUTPATIENT
Start: 2024-08-08 | End: 2024-08-08 | Stop reason: HOSPADM

## 2024-08-08 RX ORDER — EPINEPHRINE 0.3 MG/.3ML
0.3 INJECTION SUBCUTANEOUS ONCE AS NEEDED
Status: DISCONTINUED | OUTPATIENT
Start: 2024-08-08 | End: 2024-08-08 | Stop reason: HOSPADM

## 2024-08-08 RX ADMIN — HEPARIN 500 UNITS: 100 SYRINGE at 10:08

## 2024-08-08 RX ADMIN — SODIUM CHLORIDE: 9 INJECTION, SOLUTION INTRAVENOUS at 08:08

## 2024-08-08 RX ADMIN — ETOPOSIDE 156 MG: 20 INJECTION, SOLUTION INTRAVENOUS at 09:08

## 2024-08-08 RX ADMIN — Medication 10 ML: at 10:08

## 2024-08-09 ENCOUNTER — INFUSION (OUTPATIENT)
Dept: INFUSION THERAPY | Facility: HOSPITAL | Age: 39
End: 2024-08-09
Attending: INTERNAL MEDICINE
Payer: MEDICAID

## 2024-08-09 VITALS
TEMPERATURE: 98 F | OXYGEN SATURATION: 98 % | RESPIRATION RATE: 18 BRPM | SYSTOLIC BLOOD PRESSURE: 99 MMHG | DIASTOLIC BLOOD PRESSURE: 50 MMHG | HEART RATE: 83 BPM

## 2024-08-09 DIAGNOSIS — C7A.8 NEUROENDOCRINE CARCINOMA OF LUNG: Primary | ICD-10-CM

## 2024-08-09 PROCEDURE — 63600175 PHARM REV CODE 636 W HCPCS: Performed by: INTERNAL MEDICINE

## 2024-08-09 PROCEDURE — 77386 HC IMRT, COMPLEX: CPT | Performed by: RADIOLOGY

## 2024-08-09 PROCEDURE — 96413 CHEMO IV INFUSION 1 HR: CPT

## 2024-08-09 PROCEDURE — 25000003 PHARM REV CODE 250: Performed by: INTERNAL MEDICINE

## 2024-08-09 RX ORDER — DIPHENHYDRAMINE HYDROCHLORIDE 50 MG/ML
50 INJECTION INTRAMUSCULAR; INTRAVENOUS ONCE AS NEEDED
Status: DISCONTINUED | OUTPATIENT
Start: 2024-08-09 | End: 2024-08-09 | Stop reason: HOSPADM

## 2024-08-09 RX ORDER — PROCHLORPERAZINE EDISYLATE 5 MG/ML
10 INJECTION INTRAMUSCULAR; INTRAVENOUS ONCE AS NEEDED
Status: DISCONTINUED | OUTPATIENT
Start: 2024-08-09 | End: 2024-08-09 | Stop reason: HOSPADM

## 2024-08-09 RX ORDER — SODIUM CHLORIDE 0.9 % (FLUSH) 0.9 %
10 SYRINGE (ML) INJECTION
Status: DISCONTINUED | OUTPATIENT
Start: 2024-08-09 | End: 2024-08-09 | Stop reason: HOSPADM

## 2024-08-09 RX ORDER — HEPARIN 100 UNIT/ML
500 SYRINGE INTRAVENOUS
Status: DISCONTINUED | OUTPATIENT
Start: 2024-08-09 | End: 2024-08-09 | Stop reason: HOSPADM

## 2024-08-09 RX ORDER — EPINEPHRINE 0.3 MG/.3ML
0.3 INJECTION SUBCUTANEOUS ONCE AS NEEDED
Status: DISCONTINUED | OUTPATIENT
Start: 2024-08-09 | End: 2024-08-09 | Stop reason: HOSPADM

## 2024-08-09 RX ADMIN — ETOPOSIDE 156 MG: 20 INJECTION, SOLUTION INTRAVENOUS at 08:08

## 2024-08-09 RX ADMIN — SODIUM CHLORIDE: 9 INJECTION, SOLUTION INTRAVENOUS at 07:08

## 2024-08-12 ENCOUNTER — HOSPITAL ENCOUNTER (OUTPATIENT)
Dept: RADIOLOGY | Facility: HOSPITAL | Age: 39
Discharge: HOME | End: 2024-08-12
Payer: MEDICAID

## 2024-08-12 DIAGNOSIS — C34.90 MALIGNANT NEOPLASM OF LUNG, UNSPECIFIED LATERALITY, UNSPECIFIED PART OF LUNG: ICD-10-CM

## 2024-08-12 PROCEDURE — 77336 RADIATION PHYSICS CONSULT: CPT | Performed by: RADIOLOGY

## 2024-08-12 PROCEDURE — 70491 CT SOFT TISSUE NECK W/DYE: CPT | Mod: TC

## 2024-08-12 PROCEDURE — 77386 HC IMRT, COMPLEX: CPT | Performed by: RADIOLOGY

## 2024-08-12 PROCEDURE — 78306 BONE IMAGING WHOLE BODY: CPT | Mod: TC

## 2024-08-12 PROCEDURE — A9503 TC99M MEDRONATE: HCPCS

## 2024-08-12 PROCEDURE — 25500020 PHARM REV CODE 255

## 2024-08-12 RX ORDER — TC 99M MEDRONATE 20 MG/10ML
25 INJECTION, POWDER, LYOPHILIZED, FOR SOLUTION INTRAVENOUS
Status: COMPLETED | OUTPATIENT
Start: 2024-08-12 | End: 2024-08-12

## 2024-08-12 RX ADMIN — TECHNETIUM TC 99M MEDRONATE 23.5 MILLICURIE: 25 INJECTION, POWDER, FOR SOLUTION INTRAVENOUS at 10:08

## 2024-08-12 RX ADMIN — IOHEXOL 75 ML: 350 INJECTION, SOLUTION INTRAVENOUS at 10:08

## 2024-08-12 NOTE — NURSING
After CT patient became very nauseated and began vomiting. Pt continues to deny an iodine allergy or an allergy to seafood.  Vitals 151/91, hr 99 o2sat 100 all sitting in chair. When asked if patient ate anything, he voiced he ate a large bowl of gumbo prior to coming for scan. Pt refuses ED or urgent care evaluation. Pt held in CT area and monitored for an hour.

## 2024-08-12 NOTE — PROGRESS NOTES
Patient threw up before contrast was administered and sweating; patient continued to throw up about 30 minutes after contrast was administered

## 2024-08-13 ENCOUNTER — TELEPHONE (OUTPATIENT)
Dept: HEMATOLOGY/ONCOLOGY | Facility: CLINIC | Age: 39
End: 2024-08-13
Payer: MEDICAID

## 2024-08-13 DIAGNOSIS — C7A.8 NEUROENDOCRINE CARCINOMA OF LUNG: Primary | ICD-10-CM

## 2024-08-13 PROCEDURE — 77386 HC IMRT, COMPLEX: CPT | Performed by: RADIOLOGY

## 2024-08-14 ENCOUNTER — OFFICE VISIT (OUTPATIENT)
Dept: HEMATOLOGY/ONCOLOGY | Facility: CLINIC | Age: 39
End: 2024-08-14
Attending: INTERNAL MEDICINE
Payer: MEDICAID

## 2024-08-14 VITALS
TEMPERATURE: 99 F | OXYGEN SATURATION: 100 % | HEART RATE: 95 BPM | BODY MASS INDEX: 18.78 KG/M2 | SYSTOLIC BLOOD PRESSURE: 120 MMHG | DIASTOLIC BLOOD PRESSURE: 69 MMHG | RESPIRATION RATE: 18 BRPM | WEIGHT: 119.63 LBS | HEIGHT: 67 IN

## 2024-08-14 DIAGNOSIS — E27.8 ADRENAL MASS, LEFT: ICD-10-CM

## 2024-08-14 DIAGNOSIS — C7A.1 LARGE CELL NEUROENDOCRINE CARCINOMA: ICD-10-CM

## 2024-08-14 DIAGNOSIS — C34.90 MALIGNANT NEOPLASM OF LUNG, UNSPECIFIED LATERALITY, UNSPECIFIED PART OF LUNG: ICD-10-CM

## 2024-08-14 DIAGNOSIS — D75.839 THROMBOCYTOSIS: ICD-10-CM

## 2024-08-14 DIAGNOSIS — Z51.11 CHEMOTHERAPY MANAGEMENT, ENCOUNTER FOR: ICD-10-CM

## 2024-08-14 DIAGNOSIS — K92.0 HEMATEMESIS WITH NAUSEA: ICD-10-CM

## 2024-08-14 DIAGNOSIS — R06.00 DYSPNEA, UNSPECIFIED TYPE: Primary | ICD-10-CM

## 2024-08-14 DIAGNOSIS — C79.89 METASTATIC CANCER TO PELVIS: ICD-10-CM

## 2024-08-14 DIAGNOSIS — K62.9 RECTAL ABNORMALITY: ICD-10-CM

## 2024-08-14 DIAGNOSIS — C77.1 SECONDARY MALIGNANCY OF MEDIASTINAL LYMPH NODES: Primary | ICD-10-CM

## 2024-08-14 DIAGNOSIS — D63.8 ANEMIA, CHRONIC DISEASE: ICD-10-CM

## 2024-08-14 DIAGNOSIS — R11.0 NAUSEA: ICD-10-CM

## 2024-08-14 DIAGNOSIS — C78.01 MALIGNANT NEOPLASM METASTATIC TO HILUS OF LUNG WITH UNKNOWN PRIMARY SITE, RIGHT: ICD-10-CM

## 2024-08-14 DIAGNOSIS — C77.0 SECONDARY MALIGNANCY OF SUPRACLAVICULAR LYMPH NODES: ICD-10-CM

## 2024-08-14 DIAGNOSIS — R91.1 NODULE OF LOWER LOBE OF LEFT LUNG: ICD-10-CM

## 2024-08-14 DIAGNOSIS — R04.2 HEMOPTYSIS: ICD-10-CM

## 2024-08-14 DIAGNOSIS — C77.1 SECONDARY MALIGNANCY OF MEDIASTINAL LYMPH NODES: ICD-10-CM

## 2024-08-14 DIAGNOSIS — C80.1 MALIGNANT NEOPLASM METASTATIC TO HILUS OF LUNG WITH UNKNOWN PRIMARY SITE, RIGHT: ICD-10-CM

## 2024-08-14 DIAGNOSIS — K92.1 BLOODY STOOL: ICD-10-CM

## 2024-08-14 LAB
T4 FREE SERPL-MCNC: 0.93 NG/DL (ref 0.7–1.48)
TSH SERPL-ACNC: 0.93 UIU/ML (ref 0.35–4.94)

## 2024-08-14 PROCEDURE — 1160F RVW MEDS BY RX/DR IN RCRD: CPT | Mod: CPTII,,, | Performed by: INTERNAL MEDICINE

## 2024-08-14 PROCEDURE — 77386 HC IMRT, COMPLEX: CPT | Performed by: RADIOLOGY

## 2024-08-14 PROCEDURE — 99214 OFFICE O/P EST MOD 30 MIN: CPT | Mod: PBBFAC | Performed by: INTERNAL MEDICINE

## 2024-08-14 PROCEDURE — 84443 ASSAY THYROID STIM HORMONE: CPT | Performed by: INTERNAL MEDICINE

## 2024-08-14 PROCEDURE — 3008F BODY MASS INDEX DOCD: CPT | Mod: CPTII,,, | Performed by: INTERNAL MEDICINE

## 2024-08-14 PROCEDURE — 3074F SYST BP LT 130 MM HG: CPT | Mod: CPTII,,, | Performed by: INTERNAL MEDICINE

## 2024-08-14 PROCEDURE — 3044F HG A1C LEVEL LT 7.0%: CPT | Mod: CPTII,,, | Performed by: INTERNAL MEDICINE

## 2024-08-14 PROCEDURE — 84439 ASSAY OF FREE THYROXINE: CPT | Performed by: INTERNAL MEDICINE

## 2024-08-14 PROCEDURE — 3078F DIAST BP <80 MM HG: CPT | Mod: CPTII,,, | Performed by: INTERNAL MEDICINE

## 2024-08-14 PROCEDURE — 99215 OFFICE O/P EST HI 40 MIN: CPT | Mod: S$PBB,,, | Performed by: INTERNAL MEDICINE

## 2024-08-14 PROCEDURE — 1159F MED LIST DOCD IN RCRD: CPT | Mod: CPTII,,, | Performed by: INTERNAL MEDICINE

## 2024-08-14 NOTE — Clinical Note
Continue chemotherapy every 3 weeks x4 cycles Re-stage with contrast-enhanced CT scans of C/A/P around 09/21/2024, then after completion of 4 cycles of chemotherapy  Check TSH and free T4 as baseline, then, every 6 weeks EGD and colonoscopy for evaluation of hematemesis and rectal wall thickening; refer to GI Follow-up with NP in 3 weeks

## 2024-08-14 NOTE — PROGRESS NOTES
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  -most likely, reactive  -regardless, we will order testing rule out myeloproliferative neoplasms  -ESR, CRP elevated secondary to underlying metastatic disease  -iron stores normal  -06/25/2024:  Peripheral blood: Favor reactive thrombocytosis and secondary anemia  (negative for JAK2 V617F, CALR, and MPL mutation; negative for CML [negative for major p210 M-bcr BCR-ABL1 fusion transcripts])    Carboplatin/etoposide/atezolizumab:  Emesis risk: MODERATE on day 1 and LOW on days 2 and 3  Vesicant/irritant properties: Carboplatin and etoposide are irritants.  Infection prophylaxis: Routine primary prophylaxis with hematopoietic growth factors is not recommended (incidence of febrile neutropenia is about 5%)  Dose adjustment for baseline liver or renal dysfunction:  Each carboplatin dose should be calculated based upon renal function by use of the [place] formula. A lower starting dose of etoposide may be needed for patients with renal or liver impairment.     Monitoring parameters with chemotherapy:  CBC with differential and platelet count weekly during treatment.  Electrolytes and liver and renal function prior to each cycle of chemotherapy.     Suggested dose modifications for toxicity:  Myelotoxicity: The dose  of carboplatin should be reduced by 25% if platelets are <50,000/microL and/or ANC is <500/microL.  Nonhematologic toxicity: Chemotherapy should be held for grade 3 and 4 nonhematologic toxicities (except for neurotoxicity) and is only restarted after the toxicity has resolved to patient's baseline.     Monitoring for immune side effects with atezolizumab:  Monitoring on pembrolizumab:  Monitor LFTs (AST, ALT, and total bilirubin; at baseline and periodically during treatment;  kidney function (serum creatinine; at baseline and periodically during treatment);  thyroid function (at baseline, periodically during treatment and as clinically indicated);  monitor blood glucose (for hyperglycemia);  Monitor closely for signs/symptoms of immune-mediated adverse reactions, including  adrenal insufficiency,  diarrhea/colitis (consider initiating or repeating infectious workup in patients with corticosteroid-refractory immune-mediated colitis to exclude alternative causes),  dermatologic toxicity,  diabetes mellitus,  hypophysitis,  ocular disorders,  thyroid disorders,  pneumonitis and other immune-mediated adverse reactions.  Monitor for signs/symptoms of infusion-related reactions.    Follow-up with NP in 3 weeks    Above discussed at length with the patient.  All questions answered.  Discussed labs and scans and gave him copies of relevant records.    Plan of management reinforced.  Have already discussed the following in detail: That he has unresectable, stage IV disease which can not be cured but hopefully, maybe palliative with systemic therapy; response with treatment can not be guaranteed and prognosis guarded.   He understands and agrees with this plan.      Follow-up:  No follow-ups on file.

## 2024-08-15 PROCEDURE — 77386 HC IMRT, COMPLEX: CPT | Performed by: RADIOLOGY

## 2024-08-16 PROCEDURE — 77386 HC IMRT, COMPLEX: CPT | Performed by: RADIOLOGY

## 2024-08-19 PROCEDURE — 77336 RADIATION PHYSICS CONSULT: CPT | Performed by: RADIOLOGY

## 2024-08-19 PROCEDURE — 77386 HC IMRT, COMPLEX: CPT | Performed by: RADIOLOGY

## 2024-08-20 PROCEDURE — 77386 HC IMRT, COMPLEX: CPT | Performed by: RADIOLOGY

## 2024-08-21 PROCEDURE — 77386 HC IMRT, COMPLEX: CPT | Performed by: RADIOLOGY

## 2024-08-22 PROCEDURE — 77386 HC IMRT, COMPLEX: CPT | Performed by: RADIOLOGY

## 2024-08-23 PROCEDURE — 77386 HC IMRT, COMPLEX: CPT | Performed by: RADIOLOGY

## 2024-08-26 ENCOUNTER — TELEPHONE (OUTPATIENT)
Dept: HEMATOLOGY/ONCOLOGY | Facility: CLINIC | Age: 39
End: 2024-08-26
Payer: MEDICAID

## 2024-08-26 DIAGNOSIS — C78.01 MALIGNANT NEOPLASM METASTATIC TO HILUS OF LUNG WITH UNKNOWN PRIMARY SITE, RIGHT: Primary | ICD-10-CM

## 2024-08-26 DIAGNOSIS — C80.1 MALIGNANT NEOPLASM METASTATIC TO HILUS OF LUNG WITH UNKNOWN PRIMARY SITE, RIGHT: Primary | ICD-10-CM

## 2024-08-26 PROCEDURE — 77336 RADIATION PHYSICS CONSULT: CPT | Performed by: RADIOLOGY

## 2024-08-26 PROCEDURE — 77386 HC IMRT, COMPLEX: CPT | Performed by: RADIOLOGY

## 2024-08-26 NOTE — TELEPHONE ENCOUNTER
Called patient to confirm appointment for 8/27/24. Patient confirmed appointment.  
No indicators present

## 2024-08-27 ENCOUNTER — OFFICE VISIT (OUTPATIENT)
Dept: HEMATOLOGY/ONCOLOGY | Facility: CLINIC | Age: 39
End: 2024-08-27
Payer: MEDICAID

## 2024-08-27 VITALS
TEMPERATURE: 99 F | SYSTOLIC BLOOD PRESSURE: 111 MMHG | DIASTOLIC BLOOD PRESSURE: 77 MMHG | HEART RATE: 105 BPM | WEIGHT: 123.81 LBS | HEIGHT: 67 IN | BODY MASS INDEX: 19.43 KG/M2 | OXYGEN SATURATION: 100 %

## 2024-08-27 DIAGNOSIS — C80.1 MALIGNANT NEOPLASM METASTATIC TO HILUS OF LUNG WITH UNKNOWN PRIMARY SITE, RIGHT: Primary | ICD-10-CM

## 2024-08-27 DIAGNOSIS — L30.9 DERMATITIS: ICD-10-CM

## 2024-08-27 DIAGNOSIS — Z51.11 CHEMOTHERAPY MANAGEMENT, ENCOUNTER FOR: ICD-10-CM

## 2024-08-27 DIAGNOSIS — R06.00 DYSPNEA, UNSPECIFIED TYPE: ICD-10-CM

## 2024-08-27 DIAGNOSIS — C78.01 MALIGNANT NEOPLASM METASTATIC TO HILUS OF LUNG WITH UNKNOWN PRIMARY SITE, RIGHT: Primary | ICD-10-CM

## 2024-08-27 PROCEDURE — 99214 OFFICE O/P EST MOD 30 MIN: CPT | Mod: PBBFAC

## 2024-08-27 PROCEDURE — 99215 OFFICE O/P EST HI 40 MIN: CPT | Mod: S$PBB,,,

## 2024-08-27 PROCEDURE — 77386 HC IMRT, COMPLEX: CPT | Performed by: RADIOLOGY

## 2024-08-27 RX ORDER — HYDROCORTISONE 25 MG/G
CREAM TOPICAL 2 TIMES DAILY
Qty: 28 G | Refills: 0 | Status: SHIPPED | OUTPATIENT
Start: 2024-08-27

## 2024-08-27 NOTE — Clinical Note
Proceed with chemotherapy tomorrow in infusion for cycle 2 day 1 Return to infusion on 08/29 in 8/30 for cycle 2 days 2 and 3-no labs needed on this day Have patient return to clinic with me in 3 weeks (9/17) with labs prior (CBC/CMP/Mag level) followed by next day treat in infusion
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
## 2024-08-28 ENCOUNTER — INFUSION (OUTPATIENT)
Dept: INFUSION THERAPY | Facility: HOSPITAL | Age: 39
End: 2024-08-28
Attending: INTERNAL MEDICINE
Payer: MEDICAID

## 2024-08-28 VITALS
RESPIRATION RATE: 18 BRPM | SYSTOLIC BLOOD PRESSURE: 109 MMHG | BODY MASS INDEX: 18.85 KG/M2 | TEMPERATURE: 99 F | HEART RATE: 108 BPM | OXYGEN SATURATION: 99 % | HEIGHT: 67 IN | DIASTOLIC BLOOD PRESSURE: 68 MMHG | WEIGHT: 120.13 LBS

## 2024-08-28 DIAGNOSIS — C7A.8 NEUROENDOCRINE CARCINOMA OF LUNG: Primary | ICD-10-CM

## 2024-08-28 PROCEDURE — 63600175 PHARM REV CODE 636 W HCPCS: Mod: JZ,JG | Performed by: INTERNAL MEDICINE

## 2024-08-28 PROCEDURE — 25000003 PHARM REV CODE 250: Performed by: INTERNAL MEDICINE

## 2024-08-28 PROCEDURE — 96375 TX/PRO/DX INJ NEW DRUG ADDON: CPT

## 2024-08-28 PROCEDURE — 96367 TX/PROPH/DG ADDL SEQ IV INF: CPT

## 2024-08-28 PROCEDURE — 96413 CHEMO IV INFUSION 1 HR: CPT

## 2024-08-28 PROCEDURE — 96417 CHEMO IV INFUS EACH ADDL SEQ: CPT

## 2024-08-28 RX ORDER — EPINEPHRINE 0.3 MG/.3ML
0.3 INJECTION SUBCUTANEOUS ONCE AS NEEDED
Status: DISCONTINUED | OUTPATIENT
Start: 2024-08-28 | End: 2024-08-28 | Stop reason: HOSPADM

## 2024-08-28 RX ORDER — PROCHLORPERAZINE EDISYLATE 5 MG/ML
10 INJECTION INTRAMUSCULAR; INTRAVENOUS ONCE AS NEEDED
Status: DISCONTINUED | OUTPATIENT
Start: 2024-08-28 | End: 2024-08-28 | Stop reason: HOSPADM

## 2024-08-28 RX ORDER — DIPHENHYDRAMINE HYDROCHLORIDE 50 MG/ML
50 INJECTION INTRAMUSCULAR; INTRAVENOUS ONCE AS NEEDED
Status: COMPLETED | OUTPATIENT
Start: 2024-08-28 | End: 2024-08-28

## 2024-08-28 RX ORDER — SODIUM CHLORIDE 0.9 % (FLUSH) 0.9 %
10 SYRINGE (ML) INJECTION
Status: DISCONTINUED | OUTPATIENT
Start: 2024-08-28 | End: 2024-08-28 | Stop reason: HOSPADM

## 2024-08-28 RX ORDER — HEPARIN 100 UNIT/ML
500 SYRINGE INTRAVENOUS
Status: DISCONTINUED | OUTPATIENT
Start: 2024-08-28 | End: 2024-08-28 | Stop reason: HOSPADM

## 2024-08-28 RX ADMIN — ATEZOLIZUMAB 1200 MG: 1200 INJECTION, SOLUTION INTRAVENOUS at 09:08

## 2024-08-28 RX ADMIN — DIPHENHYDRAMINE HYDROCHLORIDE 50 MG: 50 INJECTION INTRAMUSCULAR; INTRAVENOUS at 10:08

## 2024-08-28 RX ADMIN — CARBOPLATIN 560 MG: 10 INJECTION, SOLUTION INTRAVENOUS at 10:08

## 2024-08-28 RX ADMIN — DEXAMETHASONE SODIUM PHOSPHATE 0.25 MG: 4 INJECTION, SOLUTION INTRA-ARTICULAR; INTRALESIONAL; INTRAMUSCULAR; INTRAVENOUS; SOFT TISSUE at 10:08

## 2024-08-28 RX ADMIN — ETOPOSIDE 160 MG: 20 INJECTION, SOLUTION INTRAVENOUS at 11:08

## 2024-08-28 RX ADMIN — APREPITANT 130 MG: 130 INJECTION, EMULSION INTRAVENOUS at 10:08

## 2024-08-29 ENCOUNTER — INFUSION (OUTPATIENT)
Dept: INFUSION THERAPY | Facility: HOSPITAL | Age: 39
End: 2024-08-29
Attending: INTERNAL MEDICINE
Payer: MEDICAID

## 2024-08-29 VITALS
DIASTOLIC BLOOD PRESSURE: 67 MMHG | SYSTOLIC BLOOD PRESSURE: 113 MMHG | TEMPERATURE: 98 F | OXYGEN SATURATION: 99 % | HEART RATE: 88 BPM | RESPIRATION RATE: 18 BRPM

## 2024-08-29 DIAGNOSIS — C7A.8 NEUROENDOCRINE CARCINOMA OF LUNG: Primary | ICD-10-CM

## 2024-08-29 DIAGNOSIS — L30.9 DERMATITIS: Primary | ICD-10-CM

## 2024-08-29 PROCEDURE — 96413 CHEMO IV INFUSION 1 HR: CPT

## 2024-08-29 PROCEDURE — 25000003 PHARM REV CODE 250: Performed by: INTERNAL MEDICINE

## 2024-08-29 PROCEDURE — 96375 TX/PRO/DX INJ NEW DRUG ADDON: CPT

## 2024-08-29 PROCEDURE — 77386 HC IMRT, COMPLEX: CPT | Performed by: RADIOLOGY

## 2024-08-29 PROCEDURE — 63600175 PHARM REV CODE 636 W HCPCS: Performed by: INTERNAL MEDICINE

## 2024-08-29 RX ORDER — SODIUM CHLORIDE 0.9 % (FLUSH) 0.9 %
10 SYRINGE (ML) INJECTION
Status: DISCONTINUED | OUTPATIENT
Start: 2024-08-29 | End: 2024-08-29 | Stop reason: HOSPADM

## 2024-08-29 RX ORDER — KETOCONAZOLE 20 MG/ML
SHAMPOO, SUSPENSION TOPICAL
Qty: 120 ML | Refills: 2 | Status: SHIPPED | OUTPATIENT
Start: 2024-08-29

## 2024-08-29 RX ORDER — PROCHLORPERAZINE EDISYLATE 5 MG/ML
10 INJECTION INTRAMUSCULAR; INTRAVENOUS ONCE AS NEEDED
Status: DISCONTINUED | OUTPATIENT
Start: 2024-08-29 | End: 2024-08-29 | Stop reason: HOSPADM

## 2024-08-29 RX ORDER — EPINEPHRINE 0.3 MG/.3ML
0.3 INJECTION SUBCUTANEOUS ONCE AS NEEDED
Status: DISCONTINUED | OUTPATIENT
Start: 2024-08-29 | End: 2024-08-29 | Stop reason: HOSPADM

## 2024-08-29 RX ORDER — HEPARIN 100 UNIT/ML
500 SYRINGE INTRAVENOUS
Status: DISCONTINUED | OUTPATIENT
Start: 2024-08-29 | End: 2024-08-29 | Stop reason: HOSPADM

## 2024-08-29 RX ORDER — DIPHENHYDRAMINE HYDROCHLORIDE 50 MG/ML
50 INJECTION INTRAMUSCULAR; INTRAVENOUS ONCE AS NEEDED
Status: COMPLETED | OUTPATIENT
Start: 2024-08-29 | End: 2024-08-29

## 2024-08-29 RX ADMIN — SODIUM CHLORIDE: 9 INJECTION, SOLUTION INTRAVENOUS at 09:08

## 2024-08-29 RX ADMIN — ETOPOSIDE 160 MG: 20 INJECTION, SOLUTION INTRAVENOUS at 10:08

## 2024-08-29 RX ADMIN — DIPHENHYDRAMINE HYDROCHLORIDE 50 MG: 50 INJECTION INTRAMUSCULAR; INTRAVENOUS at 09:08

## 2024-08-30 ENCOUNTER — INFUSION (OUTPATIENT)
Dept: INFUSION THERAPY | Facility: HOSPITAL | Age: 39
End: 2024-08-30
Attending: INTERNAL MEDICINE
Payer: MEDICAID

## 2024-08-30 VITALS
RESPIRATION RATE: 18 BRPM | HEART RATE: 87 BPM | SYSTOLIC BLOOD PRESSURE: 112 MMHG | TEMPERATURE: 98 F | DIASTOLIC BLOOD PRESSURE: 70 MMHG | OXYGEN SATURATION: 100 %

## 2024-08-30 DIAGNOSIS — C7A.8 NEUROENDOCRINE CARCINOMA OF LUNG: Primary | ICD-10-CM

## 2024-08-30 PROCEDURE — 25000003 PHARM REV CODE 250: Performed by: INTERNAL MEDICINE

## 2024-08-30 PROCEDURE — 63600175 PHARM REV CODE 636 W HCPCS: Performed by: INTERNAL MEDICINE

## 2024-08-30 PROCEDURE — 77386 HC IMRT, COMPLEX: CPT | Performed by: RADIOLOGY

## 2024-08-30 PROCEDURE — 96375 TX/PRO/DX INJ NEW DRUG ADDON: CPT

## 2024-08-30 PROCEDURE — 96413 CHEMO IV INFUSION 1 HR: CPT

## 2024-08-30 RX ORDER — EPINEPHRINE 0.3 MG/.3ML
0.3 INJECTION SUBCUTANEOUS ONCE AS NEEDED
Status: DISCONTINUED | OUTPATIENT
Start: 2024-08-30 | End: 2024-08-30 | Stop reason: HOSPADM

## 2024-08-30 RX ORDER — PROCHLORPERAZINE EDISYLATE 5 MG/ML
10 INJECTION INTRAMUSCULAR; INTRAVENOUS ONCE AS NEEDED
Status: DISCONTINUED | OUTPATIENT
Start: 2024-08-30 | End: 2024-08-30 | Stop reason: HOSPADM

## 2024-08-30 RX ORDER — HEPARIN 100 UNIT/ML
500 SYRINGE INTRAVENOUS
Status: DISCONTINUED | OUTPATIENT
Start: 2024-08-30 | End: 2024-08-30 | Stop reason: HOSPADM

## 2024-08-30 RX ORDER — SODIUM CHLORIDE 0.9 % (FLUSH) 0.9 %
10 SYRINGE (ML) INJECTION
Status: DISCONTINUED | OUTPATIENT
Start: 2024-08-30 | End: 2024-08-30 | Stop reason: HOSPADM

## 2024-08-30 RX ORDER — DIPHENHYDRAMINE HYDROCHLORIDE 50 MG/ML
50 INJECTION INTRAMUSCULAR; INTRAVENOUS ONCE AS NEEDED
Status: COMPLETED | OUTPATIENT
Start: 2024-08-30 | End: 2024-08-30

## 2024-08-30 RX ADMIN — DIPHENHYDRAMINE HYDROCHLORIDE 50 MG: 50 INJECTION INTRAMUSCULAR; INTRAVENOUS at 09:08

## 2024-08-30 RX ADMIN — SODIUM CHLORIDE: 9 INJECTION, SOLUTION INTRAVENOUS at 09:08

## 2024-08-30 RX ADMIN — SODIUM CHLORIDE 160 MG: 9 INJECTION, SOLUTION INTRAVENOUS at 09:08

## 2024-09-16 ENCOUNTER — TELEPHONE (OUTPATIENT)
Dept: HEMATOLOGY/ONCOLOGY | Facility: CLINIC | Age: 39
End: 2024-09-16
Payer: MEDICAID

## 2024-09-16 DIAGNOSIS — C78.01 MALIGNANT NEOPLASM METASTATIC TO HILUS OF LUNG WITH UNKNOWN PRIMARY SITE, RIGHT: Primary | ICD-10-CM

## 2024-09-16 DIAGNOSIS — C80.1 MALIGNANT NEOPLASM METASTATIC TO HILUS OF LUNG WITH UNKNOWN PRIMARY SITE, RIGHT: Primary | ICD-10-CM

## 2024-09-16 PROBLEM — K92.0 HEMATEMESIS WITH NAUSEA: Status: RESOLVED | Noted: 2024-06-13 | Resolved: 2024-09-16

## 2024-09-17 ENCOUNTER — OFFICE VISIT (OUTPATIENT)
Dept: HEMATOLOGY/ONCOLOGY | Facility: CLINIC | Age: 39
End: 2024-09-17
Payer: MEDICAID

## 2024-09-17 VITALS
DIASTOLIC BLOOD PRESSURE: 65 MMHG | BODY MASS INDEX: 19.02 KG/M2 | SYSTOLIC BLOOD PRESSURE: 100 MMHG | HEART RATE: 97 BPM | HEIGHT: 67 IN | TEMPERATURE: 98 F | OXYGEN SATURATION: 100 % | WEIGHT: 121.19 LBS

## 2024-09-17 DIAGNOSIS — Z51.11 CHEMOTHERAPY MANAGEMENT, ENCOUNTER FOR: ICD-10-CM

## 2024-09-17 DIAGNOSIS — C78.01 MALIGNANT NEOPLASM METASTATIC TO HILUS OF LUNG WITH UNKNOWN PRIMARY SITE, RIGHT: ICD-10-CM

## 2024-09-17 DIAGNOSIS — R05.9 COUGH, UNSPECIFIED TYPE: ICD-10-CM

## 2024-09-17 DIAGNOSIS — C80.1 MALIGNANT NEOPLASM METASTATIC TO HILUS OF LUNG WITH UNKNOWN PRIMARY SITE, RIGHT: ICD-10-CM

## 2024-09-17 DIAGNOSIS — R11.0 NAUSEA: Primary | ICD-10-CM

## 2024-09-17 PROCEDURE — 99213 OFFICE O/P EST LOW 20 MIN: CPT | Mod: PBBFAC

## 2024-09-17 PROCEDURE — 3074F SYST BP LT 130 MM HG: CPT | Mod: CPTII,,,

## 2024-09-17 PROCEDURE — 3008F BODY MASS INDEX DOCD: CPT | Mod: CPTII,,,

## 2024-09-17 PROCEDURE — 99215 OFFICE O/P EST HI 40 MIN: CPT | Mod: S$PBB,,,

## 2024-09-17 PROCEDURE — 1159F MED LIST DOCD IN RCRD: CPT | Mod: CPTII,,,

## 2024-09-17 PROCEDURE — 3044F HG A1C LEVEL LT 7.0%: CPT | Mod: CPTII,,,

## 2024-09-17 PROCEDURE — 1160F RVW MEDS BY RX/DR IN RCRD: CPT | Mod: CPTII,,,

## 2024-09-17 PROCEDURE — 3078F DIAST BP <80 MM HG: CPT | Mod: CPTII,,,

## 2024-09-17 RX ORDER — PROCHLORPERAZINE EDISYLATE 5 MG/ML
10 INJECTION INTRAMUSCULAR; INTRAVENOUS ONCE AS NEEDED
Status: CANCELLED | OUTPATIENT
Start: 2024-09-18

## 2024-09-17 RX ORDER — BENZONATATE 100 MG/1
100 CAPSULE ORAL 3 TIMES DAILY PRN
Qty: 30 CAPSULE | Refills: 0 | Status: SHIPPED | OUTPATIENT
Start: 2024-09-17 | End: 2024-09-27

## 2024-09-17 RX ORDER — SODIUM CHLORIDE 0.9 % (FLUSH) 0.9 %
10 SYRINGE (ML) INJECTION
Status: CANCELLED | OUTPATIENT
Start: 2024-09-19

## 2024-09-17 RX ORDER — PROCHLORPERAZINE EDISYLATE 5 MG/ML
10 INJECTION INTRAMUSCULAR; INTRAVENOUS ONCE AS NEEDED
Status: CANCELLED | OUTPATIENT
Start: 2024-09-20

## 2024-09-17 RX ORDER — DIPHENHYDRAMINE HYDROCHLORIDE 50 MG/ML
50 INJECTION INTRAMUSCULAR; INTRAVENOUS ONCE AS NEEDED
Status: CANCELLED | OUTPATIENT
Start: 2024-09-19

## 2024-09-17 RX ORDER — EPINEPHRINE 0.3 MG/.3ML
0.3 INJECTION SUBCUTANEOUS ONCE AS NEEDED
Status: CANCELLED | OUTPATIENT
Start: 2024-09-20

## 2024-09-17 RX ORDER — DIPHENHYDRAMINE HYDROCHLORIDE 50 MG/ML
50 INJECTION INTRAMUSCULAR; INTRAVENOUS ONCE AS NEEDED
Status: CANCELLED | OUTPATIENT
Start: 2024-09-20

## 2024-09-17 RX ORDER — PROCHLORPERAZINE EDISYLATE 5 MG/ML
10 INJECTION INTRAMUSCULAR; INTRAVENOUS ONCE AS NEEDED
Status: CANCELLED | OUTPATIENT
Start: 2024-09-19

## 2024-09-17 RX ORDER — HEPARIN 100 UNIT/ML
500 SYRINGE INTRAVENOUS
Status: CANCELLED | OUTPATIENT
Start: 2024-09-19

## 2024-09-17 RX ORDER — EPINEPHRINE 0.3 MG/.3ML
0.3 INJECTION SUBCUTANEOUS ONCE AS NEEDED
Status: CANCELLED | OUTPATIENT
Start: 2024-09-18

## 2024-09-17 RX ORDER — DIPHENHYDRAMINE HYDROCHLORIDE 50 MG/ML
50 INJECTION INTRAMUSCULAR; INTRAVENOUS ONCE AS NEEDED
Status: CANCELLED | OUTPATIENT
Start: 2024-09-18

## 2024-09-17 RX ORDER — SODIUM CHLORIDE 0.9 % (FLUSH) 0.9 %
10 SYRINGE (ML) INJECTION
Status: CANCELLED | OUTPATIENT
Start: 2024-09-18

## 2024-09-17 RX ORDER — HEPARIN 100 UNIT/ML
500 SYRINGE INTRAVENOUS
Status: CANCELLED | OUTPATIENT
Start: 2024-09-20

## 2024-09-17 RX ORDER — EPINEPHRINE 0.3 MG/.3ML
0.3 INJECTION SUBCUTANEOUS ONCE AS NEEDED
Status: CANCELLED | OUTPATIENT
Start: 2024-09-19

## 2024-09-17 RX ORDER — SODIUM CHLORIDE 0.9 % (FLUSH) 0.9 %
10 SYRINGE (ML) INJECTION
Status: CANCELLED | OUTPATIENT
Start: 2024-09-20

## 2024-09-17 RX ORDER — ONDANSETRON 4 MG/1
4 TABLET, FILM COATED ORAL EVERY 6 HOURS PRN
Qty: 30 TABLET | Refills: 1 | Status: SHIPPED | OUTPATIENT
Start: 2024-09-17

## 2024-09-17 RX ORDER — HEPARIN 100 UNIT/ML
500 SYRINGE INTRAVENOUS
Status: CANCELLED | OUTPATIENT
Start: 2024-09-18

## 2024-09-17 NOTE — Clinical Note
Proceed with chemotherapy tomorrow in infusion  Return to infusion 9/19-9/20 for  16  RTC with Dawn on 10/8 with labs (CBC/CMP/Mag level) followed by next day treat in infusion  Schedule infusion for 10/9-10/11  MOVE CT Scans to the week of 10/21 Schedule an appt with MD week of 10/28 with labs prior (CBC/CMP/Mag level) followed by infusion
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
## 2024-09-18 ENCOUNTER — INFUSION (OUTPATIENT)
Dept: INFUSION THERAPY | Facility: HOSPITAL | Age: 39
End: 2024-09-18
Attending: INTERNAL MEDICINE
Payer: MEDICAID

## 2024-09-18 VITALS
HEART RATE: 102 BPM | TEMPERATURE: 98 F | DIASTOLIC BLOOD PRESSURE: 57 MMHG | OXYGEN SATURATION: 98 % | SYSTOLIC BLOOD PRESSURE: 90 MMHG | RESPIRATION RATE: 20 BRPM

## 2024-09-18 DIAGNOSIS — C7A.8 NEUROENDOCRINE CARCINOMA OF LUNG: Primary | ICD-10-CM

## 2024-09-18 PROCEDURE — 96417 CHEMO IV INFUS EACH ADDL SEQ: CPT

## 2024-09-18 PROCEDURE — 63600175 PHARM REV CODE 636 W HCPCS: Mod: JZ,JG

## 2024-09-18 PROCEDURE — 25000003 PHARM REV CODE 250

## 2024-09-18 PROCEDURE — 96375 TX/PRO/DX INJ NEW DRUG ADDON: CPT

## 2024-09-18 PROCEDURE — 96413 CHEMO IV INFUSION 1 HR: CPT

## 2024-09-18 PROCEDURE — 96367 TX/PROPH/DG ADDL SEQ IV INF: CPT

## 2024-09-18 RX ORDER — PROCHLORPERAZINE EDISYLATE 5 MG/ML
10 INJECTION INTRAMUSCULAR; INTRAVENOUS ONCE AS NEEDED
Status: DISCONTINUED | OUTPATIENT
Start: 2024-09-18 | End: 2024-09-18 | Stop reason: HOSPADM

## 2024-09-18 RX ORDER — EPINEPHRINE 0.3 MG/.3ML
0.3 INJECTION SUBCUTANEOUS ONCE AS NEEDED
Status: DISCONTINUED | OUTPATIENT
Start: 2024-09-18 | End: 2024-09-18 | Stop reason: HOSPADM

## 2024-09-18 RX ORDER — DIPHENHYDRAMINE HYDROCHLORIDE 50 MG/ML
50 INJECTION INTRAMUSCULAR; INTRAVENOUS ONCE AS NEEDED
Status: DISCONTINUED | OUTPATIENT
Start: 2024-09-18 | End: 2024-09-18 | Stop reason: HOSPADM

## 2024-09-18 RX ORDER — SODIUM CHLORIDE 0.9 % (FLUSH) 0.9 %
10 SYRINGE (ML) INJECTION
Status: DISCONTINUED | OUTPATIENT
Start: 2024-09-18 | End: 2024-09-18 | Stop reason: HOSPADM

## 2024-09-18 RX ORDER — HEPARIN 100 UNIT/ML
500 SYRINGE INTRAVENOUS
Status: DISCONTINUED | OUTPATIENT
Start: 2024-09-18 | End: 2024-09-18 | Stop reason: HOSPADM

## 2024-09-18 RX ADMIN — CARBOPLATIN 560 MG: 10 INJECTION, SOLUTION INTRAVENOUS at 10:09

## 2024-09-18 RX ADMIN — APREPITANT 130 MG: 130 INJECTION, EMULSION INTRAVENOUS at 09:09

## 2024-09-18 RX ADMIN — SODIUM CHLORIDE: 9 INJECTION, SOLUTION INTRAVENOUS at 08:09

## 2024-09-18 RX ADMIN — SODIUM CHLORIDE 160 MG: 9 INJECTION, SOLUTION INTRAVENOUS at 11:09

## 2024-09-18 RX ADMIN — ATEZOLIZUMAB 1200 MG: 1200 INJECTION, SOLUTION INTRAVENOUS at 08:09

## 2024-09-18 RX ADMIN — DEXAMETHASONE SODIUM PHOSPHATE 0.25 MG: 4 INJECTION, SOLUTION INTRA-ARTICULAR; INTRALESIONAL; INTRAMUSCULAR; INTRAVENOUS; SOFT TISSUE at 09:09

## 2024-09-18 NOTE — NURSING
Infusion Note:  Pt has an appointment today for: Infusion today. Labs and provider yesterday.    Treatment Regimen: Carbo/VP16/Tecentriq   Cycle: 3 Day: 1 every Q3 weeks;      Given via Right Mediport    Treatment parameters: were met    Nursing note:  Pt reports rash RLQ back flank area that he has hydrocortisone cream for. Reports cough provider is aware of. Reviewed how to take zyprexa and dex at home. Tolerated infusion.    Future appts scheduled:  Back for C3D2-3 and weekly labs done on Tuesdays. 10/8: labs & provider visit

## 2024-09-19 ENCOUNTER — INFUSION (OUTPATIENT)
Dept: INFUSION THERAPY | Facility: HOSPITAL | Age: 39
End: 2024-09-19
Attending: INTERNAL MEDICINE
Payer: MEDICAID

## 2024-09-19 VITALS
RESPIRATION RATE: 20 BRPM | TEMPERATURE: 99 F | SYSTOLIC BLOOD PRESSURE: 92 MMHG | OXYGEN SATURATION: 98 % | HEART RATE: 95 BPM | DIASTOLIC BLOOD PRESSURE: 51 MMHG

## 2024-09-19 DIAGNOSIS — C7A.8 NEUROENDOCRINE CARCINOMA OF LUNG: Primary | ICD-10-CM

## 2024-09-19 PROCEDURE — 96413 CHEMO IV INFUSION 1 HR: CPT

## 2024-09-19 PROCEDURE — 25000003 PHARM REV CODE 250

## 2024-09-19 PROCEDURE — 63600175 PHARM REV CODE 636 W HCPCS

## 2024-09-19 RX ORDER — PROCHLORPERAZINE EDISYLATE 5 MG/ML
10 INJECTION INTRAMUSCULAR; INTRAVENOUS ONCE AS NEEDED
Status: DISCONTINUED | OUTPATIENT
Start: 2024-09-19 | End: 2024-09-19 | Stop reason: HOSPADM

## 2024-09-19 RX ORDER — DIPHENHYDRAMINE HYDROCHLORIDE 50 MG/ML
50 INJECTION INTRAMUSCULAR; INTRAVENOUS ONCE AS NEEDED
Status: DISCONTINUED | OUTPATIENT
Start: 2024-09-19 | End: 2024-09-19 | Stop reason: HOSPADM

## 2024-09-19 RX ORDER — HEPARIN 100 UNIT/ML
500 SYRINGE INTRAVENOUS
Status: DISCONTINUED | OUTPATIENT
Start: 2024-09-19 | End: 2024-09-19 | Stop reason: HOSPADM

## 2024-09-19 RX ORDER — SODIUM CHLORIDE 0.9 % (FLUSH) 0.9 %
10 SYRINGE (ML) INJECTION
Status: DISCONTINUED | OUTPATIENT
Start: 2024-09-19 | End: 2024-09-19 | Stop reason: HOSPADM

## 2024-09-19 RX ORDER — EPINEPHRINE 0.3 MG/.3ML
0.3 INJECTION SUBCUTANEOUS ONCE AS NEEDED
Status: DISCONTINUED | OUTPATIENT
Start: 2024-09-19 | End: 2024-09-19 | Stop reason: HOSPADM

## 2024-09-19 RX ADMIN — SODIUM CHLORIDE 160 MG: 9 INJECTION, SOLUTION INTRAVENOUS at 08:09

## 2024-09-19 RX ADMIN — SODIUM CHLORIDE: 9 INJECTION, SOLUTION INTRAVENOUS at 08:09

## 2024-09-19 NOTE — NURSING
Pt arrived for C3D2 VP16 infusion. He in on D1-3 treatment plan every 3 weeks in which he goes home with mediport accessed. Rash on RLQ back flank present which he is prescribed hydrocortisone cream for. Upon arrival, mediport dressing intact but saturated with clear fluid. Access site is not red or tender, no drainage from access site. Pt states he sat in tub with water last night and tried to clean around his dressing. I notified him steam from tub can also have a contributing factor and educated him on risk of infection. He verbalizes understanding to keep dressing dry. I deaccessed old mediport due as the wings were wet as well. Accessed with new needle with sterile technique. Pt tolerated infusion. Will come back for D3 tomorrow @ 0900. Then weekly labs every Tues. Labs/NP/C4D1 scheduled for 10/18.

## 2024-09-20 ENCOUNTER — INFUSION (OUTPATIENT)
Dept: INFUSION THERAPY | Facility: HOSPITAL | Age: 39
End: 2024-09-20
Attending: INTERNAL MEDICINE
Payer: MEDICAID

## 2024-09-20 VITALS
SYSTOLIC BLOOD PRESSURE: 126 MMHG | DIASTOLIC BLOOD PRESSURE: 73 MMHG | OXYGEN SATURATION: 99 % | TEMPERATURE: 98 F | HEART RATE: 88 BPM | RESPIRATION RATE: 18 BRPM

## 2024-09-20 DIAGNOSIS — C7A.8 NEUROENDOCRINE CARCINOMA OF LUNG: Primary | ICD-10-CM

## 2024-09-20 PROCEDURE — 96413 CHEMO IV INFUSION 1 HR: CPT

## 2024-09-20 PROCEDURE — 25000003 PHARM REV CODE 250

## 2024-09-20 PROCEDURE — 63600175 PHARM REV CODE 636 W HCPCS

## 2024-09-20 RX ORDER — HEPARIN 100 UNIT/ML
500 SYRINGE INTRAVENOUS
Status: DISCONTINUED | OUTPATIENT
Start: 2024-09-20 | End: 2024-09-20 | Stop reason: HOSPADM

## 2024-09-20 RX ORDER — PROCHLORPERAZINE EDISYLATE 5 MG/ML
10 INJECTION INTRAMUSCULAR; INTRAVENOUS ONCE AS NEEDED
Status: DISCONTINUED | OUTPATIENT
Start: 2024-09-20 | End: 2024-09-20 | Stop reason: HOSPADM

## 2024-09-20 RX ORDER — EPINEPHRINE 0.3 MG/.3ML
0.3 INJECTION SUBCUTANEOUS ONCE AS NEEDED
Status: DISCONTINUED | OUTPATIENT
Start: 2024-09-20 | End: 2024-09-20 | Stop reason: HOSPADM

## 2024-09-20 RX ORDER — DIPHENHYDRAMINE HYDROCHLORIDE 50 MG/ML
50 INJECTION INTRAMUSCULAR; INTRAVENOUS ONCE AS NEEDED
Status: DISCONTINUED | OUTPATIENT
Start: 2024-09-20 | End: 2024-09-20 | Stop reason: HOSPADM

## 2024-09-20 RX ORDER — SODIUM CHLORIDE 0.9 % (FLUSH) 0.9 %
10 SYRINGE (ML) INJECTION
Status: DISCONTINUED | OUTPATIENT
Start: 2024-09-20 | End: 2024-09-20 | Stop reason: HOSPADM

## 2024-09-20 RX ADMIN — SODIUM CHLORIDE: 9 INJECTION, SOLUTION INTRAVENOUS at 09:09

## 2024-09-20 RX ADMIN — HEPARIN 500 UNITS: 100 SYRINGE at 10:09

## 2024-09-20 RX ADMIN — SODIUM CHLORIDE 160 MG: 9 INJECTION, SOLUTION INTRAVENOUS at 09:09

## 2024-09-20 NOTE — NURSING
Pt received C3D3 VP16 today. Pt arrived with mediport already accessed from yesterday. Dressing is clean, dry, intact. No fever, redness, or tenderness. Rash present RLQ back flank - prescribed hydrocortisone cream. Reports compliance to zyprexa and dexamethasone PO. Tolerated infusion.

## 2024-09-23 PROBLEM — K92.0 HEMATEMESIS: Status: RESOLVED | Noted: 2024-06-22 | Resolved: 2024-09-23

## 2024-09-24 ENCOUNTER — LAB VISIT (OUTPATIENT)
Dept: HEMATOLOGY/ONCOLOGY | Facility: CLINIC | Age: 39
End: 2024-09-24
Payer: MEDICAID

## 2024-09-24 DIAGNOSIS — C80.1 MALIGNANT NEOPLASM METASTATIC TO HILUS OF LUNG WITH UNKNOWN PRIMARY SITE, RIGHT: Primary | ICD-10-CM

## 2024-09-24 DIAGNOSIS — C78.01 MALIGNANT NEOPLASM METASTATIC TO HILUS OF LUNG WITH UNKNOWN PRIMARY SITE, RIGHT: Primary | ICD-10-CM

## 2024-09-24 LAB
ALBUMIN SERPL-MCNC: 2.8 G/DL (ref 3.5–5)
ALBUMIN/GLOB SERPL: 0.7 RATIO (ref 1.1–2)
ALP SERPL-CCNC: 63 UNIT/L (ref 40–150)
ALT SERPL-CCNC: 18 UNIT/L (ref 0–55)
ANION GAP SERPL CALC-SCNC: 7 MEQ/L
AST SERPL-CCNC: 14 UNIT/L (ref 5–34)
BASOPHILS # BLD AUTO: 0.02 X10(3)/MCL
BASOPHILS NFR BLD AUTO: 0.6 %
BILIRUB SERPL-MCNC: 0.2 MG/DL
BUN SERPL-MCNC: 13.8 MG/DL (ref 8.9–20.6)
CALCIUM SERPL-MCNC: 9.1 MG/DL (ref 8.4–10.2)
CHLORIDE SERPL-SCNC: 107 MMOL/L (ref 98–107)
CO2 SERPL-SCNC: 27 MMOL/L (ref 22–29)
CREAT SERPL-MCNC: 0.72 MG/DL (ref 0.73–1.18)
CREAT/UREA NIT SERPL: 19
EOSINOPHIL # BLD AUTO: 0.02 X10(3)/MCL (ref 0–0.9)
EOSINOPHIL NFR BLD AUTO: 0.6 %
ERYTHROCYTE [DISTWIDTH] IN BLOOD BY AUTOMATED COUNT: 18.2 % (ref 11.5–17)
GFR SERPLBLD CREATININE-BSD FMLA CKD-EPI: >60 ML/MIN/1.73/M2
GLOBULIN SER-MCNC: 4.1 GM/DL (ref 2.4–3.5)
GLUCOSE SERPL-MCNC: 117 MG/DL (ref 74–100)
HCT VFR BLD AUTO: 29.3 % (ref 42–52)
HGB BLD-MCNC: 9.4 G/DL (ref 14–18)
IMM GRANULOCYTES # BLD AUTO: 0.03 X10(3)/MCL (ref 0–0.04)
IMM GRANULOCYTES NFR BLD AUTO: 0.9 %
LYMPHOCYTES # BLD AUTO: 0.44 X10(3)/MCL (ref 0.6–4.6)
LYMPHOCYTES NFR BLD AUTO: 12.9 %
MCH RBC QN AUTO: 30.2 PG (ref 27–31)
MCHC RBC AUTO-ENTMCNC: 32.1 G/DL (ref 33–36)
MCV RBC AUTO: 94.2 FL (ref 80–94)
MONOCYTES # BLD AUTO: 0.18 X10(3)/MCL (ref 0.1–1.3)
MONOCYTES NFR BLD AUTO: 5.3 %
NEUTROPHILS # BLD AUTO: 2.72 X10(3)/MCL (ref 2.1–9.2)
NEUTROPHILS NFR BLD AUTO: 79.7 %
NRBC BLD AUTO-RTO: 0 %
PLATELET # BLD AUTO: 332 X10(3)/MCL (ref 130–400)
PMV BLD AUTO: 8.4 FL (ref 7.4–10.4)
POTASSIUM SERPL-SCNC: 3.7 MMOL/L (ref 3.5–5.1)
PROT SERPL-MCNC: 6.9 GM/DL (ref 6.4–8.3)
RBC # BLD AUTO: 3.11 X10(6)/MCL (ref 4.7–6.1)
SODIUM SERPL-SCNC: 141 MMOL/L (ref 136–145)
WBC # BLD AUTO: 3.41 X10(3)/MCL (ref 4.5–11.5)

## 2024-09-24 PROCEDURE — 36415 COLL VENOUS BLD VENIPUNCTURE: CPT

## 2024-09-24 PROCEDURE — 80053 COMPREHEN METABOLIC PANEL: CPT

## 2024-09-24 PROCEDURE — 85025 COMPLETE CBC W/AUTO DIFF WBC: CPT

## 2024-10-01 ENCOUNTER — LAB VISIT (OUTPATIENT)
Dept: HEMATOLOGY/ONCOLOGY | Facility: CLINIC | Age: 39
End: 2024-10-01
Payer: MEDICAID

## 2024-10-01 DIAGNOSIS — C80.1 MALIGNANT NEOPLASM METASTATIC TO HILUS OF LUNG WITH UNKNOWN PRIMARY SITE, RIGHT: Primary | ICD-10-CM

## 2024-10-01 DIAGNOSIS — C78.01 MALIGNANT NEOPLASM METASTATIC TO HILUS OF LUNG WITH UNKNOWN PRIMARY SITE, RIGHT: Primary | ICD-10-CM

## 2024-10-01 LAB
ALBUMIN SERPL-MCNC: 3.2 G/DL (ref 3.5–5)
ALBUMIN/GLOB SERPL: 0.8 RATIO (ref 1.1–2)
ALP SERPL-CCNC: 71 UNIT/L (ref 40–150)
ALT SERPL-CCNC: 17 UNIT/L (ref 0–55)
ANION GAP SERPL CALC-SCNC: 7 MEQ/L
AST SERPL-CCNC: 13 UNIT/L (ref 5–34)
BASOPHILS # BLD AUTO: 0.01 X10(3)/MCL
BASOPHILS NFR BLD AUTO: 0.3 %
BILIRUB SERPL-MCNC: 0.2 MG/DL
BUN SERPL-MCNC: 12.3 MG/DL (ref 8.9–20.6)
CALCIUM SERPL-MCNC: 9.5 MG/DL (ref 8.4–10.2)
CHLORIDE SERPL-SCNC: 108 MMOL/L (ref 98–107)
CO2 SERPL-SCNC: 25 MMOL/L (ref 22–29)
CREAT SERPL-MCNC: 0.82 MG/DL (ref 0.73–1.18)
CREAT/UREA NIT SERPL: 15
EOSINOPHIL # BLD AUTO: 0.01 X10(3)/MCL (ref 0–0.9)
EOSINOPHIL NFR BLD AUTO: 0.3 %
ERYTHROCYTE [DISTWIDTH] IN BLOOD BY AUTOMATED COUNT: 19.6 % (ref 11.5–17)
GFR SERPLBLD CREATININE-BSD FMLA CKD-EPI: >60 ML/MIN/1.73/M2
GLOBULIN SER-MCNC: 4.2 GM/DL (ref 2.4–3.5)
GLUCOSE SERPL-MCNC: 144 MG/DL (ref 74–100)
HCT VFR BLD AUTO: 33.1 % (ref 42–52)
HGB BLD-MCNC: 10.7 G/DL (ref 14–18)
IMM GRANULOCYTES # BLD AUTO: 0.01 X10(3)/MCL (ref 0–0.04)
IMM GRANULOCYTES NFR BLD AUTO: 0.3 %
LYMPHOCYTES # BLD AUTO: 0.57 X10(3)/MCL (ref 0.6–4.6)
LYMPHOCYTES NFR BLD AUTO: 18 %
MCH RBC QN AUTO: 30.7 PG (ref 27–31)
MCHC RBC AUTO-ENTMCNC: 32.3 G/DL (ref 33–36)
MCV RBC AUTO: 95.1 FL (ref 80–94)
MONOCYTES # BLD AUTO: 0.39 X10(3)/MCL (ref 0.1–1.3)
MONOCYTES NFR BLD AUTO: 12.3 %
NEUTROPHILS # BLD AUTO: 2.18 X10(3)/MCL (ref 2.1–9.2)
NEUTROPHILS NFR BLD AUTO: 68.8 %
NRBC BLD AUTO-RTO: 0 %
PLATELET # BLD AUTO: 298 X10(3)/MCL (ref 130–400)
PMV BLD AUTO: 8.9 FL (ref 7.4–10.4)
POTASSIUM SERPL-SCNC: 3.9 MMOL/L (ref 3.5–5.1)
PROT SERPL-MCNC: 7.4 GM/DL (ref 6.4–8.3)
RBC # BLD AUTO: 3.48 X10(6)/MCL (ref 4.7–6.1)
SODIUM SERPL-SCNC: 140 MMOL/L (ref 136–145)
WBC # BLD AUTO: 3.17 X10(3)/MCL (ref 4.5–11.5)

## 2024-10-01 PROCEDURE — 36415 COLL VENOUS BLD VENIPUNCTURE: CPT

## 2024-10-01 PROCEDURE — 80053 COMPREHEN METABOLIC PANEL: CPT

## 2024-10-01 PROCEDURE — 85025 COMPLETE CBC W/AUTO DIFF WBC: CPT

## 2024-10-07 ENCOUNTER — DOCUMENTATION ONLY (OUTPATIENT)
Dept: HEMATOLOGY/ONCOLOGY | Facility: CLINIC | Age: 39
End: 2024-10-07
Payer: MEDICAID

## 2024-10-07 DIAGNOSIS — C78.01 MALIGNANT NEOPLASM METASTATIC TO HILUS OF LUNG WITH UNKNOWN PRIMARY SITE, RIGHT: Primary | ICD-10-CM

## 2024-10-07 DIAGNOSIS — C80.1 MALIGNANT NEOPLASM METASTATIC TO HILUS OF LUNG WITH UNKNOWN PRIMARY SITE, RIGHT: Primary | ICD-10-CM

## 2024-10-08 ENCOUNTER — TELEPHONE (OUTPATIENT)
Dept: HEMATOLOGY/ONCOLOGY | Facility: CLINIC | Age: 39
End: 2024-10-08
Payer: MEDICAID

## 2024-10-08 NOTE — TELEPHONE ENCOUNTER
Spoke with MARGARITO Can about patient missed appt with provider and labs and she stated that patient has to be seen by the provider prior to tx. MARGARITO Can instructed that patient do labs in  the am and she will see him on infusion side. Attempted to contact Ranjeet in regards to his missed appt with no answer noted. Contacted Erik, patient sister and she stated that she will call patient and have him call office.

## 2024-10-09 ENCOUNTER — OFFICE VISIT (OUTPATIENT)
Dept: HEMATOLOGY/ONCOLOGY | Facility: CLINIC | Age: 39
End: 2024-10-09
Payer: MEDICAID

## 2024-10-09 ENCOUNTER — INFUSION (OUTPATIENT)
Dept: INFUSION THERAPY | Facility: HOSPITAL | Age: 39
End: 2024-10-09
Attending: INTERNAL MEDICINE
Payer: MEDICAID

## 2024-10-09 ENCOUNTER — LAB VISIT (OUTPATIENT)
Dept: HEMATOLOGY/ONCOLOGY | Facility: CLINIC | Age: 39
End: 2024-10-09
Payer: MEDICAID

## 2024-10-09 VITALS
HEIGHT: 67 IN | BODY MASS INDEX: 19.76 KG/M2 | DIASTOLIC BLOOD PRESSURE: 71 MMHG | TEMPERATURE: 98 F | RESPIRATION RATE: 20 BRPM | OXYGEN SATURATION: 96 % | HEART RATE: 98 BPM | WEIGHT: 125.88 LBS | SYSTOLIC BLOOD PRESSURE: 124 MMHG

## 2024-10-09 DIAGNOSIS — K62.9 RECTAL ABNORMALITY: ICD-10-CM

## 2024-10-09 DIAGNOSIS — C80.1 MALIGNANT NEOPLASM METASTATIC TO HILUS OF LUNG WITH UNKNOWN PRIMARY SITE, RIGHT: Primary | ICD-10-CM

## 2024-10-09 DIAGNOSIS — C78.01 MALIGNANT NEOPLASM METASTATIC TO HILUS OF LUNG WITH UNKNOWN PRIMARY SITE, RIGHT: Primary | ICD-10-CM

## 2024-10-09 DIAGNOSIS — R05.9 COUGH, UNSPECIFIED TYPE: ICD-10-CM

## 2024-10-09 DIAGNOSIS — C7A.8 NEUROENDOCRINE CARCINOMA OF LUNG: Primary | ICD-10-CM

## 2024-10-09 DIAGNOSIS — R11.0 NAUSEA: ICD-10-CM

## 2024-10-09 DIAGNOSIS — C7A.1 LARGE CELL NEUROENDOCRINE CARCINOMA: Primary | ICD-10-CM

## 2024-10-09 DIAGNOSIS — D75.839 THROMBOCYTOSIS: ICD-10-CM

## 2024-10-09 LAB
ALBUMIN SERPL-MCNC: 3 G/DL (ref 3.5–5)
ALBUMIN/GLOB SERPL: 0.7 RATIO (ref 1.1–2)
ALP SERPL-CCNC: 82 UNIT/L (ref 40–150)
ALT SERPL-CCNC: 8 UNIT/L (ref 0–55)
ANION GAP SERPL CALC-SCNC: 9 MEQ/L
AST SERPL-CCNC: 9 UNIT/L (ref 5–34)
BASOPHILS # BLD AUTO: 0.02 X10(3)/MCL
BASOPHILS NFR BLD AUTO: 0.3 %
BILIRUB SERPL-MCNC: 0.2 MG/DL
BUN SERPL-MCNC: 4.3 MG/DL (ref 8.9–20.6)
CALCIUM SERPL-MCNC: 9.4 MG/DL (ref 8.4–10.2)
CHLORIDE SERPL-SCNC: 108 MMOL/L (ref 98–107)
CO2 SERPL-SCNC: 23 MMOL/L (ref 22–29)
CREAT SERPL-MCNC: 0.8 MG/DL (ref 0.73–1.18)
CREAT/UREA NIT SERPL: 5
EOSINOPHIL # BLD AUTO: 0.03 X10(3)/MCL (ref 0–0.9)
EOSINOPHIL NFR BLD AUTO: 0.5 %
ERYTHROCYTE [DISTWIDTH] IN BLOOD BY AUTOMATED COUNT: 21.4 % (ref 11.5–17)
GFR SERPLBLD CREATININE-BSD FMLA CKD-EPI: >60 ML/MIN/1.73/M2
GLOBULIN SER-MCNC: 4.2 GM/DL (ref 2.4–3.5)
GLUCOSE SERPL-MCNC: 164 MG/DL (ref 74–100)
HCT VFR BLD AUTO: 32.8 % (ref 42–52)
HGB BLD-MCNC: 10.7 G/DL (ref 14–18)
IMM GRANULOCYTES # BLD AUTO: 0.03 X10(3)/MCL (ref 0–0.04)
IMM GRANULOCYTES NFR BLD AUTO: 0.5 %
LYMPHOCYTES # BLD AUTO: 1.18 X10(3)/MCL (ref 0.6–4.6)
LYMPHOCYTES NFR BLD AUTO: 20 %
MAGNESIUM SERPL-MCNC: 2 MG/DL (ref 1.6–2.6)
MCH RBC QN AUTO: 31.9 PG (ref 27–31)
MCHC RBC AUTO-ENTMCNC: 32.6 G/DL (ref 33–36)
MCV RBC AUTO: 97.9 FL (ref 80–94)
MONOCYTES # BLD AUTO: 1.01 X10(3)/MCL (ref 0.1–1.3)
MONOCYTES NFR BLD AUTO: 17.1 %
NEUTROPHILS # BLD AUTO: 3.62 X10(3)/MCL (ref 2.1–9.2)
NEUTROPHILS NFR BLD AUTO: 61.6 %
NRBC BLD AUTO-RTO: 0 %
PLATELET # BLD AUTO: 330 X10(3)/MCL (ref 130–400)
PMV BLD AUTO: 8.6 FL (ref 7.4–10.4)
POTASSIUM SERPL-SCNC: 3.4 MMOL/L (ref 3.5–5.1)
PROT SERPL-MCNC: 7.2 GM/DL (ref 6.4–8.3)
RBC # BLD AUTO: 3.35 X10(6)/MCL (ref 4.7–6.1)
SODIUM SERPL-SCNC: 140 MMOL/L (ref 136–145)
T4 FREE SERPL-MCNC: 0.96 NG/DL (ref 0.7–1.48)
TSH SERPL-ACNC: 0.38 UIU/ML (ref 0.35–4.94)
WBC # BLD AUTO: 5.89 X10(3)/MCL (ref 4.5–11.5)

## 2024-10-09 PROCEDURE — 63600175 PHARM REV CODE 636 W HCPCS: Performed by: NURSE PRACTITIONER

## 2024-10-09 PROCEDURE — 1159F MED LIST DOCD IN RCRD: CPT | Mod: CPTII,,, | Performed by: NURSE PRACTITIONER

## 2024-10-09 PROCEDURE — 3044F HG A1C LEVEL LT 7.0%: CPT | Mod: CPTII,,, | Performed by: NURSE PRACTITIONER

## 2024-10-09 PROCEDURE — 83735 ASSAY OF MAGNESIUM: CPT

## 2024-10-09 PROCEDURE — 80053 COMPREHEN METABOLIC PANEL: CPT

## 2024-10-09 PROCEDURE — 96413 CHEMO IV INFUSION 1 HR: CPT

## 2024-10-09 PROCEDURE — 99215 OFFICE O/P EST HI 40 MIN: CPT | Mod: S$PBB,,, | Performed by: NURSE PRACTITIONER

## 2024-10-09 PROCEDURE — 85025 COMPLETE CBC W/AUTO DIFF WBC: CPT

## 2024-10-09 PROCEDURE — 36415 COLL VENOUS BLD VENIPUNCTURE: CPT

## 2024-10-09 PROCEDURE — 96367 TX/PROPH/DG ADDL SEQ IV INF: CPT

## 2024-10-09 PROCEDURE — 1160F RVW MEDS BY RX/DR IN RCRD: CPT | Mod: CPTII,,, | Performed by: NURSE PRACTITIONER

## 2024-10-09 PROCEDURE — 84439 ASSAY OF FREE THYROXINE: CPT

## 2024-10-09 PROCEDURE — 25000003 PHARM REV CODE 250: Performed by: NURSE PRACTITIONER

## 2024-10-09 PROCEDURE — 96375 TX/PRO/DX INJ NEW DRUG ADDON: CPT

## 2024-10-09 PROCEDURE — A4216 STERILE WATER/SALINE, 10 ML: HCPCS | Performed by: NURSE PRACTITIONER

## 2024-10-09 PROCEDURE — 99211 OFF/OP EST MAY X REQ PHY/QHP: CPT | Mod: PBBFAC,25 | Performed by: NURSE PRACTITIONER

## 2024-10-09 PROCEDURE — 96417 CHEMO IV INFUS EACH ADDL SEQ: CPT

## 2024-10-09 PROCEDURE — 84443 ASSAY THYROID STIM HORMONE: CPT

## 2024-10-09 RX ORDER — DIPHENHYDRAMINE HYDROCHLORIDE 50 MG/ML
50 INJECTION INTRAMUSCULAR; INTRAVENOUS ONCE AS NEEDED
Status: CANCELLED | OUTPATIENT
Start: 2024-10-09

## 2024-10-09 RX ORDER — HEPARIN 100 UNIT/ML
500 SYRINGE INTRAVENOUS
Status: CANCELLED | OUTPATIENT
Start: 2024-10-09

## 2024-10-09 RX ORDER — PROCHLORPERAZINE EDISYLATE 5 MG/ML
10 INJECTION INTRAMUSCULAR; INTRAVENOUS ONCE AS NEEDED
Status: CANCELLED | OUTPATIENT
Start: 2024-10-10

## 2024-10-09 RX ORDER — HEPARIN 100 UNIT/ML
500 SYRINGE INTRAVENOUS
Status: CANCELLED | OUTPATIENT
Start: 2024-10-10

## 2024-10-09 RX ORDER — PROCHLORPERAZINE EDISYLATE 5 MG/ML
10 INJECTION INTRAMUSCULAR; INTRAVENOUS ONCE AS NEEDED
Status: CANCELLED | OUTPATIENT
Start: 2024-10-11

## 2024-10-09 RX ORDER — DIPHENHYDRAMINE HYDROCHLORIDE 50 MG/ML
50 INJECTION INTRAMUSCULAR; INTRAVENOUS ONCE AS NEEDED
Status: DISCONTINUED | OUTPATIENT
Start: 2024-10-09 | End: 2024-10-09 | Stop reason: HOSPADM

## 2024-10-09 RX ORDER — EPINEPHRINE 0.3 MG/.3ML
0.3 INJECTION SUBCUTANEOUS ONCE AS NEEDED
Status: CANCELLED | OUTPATIENT
Start: 2024-10-10

## 2024-10-09 RX ORDER — PROCHLORPERAZINE EDISYLATE 5 MG/ML
10 INJECTION INTRAMUSCULAR; INTRAVENOUS ONCE AS NEEDED
Status: DISCONTINUED | OUTPATIENT
Start: 2024-10-09 | End: 2024-10-09 | Stop reason: HOSPADM

## 2024-10-09 RX ORDER — DIPHENHYDRAMINE HYDROCHLORIDE 50 MG/ML
50 INJECTION INTRAMUSCULAR; INTRAVENOUS ONCE AS NEEDED
Status: CANCELLED | OUTPATIENT
Start: 2024-10-11

## 2024-10-09 RX ORDER — HEPARIN 100 UNIT/ML
500 SYRINGE INTRAVENOUS
Status: DISCONTINUED | OUTPATIENT
Start: 2024-10-09 | End: 2024-10-09 | Stop reason: HOSPADM

## 2024-10-09 RX ORDER — EPINEPHRINE 0.3 MG/.3ML
0.3 INJECTION SUBCUTANEOUS ONCE AS NEEDED
Status: CANCELLED | OUTPATIENT
Start: 2024-10-11

## 2024-10-09 RX ORDER — EPINEPHRINE 1 MG/ML
0.3 INJECTION INTRAMUSCULAR; INTRAVENOUS; SUBCUTANEOUS ONCE AS NEEDED
Status: DISCONTINUED | OUTPATIENT
Start: 2024-10-09 | End: 2024-10-09 | Stop reason: HOSPADM

## 2024-10-09 RX ORDER — SODIUM CHLORIDE 0.9 % (FLUSH) 0.9 %
10 SYRINGE (ML) INJECTION
Status: CANCELLED | OUTPATIENT
Start: 2024-10-11

## 2024-10-09 RX ORDER — DIPHENHYDRAMINE HYDROCHLORIDE 50 MG/ML
50 INJECTION INTRAMUSCULAR; INTRAVENOUS ONCE AS NEEDED
Status: CANCELLED | OUTPATIENT
Start: 2024-10-10

## 2024-10-09 RX ORDER — PROCHLORPERAZINE EDISYLATE 5 MG/ML
10 INJECTION INTRAMUSCULAR; INTRAVENOUS ONCE AS NEEDED
Status: CANCELLED | OUTPATIENT
Start: 2024-10-09

## 2024-10-09 RX ORDER — BENZONATATE 100 MG/1
200 CAPSULE ORAL 3 TIMES DAILY PRN
Qty: 90 CAPSULE | Refills: 1 | Status: SHIPPED | OUTPATIENT
Start: 2024-10-09 | End: 2024-11-08

## 2024-10-09 RX ORDER — HEPARIN 100 UNIT/ML
500 SYRINGE INTRAVENOUS
Status: CANCELLED | OUTPATIENT
Start: 2024-10-11

## 2024-10-09 RX ORDER — EPINEPHRINE 0.3 MG/.3ML
0.3 INJECTION SUBCUTANEOUS ONCE AS NEEDED
Status: CANCELLED | OUTPATIENT
Start: 2024-10-09

## 2024-10-09 RX ORDER — SODIUM CHLORIDE 0.9 % (FLUSH) 0.9 %
10 SYRINGE (ML) INJECTION
Status: CANCELLED | OUTPATIENT
Start: 2024-10-09

## 2024-10-09 RX ORDER — SODIUM CHLORIDE 0.9 % (FLUSH) 0.9 %
10 SYRINGE (ML) INJECTION
Status: DISCONTINUED | OUTPATIENT
Start: 2024-10-09 | End: 2024-10-09 | Stop reason: HOSPADM

## 2024-10-09 RX ORDER — SODIUM CHLORIDE 0.9 % (FLUSH) 0.9 %
10 SYRINGE (ML) INJECTION
Status: CANCELLED | OUTPATIENT
Start: 2024-10-10

## 2024-10-09 RX ADMIN — DEXAMETHASONE SODIUM PHOSPHATE 0.25 MG: 4 INJECTION, SOLUTION INTRA-ARTICULAR; INTRALESIONAL; INTRAMUSCULAR; INTRAVENOUS; SOFT TISSUE at 10:10

## 2024-10-09 RX ADMIN — SODIUM CHLORIDE 160 MG: 9 INJECTION, SOLUTION INTRAVENOUS at 11:10

## 2024-10-09 RX ADMIN — APREPITANT 130 MG: 130 INJECTION, EMULSION INTRAVENOUS at 10:10

## 2024-10-09 RX ADMIN — CARBOPLATIN 585 MG: 10 INJECTION, SOLUTION INTRAVENOUS at 10:10

## 2024-10-09 RX ADMIN — Medication 10 ML: at 12:10

## 2024-10-09 RX ADMIN — ATEZOLIZUMAB 1200 MG: 1200 INJECTION, SOLUTION INTRAVENOUS at 09:10

## 2024-10-09 RX ADMIN — SODIUM CHLORIDE: 9 INJECTION, SOLUTION INTRAVENOUS at 09:10
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
## 2024-10-09 NOTE — Clinical Note
Infusion today cycle 4 day 1 Return to infusion on 10/10/2024 cycle 4 day 2 Return to infusion on 10/11/2024 cycle 4 day 3 Follow up with Dr. Denny in 3 weeks on 10/30/2024 with lab work and CT scan results prior to starting maintenance Tecentriq Weekly lab work

## 2024-10-09 NOTE — NURSING
0999  Pt missed his provider appt yesterday due to transportation issues.  Pt arrived today and did labwork.  TSH and free T 4 had not been ordered.  Contacted A Prashanth NP to see if she wanted them ordered and she replied yes.  Orders added.  She states she will stop by room to evaluate pt prior to starting chemo.  Pt denies any pain.  Does report a cough and SOB and constipation.

## 2024-10-10 ENCOUNTER — INFUSION (OUTPATIENT)
Dept: INFUSION THERAPY | Facility: HOSPITAL | Age: 39
End: 2024-10-10
Attending: INTERNAL MEDICINE
Payer: MEDICAID

## 2024-10-10 VITALS
TEMPERATURE: 98 F | HEIGHT: 67 IN | BODY MASS INDEX: 19.76 KG/M2 | DIASTOLIC BLOOD PRESSURE: 67 MMHG | WEIGHT: 125.88 LBS | HEART RATE: 82 BPM | OXYGEN SATURATION: 98 % | RESPIRATION RATE: 20 BRPM | SYSTOLIC BLOOD PRESSURE: 104 MMHG

## 2024-10-10 DIAGNOSIS — C7A.8 NEUROENDOCRINE CARCINOMA OF LUNG: Primary | ICD-10-CM

## 2024-10-10 PROCEDURE — 96413 CHEMO IV INFUSION 1 HR: CPT

## 2024-10-10 PROCEDURE — 63600175 PHARM REV CODE 636 W HCPCS: Performed by: NURSE PRACTITIONER

## 2024-10-10 PROCEDURE — 25000003 PHARM REV CODE 250: Performed by: NURSE PRACTITIONER

## 2024-10-10 PROCEDURE — A4216 STERILE WATER/SALINE, 10 ML: HCPCS | Performed by: NURSE PRACTITIONER

## 2024-10-10 PROCEDURE — 25000003 PHARM REV CODE 250: Performed by: INTERNAL MEDICINE

## 2024-10-10 RX ORDER — EPINEPHRINE 0.3 MG/.3ML
0.3 INJECTION SUBCUTANEOUS ONCE AS NEEDED
Status: DISCONTINUED | OUTPATIENT
Start: 2024-10-10 | End: 2024-10-10 | Stop reason: HOSPADM

## 2024-10-10 RX ORDER — DIPHENHYDRAMINE HYDROCHLORIDE 50 MG/ML
50 INJECTION INTRAMUSCULAR; INTRAVENOUS ONCE AS NEEDED
Status: DISCONTINUED | OUTPATIENT
Start: 2024-10-10 | End: 2024-10-10 | Stop reason: HOSPADM

## 2024-10-10 RX ORDER — PROCHLORPERAZINE EDISYLATE 5 MG/ML
10 INJECTION INTRAMUSCULAR; INTRAVENOUS ONCE AS NEEDED
Status: DISCONTINUED | OUTPATIENT
Start: 2024-10-10 | End: 2024-10-10 | Stop reason: HOSPADM

## 2024-10-10 RX ORDER — HEPARIN 100 UNIT/ML
500 SYRINGE INTRAVENOUS
Status: DISCONTINUED | OUTPATIENT
Start: 2024-10-10 | End: 2024-10-10 | Stop reason: HOSPADM

## 2024-10-10 RX ORDER — SODIUM CHLORIDE 0.9 % (FLUSH) 0.9 %
10 SYRINGE (ML) INJECTION
Status: DISCONTINUED | OUTPATIENT
Start: 2024-10-10 | End: 2024-10-10 | Stop reason: HOSPADM

## 2024-10-10 RX ADMIN — SODIUM CHLORIDE: 9 INJECTION, SOLUTION INTRAVENOUS at 08:10

## 2024-10-10 RX ADMIN — SODIUM CHLORIDE 160 MG: 9 INJECTION, SOLUTION INTRAVENOUS at 08:10

## 2024-10-10 RX ADMIN — Medication 10 ML: at 09:10

## 2024-10-11 ENCOUNTER — INFUSION (OUTPATIENT)
Dept: INFUSION THERAPY | Facility: HOSPITAL | Age: 39
End: 2024-10-11
Attending: INTERNAL MEDICINE
Payer: MEDICAID

## 2024-10-11 VITALS
HEIGHT: 67 IN | OXYGEN SATURATION: 98 % | HEART RATE: 92 BPM | BODY MASS INDEX: 19.76 KG/M2 | WEIGHT: 125.88 LBS | TEMPERATURE: 98 F | SYSTOLIC BLOOD PRESSURE: 128 MMHG | DIASTOLIC BLOOD PRESSURE: 82 MMHG | RESPIRATION RATE: 20 BRPM

## 2024-10-11 DIAGNOSIS — C7A.8 NEUROENDOCRINE CARCINOMA OF LUNG: Primary | ICD-10-CM

## 2024-10-11 PROCEDURE — 63600175 PHARM REV CODE 636 W HCPCS: Performed by: NURSE PRACTITIONER

## 2024-10-11 PROCEDURE — 96413 CHEMO IV INFUSION 1 HR: CPT

## 2024-10-11 PROCEDURE — A4216 STERILE WATER/SALINE, 10 ML: HCPCS | Performed by: NURSE PRACTITIONER

## 2024-10-11 PROCEDURE — 25000003 PHARM REV CODE 250: Performed by: NURSE PRACTITIONER

## 2024-10-11 RX ORDER — SODIUM CHLORIDE 0.9 % (FLUSH) 0.9 %
10 SYRINGE (ML) INJECTION
Status: DISCONTINUED | OUTPATIENT
Start: 2024-10-11 | End: 2024-10-11 | Stop reason: HOSPADM

## 2024-10-11 RX ORDER — DIPHENHYDRAMINE HYDROCHLORIDE 50 MG/ML
50 INJECTION INTRAMUSCULAR; INTRAVENOUS ONCE AS NEEDED
Status: DISCONTINUED | OUTPATIENT
Start: 2024-10-11 | End: 2024-10-11 | Stop reason: HOSPADM

## 2024-10-11 RX ORDER — HEPARIN 100 UNIT/ML
500 SYRINGE INTRAVENOUS
Status: DISCONTINUED | OUTPATIENT
Start: 2024-10-11 | End: 2024-10-11 | Stop reason: HOSPADM

## 2024-10-11 RX ORDER — PROCHLORPERAZINE EDISYLATE 5 MG/ML
10 INJECTION INTRAMUSCULAR; INTRAVENOUS ONCE AS NEEDED
Status: DISCONTINUED | OUTPATIENT
Start: 2024-10-11 | End: 2024-10-11 | Stop reason: HOSPADM

## 2024-10-11 RX ORDER — EPINEPHRINE 1 MG/ML
0.3 INJECTION INTRAMUSCULAR; INTRAVENOUS; SUBCUTANEOUS ONCE AS NEEDED
Status: DISCONTINUED | OUTPATIENT
Start: 2024-10-11 | End: 2024-10-11 | Stop reason: HOSPADM

## 2024-10-11 RX ADMIN — Medication 10 ML: at 09:10

## 2024-10-11 RX ADMIN — SODIUM CHLORIDE 160 MG: 0.9 INJECTION, SOLUTION INTRAVENOUS at 08:10

## 2024-10-11 RX ADMIN — SODIUM CHLORIDE: 9 INJECTION, SOLUTION INTRAVENOUS at 08:10

## 2024-10-11 RX ADMIN — HEPARIN 500 UNITS: 100 SYRINGE at 09:10

## 2024-10-22 ENCOUNTER — HOSPITAL ENCOUNTER (OUTPATIENT)
Dept: RADIOLOGY | Facility: HOSPITAL | Age: 39
Discharge: HOME | End: 2024-10-22
Attending: INTERNAL MEDICINE
Payer: MEDICAID

## 2024-10-22 DIAGNOSIS — C77.1 SECONDARY MALIGNANCY OF MEDIASTINAL LYMPH NODES: ICD-10-CM

## 2024-10-22 DIAGNOSIS — E27.8 ADRENAL MASS, LEFT: ICD-10-CM

## 2024-10-22 PROCEDURE — 71260 CT THORAX DX C+: CPT | Mod: TC

## 2024-10-22 PROCEDURE — 74177 CT ABD & PELVIS W/CONTRAST: CPT | Mod: TC

## 2024-10-22 PROCEDURE — 25500020 PHARM REV CODE 255

## 2024-10-22 RX ADMIN — IOHEXOL 100 ML: 350 INJECTION, SOLUTION INTRAVENOUS at 09:10

## 2024-10-30 ENCOUNTER — INFUSION (OUTPATIENT)
Dept: INFUSION THERAPY | Facility: HOSPITAL | Age: 39
End: 2024-10-30
Attending: INTERNAL MEDICINE
Payer: MEDICAID

## 2024-10-30 VITALS
TEMPERATURE: 98 F | DIASTOLIC BLOOD PRESSURE: 87 MMHG | OXYGEN SATURATION: 100 % | SYSTOLIC BLOOD PRESSURE: 130 MMHG | RESPIRATION RATE: 20 BRPM | WEIGHT: 122.38 LBS | BODY MASS INDEX: 19.21 KG/M2 | HEIGHT: 67 IN | HEART RATE: 95 BPM

## 2024-10-30 DIAGNOSIS — C7A.8 NEUROENDOCRINE CARCINOMA OF LUNG: Primary | ICD-10-CM

## 2024-10-30 PROCEDURE — A4216 STERILE WATER/SALINE, 10 ML: HCPCS | Performed by: NURSE PRACTITIONER

## 2024-10-30 PROCEDURE — 96413 CHEMO IV INFUSION 1 HR: CPT

## 2024-10-30 PROCEDURE — 63600175 PHARM REV CODE 636 W HCPCS: Performed by: NURSE PRACTITIONER

## 2024-10-30 PROCEDURE — 25000003 PHARM REV CODE 250: Performed by: NURSE PRACTITIONER

## 2024-10-30 RX ORDER — DIPHENHYDRAMINE HYDROCHLORIDE 50 MG/ML
50 INJECTION INTRAMUSCULAR; INTRAVENOUS ONCE AS NEEDED
Status: DISCONTINUED | OUTPATIENT
Start: 2024-10-30 | End: 2024-10-30 | Stop reason: HOSPADM

## 2024-10-30 RX ORDER — EPINEPHRINE 1 MG/ML
0.3 INJECTION INTRAMUSCULAR; INTRAVENOUS; SUBCUTANEOUS ONCE AS NEEDED
Status: DISCONTINUED | OUTPATIENT
Start: 2024-10-30 | End: 2024-10-30 | Stop reason: HOSPADM

## 2024-10-30 RX ORDER — HEPARIN 100 UNIT/ML
500 SYRINGE INTRAVENOUS
Status: CANCELLED | OUTPATIENT
Start: 2024-10-30

## 2024-10-30 RX ORDER — PROCHLORPERAZINE EDISYLATE 5 MG/ML
10 INJECTION INTRAMUSCULAR; INTRAVENOUS ONCE AS NEEDED
Status: DISCONTINUED | OUTPATIENT
Start: 2024-10-30 | End: 2024-10-30 | Stop reason: HOSPADM

## 2024-10-30 RX ORDER — EPINEPHRINE 0.3 MG/.3ML
0.3 INJECTION SUBCUTANEOUS ONCE AS NEEDED
Status: CANCELLED | OUTPATIENT
Start: 2024-10-30

## 2024-10-30 RX ORDER — PROCHLORPERAZINE EDISYLATE 5 MG/ML
10 INJECTION INTRAMUSCULAR; INTRAVENOUS ONCE AS NEEDED
Status: CANCELLED | OUTPATIENT
Start: 2024-10-30

## 2024-10-30 RX ORDER — HEPARIN 100 UNIT/ML
500 SYRINGE INTRAVENOUS
Status: DISCONTINUED | OUTPATIENT
Start: 2024-10-30 | End: 2024-10-30 | Stop reason: HOSPADM

## 2024-10-30 RX ORDER — SODIUM CHLORIDE 0.9 % (FLUSH) 0.9 %
10 SYRINGE (ML) INJECTION
Status: CANCELLED | OUTPATIENT
Start: 2024-10-30

## 2024-10-30 RX ORDER — DIPHENHYDRAMINE HYDROCHLORIDE 50 MG/ML
50 INJECTION INTRAMUSCULAR; INTRAVENOUS ONCE AS NEEDED
Status: CANCELLED | OUTPATIENT
Start: 2024-10-30

## 2024-10-30 RX ORDER — SODIUM CHLORIDE 0.9 % (FLUSH) 0.9 %
10 SYRINGE (ML) INJECTION
Status: DISCONTINUED | OUTPATIENT
Start: 2024-10-30 | End: 2024-10-30 | Stop reason: HOSPADM

## 2024-10-30 RX ADMIN — Medication 10 ML: at 11:10

## 2024-10-30 RX ADMIN — SODIUM CHLORIDE: 9 INJECTION, SOLUTION INTRAVENOUS at 10:10

## 2024-10-30 RX ADMIN — ATEZOLIZUMAB 1200 MG: 1200 INJECTION, SOLUTION INTRAVENOUS at 10:10

## 2024-10-30 RX ADMIN — HEPARIN 500 UNITS: 100 SYRINGE at 11:10

## 2024-11-01 ENCOUNTER — TELEPHONE (OUTPATIENT)
Dept: HEMATOLOGY/ONCOLOGY | Facility: CLINIC | Age: 39
End: 2024-11-01
Payer: MEDICAID

## 2024-11-03 RX ORDER — PROCHLORPERAZINE EDISYLATE 5 MG/ML
10 INJECTION INTRAMUSCULAR; INTRAVENOUS ONCE AS NEEDED
OUTPATIENT
Start: 2024-11-20

## 2024-11-03 RX ORDER — EPINEPHRINE 0.3 MG/.3ML
0.3 INJECTION SUBCUTANEOUS ONCE AS NEEDED
OUTPATIENT
Start: 2024-11-20

## 2024-11-03 RX ORDER — SODIUM CHLORIDE 0.9 % (FLUSH) 0.9 %
10 SYRINGE (ML) INJECTION
OUTPATIENT
Start: 2024-12-11

## 2024-11-03 RX ORDER — DIPHENHYDRAMINE HYDROCHLORIDE 50 MG/ML
50 INJECTION INTRAMUSCULAR; INTRAVENOUS ONCE AS NEEDED
OUTPATIENT
Start: 2024-11-20

## 2024-11-03 RX ORDER — PROCHLORPERAZINE EDISYLATE 5 MG/ML
10 INJECTION INTRAMUSCULAR; INTRAVENOUS ONCE AS NEEDED
OUTPATIENT
Start: 2024-12-11

## 2024-11-03 RX ORDER — HEPARIN 100 UNIT/ML
500 SYRINGE INTRAVENOUS
OUTPATIENT
Start: 2024-11-20

## 2024-11-03 RX ORDER — EPINEPHRINE 0.3 MG/.3ML
0.3 INJECTION SUBCUTANEOUS ONCE AS NEEDED
OUTPATIENT
Start: 2024-12-11

## 2024-11-03 RX ORDER — SODIUM CHLORIDE 0.9 % (FLUSH) 0.9 %
10 SYRINGE (ML) INJECTION
OUTPATIENT
Start: 2024-11-20

## 2024-11-03 RX ORDER — HEPARIN 100 UNIT/ML
500 SYRINGE INTRAVENOUS
OUTPATIENT
Start: 2024-12-11

## 2024-11-03 RX ORDER — DIPHENHYDRAMINE HYDROCHLORIDE 50 MG/ML
50 INJECTION INTRAMUSCULAR; INTRAVENOUS ONCE AS NEEDED
OUTPATIENT
Start: 2024-12-11
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
## 2024-11-04 ENCOUNTER — OFFICE VISIT (OUTPATIENT)
Dept: HEMATOLOGY/ONCOLOGY | Facility: CLINIC | Age: 39
End: 2024-11-04
Attending: INTERNAL MEDICINE
Payer: MEDICAID

## 2024-11-04 VITALS
WEIGHT: 121 LBS | SYSTOLIC BLOOD PRESSURE: 133 MMHG | HEART RATE: 104 BPM | HEIGHT: 67 IN | OXYGEN SATURATION: 99 % | BODY MASS INDEX: 18.99 KG/M2 | RESPIRATION RATE: 18 BRPM | TEMPERATURE: 98 F | DIASTOLIC BLOOD PRESSURE: 80 MMHG

## 2024-11-04 DIAGNOSIS — R04.2 HEMOPTYSIS: Primary | ICD-10-CM

## 2024-11-04 DIAGNOSIS — C7A.1 LARGE CELL NEUROENDOCRINE CARCINOMA: Primary | ICD-10-CM

## 2024-11-04 DIAGNOSIS — E27.8 ADRENAL MASS, LEFT: ICD-10-CM

## 2024-11-04 DIAGNOSIS — E87.6 HYPOKALEMIA: ICD-10-CM

## 2024-11-04 DIAGNOSIS — C77.1 SECONDARY MALIGNANCY OF MEDIASTINAL LYMPH NODES: ICD-10-CM

## 2024-11-04 DIAGNOSIS — R91.1 NODULE OF LOWER LOBE OF LEFT LUNG: ICD-10-CM

## 2024-11-04 DIAGNOSIS — C80.1 MALIGNANT NEOPLASM METASTATIC TO HILUS OF LUNG WITH UNKNOWN PRIMARY SITE, RIGHT: ICD-10-CM

## 2024-11-04 DIAGNOSIS — C7A.8 NEUROENDOCRINE CARCINOMA OF LUNG: ICD-10-CM

## 2024-11-04 DIAGNOSIS — D75.839 THROMBOCYTOSIS: ICD-10-CM

## 2024-11-04 DIAGNOSIS — C7A.1 LARGE CELL NEUROENDOCRINE CARCINOMA: ICD-10-CM

## 2024-11-04 DIAGNOSIS — C79.89 METASTATIC CANCER TO PELVIS: ICD-10-CM

## 2024-11-04 DIAGNOSIS — K92.1 BLOODY STOOL: ICD-10-CM

## 2024-11-04 DIAGNOSIS — Z51.11 CHEMOTHERAPY MANAGEMENT, ENCOUNTER FOR: ICD-10-CM

## 2024-11-04 DIAGNOSIS — C77.0 SECONDARY MALIGNANCY OF SUPRACLAVICULAR LYMPH NODES: ICD-10-CM

## 2024-11-04 DIAGNOSIS — C78.01 MALIGNANT NEOPLASM METASTATIC TO HILUS OF LUNG WITH UNKNOWN PRIMARY SITE, RIGHT: ICD-10-CM

## 2024-11-04 DIAGNOSIS — D63.8 ANEMIA, CHRONIC DISEASE: ICD-10-CM

## 2024-11-04 DIAGNOSIS — K62.9 RECTAL ABNORMALITY: ICD-10-CM

## 2024-11-04 PROCEDURE — 3075F SYST BP GE 130 - 139MM HG: CPT | Mod: CPTII,,, | Performed by: INTERNAL MEDICINE

## 2024-11-04 PROCEDURE — 1160F RVW MEDS BY RX/DR IN RCRD: CPT | Mod: CPTII,,, | Performed by: INTERNAL MEDICINE

## 2024-11-04 PROCEDURE — 1159F MED LIST DOCD IN RCRD: CPT | Mod: CPTII,,, | Performed by: INTERNAL MEDICINE

## 2024-11-04 PROCEDURE — 99215 OFFICE O/P EST HI 40 MIN: CPT | Mod: S$PBB,,, | Performed by: INTERNAL MEDICINE

## 2024-11-04 PROCEDURE — 3008F BODY MASS INDEX DOCD: CPT | Mod: CPTII,,, | Performed by: INTERNAL MEDICINE

## 2024-11-04 PROCEDURE — 3079F DIAST BP 80-89 MM HG: CPT | Mod: CPTII,,, | Performed by: INTERNAL MEDICINE

## 2024-11-04 PROCEDURE — 3044F HG A1C LEVEL LT 7.0%: CPT | Mod: CPTII,,, | Performed by: INTERNAL MEDICINE

## 2024-11-04 PROCEDURE — 99214 OFFICE O/P EST MOD 30 MIN: CPT | Mod: PBBFAC | Performed by: INTERNAL MEDICINE

## 2024-11-04 RX ORDER — POTASSIUM CHLORIDE 1500 MG/1
40 TABLET, EXTENDED RELEASE ORAL DAILY
Qty: 28 TABLET | Refills: 0 | Status: SHIPPED | OUTPATIENT
Start: 2024-11-04

## 2024-11-04 NOTE — Clinical Note
Orders for 11/04/2024: -start potassium chloride 40 mEq p.o. q.d. x2 weeks; no refills; in 2 weeks, recheck CMP and magnesium level Continue maintenance Tecentriq every 3 weeks  Re-stage with contrast-enhanced CT scans of C/A/P in January 2025  Check TSH and free T4 every 6 weeks  Check CBC and CMP every 3 weeks  Refer to GI once again for EGD and colonoscopy for history of hematemesis and rectal wall thickening on imaging studies  Follow-up with NP in 3 weeks

## 2024-11-19 ENCOUNTER — TELEPHONE (OUTPATIENT)
Dept: HEMATOLOGY/ONCOLOGY | Facility: CLINIC | Age: 39
End: 2024-11-19
Payer: MEDICAID

## 2024-11-20 ENCOUNTER — INFUSION (OUTPATIENT)
Dept: INFUSION THERAPY | Facility: HOSPITAL | Age: 39
End: 2024-11-20
Attending: INTERNAL MEDICINE
Payer: MEDICAID

## 2024-11-20 ENCOUNTER — OFFICE VISIT (OUTPATIENT)
Dept: HEMATOLOGY/ONCOLOGY | Facility: CLINIC | Age: 39
End: 2024-11-20
Payer: MEDICAID

## 2024-11-20 VITALS
WEIGHT: 122 LBS | HEART RATE: 92 BPM | BODY MASS INDEX: 19.61 KG/M2 | TEMPERATURE: 98 F | DIASTOLIC BLOOD PRESSURE: 79 MMHG | SYSTOLIC BLOOD PRESSURE: 115 MMHG | HEIGHT: 66 IN | RESPIRATION RATE: 20 BRPM | OXYGEN SATURATION: 100 %

## 2024-11-20 DIAGNOSIS — K92.0 HEMATEMESIS WITH NAUSEA: ICD-10-CM

## 2024-11-20 DIAGNOSIS — C7A.8 NEUROENDOCRINE CARCINOMA OF LUNG: Primary | ICD-10-CM

## 2024-11-20 DIAGNOSIS — C7A.1 LARGE CELL NEUROENDOCRINE CARCINOMA: Primary | ICD-10-CM

## 2024-11-20 PROCEDURE — 3008F BODY MASS INDEX DOCD: CPT | Mod: CPTII,,, | Performed by: NURSE PRACTITIONER

## 2024-11-20 PROCEDURE — 63600175 PHARM REV CODE 636 W HCPCS: Mod: JZ,JG | Performed by: INTERNAL MEDICINE

## 2024-11-20 PROCEDURE — 99214 OFFICE O/P EST MOD 30 MIN: CPT | Mod: PBBFAC | Performed by: NURSE PRACTITIONER

## 2024-11-20 PROCEDURE — 1159F MED LIST DOCD IN RCRD: CPT | Mod: CPTII,,, | Performed by: NURSE PRACTITIONER

## 2024-11-20 PROCEDURE — 25000003 PHARM REV CODE 250: Performed by: INTERNAL MEDICINE

## 2024-11-20 PROCEDURE — 3078F DIAST BP <80 MM HG: CPT | Mod: CPTII,,, | Performed by: NURSE PRACTITIONER

## 2024-11-20 PROCEDURE — 3044F HG A1C LEVEL LT 7.0%: CPT | Mod: CPTII,,, | Performed by: NURSE PRACTITIONER

## 2024-11-20 PROCEDURE — 99215 OFFICE O/P EST HI 40 MIN: CPT | Mod: S$PBB,,, | Performed by: NURSE PRACTITIONER

## 2024-11-20 PROCEDURE — 1160F RVW MEDS BY RX/DR IN RCRD: CPT | Mod: CPTII,,, | Performed by: NURSE PRACTITIONER

## 2024-11-20 PROCEDURE — 3074F SYST BP LT 130 MM HG: CPT | Mod: CPTII,,, | Performed by: NURSE PRACTITIONER

## 2024-11-20 PROCEDURE — 96413 CHEMO IV INFUSION 1 HR: CPT

## 2024-11-20 RX ORDER — PROCHLORPERAZINE EDISYLATE 5 MG/ML
10 INJECTION INTRAMUSCULAR; INTRAVENOUS ONCE AS NEEDED
Status: DISCONTINUED | OUTPATIENT
Start: 2024-11-20 | End: 2024-11-20 | Stop reason: HOSPADM

## 2024-11-20 RX ORDER — HEPARIN 100 UNIT/ML
500 SYRINGE INTRAVENOUS
Status: DISCONTINUED | OUTPATIENT
Start: 2024-11-20 | End: 2024-11-20 | Stop reason: HOSPADM

## 2024-11-20 RX ORDER — DIPHENHYDRAMINE HYDROCHLORIDE 50 MG/ML
50 INJECTION INTRAMUSCULAR; INTRAVENOUS ONCE AS NEEDED
Status: DISCONTINUED | OUTPATIENT
Start: 2024-11-20 | End: 2024-11-20 | Stop reason: HOSPADM

## 2024-11-20 RX ORDER — EPINEPHRINE 1 MG/ML
0.3 INJECTION INTRAMUSCULAR; INTRAVENOUS; SUBCUTANEOUS ONCE AS NEEDED
Status: DISCONTINUED | OUTPATIENT
Start: 2024-11-20 | End: 2024-11-20 | Stop reason: HOSPADM

## 2024-11-20 RX ORDER — SODIUM CHLORIDE 0.9 % (FLUSH) 0.9 %
10 SYRINGE (ML) INJECTION
Status: DISCONTINUED | OUTPATIENT
Start: 2024-11-20 | End: 2024-11-20 | Stop reason: HOSPADM

## 2024-11-20 RX ADMIN — ATEZOLIZUMAB 1200 MG: 1200 INJECTION, SOLUTION INTRAVENOUS at 11:11
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
## 2024-11-20 NOTE — Clinical Note
infusion today maintenance tecentriq  fu w/np in 3 weeks on 12/11/2024 with lab work prior to maintenance Tecentriq

## 2024-12-02 ENCOUNTER — HOSPITAL ENCOUNTER (EMERGENCY)
Facility: HOSPITAL | Age: 39
Discharge: HOME | End: 2024-12-02
Attending: EMERGENCY MEDICINE
Payer: MEDICAID

## 2024-12-02 VITALS
TEMPERATURE: 98 F | DIASTOLIC BLOOD PRESSURE: 69 MMHG | BODY MASS INDEX: 18.4 KG/M2 | WEIGHT: 114.5 LBS | HEIGHT: 66 IN | HEART RATE: 91 BPM | RESPIRATION RATE: 16 BRPM | SYSTOLIC BLOOD PRESSURE: 118 MMHG | OXYGEN SATURATION: 98 %

## 2024-12-02 DIAGNOSIS — N45.2 ORCHITIS: Primary | ICD-10-CM

## 2024-12-02 DIAGNOSIS — N43.3 BILATERAL HYDROCELE: ICD-10-CM

## 2024-12-02 DIAGNOSIS — N50.89 TESTICULAR SWELLING, LEFT: ICD-10-CM

## 2024-12-02 LAB
BACTERIA #/AREA URNS AUTO: ABNORMAL /HPF
BILIRUB UR QL STRIP.AUTO: NEGATIVE
C TRACH DNA SPEC QL NAA+PROBE: NOT DETECTED
CLARITY UR: ABNORMAL
COLOR UR AUTO: YELLOW
GLUCOSE UR QL STRIP: NORMAL
HGB UR QL STRIP: NEGATIVE
HYALINE CASTS #/AREA URNS LPF: ABNORMAL /LPF
KETONES UR QL STRIP: ABNORMAL
LEUKOCYTE ESTERASE UR QL STRIP: 500
MUCOUS THREADS URNS QL MICRO: ABNORMAL /LPF
N GONORRHOEA DNA SPEC QL NAA+PROBE: NOT DETECTED
NITRITE UR QL STRIP: NEGATIVE
PH UR STRIP: 6 [PH]
PROT UR QL STRIP: ABNORMAL
RBC #/AREA URNS AUTO: ABNORMAL /HPF
RENAL EPI CELLS #/AREA UR COMP ASSIST: ABNORMAL /HPF
SOURCE (OHS): NORMAL
SP GR UR STRIP.AUTO: 1.03 (ref 1–1.03)
SQUAMOUS #/AREA URNS LPF: ABNORMAL /HPF
UROBILINOGEN UR STRIP-ACNC: ABNORMAL
WBC #/AREA URNS AUTO: >100 /HPF

## 2024-12-02 PROCEDURE — 81001 URINALYSIS AUTO W/SCOPE: CPT | Performed by: EMERGENCY MEDICINE

## 2024-12-02 PROCEDURE — 96372 THER/PROPH/DIAG INJ SC/IM: CPT | Performed by: PHYSICIAN ASSISTANT

## 2024-12-02 PROCEDURE — 87591 N.GONORRHOEAE DNA AMP PROB: CPT | Performed by: EMERGENCY MEDICINE

## 2024-12-02 PROCEDURE — 99284 EMERGENCY DEPT VISIT MOD MDM: CPT | Mod: 25

## 2024-12-02 PROCEDURE — 87086 URINE CULTURE/COLONY COUNT: CPT | Performed by: EMERGENCY MEDICINE

## 2024-12-02 PROCEDURE — 63600175 PHARM REV CODE 636 W HCPCS: Performed by: PHYSICIAN ASSISTANT

## 2024-12-02 RX ORDER — DOXYCYCLINE 100 MG/1
100 CAPSULE ORAL 2 TIMES DAILY
Qty: 28 CAPSULE | Refills: 0 | Status: SHIPPED | OUTPATIENT
Start: 2024-12-02 | End: 2024-12-16

## 2024-12-02 RX ORDER — LIDOCAINE HYDROCHLORIDE 10 MG/ML
2 INJECTION, SOLUTION EPIDURAL; INFILTRATION; INTRACAUDAL; PERINEURAL
Status: COMPLETED | OUTPATIENT
Start: 2024-12-02 | End: 2024-12-02

## 2024-12-02 RX ORDER — IBUPROFEN 800 MG/1
800 TABLET ORAL 3 TIMES DAILY PRN
Qty: 30 TABLET | Refills: 0 | Status: SHIPPED | OUTPATIENT
Start: 2024-12-02

## 2024-12-02 RX ORDER — CEFTRIAXONE 500 MG/1
500 INJECTION, POWDER, FOR SOLUTION INTRAMUSCULAR; INTRAVENOUS
Status: COMPLETED | OUTPATIENT
Start: 2024-12-02 | End: 2024-12-02

## 2024-12-02 RX ORDER — KETOROLAC TROMETHAMINE 30 MG/ML
30 INJECTION, SOLUTION INTRAMUSCULAR; INTRAVENOUS
Status: COMPLETED | OUTPATIENT
Start: 2024-12-02 | End: 2024-12-02

## 2024-12-02 RX ADMIN — KETOROLAC TROMETHAMINE 30 MG: 30 INJECTION INTRAMUSCULAR; INTRAVENOUS at 12:12

## 2024-12-02 RX ADMIN — CEFTRIAXONE SODIUM 500 MG: 500 INJECTION, POWDER, FOR SOLUTION INTRAMUSCULAR; INTRAVENOUS at 01:12

## 2024-12-02 RX ADMIN — LIDOCAINE HYDROCHLORIDE 20 MG: 10 INJECTION, SOLUTION EPIDURAL; INFILTRATION; INTRACAUDAL; PERINEURAL at 01:12

## 2024-12-02 NOTE — ED PROVIDER NOTES
Encounter Date: 12/2/2024       History     Chief Complaint   Patient presents with    Groin Pain     Left side groin pain and swelling for the past 3 days. Denies any urinary symptoms     38 year old male presents to the emergency department with complaints of left testicular pain and swelling x 3 days.  He denies dysuria, hematuria, urethral discharge.      The history is provided by the patient. No  was used.     Review of patient's allergies indicates:  No Known Allergies  Past Medical History:   Diagnosis Date    Hyperlipidemia     Myocardial infarction      Past Surgical History:   Procedure Laterality Date    CORONARY ARTERY BYPASS GRAFT      INSERTION OF TUNNELED CENTRAL VENOUS CATHETER (CVC) WITH SUBCUTANEOUS PORT Right 7/31/2024    Procedure: [id number]-S-CATH;  Surgeon: Renato Alvarado Jr., MD;  Location: [facility] OR;  Service: General;  Laterality: Right;    LAPAROTOMY, EXPLORATORY N/A 03/23/2023    Procedure: LAPAROTOMY, EXPLORATORY;  Surgeon: Alex Juárez MD;  Location: [facility] OR;  Service: General;  Laterality: N/A;    LYMPH NODE BIOPSY Right 6/14/2024    Procedure: BIOPSY, LYMPH NODE;  Surgeon: Renato Alvarado Jr., MD;  Location: [facility] OR;  Service: General;  Laterality: Right;  right supraclavicular lymph node    OPEN REDUCTION AND INTERNAL FIXATION (ORIF) OF FRACTURE OF DISTAL RADIUS Left 4/4/2024    Procedure: ORIF, FRACTURE, RADIUS, DISTAL;  Surgeon: Khoa Abdalla MD;  Location: [facility] OR;  Service: Orthopedics;  Laterality: Left;    ORIF FOREARM FRACTURE Right 12/7/2023    Procedure: ORIF, FRACTURE, RADIUS OR ULNA;  Surgeon: Khoa Abdalla MD;  Location: [facility] OR;  Service: Orthopedics;  Laterality: Right;  Call Wilmer     Family History   Problem Relation Name Age of Onset    Diabetes Mother      Heart disease Mother      Cancer Father      Diabetes Sister      Heart disease Brother      Stroke Maternal Grandmother      Heart disease Maternal Grandfather       Social  History     Tobacco Use    Smoking status: Former     Current packs/day: 0.15     Average packs/day: 0.2 packs/day for 25.0 years (3.7 ttl pk-yrs)     Types: Cigarettes     Start date: 12/4/1999    Smokeless tobacco: Never    Tobacco comments:     Pt states he quit cigs   Substance Use Topics    Alcohol use: Yes     Comment: occasionally    Drug use: Yes     Frequency: 3.0 times per week     Types: Marijuana     Review of Systems   Constitutional:  Negative for chills and fever.   Gastrointestinal:  Negative for abdominal pain, nausea and vomiting.   Genitourinary:  Positive for testicular pain (left swelling). Negative for dysuria, flank pain, genital sores, hematuria and penile discharge.   Musculoskeletal:  Negative for back pain and neck pain.   Skin:  Negative for rash.       Physical Exam     Initial Vitals [12/02/24 1119]   BP Pulse Resp Temp SpO2   113/74 104 18 97.4 °F (36.3 °C) 100 %      MAP       --         Physical Exam    Nursing note and vitals reviewed.  Constitutional: He appears well-developed and well-nourished.   Cardiovascular:  Normal rate, regular rhythm, normal heart sounds and intact distal pulses.           Pulmonary/Chest: Breath sounds normal. No respiratory distress. He has no wheezes. He has no rhonchi. He has no rales. He exhibits no tenderness.   Abdominal: Abdomen is soft. Bowel sounds are normal. There is no abdominal tenderness.   Genitourinary:    Penis normal.   Left testis shows swelling and tenderness.   Musculoskeletal:         General: Normal range of motion.     Neurological: He is alert. GCS eye subscore is 4. GCS verbal subscore is 5. GCS motor subscore is 6.         ED Course   Procedures  Labs Reviewed   CULTURE, URINE - Abnormal       Result Value    Urine Culture >/= 100,000 colonies/ml Gram-negative Rods (*)    URINALYSIS, REFLEX TO URINE CULTURE - Abnormal    Color, UA Yellow      Appearance, UA Turbid (*)     Specific Gravity, UA 1.033 (*)     pH, UA 6.0       Protein, UA 1+ (*)     Glucose, UA Normal      Ketones, UA Trace (*)     Blood, UA Negative      Bilirubin, UA Negative      Urobilinogen, UA 1+ (*)     Nitrites, UA Negative      Leukocyte Esterase,  (*)     RBC, UA 0-5      WBC, UA >100 (*)     Bacteria, UA None Seen      Squamous Epithelial Cells, UA Few (*)     Renal Epithelial Cells, UA Occasional (*)     Mucous, UA Many (*)     Hyaline Casts, UA None Seen     CHLAMYDIA/GONORRHOEAE(GC), PCR    Chlamydia trachomatis PCR Not Detected      N. gonorrhea PCR Not Detected      Source Urine      Narrative:     The Xpert CT/NG test, performed on the [employer] system is a qualitative in vitro real-time polymerase chain reaction (PCR) test for the automated detected and differentiation for genomic DNA from Chlamydia trachomatis (CT) and/or Neisseria gonorrhoeae (NG).          Imaging Results              US Scrotum And Testicles (Final result)  Result time 12/02/24 14:22:17      Final result by Jerry Christianson MD (12/02/24 14:22:17)                   Impression:      1. Left testicular increased vascularity reflects orchitis.    2. Bilateral hydrocele.      Electronically signed by: Jerry Christianson  Date:    12/02/2024  Time:    14:22               Narrative:    EXAMINATION:  US SCROTUM AND TESTICLES    CLINICAL HISTORY:  Other specified disorders of the male genital organs    TECHNIQUE:  Multiple real-time images were performed of the scrotum in various planes by the sonographer.    COMPARISON:  May 27, 2024.    FINDINGS:  Right testicle measures 3.5 x 1.6 x 2.7 cm.  There is unremarkable echotexture of the right testicular parenchyma.  There is arteriovenous flow to the right testicle.  There is right hydrocele which measures 3.0 x 0.5 cm.    Left testicle measures 3.2 x 1.9 x 3.0 cm.  There is also unremarkable echotexture of the left testicular parenchyma.  There is increased vascularity of left testicle which suggests orchitis.  Left hydrocele is also noted  which measures 3.0 x 0.9 cm.                                       Medications   ketorolac injection 30 mg (30 mg Intramuscular Given 12/2/24 1245)   cefTRIAXone injection 500 mg (500 mg Intramuscular Given 12/2/24 1300)   LIDOcaine (PF) 10 mg/ml (1%) injection 20 mg (20 mg Infiltration Given 12/2/24 1300)     Medical Decision Making  38 year old male presents to the emergency department with complaints of left testicular pain and swelling x 3 days.  He denies dysuria, hematuria, urethral discharge.      DDx: UTI, STI, epididymitis, hydrocele    Toradol IM given in the ED. Urinalysis concerning for UTI vs STI.  Rocephin IM given in the ED. Ultrasound show left testicular increased vascularity reflects orchitis.  Bilateral hydrocele.  Prescribed doxycycline.  G/C negative.  Urine culture pending.  Referral sent to urology.    Amount and/or Complexity of Data Reviewed  Labs:  Decision-making details documented in ED Course.  Radiology: ordered. Decision-making details documented in ED Course.    Risk  Prescription drug management.               ED Course as of 12/03/24 1730   Mon Dec 02, 2024   1214 Leukocyte Esterase, UA(!): 500 [ER]   1214 WBC, UA(!): >100 [ER]   1352 Chlamydia trachomatis PCR: Not Detected [ER]   1352 N. gonorrhea PCR: Not Detected [ER]   1458 US Scrotum And Testicles    1. Left testicular increased vascularity reflects orchitis.     2. Bilateral hydrocele   [ER]      ED Course User Index  [ER] Shahida Varghese PA                           Clinical Impression:  Final diagnoses:  [N50.89] Testicular swelling, left  [N45.2] Orchitis (Primary)  [N43.3] Bilateral hydrocele          ED Disposition Condition    Discharge Stable          ED Prescriptions       Medication Sig Dispense Start Date End Date Auth. Provider    doxycycline (VIBRAMYCIN) 100 MG Cap Take 1 capsule (100 mg total) by mouth 2 (two) times daily. for 14 days 28 capsule 12/2/2024 12/16/2024 Shahida Varghese PA    ibuprofen (ADVIL,MOTRIN)  800 MG tablet Take 1 tablet (800 mg total) by mouth 3 (three) times daily as needed for Pain. 30 tablet 12/2/2024 -- Shahida Varghese PA          Follow-up Information       Follow up With Specialties Details Why Contact Info    [facility] Emergency Medicine  As needed, If symptoms worsen [address] [facility] [address] [phone]    [facility] Family Medicine Schedule an appointment as soon as possible for a visit in 2 days  [address] [facility] [address] [phone]      [facility] Urology  They will call you to schedule an apt. [address] [facility] [address] [phone]             Shahida Varghese PA  12/03/24 1877

## 2024-12-02 NOTE — DISCHARGE INSTRUCTIONS
Take medication as prescribed with food and water until complete.   Follow up with your doctor within 2-3 days. Urology referral sent for follow up.  Return if symptoms worsen.

## 2024-12-04 LAB — BACTERIA UR CULT: ABNORMAL

## 2024-12-10 ENCOUNTER — TELEPHONE (OUTPATIENT)
Dept: HEMATOLOGY/ONCOLOGY | Facility: CLINIC | Age: 39
End: 2024-12-10
Payer: MEDICAID

## 2024-12-11 ENCOUNTER — OFFICE VISIT (OUTPATIENT)
Dept: HEMATOLOGY/ONCOLOGY | Facility: CLINIC | Age: 39
End: 2024-12-11
Payer: MEDICAID

## 2024-12-11 ENCOUNTER — INFUSION (OUTPATIENT)
Dept: INFUSION THERAPY | Facility: HOSPITAL | Age: 39
End: 2024-12-11
Attending: INTERNAL MEDICINE
Payer: MEDICAID

## 2024-12-11 VITALS
TEMPERATURE: 98 F | OXYGEN SATURATION: 98 % | RESPIRATION RATE: 18 BRPM | DIASTOLIC BLOOD PRESSURE: 84 MMHG | BODY MASS INDEX: 19.42 KG/M2 | HEIGHT: 66 IN | HEART RATE: 91 BPM | SYSTOLIC BLOOD PRESSURE: 148 MMHG | WEIGHT: 120.81 LBS

## 2024-12-11 VITALS
WEIGHT: 120.81 LBS | DIASTOLIC BLOOD PRESSURE: 82 MMHG | OXYGEN SATURATION: 100 % | HEART RATE: 91 BPM | BODY MASS INDEX: 19.42 KG/M2 | TEMPERATURE: 98 F | SYSTOLIC BLOOD PRESSURE: 143 MMHG | RESPIRATION RATE: 20 BRPM | HEIGHT: 66 IN

## 2024-12-11 DIAGNOSIS — C7A.1 LARGE CELL NEUROENDOCRINE CARCINOMA: Primary | ICD-10-CM

## 2024-12-11 DIAGNOSIS — C7A.8 NEUROENDOCRINE CARCINOMA OF LUNG: Primary | ICD-10-CM

## 2024-12-11 DIAGNOSIS — D75.839 THROMBOCYTOSIS: ICD-10-CM

## 2024-12-11 DIAGNOSIS — D63.8 ANEMIA, CHRONIC DISEASE: ICD-10-CM

## 2024-12-11 DIAGNOSIS — C7A.8 NEUROENDOCRINE CARCINOMA OF LUNG: ICD-10-CM

## 2024-12-11 PROCEDURE — 96413 CHEMO IV INFUSION 1 HR: CPT

## 2024-12-11 PROCEDURE — 99215 OFFICE O/P EST HI 40 MIN: CPT | Mod: PBBFAC,25 | Performed by: NURSE PRACTITIONER

## 2024-12-11 PROCEDURE — A4216 STERILE WATER/SALINE, 10 ML: HCPCS | Performed by: INTERNAL MEDICINE

## 2024-12-11 PROCEDURE — 25000003 PHARM REV CODE 250: Performed by: INTERNAL MEDICINE

## 2024-12-11 PROCEDURE — 63600175 PHARM REV CODE 636 W HCPCS: Performed by: INTERNAL MEDICINE

## 2024-12-11 RX ORDER — HEPARIN 100 UNIT/ML
500 SYRINGE INTRAVENOUS
Status: DISCONTINUED | OUTPATIENT
Start: 2024-12-11 | End: 2024-12-11 | Stop reason: HOSPADM

## 2024-12-11 RX ORDER — PROCHLORPERAZINE EDISYLATE 5 MG/ML
10 INJECTION INTRAMUSCULAR; INTRAVENOUS ONCE AS NEEDED
Status: DISCONTINUED | OUTPATIENT
Start: 2024-12-11 | End: 2024-12-11 | Stop reason: HOSPADM

## 2024-12-11 RX ORDER — DIPHENHYDRAMINE HYDROCHLORIDE 50 MG/ML
50 INJECTION INTRAMUSCULAR; INTRAVENOUS ONCE AS NEEDED
Status: DISCONTINUED | OUTPATIENT
Start: 2024-12-11 | End: 2024-12-11 | Stop reason: HOSPADM

## 2024-12-11 RX ORDER — SODIUM CHLORIDE 0.9 % (FLUSH) 0.9 %
10 SYRINGE (ML) INJECTION
Status: DISCONTINUED | OUTPATIENT
Start: 2024-12-11 | End: 2024-12-11 | Stop reason: HOSPADM

## 2024-12-11 RX ORDER — EPINEPHRINE 0.3 MG/.3ML
0.3 INJECTION SUBCUTANEOUS ONCE AS NEEDED
Status: DISCONTINUED | OUTPATIENT
Start: 2024-12-11 | End: 2024-12-11 | Stop reason: HOSPADM

## 2024-12-11 RX ADMIN — SODIUM CHLORIDE: 9 INJECTION, SOLUTION INTRAVENOUS at 10:12

## 2024-12-11 RX ADMIN — HEPARIN 500 UNITS: 100 SYRINGE at 11:12

## 2024-12-11 RX ADMIN — ATEZOLIZUMAB 1200 MG: 1200 INJECTION, SOLUTION INTRAVENOUS at 11:12

## 2024-12-11 RX ADMIN — SODIUM CHLORIDE, PRESERVATIVE FREE 10 ML: 5 INJECTION INTRAVENOUS at 11:12

## 2024-12-11 NOTE — Clinical Note
-Follow-up: · 3 weeks- January 2, 2024 with Jeff PIMENTEL -Labs: · CBC, CMP, LDH, TSH, free T4-January 2, 2024 -Other orders: ·Treatment, atezolizumab January 2, 2024 -F/U with PCP for testicular pain ASAP
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
## 2024-12-11 NOTE — NURSING
Infusion Note:  Pt has an appointment today for: Labs, Provider, and Infusion    Treatment Regimen: Tecentriq     Cycle: 7 Day: 1   every Q3 weeks cycle;      Given via Right Mediport    Nursing note:  Treatment parameters: were met    Pt escorted to Infusion Clinic, no complaints.    Future appts scheduled: Labs, Provider, and Infusion on 1/2/25.

## 2024-12-14 ENCOUNTER — HOSPITAL ENCOUNTER (INPATIENT)
Facility: HOSPITAL | Age: 39
LOS: 4 days | Discharge: HOME | DRG: 871 | End: 2024-12-18
Attending: EMERGENCY MEDICINE | Admitting: INTERNAL MEDICINE
Payer: MEDICAID

## 2024-12-14 DIAGNOSIS — E83.52 HYPERCALCEMIA: ICD-10-CM

## 2024-12-14 DIAGNOSIS — C7A.1 LARGE CELL NEUROENDOCRINE CARCINOMA: Primary | ICD-10-CM

## 2024-12-14 DIAGNOSIS — R11.10 VOMITING: ICD-10-CM

## 2024-12-14 DIAGNOSIS — E86.0 DEHYDRATION: ICD-10-CM

## 2024-12-14 DIAGNOSIS — K85.90 ACUTE PANCREATITIS, UNSPECIFIED COMPLICATION STATUS, UNSPECIFIED PANCREATITIS TYPE: ICD-10-CM

## 2024-12-14 DIAGNOSIS — R07.9 CHEST PAIN: ICD-10-CM

## 2024-12-14 DIAGNOSIS — N17.9 ACUTE RENAL FAILURE, UNSPECIFIED ACUTE RENAL FAILURE TYPE: ICD-10-CM

## 2024-12-14 LAB
ALBUMIN SERPL-MCNC: 5.3 G/DL (ref 3.5–5)
ALBUMIN/GLOB SERPL: 0.7 RATIO (ref 1.1–2)
ALP SERPL-CCNC: 126 UNIT/L (ref 40–150)
ALT SERPL-CCNC: 15 UNIT/L (ref 0–55)
ANION GAP SERPL CALC-SCNC: 26 MEQ/L
AST SERPL-CCNC: 17 UNIT/L (ref 5–34)
BASOPHILS # BLD AUTO: 0.03 X10(3)/MCL
BASOPHILS NFR BLD AUTO: 0.2 %
BILIRUB SERPL-MCNC: 0.4 MG/DL
BNP BLD-MCNC: 30.3 PG/ML
BUN SERPL-MCNC: 21.7 MG/DL (ref 8.9–20.6)
CALCIUM SERPL-MCNC: 12.5 MG/DL (ref 8.4–10.2)
CHLORIDE SERPL-SCNC: 89 MMOL/L (ref 98–107)
CO2 SERPL-SCNC: 24 MMOL/L (ref 22–29)
CREAT SERPL-MCNC: 3.88 MG/DL (ref 0.72–1.25)
CREAT/UREA NIT SERPL: 6
EOSINOPHIL # BLD AUTO: 0.01 X10(3)/MCL (ref 0–0.9)
EOSINOPHIL NFR BLD AUTO: 0.1 %
ERYTHROCYTE [DISTWIDTH] IN BLOOD BY AUTOMATED COUNT: 14.4 % (ref 11.5–17)
GFR SERPLBLD CREATININE-BSD FMLA CKD-EPI: 19 ML/MIN/1.73/M2
GLOBULIN SER-MCNC: 7.1 GM/DL (ref 2.4–3.5)
GLUCOSE SERPL-MCNC: 240 MG/DL (ref 74–100)
HCT VFR BLD AUTO: 51.8 % (ref 42–52)
HGB BLD-MCNC: 17.1 G/DL (ref 14–18)
IMM GRANULOCYTES # BLD AUTO: 0.08 X10(3)/MCL (ref 0–0.04)
IMM GRANULOCYTES NFR BLD AUTO: 0.6 %
LIPASE SERPL-CCNC: 70 U/L
LYMPHOCYTES # BLD AUTO: 1.11 X10(3)/MCL (ref 0.6–4.6)
LYMPHOCYTES NFR BLD AUTO: 8.8 %
MAGNESIUM SERPL-MCNC: 2.9 MG/DL (ref 1.6–2.6)
MCH RBC QN AUTO: 33.1 PG (ref 27–31)
MCHC RBC AUTO-ENTMCNC: 33 G/DL (ref 33–36)
MCV RBC AUTO: 100.2 FL (ref 80–94)
MONOCYTES # BLD AUTO: 0.77 X10(3)/MCL (ref 0.1–1.3)
MONOCYTES NFR BLD AUTO: 6.1 %
NEUTROPHILS # BLD AUTO: 10.55 X10(3)/MCL (ref 2.1–9.2)
NEUTROPHILS NFR BLD AUTO: 84.2 %
NRBC BLD AUTO-RTO: 0 %
PLATELET # BLD AUTO: 446 X10(3)/MCL (ref 130–400)
PMV BLD AUTO: 8.8 FL (ref 7.4–10.4)
POTASSIUM SERPL-SCNC: 3.5 MMOL/L (ref 3.5–5.1)
PROT SERPL-MCNC: 12.4 GM/DL (ref 6.4–8.3)
RBC # BLD AUTO: 5.17 X10(6)/MCL (ref 4.7–6.1)
SODIUM SERPL-SCNC: 139 MMOL/L (ref 136–145)
WBC # BLD AUTO: 12.55 X10(3)/MCL (ref 4.5–11.5)

## 2024-12-14 PROCEDURE — 83880 ASSAY OF NATRIURETIC PEPTIDE: CPT

## 2024-12-14 PROCEDURE — 96375 TX/PRO/DX INJ NEW DRUG ADDON: CPT

## 2024-12-14 PROCEDURE — 83690 ASSAY OF LIPASE: CPT

## 2024-12-14 PROCEDURE — 87040 BLOOD CULTURE FOR BACTERIA: CPT | Performed by: NURSE PRACTITIONER

## 2024-12-14 PROCEDURE — 93010 ELECTROCARDIOGRAM REPORT: CPT | Mod: ,,, | Performed by: STUDENT IN AN ORGANIZED HEALTH CARE EDUCATION/TRAINING PROGRAM

## 2024-12-14 PROCEDURE — 25000003 PHARM REV CODE 250: Performed by: EMERGENCY MEDICINE

## 2024-12-14 PROCEDURE — 93005 ELECTROCARDIOGRAM TRACING: CPT

## 2024-12-14 PROCEDURE — 80053 COMPREHEN METABOLIC PANEL: CPT

## 2024-12-14 PROCEDURE — 96374 THER/PROPH/DIAG INJ IV PUSH: CPT

## 2024-12-14 PROCEDURE — 99285 EMERGENCY DEPT VISIT HI MDM: CPT | Mod: 25

## 2024-12-14 PROCEDURE — 25000003 PHARM REV CODE 250: Performed by: NURSE PRACTITIONER

## 2024-12-14 PROCEDURE — 83735 ASSAY OF MAGNESIUM: CPT

## 2024-12-14 PROCEDURE — 11000001 HC ACUTE MED/SURG PRIVATE ROOM

## 2024-12-14 PROCEDURE — 96361 HYDRATE IV INFUSION ADD-ON: CPT

## 2024-12-14 PROCEDURE — 96376 TX/PRO/DX INJ SAME DRUG ADON: CPT

## 2024-12-14 PROCEDURE — 63600175 PHARM REV CODE 636 W HCPCS: Performed by: EMERGENCY MEDICINE

## 2024-12-14 PROCEDURE — 85025 COMPLETE CBC W/AUTO DIFF WBC: CPT

## 2024-12-14 RX ORDER — PROCHLORPERAZINE EDISYLATE 5 MG/ML
5 INJECTION INTRAMUSCULAR; INTRAVENOUS EVERY 6 HOURS PRN
Status: DISCONTINUED | OUTPATIENT
Start: 2024-12-14 | End: 2024-12-18 | Stop reason: HOSPADM

## 2024-12-14 RX ORDER — IBUPROFEN 200 MG
16 TABLET ORAL
Status: DISCONTINUED | OUTPATIENT
Start: 2024-12-14 | End: 2024-12-18 | Stop reason: HOSPADM

## 2024-12-14 RX ORDER — NALOXONE HCL 0.4 MG/ML
0.02 VIAL (ML) INJECTION
Status: DISCONTINUED | OUTPATIENT
Start: 2024-12-14 | End: 2024-12-18 | Stop reason: HOSPADM

## 2024-12-14 RX ORDER — SODIUM CHLORIDE, SODIUM LACTATE, POTASSIUM CHLORIDE, CALCIUM CHLORIDE 600; 310; 30; 20 MG/100ML; MG/100ML; MG/100ML; MG/100ML
INJECTION, SOLUTION INTRAVENOUS CONTINUOUS
Status: ACTIVE | OUTPATIENT
Start: 2024-12-15 | End: 2024-12-15

## 2024-12-14 RX ORDER — IBUPROFEN 200 MG
24 TABLET ORAL
Status: DISCONTINUED | OUTPATIENT
Start: 2024-12-14 | End: 2024-12-18 | Stop reason: HOSPADM

## 2024-12-14 RX ORDER — ONDANSETRON HYDROCHLORIDE 2 MG/ML
4 INJECTION, SOLUTION INTRAVENOUS
Status: COMPLETED | OUTPATIENT
Start: 2024-12-14 | End: 2024-12-14

## 2024-12-14 RX ORDER — GABAPENTIN 300 MG/1
300 CAPSULE ORAL NIGHTLY
Status: DISCONTINUED | OUTPATIENT
Start: 2024-12-15 | End: 2024-12-18 | Stop reason: HOSPADM

## 2024-12-14 RX ORDER — ONDANSETRON HYDROCHLORIDE 2 MG/ML
4 INJECTION, SOLUTION INTRAVENOUS EVERY 4 HOURS PRN
Status: DISCONTINUED | OUTPATIENT
Start: 2024-12-14 | End: 2024-12-18 | Stop reason: HOSPADM

## 2024-12-14 RX ORDER — SIMETHICONE 80 MG
1 TABLET,CHEWABLE ORAL 4 TIMES DAILY PRN
Status: DISCONTINUED | OUTPATIENT
Start: 2024-12-14 | End: 2024-12-18 | Stop reason: HOSPADM

## 2024-12-14 RX ORDER — ATORVASTATIN CALCIUM 40 MG/1
40 TABLET, FILM COATED ORAL DAILY
Status: DISCONTINUED | OUTPATIENT
Start: 2024-12-15 | End: 2024-12-18 | Stop reason: HOSPADM

## 2024-12-14 RX ORDER — ACETAMINOPHEN 500 MG
1000 TABLET ORAL EVERY 6 HOURS PRN
Status: DISCONTINUED | OUTPATIENT
Start: 2024-12-14 | End: 2024-12-18 | Stop reason: HOSPADM

## 2024-12-14 RX ORDER — MORPHINE SULFATE 4 MG/ML
4 INJECTION, SOLUTION INTRAMUSCULAR; INTRAVENOUS
Status: COMPLETED | OUTPATIENT
Start: 2024-12-14 | End: 2024-12-14

## 2024-12-14 RX ORDER — SODIUM CHLORIDE 0.9 % (FLUSH) 0.9 %
10 SYRINGE (ML) INJECTION
Status: DISCONTINUED | OUTPATIENT
Start: 2024-12-14 | End: 2024-12-18 | Stop reason: HOSPADM

## 2024-12-14 RX ORDER — HYDROMORPHONE HYDROCHLORIDE 2 MG/ML
1 INJECTION, SOLUTION INTRAMUSCULAR; INTRAVENOUS; SUBCUTANEOUS EVERY 6 HOURS PRN
Status: DISCONTINUED | OUTPATIENT
Start: 2024-12-14 | End: 2024-12-18 | Stop reason: HOSPADM

## 2024-12-14 RX ORDER — POTASSIUM CHLORIDE 20 MEQ/1
40 TABLET, EXTENDED RELEASE ORAL DAILY
Status: DISCONTINUED | OUTPATIENT
Start: 2024-12-15 | End: 2024-12-18 | Stop reason: HOSPADM

## 2024-12-14 RX ORDER — OXYCODONE HYDROCHLORIDE 10 MG/1
10 TABLET ORAL EVERY 6 HOURS PRN
Status: DISCONTINUED | OUTPATIENT
Start: 2024-12-14 | End: 2024-12-18 | Stop reason: HOSPADM

## 2024-12-14 RX ORDER — ACETAMINOPHEN 325 MG/1
650 TABLET ORAL EVERY 4 HOURS PRN
Status: DISCONTINUED | OUTPATIENT
Start: 2024-12-14 | End: 2024-12-18 | Stop reason: HOSPADM

## 2024-12-14 RX ORDER — GLUCAGON 1 MG
1 KIT INJECTION
Status: DISCONTINUED | OUTPATIENT
Start: 2024-12-14 | End: 2024-12-18 | Stop reason: HOSPADM

## 2024-12-14 RX ADMIN — SODIUM CHLORIDE 1000 ML: 9 INJECTION, SOLUTION INTRAVENOUS at 09:12

## 2024-12-14 RX ADMIN — ONDANSETRON 4 MG: 2 INJECTION INTRAMUSCULAR; INTRAVENOUS at 07:12

## 2024-12-14 RX ADMIN — DOXYCYCLINE 100 MG: 100 INJECTION, POWDER, LYOPHILIZED, FOR SOLUTION INTRAVENOUS at 11:12

## 2024-12-14 RX ADMIN — SODIUM CHLORIDE, POTASSIUM CHLORIDE, SODIUM LACTATE AND CALCIUM CHLORIDE: 600; 310; 30; 20 INJECTION, SOLUTION INTRAVENOUS at 11:12

## 2024-12-14 RX ADMIN — ONDANSETRON 4 MG: 2 INJECTION INTRAMUSCULAR; INTRAVENOUS at 09:12

## 2024-12-14 RX ADMIN — MORPHINE SULFATE 4 MG: 4 INJECTION, SOLUTION INTRAMUSCULAR; INTRAVENOUS at 09:12

## 2024-12-14 RX ADMIN — SODIUM CHLORIDE 1000 ML: 9 INJECTION, SOLUTION INTRAVENOUS at 07:12

## 2024-12-15 LAB
ALBUMIN SERPL-MCNC: 3.6 G/DL (ref 3.5–5)
ALBUMIN/GLOB SERPL: 0.9 RATIO (ref 1.1–2)
ALP SERPL-CCNC: 79 UNIT/L (ref 40–150)
ALT SERPL-CCNC: 10 UNIT/L (ref 0–55)
ANION GAP SERPL CALC-SCNC: 11 MEQ/L
AST SERPL-CCNC: 16 UNIT/L (ref 5–34)
BACTERIA #/AREA URNS AUTO: ABNORMAL /HPF
BASOPHILS # BLD AUTO: 0.02 X10(3)/MCL
BASOPHILS NFR BLD AUTO: 0.1 %
BILIRUB SERPL-MCNC: 0.3 MG/DL
BILIRUB UR QL STRIP.AUTO: NEGATIVE
BUN SERPL-MCNC: 20.8 MG/DL (ref 8.9–20.6)
CALCIUM SERPL-MCNC: 9 MG/DL (ref 8.4–10.2)
CAOX CRY UR QL COMP ASSIST: ABNORMAL
CHLORIDE SERPL-SCNC: 101 MMOL/L (ref 98–107)
CLARITY UR: ABNORMAL
CO2 SERPL-SCNC: 21 MMOL/L (ref 22–29)
COLOR UR AUTO: YELLOW
CREAT SERPL-MCNC: 1.8 MG/DL (ref 0.72–1.25)
CREAT/UREA NIT SERPL: 12
EOSINOPHIL # BLD AUTO: 0.01 X10(3)/MCL (ref 0–0.9)
EOSINOPHIL NFR BLD AUTO: 0.1 %
EPI CELLS #/AREA URNS HPF: ABNORMAL /HPF
ERYTHROCYTE [DISTWIDTH] IN BLOOD BY AUTOMATED COUNT: 14.4 % (ref 11.5–17)
GFR SERPLBLD CREATININE-BSD FMLA CKD-EPI: 48 ML/MIN/1.73/M2
GLOBULIN SER-MCNC: 4.2 GM/DL (ref 2.4–3.5)
GLUCOSE SERPL-MCNC: 109 MG/DL (ref 74–100)
GLUCOSE UR QL STRIP: NORMAL
GRAN CASTS #/AREA URNS LPF: ABNORMAL /LPF
HCT VFR BLD AUTO: 41.4 % (ref 42–52)
HGB BLD-MCNC: 13.8 G/DL (ref 14–18)
HGB UR QL STRIP: ABNORMAL
HYALINE CASTS #/AREA URNS LPF: >20 /LPF
IMM GRANULOCYTES # BLD AUTO: 0.05 X10(3)/MCL (ref 0–0.04)
IMM GRANULOCYTES NFR BLD AUTO: 0.4 %
KETONES UR QL STRIP: NEGATIVE
LEUKOCYTE ESTERASE UR QL STRIP: NEGATIVE
LYMPHOCYTES # BLD AUTO: 1.17 X10(3)/MCL (ref 0.6–4.6)
LYMPHOCYTES NFR BLD AUTO: 8.6 %
MCH RBC QN AUTO: 33.4 PG (ref 27–31)
MCHC RBC AUTO-ENTMCNC: 33.3 G/DL (ref 33–36)
MCV RBC AUTO: 100.2 FL (ref 80–94)
MONOCYTES # BLD AUTO: 1.1 X10(3)/MCL (ref 0.1–1.3)
MONOCYTES NFR BLD AUTO: 8.1 %
MRSA PCR SCRN (OHS): NOT DETECTED
MUCOUS THREADS URNS QL MICRO: ABNORMAL /LPF
NEUTROPHILS # BLD AUTO: 11.19 X10(3)/MCL (ref 2.1–9.2)
NEUTROPHILS NFR BLD AUTO: 82.7 %
NITRITE UR QL STRIP: NEGATIVE
NRBC BLD AUTO-RTO: 0 %
OHS QRS DURATION: 72 MS
OHS QTC CALCULATION: 465 MS
PH UR STRIP: 5.5 [PH]
PLATELET # BLD AUTO: 345 X10(3)/MCL (ref 130–400)
PMV BLD AUTO: 8.6 FL (ref 7.4–10.4)
POTASSIUM SERPL-SCNC: 4.2 MMOL/L (ref 3.5–5.1)
PROT SERPL-MCNC: 7.8 GM/DL (ref 6.4–8.3)
PROT UR QL STRIP: ABNORMAL
RBC # BLD AUTO: 4.13 X10(6)/MCL (ref 4.7–6.1)
RBC #/AREA URNS AUTO: ABNORMAL /HPF
SODIUM SERPL-SCNC: 133 MMOL/L (ref 136–145)
SP GR UR STRIP.AUTO: 1.02 (ref 1–1.03)
SQUAMOUS #/AREA URNS LPF: ABNORMAL /HPF
UROBILINOGEN UR STRIP-ACNC: NORMAL
WBC # BLD AUTO: 13.54 X10(3)/MCL (ref 4.5–11.5)
WBC #/AREA URNS AUTO: ABNORMAL /HPF

## 2024-12-15 PROCEDURE — 25000003 PHARM REV CODE 250: Performed by: HOSPITALIST

## 2024-12-15 PROCEDURE — 87086 URINE CULTURE/COLONY COUNT: CPT

## 2024-12-15 PROCEDURE — 87641 MR-STAPH DNA AMP PROBE: CPT

## 2024-12-15 PROCEDURE — 63600175 PHARM REV CODE 636 W HCPCS: Performed by: INTERNAL MEDICINE

## 2024-12-15 PROCEDURE — 36415 COLL VENOUS BLD VENIPUNCTURE: CPT | Performed by: NURSE PRACTITIONER

## 2024-12-15 PROCEDURE — 25000003 PHARM REV CODE 250: Performed by: NURSE PRACTITIONER

## 2024-12-15 PROCEDURE — 11000001 HC ACUTE MED/SURG PRIVATE ROOM

## 2024-12-15 PROCEDURE — 85025 COMPLETE CBC W/AUTO DIFF WBC: CPT | Performed by: NURSE PRACTITIONER

## 2024-12-15 PROCEDURE — 81001 URINALYSIS AUTO W/SCOPE: CPT

## 2024-12-15 PROCEDURE — 63600175 PHARM REV CODE 636 W HCPCS: Performed by: NURSE PRACTITIONER

## 2024-12-15 PROCEDURE — 21400001 HC TELEMETRY ROOM

## 2024-12-15 PROCEDURE — 80053 COMPREHEN METABOLIC PANEL: CPT | Performed by: NURSE PRACTITIONER

## 2024-12-15 RX ORDER — ENOXAPARIN SODIUM 100 MG/ML
30 INJECTION SUBCUTANEOUS EVERY 24 HOURS
Status: DISCONTINUED | OUTPATIENT
Start: 2024-12-15 | End: 2024-12-18 | Stop reason: HOSPADM

## 2024-12-15 RX ORDER — CAPSAICIN 0.75 MG/G
CREAM TOPICAL 3 TIMES DAILY
Status: DISCONTINUED | OUTPATIENT
Start: 2024-12-15 | End: 2024-12-18 | Stop reason: HOSPADM

## 2024-12-15 RX ORDER — MUPIROCIN 20 MG/G
OINTMENT TOPICAL 2 TIMES DAILY
Status: DISCONTINUED | OUTPATIENT
Start: 2024-12-17 | End: 2024-12-18 | Stop reason: HOSPADM

## 2024-12-15 RX ORDER — SUCRALFATE 1 G/1
1 TABLET ORAL
Status: DISCONTINUED | OUTPATIENT
Start: 2024-12-15 | End: 2024-12-18 | Stop reason: HOSPADM

## 2024-12-15 RX ADMIN — CAPSAICIN: 0.75 CREAM TOPICAL at 02:12

## 2024-12-15 RX ADMIN — OXYCODONE HYDROCHLORIDE 10 MG: 10 TABLET ORAL at 08:12

## 2024-12-15 RX ADMIN — SUCRALFATE 1 G: 1 TABLET ORAL at 10:12

## 2024-12-15 RX ADMIN — CAPSAICIN: 0.75 CREAM TOPICAL at 08:12

## 2024-12-15 RX ADMIN — SODIUM CHLORIDE, POTASSIUM CHLORIDE, SODIUM LACTATE AND CALCIUM CHLORIDE: 600; 310; 30; 20 INJECTION, SOLUTION INTRAVENOUS at 04:12

## 2024-12-15 RX ADMIN — SUCRALFATE 1 G: 1 TABLET ORAL at 08:12

## 2024-12-15 RX ADMIN — OXYCODONE HYDROCHLORIDE 10 MG: 10 TABLET ORAL at 02:12

## 2024-12-15 RX ADMIN — POTASSIUM CHLORIDE 40 MEQ: 1500 TABLET, EXTENDED RELEASE ORAL at 08:12

## 2024-12-15 RX ADMIN — GABAPENTIN 300 MG: 300 CAPSULE ORAL at 08:12

## 2024-12-15 RX ADMIN — DOXYCYCLINE 100 MG: 100 INJECTION, POWDER, LYOPHILIZED, FOR SOLUTION INTRAVENOUS at 08:12

## 2024-12-15 RX ADMIN — SODIUM CHLORIDE, POTASSIUM CHLORIDE, SODIUM LACTATE AND CALCIUM CHLORIDE: 600; 310; 30; 20 INJECTION, SOLUTION INTRAVENOUS at 08:12

## 2024-12-15 RX ADMIN — ENOXAPARIN SODIUM 30 MG: 30 INJECTION SUBCUTANEOUS at 04:12

## 2024-12-15 RX ADMIN — ATORVASTATIN CALCIUM 40 MG: 40 TABLET, FILM COATED ORAL at 08:12

## 2024-12-15 RX ADMIN — SUCRALFATE 1 G: 1 TABLET ORAL at 04:12

## 2024-12-15 NOTE — PLAN OF CARE
Problem: Adult Inpatient Plan of Care  Goal: Plan of Care Review  Outcome: Progressing  Goal: Patient-Specific Goal (Individualized)  Outcome: Progressing  Goal: Absence of Hospital-Acquired Illness or Injury  Outcome: Progressing  Goal: Optimal Comfort and Wellbeing  Outcome: Progressing  Goal: Readiness for Transition of Care  Outcome: Progressing     Problem: Acute Kidney Injury/Impairment  Goal: Fluid and Electrolyte Balance  Outcome: Progressing  Goal: Improved Oral Intake  Outcome: Progressing  Goal: Effective Renal Function  Outcome: Progressing     Problem: Infection  Goal: Absence of Infection Signs and Symptoms  Outcome: Progressing     Problem: Wound  Goal: Optimal Coping  Outcome: Progressing  Goal: Optimal Functional Ability  Outcome: Progressing  Goal: Absence of Infection Signs and Symptoms  Outcome: Progressing  Goal: Improved Oral Intake  Outcome: Progressing  Goal: Optimal Pain Control and Function  Outcome: Progressing  Goal: Skin Health and Integrity  Outcome: Progressing  Goal: Optimal Wound Healing  Outcome: Progressing

## 2024-12-15 NOTE — PROGRESS NOTES
[facility] Hospital Medicine Progress Note        Chief Complaint: Inpatient Follow-up for nausea/vomiting, ARYAN    HPI: 38 y.o. 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  Patient reports his symptoms have been present over the past 2 days PTA and he has had poor oral intake since then secondary to the abdominal pain with nausea and vomiting.  He is currently receiving chemotherapy treatments for his lung cancer with his last treatment on 12/11/2024.  Lab work reviewed demonstrated WBCs 12.55, chloride 89, BUN 21.7, creatinine 3.88, glucose 240, calcium 12.5, magnesium 2.90, protein 12.4, albumin 5.3, lipase 70, liver enzymes unremarkable.  CT of chest abdomen and pelvis without IV contrast impression reviewed demonstrated moderate decrease in size of nodule opacities with associated ground-glass component in the right lung and left lower lobe, no new nodules are identified, no acute thoracic pathology identified.    Initial vital signs BP 95/62 pulse 145 respirations 27 temperature 96.1° F O2 saturation 100% on room air.  Patient received antiemetic therapy, 2 L normal saline boluses, and IV morphine in the ED with improvement in hemodynamics and heart rate.  Heart rate had trended down to 94 at the time of examination.  Patient reporting nausea and vomiting has improved and improvement in his abdominal pain.     Today current vital signs are stable, the patient is afebrile.  Mild leukocytosis persist at 13.5.  The patient does have some mild hyponatremia at 132, metabolic acidosis, creatinine trending down from 3.882 presently at 1.8.  Calcium has improved significantly from 12.5-9 with intravenous  hydration.  Oral intake continues to be minimal.  During the course of his stay, he continues on intravenous anti-infective therapy from his outpatient pneumonia treatment given his inability to tolerate p.o..  He has received 2 doses of intravenous antiemetics overnight, he has received 2 boluses of normal saline.    ROS: he is still complaining of epigastric pain sans radiation, with alleviating factors of analgesics, no nausea/vomiting, fever, chills, improved since admission.    Case was discussed with patient's nurse and  on the floor.    Objective/physical exam:  General: In no acute distress, afebrile  Chest: Clear to auscultation bilaterally  Heart: RRR, +S1, S2, no appreciable murmur  Abdomen: Soft, nontender, BS +, mild TTP upon deep palpation over epigastrium, no guarding, no rigidity.  MSK: Warm, no lower extremity edema, no clubbing or cyanosis  Neurologic: Alert and oriented x 4, Strength 5/5 in all 4 extremities    VITAL SIGNS: 24 HRS MIN & MAX LAST   Temp  Min: 96.1 °F (35.6 °C)  Max: 98.5 °F (36.9 °C) 97.9 °F (36.6 °C)   BP  Min: 95/62  Max: 124/75 117/74   Pulse  Min: 95  Max: 145  95   Resp  Min: 18  Max: 27 18   SpO2  Min: 97 %  Max: 100 % 97 %     I have reviewed the following labs:  Recent Labs   Lab 12/11/24  0854 12/14/24  2004 12/15/24  0420   WBC 6.40 12.55* 13.54*   RBC 4.04* 5.17 4.13*   HGB 13.7* 17.1 13.8*   HCT 40.5* 51.8 41.4*   .2* 100.2* 100.2*   MCH 33.9* 33.1* 33.4*   MCHC 33.8 33.0 33.3   RDW 14.5 14.4 14.4    446* 345   MPV 8.9 8.8 8.6     Recent Labs   Lab 12/11/24  0854 12/14/24  2004 12/15/24  0420    139 133*   K 3.5 3.5 4.2    89* 101   CO2 25 24 21*   BUN 8.8* 21.7* 20.8*   CREATININE 0.84 3.88* 1.80*   CALCIUM 9.9 12.5* 9.0   MG  --  2.90*  --    ALBUMIN 3.4* 5.3* 3.6   ALKPHOS 78 126 79   ALT 12 15 10   AST 13 17 16   BILITOT 0.3 0.4 0.3     Microbiology Results (last 7 days)       Procedure Component Value Units Date/Time    Urine  culture [[id number]] Collected: 12/15/24 0147    Order Status: Sent Specimen: Urine Updated: 12/15/24 0223    Blood Culture [[id number]] Collected: 12/14/24 2333    Order Status: Resulted Specimen: Blood from Hand, Right Updated: 12/14/24 2343    Blood Culture [[id number]] Collected: 12/14/24 2333    Order Status: Resulted Specimen: Blood from Hand, Left Updated: 12/14/24 2343             See below for Radiology    Scheduled Med:   atorvastatin  40 mg Oral Daily    doxycycline IV (PEDS and ADULTS)  100 mg Intravenous Q12H    enoxparin  30 mg Subcutaneous Q24H (prophylaxis, 1700)    gabapentin  300 mg Oral QHS    [START ON 12/17/2024] mupirocin   Nasal BID    potassium chloride  40 mEq Oral Daily      Continuous Infusions:   lactated ringers   Intravenous Continuous 125 mL/hr at 12/15/24 0048 Rate Change at 12/15/24 0048      PRN Meds:    Current Facility-Administered Medications:     acetaminophen, 1,000 mg, Oral, Q6H PRN    acetaminophen, 650 mg, Oral, Q4H PRN    dextrose 10%, 12.5 g, Intravenous, PRN    dextrose 10%, 25 g, Intravenous, PRN    glucagon (human recombinant), 1 mg, Intramuscular, PRN    glucose, 16 g, Oral, PRN    glucose, 24 g, Oral, PRN    HYDROmorphone, 1 mg, Intravenous, Q6H PRN    naloxone, 0.02 mg, Intravenous, PRN    ondansetron, 4 mg, Intravenous, Q4H PRN    oxyCODONE, 10 mg, Oral, Q6H PRN    prochlorperazine, 5 mg, Intravenous, Q6H PRN    simethicone, 1 tablet, Oral, QID PRN    sodium chloride 0.9%, 10 mL, Intravenous, PRN     Assessment/Plan:    ARYAN- POA  Pneumonia- right lung and left lower lobe per imaging-POA   IVVD with dehydration- POA  Leukocytosis- POA  Acute epigastric Abdominal pain- non-intractable- POA  Thrombocytosis-POA  Hyperglycemia-POA  Immunocompromised state-currently receiving chemotherapy treatments-POA  Large cell neuroendocrine lung cancer with metastatic disease-POA   CAD/MI- s/p CABG- POA    At the present time, would favor continuation of intravenous hydration as the  patient remains with an acute kidney injury.  Continue with antiemetics, analgesics, electrolyte monitoring, kidney function monitoring.  Will continue with current anti-infective therapy.  We will monitor the leukocytosis and consider augmentation if it shows an upward trend.  The patient continues to require close frequent monitoring given his immunosuppressed state, multiple comorbid conditions.  We will utilize lovenox for DVT prophylaxis.    PPI ppx:    GI Stress ulcer prophylaxis    1. PPI recommended    a. coagulopathy (platelets < 50k, INR > 1.5, or PTT > 2x control value)  b. Mechanical ventilation > 48 hours  c. hx of GI ulcer with or without bleeding  d. TBI, burn  e. Current dual anti-platelet therapy  f. Anti-platelet and anti-coagulation therapy concurrently   g. Anti-platelet therapy and hx of GI ulcer  H. anti-coagulation/anti-platelet and corticosteroid use    2. PPI suggested (continuation of outpatient treatment for)  a. Erosive esophagitis  b. Symptomatic GERD  c. H. pylori infection  d. NSAID induced GI ulcer  e. Zollinger Elison and GI hypersecretory syndrome      VTE prophylaxis: lovenox    Patient condition:  fair    Anticipated discharge and Disposition:    upon resolution of symptoms      All diagnosis and differential diagnosis have been reviewed; assessment and plan has been documented; I have personally reviewed the labs and test results that are presently available; I have reviewed the patients medication list; I have reviewed the consulting providers response and recommendations. I have reviewed or attempted to review medical records based upon their availability    All of the patient's questions have been  addressed and answered. Patient's is agreeable to the above stated plan. I will continue to monitor closely and make adjustments to medical management as needed.  _____________________________________________________________________    Nutrition Status:    Radiology:  I have  personally reviewed the following imaging and agree with the radiologist.         Dayne Gonzalez MD   12/15/2024

## 2024-12-15 NOTE — ED PROVIDER NOTES
Encounter Date: 12/14/2024    SCRIBE #1 NOTE: I, Rick John, am scribing for, and in the presence of,  Vince Tinoco MD. I have scribed the following portions of the note - the EKG reading. Other sections scribed: HPI, ROS, PE.       History     Chief Complaint   Patient presents with    Vomiting     Arrives via [facility] with reports of nausea and vomiting that started today. PMH lung CA on chemo last run on 12/11. Also reports generalized body cramps     Patient is a 38 y.o. male with a PMHx of HLD, lung cancer, and MI presenting to the ED with complaints of abdominal pain that onset 2 days ago. Patient reports he has been having diffuse abdominal pain with associated n/v, brown mucous producing cough, and leg pain. He denies any SOB, chest pain, or chest tightness. Patient states he has not been eating because of the pain and vomiting. He denies being on thinners or taking pain medication at home. Patient has lung cancer and his last chemo infusion was on 12/11.     The history is provided by the patient. No  was used.     Review of patient's allergies indicates:  No Known Allergies  Past Medical History:   Diagnosis Date    Hyperlipidemia     Myocardial infarction      Past Surgical History:   Procedure Laterality Date    CORONARY ARTERY BYPASS GRAFT      INSERTION OF TUNNELED CENTRAL VENOUS CATHETER (CVC) WITH SUBCUTANEOUS PORT Right 7/31/2024    Procedure: [id number];  Surgeon: Renato Alvarado Jr., MD;  Location: [facility] OR;  Service: General;  Laterality: Right;    LAPAROTOMY, EXPLORATORY N/A 03/23/2023    Procedure: LAPAROTOMY, EXPLORATORY;  Surgeon: Alex Juárez MD;  Location: [facility] OR;  Service: General;  Laterality: N/A;    LYMPH NODE BIOPSY Right 6/14/2024    Procedure: BIOPSY, LYMPH NODE;  Surgeon: Renato Alvarado Jr., MD;  Location: [facility] OR;  Service: General;  Laterality: Right;  right supraclavicular lymph node    OPEN REDUCTION AND INTERNAL FIXATION (ORIF) OF  FRACTURE OF DISTAL RADIUS Left 4/4/2024    Procedure: ORIF, FRACTURE, RADIUS, DISTAL;  Surgeon: Khoa Abdalla MD;  Location: [facility] OR;  Service: Orthopedics;  Laterality: Left;    ORIF FOREARM FRACTURE Right 12/7/2023    Procedure: ORIF, FRACTURE, RADIUS OR ULNA;  Surgeon: Khoa Abdalla MD;  Location: [facility] OR;  Service: Orthopedics;  Laterality: Right;  Call Wilmer     Family History   Problem Relation Name Age of Onset    Diabetes Mother      Heart disease Mother      Cancer Father      Diabetes Sister      Heart disease Brother      Stroke Maternal Grandmother      Heart disease Maternal Grandfather       Social History     Tobacco Use    Smoking status: Former     Current packs/day: 0.15     Average packs/day: 0.2 packs/day for 25.0 years (3.8 ttl pk-yrs)     Types: Cigarettes     Start date: 12/4/1999    Smokeless tobacco: Never    Tobacco comments:     Pt states he quit cigs   Substance Use Topics    Alcohol use: Yes     Comment: occasionally    Drug use: Yes     Frequency: 3.0 times per week     Types: Marijuana     Review of Systems   Constitutional:  Negative for chills and fever.   Respiratory:  Positive for cough. Negative for chest tightness and shortness of breath.    Cardiovascular:  Negative for chest pain.   Gastrointestinal:  Positive for abdominal pain, nausea and vomiting.   Musculoskeletal:  Negative for myalgias.        Leg pain   Neurological:  Negative for syncope and headaches.   All other systems reviewed and are negative.      Physical Exam     Initial Vitals [12/14/24 1850]   BP Pulse Resp Temp SpO2   95/62 (!) 145 20 96.1 °F (35.6 °C) 100 %      MAP       --         Physical Exam    Constitutional: He appears cachectic. No distress.   HENT:   Head: Normocephalic and atraumatic.   Dry lips and dry tongue   Cardiovascular:    Tachycardia present.         Pulmonary/Chest: No respiratory distress. He has no wheezes. He has no rhonchi. He exhibits no tenderness.   Abdominal: Abdomen is soft.  He exhibits no distension. There is no abdominal tenderness. There is no rebound and no guarding.   Musculoskeletal:         General: Normal range of motion.     Neurological: He is alert and oriented to person, place, and time.   Skin: Skin is warm and dry.   Psychiatric: He has a normal mood and affect.         ED Course   Procedures  Labs Reviewed   COMPREHENSIVE METABOLIC PANEL - Abnormal       Result Value    Sodium 139      Potassium 3.5      Chloride 89 (*)     CO2 24      Glucose 240 (*)     Blood Urea Nitrogen 21.7 (*)     Creatinine 3.88 (*)     Calcium 12.5 (*)     Protein Total 12.4 (*)     Albumin 5.3 (*)     Globulin 7.1 (*)     Albumin/Globulin Ratio 0.7 (*)     Bilirubin Total 0.4            ALT 15      AST 17      eGFR 19      Anion Gap 26.0      BUN/Creatinine Ratio 6     MAGNESIUM - Abnormal    Magnesium Level 2.90 (*)    LIPASE - Abnormal    Lipase Level 70 (*)    CBC WITH DIFFERENTIAL - Abnormal    WBC 12.55 (*)     RBC 5.17      Hgb 17.1      Hct 51.8      .2 (*)     MCH 33.1 (*)     MCHC 33.0      RDW 14.4      Platelet 446 (*)     MPV 8.8      Neut % 84.2      Lymph % 8.8      Mono % 6.1      Eos % 0.1      Basophil % 0.2      Lymph # 1.11      Neut # 10.55 (*)     Mono # 0.77      Eos # 0.01      Baso # 0.03      IG# 0.08 (*)     IG% 0.6      NRBC% 0.0     B-TYPE NATRIURETIC PEPTIDE - Normal    Natriuretic Peptide 30.3     CBC W/ AUTO DIFFERENTIAL    Narrative:     The following orders were created for panel order CBC Auto Differential.  Procedure                               Abnormality         Status                     ---------                               -----------         ------                     CBC with Differential[[id number]]       Abnormal            Final result                 Please view results for these tests on the individual orders.   URINALYSIS, REFLEX TO URINE CULTURE     EKG Readings: (Independently Interpreted)   Initial Reading: No STEMI. Rhythm:  Sinus Tachycardia. Heart Rate: 141. Ectopy: No Ectopy. Conduction: Normal. ST Segments: Normal ST Segments. T Waves: Normal. Axis: Normal. Clinical Impression: Left Ventricular Hypertrophy (LDH) and Sinus Tachycardia   Done at 19:06.        Imaging Results              CT Chest Abdomen Pelvis Without Contrast (XPD) (Preliminary result)  Result time 12/14/24 22:15:49      Preliminary result by Franklin Benavides MD (12/14/24 22:15:49)                   Narrative:    START OF REPORT:  Technique: CT Scan of the chest abdomen and pelvis was performed without intravenous contrast with axial as well as sagittal and, coronal images.    Dosage Information: Automated Exposure Control was utilized 141.99 mGy.cm. Number of irradiation events 2 .    Comparison: Comparison is with study dated 2024-10-22 08:21:31.    Clinical History: Abdominal pain, vomiting.    Findings:  Lines and Tubes: Right internal jugular, catheter is present with its tip in the superior vena cave.  Neck: The visualized soft tissues of the neck appear unremarkable.  Mediastinum: No significant change is seen in the partially calcified confluent lymph nodes in the right parahilar space. They currently measure 2.7 x 3.1 cm on image 55 series 2. No new abnormal lymph nodes are seen.  Heart: The heart size is within normal limits. Moderate coronary artery calcification is seen. Coronary stents are again seen.  Aorta: Unremarkable appearing aorta.  Pulmonary Arteries: Unremarkable.  Lungs: There is mild non specific dependent change at the lung bases. Stable moderate paraseptal emphysematous changes and scarring are seen in both lungs, predominantly in the upper lobes. There is moderate decrease in sizes of the nodular opacities with associated ground-glass component in the right lung and left lower lobe. The largest lesion in the right upper lobe currently measures 11 x 7 mm on image 31 series 4. No new nodules are identified.  Pleura: No effusions or  pneumothorax are identified.  Bony Structures:  Spine: Mild multilevel spondylotic changes are seen in the thoracic spine.  Ribs: The bilateral ribs appear unremarkable.  Abdomen:  Abdominal Wall: No abdominal wall pathology is seen.  Liver: The liver appears unremarkable.  Biliary System: No intrahepatic or extrahepatic biliary duct dilatation is seen.  Gallbladder: The gallbladder is non-distended and appears otherwise unremarkable.  Pancreas: The pancreas appears unremarkable.  Spleen: Moderately atrophic spleen.  Adrenals: The right adrenal gland appears unremarkable. A stable isodense left adrenal nodule is seen. It currently measures 1.8 x 1.9 cm on image 100 series 2.  Kidneys: The kidneys appear unremarkable with no stones cysts masses or hydronephrosis.  Aorta: The abdominal aorta appears unremarkable.  Bowel:  Esophagus: The visualized esophagus appears unremarkable.  Stomach: The stomach appears unremarkable.  Duodenum: Unremarkable appearing duodenum.  Small Bowel: The small bowel appears unremarkable.  Colon: Nondistended.  Appendix: The appendix is not identified but no inflammatory changes are seen in the right lower quadrant to suggest appendicitis. Follow-up as clinically indicated.  Peritoneum: No intraperitoneal free air or ascites is seen.    Pelvis:  Bladder: The bladder is nondistended and cannot be definitively evaluated.  Male:  Prostate gland: The prostate gland appears unremarkable.    Bony structures:  Dorsal Spine: There is subtle multilevel spondylosis of the visualized dorsal spine.  Bony Pelvis: There is a stable osseous defect in the anterior aspect of the left iliac bone.      Impression:  1. There is moderate decrease in sizes of the nodular opacities with associated ground-glass component in the right lung and left lower lobe. The largest lesion in the right upper lobe currently measures 11 x 7 mm on image 31 series 4. No new nodules are identified. Correlate with clinical and  laboratory findings as regards additional evaluation and follow-up.  2. No acute intrathoracic pathology identified. Details and other findings as discussed above.                                         Medications   sodium chloride 0.9% bolus 1,000 mL 1,000 mL (0 mLs Intravenous Stopped 12/14/24 2054)   ondansetron injection 4 mg (4 mg Intravenous Given 12/14/24 1954)   sodium chloride 0.9% bolus 1,000 mL 1,000 mL (0 mLs Intravenous Stopped 12/14/24 2210)   morphine injection 4 mg (4 mg Intravenous Given 12/14/24 2134)   ondansetron injection 4 mg (4 mg Intravenous Given 12/14/24 2134)     Medical Decision Making  Differential diagnosis includes but is not limited to dehydration, failure to thrive, enteritis, and gastritis.     Amount and/or Complexity of Data Reviewed  Labs: ordered.  ECG/medicine tests: ordered and independent interpretation performed.     Details: No STEMI. Rhythm: Sinus Tachycardia. Heart Rate: 141. Ectopy: No Ectopy. Conduction: Normal. ST Segments: Normal ST Segments. T Waves: Normal. Axis: Normal. Clinical Impression: Left Ventricular Hypertrophy (LDH) and Sinus Tachycardia   Done at 19:06.    Risk  Prescription drug management.            Scribe Attestation:   Scribe #1: I performed the above scribed service and the documentation accurately describes the services I performed. I attest to the accuracy of the note.    Attending Attestation:           Physician Attestation for Scribe:  Physician Attestation Statement for Scribe #1: I, Vince Tinoco MD, reviewed documentation, as scribed by Rick John in my presence, and it is both accurate and complete.             ED Course as of 12/14/24 2243   Sat Dec 14, 2024   2125 Hypercalcemia acute pancreatitis.  Patient already received 1 L normal saline bolus starting another L saline bolus then a rate of 200 an hour for 3 L morphine and Zofran given [LF]      ED Course User Index  [LF] Vince Tinoco MD        Patient reports he was  drinking alcohol recently.  Appears to have acute pancreatitis likely the cause of his abdominal discomfort nausea and vomiting that has prevented from eating and drinking today.  I explained to him he severely dehydrated with the acute renal failure.  Giving IV fluids repeat exam abdomen is soft there is no distention of the bladder states he has not had any difficulty urinating.  Will continue IV fluids I suspect this will improve the hypercalcemia as well.  I have discussed case with the hospitalist who will admit                   Clinical Impression:  Final diagnoses:  [N17.9] Acute renal failure, unspecified acute renal failure type  [K85.90] Acute pancreatitis, unspecified complication status, unspecified pancreatitis type  [E86.0] Dehydration  [E83.52] Hypercalcemia          ED Disposition Condition    Admit Stable                Vince Tinoco MD  12/14/24 9080

## 2024-12-15 NOTE — H&P
[facility] Hospital Medicine History & Physical Examination       Patient Name: Ranjeet Blal  MRN: [id number]  Patient Class: IP- Inpatient   Admission Date: 12/14/2024   Admitting Physician: JUNAID Service   Length of Stay: 0  Attending Physician: Dr Bruce Barrow  Primary Care Provider: Matty Gomez FNP  Face-to-Face encounter date: 12/14/2024  Code Status: Full code  Chief Complaint: Vomiting (Arrives via [facility] with reports of nausea and vomiting that started today. PMH lung CA on chemo last run on 12/11. Also reports generalized body cramps)        Screening for Social Drivers for health:  Patient screened for food insecurity, housing instability, transportation needs, utility difficulties, and interpersonal safety (select all that apply as identified as concern)  []Housing or Food  []Transportation Needs  []Utility Difficulties  []Interpersonal safety  [x]None    Patient information was obtained from patient, patient's family, past medical records and/or ER records.     HISTORY OF PRESENT ILLNESS:   Ranjeet Ball is a 38 y.o. 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  Patient also reports leg pain generalized weakness with chills.  He denies any fever, injury, trauma, falls, chest pain, shortness a breath, urinary symptoms, diarrhea, or any sick contacts.  Patient reports his symptoms have been present over the past 2 days and he has had poor oral intake since then secondary to the abdominal pain with nausea and vomiting.  He reports he did have a shot of alcohol yesterday, and does admit to smoking marijuana socially. He reports he is currently receiving chemotherapy  treatments for his lung cancer with his last treatment on 12/11/2024.  Lab work reviewed demonstrated WBCs 12.55, chloride 89, BUN 21.7, creatinine 3.88, glucose 240, calcium 12.5, magnesium 2.90, protein 12.4, albumin 5.3, lipase 70, liver enzymes unremarkable.  CT of chest abdomen and pelvis without IV contrast impression reviewed demonstrated moderate decrease in size of nodule opacities with associated ground-glass component in the right lung and left lower lobe, no new nodules are identified, no acute thoracic pathology identified.  Initial vital signs BP 95/62 pulse 145 respirations 27 temperature 96.1° F O2 saturation 100% on room air.  Patient received antiemetic therapy, 2 L normal saline boluses, and IV morphine in the ED with improvement in hemodynamics and heart rate.  Heart rate had trended down to 94 at the time of examination.  Patient reporting nausea and vomiting has improved and improvement in his abdominal pain.  Patient is admitted to hospital medicine services for further management.    PAST MEDICAL HISTORY:   CAD/MI   HLD   Large cell neuroendocrine carcinoma of the lung with metastatic disease  Marijuana use  Anxiety    PAST SURGICAL HISTORY:     Past Surgical History:   Procedure Laterality Date    CORONARY ARTERY BYPASS GRAFT      INSERTION OF TUNNELED CENTRAL VENOUS CATHETER (CVC) WITH SUBCUTANEOUS PORT Right 7/31/2024    Procedure: [id number];  Surgeon: Renato Alvarado Jr., MD;  Location: [facility] OR;  Service: General;  Laterality: Right;    LAPAROTOMY, EXPLORATORY N/A 03/23/2023    Procedure: LAPAROTOMY, EXPLORATORY;  Surgeon: Alex Juárez MD;  Location: [facility] OR;  Service: General;  Laterality: N/A;    LYMPH NODE BIOPSY Right 6/14/2024    Procedure: BIOPSY, LYMPH NODE;  Surgeon: Renato Alvarado Jr., MD;  Location: [facility] OR;  Service: General;  Laterality: Right;  right supraclavicular lymph node    OPEN REDUCTION AND INTERNAL FIXATION (ORIF) OF FRACTURE OF DISTAL RADIUS  Left 4/4/2024    Procedure: ORIF, FRACTURE, RADIUS, DISTAL;  Surgeon: Khoa Abdalla MD;  Location: [facility] OR;  Service: Orthopedics;  Laterality: Left;    ORIF FOREARM FRACTURE Right 12/7/2023    Procedure: ORIF, FRACTURE, RADIUS OR ULNA;  Surgeon: Khoa Abdalla MD;  Location: [facility] OR;  Service: Orthopedics;  Laterality: Right;  Call Wilmer       ALLERGIES:   Patient has no known allergies.    FAMILY HISTORY:   Reviewed and negative    SOCIAL HISTORY:     Social History     Tobacco Use    Smoking status: Former     Current packs/day: 0.15     Average packs/day: 0.2 packs/day for 25.0 years (3.8 ttl pk-yrs)     Types: Cigarettes     Start date: 12/4/1999    Smokeless tobacco: Never    Tobacco comments:     Pt states he quit cigs   Substance Use Topics    Alcohol use: Yes     Comment: occasionally        HOME MEDICATIONS:   As documented  Prior to Admission medications    Medication Sig Start Date End Date Taking? Authorizing Provider   atorvastatin (LIPITOR) 40 MG tablet Take 40 mg by mouth once daily.    Provider, Historical   dexAMETHasone (DECADRON) 4 MG Tab Take 2 tablets (8 mg total) by mouth once daily. on days 2-4 of each chemotherapy cycle 8/6/24   Artemio Delatorre MD   doxycycline (VIBRAMYCIN) 100 MG Cap Take 1 capsule (100 mg total) by mouth 2 (two) times daily. for 14 days 12/2/24 12/16/24  Shahida Varghese PA   ergocalciferol (ERGOCALCIFEROL) 50,000 unit Cap Take 1 capsule (50,000 Units total) by mouth every 7 days. 5/20/24   Leona Medel MD   gabapentin (NEURONTIN) 300 MG capsule Take 1 capsule (300 mg total) by mouth every evening. 2/7/24   Matty Gomez FNP   hydrocortisone 2.5 % cream Apply topically 2 (two) times daily. 8/27/24   Bertrand Hollins FNP   ibuprofen (ADVIL,MOTRIN) 800 MG tablet Take 1 tablet (800 mg total) by mouth 3 (three) times daily as needed for Pain. 12/2/24   Shahida Varghese PA   ketoconazole (NIZORAL) 2 % shampoo Apply topically twice a week. 8/29/24   Bertrand Hollins  N, FNP   OLANZapine (ZYPREXA) 5 MG tablet Take 1 tablet by mouth nightly on days 1-4 of each chemotherapy cycle. 8/6/24   Artemio Delatorre MD   ondansetron (ZOFRAN) 4 MG tablet Take 1 tablet (4 mg total) by mouth every 6 (six) hours as needed for Nausea. 9/17/24   Bertrand Hollins N, FNP   potassium chloride (K-TAB) 20 mEq Take 2 tablets (40 mEq total) by mouth once daily. 11/4/24   Artemio Delatorre MD       REVIEW OF SYSTEMS:   Except as documented, all other systems reviewed and negative     PHYSICAL EXAM:     VITAL SIGNS: 24 HRS MIN & MAX LAST   Temp  Min: 96.1 °F (35.6 °C)  Max: 97.9 °F (36.6 °C) 97.9 °F (36.6 °C)   BP  Min: 95/62  Max: 124/75 124/75   Pulse  Min: 98  Max: 145  98   Resp  Min: 18  Max: 27 18   SpO2  Min: 97 %  Max: 100 % 100 %       General appearance: Well-developed, thin male appears stated age; nontoxic, in no apparent distress.  No family member at the bedside  HENT: Atraumatic head. Moist mucous membranes of oral cavity.  Eyes:  PERRL  Lungs:  Diminished bilaterally.  Symmetrical expansion unlabored respirations No wheezing present.   Heart: Regular rate and rhythm. S1 and S2 present capillary refill brisk, no edema; MediPort to right upper chest wall  Abdomen: Soft, non-distended, mild upper abdomen tenderness on palpation. No rebound tenderness/guarding. Bowel sounds are normal.   Extremities: No cyanosis, clubbing, or edema.  Skin:  Warm and dry  Neuro:  Oriented x3, no acute focal deficits  Psych/mental status: Appropriate mood and affect. Responds appropriately to questions.     LABS AND IMAGING:     Recent Labs   Lab 12/11/24  0854 12/14/24 2004   WBC 6.40 12.55*   RBC 4.04* 5.17   HGB 13.7* 17.1   HCT 40.5* 51.8   .2* 100.2*   MCH 33.9* 33.1*   MCHC 33.8 33.0   RDW 14.5 14.4    446*   MPV 8.9 8.8       Recent Labs   Lab 12/11/24  0854 12/14/24 2004    139   K 3.5 3.5    89*   CO2 25 24   BUN 8.8* 21.7*   CREATININE 0.84 3.88*   CALCIUM 9.9 12.5*   MG  --   2.90*   ALBUMIN 3.4* 5.3*   ALKPHOS 78 126   ALT 12 15   AST 13 17   BILITOT 0.3 0.4       Microbiology Results (last 7 days)       Procedure Component Value Units Date/Time    Blood Culture [[id number]]     Order Status: Sent Specimen: Blood from Hand, Right     Blood Culture [[id number]]     Order Status: Sent Specimen: Blood from Hand, Left            Procedure Component Value Units Date/Time   CT Chest Abdomen Pelvis Without Contrast (XPD) [[id number]] Resulted: 12/14/24 2215   Order Status: Completed Updated: 12/14/24 2215   Narrative:     START OF REPORT:  Technique: CT Scan of the chest abdomen and pelvis was performed without intravenous contrast with axial as well as sagittal and, coronal images.    Dosage Information: Automated Exposure Control was utilized 141.99 mGy.cm. Number of irradiation events 2    Comparison: Comparison is with study dated 2024-10-22 08:21:31.    Clinical History: Abdominal pain, vomiting.    Findings:  Lines and Tubes: Right internal jugular, catheter is present with its tip in the superior vena cave.  Neck: The visualized soft tissues of the neck appear unremarkable.  Mediastinum: No significant change is seen in the partially calcified confluent lymph nodes in the right parahilar space. They currently measure 2.7 x 3.1 cm on image 55 series 2. No new abnormal lymph nodes are seen.  Heart: The heart size is within normal limits. Moderate coronary artery calcification is seen. Coronary stents are again seen.  Aorta: Unremarkable appearing aorta.  Pulmonary Arteries: Unremarkable.  Lungs: There is mild non specific dependent change at the lung bases. Stable moderate paraseptal emphysematous changes and scarring are seen in both lungs, predominantly in the upper lobes. There is moderate decrease in sizes of the nodular opacities with associated ground-glass component in the right lung and left lower lobe. The largest lesion in the right upper lobe currently measures 11 x 7 mm on  image 31 series 4. No new nodules are identified.  Pleura: No effusions or pneumothorax are identified.  Bony Structures:  Spine: Mild multilevel spondylotic changes are seen in the thoracic spine.  Ribs: The bilateral ribs appear unremarkable.  Abdomen:  Abdominal Wall: No abdominal wall pathology is seen.  Liver: The liver appears unremarkable.  Biliary System: No intrahepatic or extrahepatic biliary duct dilatation is seen.  Gallbladder: The gallbladder is non-distended and appears otherwise unremarkable.  Pancreas: The pancreas appears unremarkable.  Spleen: Moderately atrophic spleen.  Adrenals: The right adrenal gland appears unremarkable. A stable isodense left adrenal nodule is seen. It currently measures 1.8 x 1.9 cm on image 100 series 2.  Kidneys: The kidneys appear unremarkable with no stones cysts masses or hydronephrosis.  Aorta: The abdominal aorta appears unremarkable.  Bowel:  Esophagus: The visualized esophagus appears unremarkable.  Stomach: The stomach appears unremarkable.  Duodenum: Unremarkable appearing duodenum.  Small Bowel: The small bowel appears unremarkable.  Colon: Nondistended.  Appendix: The appendix is not identified but no inflammatory changes are seen in the right lower quadrant to suggest appendicitis. Follow-up as clinically indicated.  Peritoneum: No intraperitoneal free air or ascites is seen.    Pelvis:  Bladder: The bladder is nondistended and cannot be definitively evaluated.  Male:  Prostate gland: The prostate gland appears unremarkable.    Bony structures:  Dorsal Spine: There is subtle multilevel spondylosis of the visualized dorsal spine.  Bony Pelvis: There is a stable osseous defect in the anterior aspect of the left iliac bone.      Impression:  1. There is moderate decrease in sizes of the nodular opacities with associated ground-glass component in the right lung and left lower lobe. The largest lesion in the right upper lobe currently measures 11 x 7 mm on image  31 series 4. No new nodules are identified. Correlate with clinical and laboratory findings as regards additional evaluation and follow-up.  2. No acute intrathoracic pathology identified. Details and other findings as discussed above.           ASSESSMENT & PLAN:   ASSESSMENT:  Acute pancreatitis with elevated lipase-POA   ARYAN- POA  Pneumonia- right lung and left lower lobe per imaging-POA   IVVD with dehydration- POA  Leukocytosis- POA  Abdominal pain- non-intractable- POA  Thrombocytosis-POA  Hyperglycemia-POA  Immunocompromised state-currently receiving chemotherapy treatments-POA  Large cell neuroendocrine lung cancer with metastatic disease-POA   CAD/MI- s/p CABG- POA  Weakness- POA    PLAN:  Clear liquid diet advance as tolerated   PRN pain management   IV hydration  Continue with doxycycline as pt was on outpatient with 2 days remaining on treatment regimen  Home medication as deemed necessary   Repeat lab work in a.m.       VTE Prophylaxis: SCD for DVT prophylaxis and will be advised to be as mobile as possible and sit in a chair as tolerated    Patient condition:  Stable    I, CANDELARIO Allison have reviewed and discussed the case with   Dr. Bruce Barrow.  Please see the following addendum for further assessment and plan from their attending MD.  CANDELARIO Bermudez   12/14/2024      ________________________________________________________________________  I, Dr. Bruce Barrow assumed care of this patient.  For the patient encounter, I performed the substantive portion of the visit, I reviewed the NPPA documentation, treatment plan, and medical decision making.  I had face to face time with this patient.  I have personally reviewed the labs and test results that are presently available. I have reviewed or attempted to review medical records based upon their availability. If patient was admitted under observational status it is with my approval.      Patient is a 38 pleasant year  old male with a history of neuroendocrine carcinoma of the lung with metastatic disease for which he is currently on chemotherapy with good response who presents to the ER with 2 days nausea vomiting and epigastric abdominal pain.  Currently on exam his abdomen is benign with no epigastric tenderness.  Workup does show mildly elevated lipase and acute kidney injury.  Recommend aggressive IV fluids, bowel rest tonight, antiemetics and analgesics as needed.  Likely can try orals in a.m..  He is currently on doxy for pneumonia and this will be continued inpatient.    Time seen:  11:00 p.m. 12/14  Bruce Barrow MD

## 2024-12-15 NOTE — PLAN OF CARE
Problem: Adult Inpatient Plan of Care  Goal: Plan of Care Review  Outcome: Progressing  Flowsheets (Taken 12/15/2024 0808)  Plan of Care Reviewed With: patient  Goal: Patient-Specific Goal (Individualized)  Outcome: Progressing  Goal: Absence of Hospital-Acquired Illness or Injury  Outcome: Progressing  Intervention: Identify and Manage Fall Risk  Flowsheets (Taken 12/15/2024 0808)  Safety Promotion/Fall Prevention:   assistive device/personal item within reach   medications reviewed   nonskid shoes/socks when out of bed   side rails raised x 2  Intervention: Prevent Skin Injury  Flowsheets (Taken 12/15/2024 0808)  Body Position: position changed independently  Skin Protection: incontinence pads utilized  Device Skin Pressure Protection:   absorbent pad utilized/changed   tubing/devices free from skin contact  Intervention: Prevent and Manage VTE (Venous Thromboembolism) Risk  Flowsheets (Taken 12/15/2024 0808)  VTE Prevention/Management:   ambulation promoted   bleeding risk assessed   ROM (active) performed  Intervention: Prevent Infection  Flowsheets (Taken 12/15/2024 0808)  Infection Prevention:   rest/sleep promoted   single patient room provided  Goal: Optimal Comfort and Wellbeing  Outcome: Progressing  Intervention: Monitor Pain and Promote Comfort  Flowsheets (Taken 12/15/2024 0808)  Pain Management Interventions:   care clustered   medication offered   pain management plan reviewed with patient/caregiver   pillow support provided   position adjusted  Intervention: Provide Person-Centered Care  Flowsheets (Taken 12/15/2024 0808)  Trust Relationship/Rapport: care explained  Goal: Readiness for Transition of Care  Outcome: Progressing     Problem: Acute Kidney Injury/Impairment  Goal: Fluid and Electrolyte Balance  Outcome: Progressing  Intervention: Monitor and Manage Fluid and Electrolyte Balance  Flowsheets (Taken 12/15/2024 0808)  Fluid/Electrolyte Management: fluids provided  Goal: Improved Oral  Intake  Outcome: Progressing  Intervention: Promote and Optimize Oral Intake  Flowsheets (Taken 12/15/2024 0808)  Oral Nutrition Promotion:   physical activity promoted   rest periods promoted  Nutrition Interventions:   diet adjusted   meal set-up provided  Goal: Effective Renal Function  Outcome: Progressing  Intervention: Monitor and Support Renal Function  Flowsheets (Taken 12/15/2024 0808)  Medication Review/Management: medications reviewed     Problem: Infection  Goal: Absence of Infection Signs and Symptoms  Outcome: Progressing  Intervention: Prevent or Manage Infection  Flowsheets (Taken 12/15/2024 0808)  Infection Management: aseptic technique maintained     Problem: Wound  Goal: Optimal Coping  Outcome: Progressing  Intervention: Support Patient and Family Response  Flowsheets (Taken 12/15/2024 0808)  Supportive Measures:   active listening utilized   relaxation techniques promoted  Goal: Optimal Functional Ability  Outcome: Progressing  Intervention: Optimize Functional Ability  Flowsheets (Taken 12/15/2024 0808)  Activity Management: Ambulated in room - L4  Activity Assistance Provided: independent  Goal: Absence of Infection Signs and Symptoms  Outcome: Progressing  Intervention: Prevent or Manage Infection  Flowsheets (Taken 12/15/2024 0808)  Infection Management: aseptic technique maintained  Goal: Improved Oral Intake  Outcome: Progressing  Intervention: Promote and Optimize Oral Intake  Flowsheets (Taken 12/15/2024 0808)  Oral Nutrition Promotion:   physical activity promoted   rest periods promoted  Nutrition Interventions:   diet adjusted   meal set-up provided  Goal: Optimal Pain Control and Function  Outcome: Progressing  Intervention: Prevent or Manage Pain  Flowsheets (Taken 12/15/2024 0808)  Sleep/Rest Enhancement:   regular sleep/rest pattern promoted   relaxation techniques promoted  Pain Management Interventions:   care clustered   medication offered   pain management plan reviewed with  patient/caregiver   pillow support provided   position adjusted  Goal: Skin Health and Integrity  Outcome: Progressing  Intervention: Optimize Skin Protection  Flowsheets (Taken 12/15/2024 0808)  Pressure Reduction Techniques:   frequent weight shift encouraged   weight shift assistance provided  Skin Protection: incontinence pads utilized  Activity Management: Ambulated in room - L4  Head of Bed (HOB) Positioning: HOB at 30-45 degrees  Goal: Optimal Wound Healing  Outcome: Progressing  Intervention: Promote Wound Healing  Flowsheets (Taken 12/15/2024 0808)  Sleep/Rest Enhancement:   regular sleep/rest pattern promoted   relaxation techniques promoted

## 2024-12-16 LAB
ABS NEUT (OLG): 3.93 X10(3)/MCL (ref 2.1–9.2)
ALBUMIN SERPL-MCNC: 3.4 G/DL (ref 3.5–5)
ALBUMIN/GLOB SERPL: 0.9 RATIO (ref 1.1–2)
ALP SERPL-CCNC: 77 UNIT/L (ref 40–150)
ALT SERPL-CCNC: 11 UNIT/L (ref 0–55)
ANION GAP SERPL CALC-SCNC: 7 MEQ/L
AST SERPL-CCNC: 16 UNIT/L (ref 5–34)
BILIRUB SERPL-MCNC: 0.4 MG/DL
BUN SERPL-MCNC: 9.6 MG/DL (ref 8.9–20.6)
CALCIUM SERPL-MCNC: 9.2 MG/DL (ref 8.4–10.2)
CHLORIDE SERPL-SCNC: 105 MMOL/L (ref 98–107)
CO2 SERPL-SCNC: 25 MMOL/L (ref 22–29)
CREAT SERPL-MCNC: 0.75 MG/DL (ref 0.72–1.25)
CREAT/UREA NIT SERPL: 13
EOSINOPHIL NFR BLD MANUAL: 0.06 X10(3)/MCL (ref 0–0.9)
EOSINOPHIL NFR BLD MANUAL: 1 %
ERYTHROCYTE [DISTWIDTH] IN BLOOD BY AUTOMATED COUNT: 14 % (ref 11.5–17)
GFR SERPLBLD CREATININE-BSD FMLA CKD-EPI: >60 ML/MIN/1.73/M2
GLOBULIN SER-MCNC: 3.7 GM/DL (ref 2.4–3.5)
GLUCOSE SERPL-MCNC: 98 MG/DL (ref 74–100)
HCT VFR BLD AUTO: 38.4 % (ref 42–52)
HGB BLD-MCNC: 12.7 G/DL (ref 14–18)
INSTRUMENT WBC (OLG): 5.69 X10(3)/MCL
LYMPHOCYTES NFR BLD MANUAL: 1.02 X10(3)/MCL
LYMPHOCYTES NFR BLD MANUAL: 18 %
MACROCYTES BLD QL SMEAR: ABNORMAL
MCH RBC QN AUTO: 33.2 PG (ref 27–31)
MCHC RBC AUTO-ENTMCNC: 33.1 G/DL (ref 33–36)
MCV RBC AUTO: 100.3 FL (ref 80–94)
MONOCYTES NFR BLD MANUAL: 0.68 X10(3)/MCL (ref 0.1–1.3)
MONOCYTES NFR BLD MANUAL: 12 %
NEUTROPHILS NFR BLD MANUAL: 69 %
NRBC BLD AUTO-RTO: 0 %
PLATELET # BLD AUTO: 339 X10(3)/MCL (ref 130–400)
PLATELET # BLD EST: NORMAL 10*3/UL
PMV BLD AUTO: 8.5 FL (ref 7.4–10.4)
POTASSIUM SERPL-SCNC: 4.5 MMOL/L (ref 3.5–5.1)
PROT SERPL-MCNC: 7.1 GM/DL (ref 6.4–8.3)
RBC # BLD AUTO: 3.83 X10(6)/MCL (ref 4.7–6.1)
RBC MORPH BLD: ABNORMAL
SODIUM SERPL-SCNC: 137 MMOL/L (ref 136–145)
WBC # BLD AUTO: 5.69 X10(3)/MCL (ref 4.5–11.5)
WBC VACUOLES (OHS): ABNORMAL

## 2024-12-16 PROCEDURE — 36415 COLL VENOUS BLD VENIPUNCTURE: CPT | Performed by: HOSPITALIST

## 2024-12-16 PROCEDURE — 63600175 PHARM REV CODE 636 W HCPCS: Performed by: INTERNAL MEDICINE

## 2024-12-16 PROCEDURE — 21400001 HC TELEMETRY ROOM

## 2024-12-16 PROCEDURE — 85025 COMPLETE CBC W/AUTO DIFF WBC: CPT | Performed by: HOSPITALIST

## 2024-12-16 PROCEDURE — 25000003 PHARM REV CODE 250: Performed by: NURSE PRACTITIONER

## 2024-12-16 PROCEDURE — 25000003 PHARM REV CODE 250: Performed by: HOSPITALIST

## 2024-12-16 PROCEDURE — 11000001 HC ACUTE MED/SURG PRIVATE ROOM

## 2024-12-16 PROCEDURE — 80053 COMPREHEN METABOLIC PANEL: CPT | Performed by: HOSPITALIST

## 2024-12-16 RX ADMIN — POTASSIUM CHLORIDE 40 MEQ: 1500 TABLET, EXTENDED RELEASE ORAL at 08:12

## 2024-12-16 RX ADMIN — OXYCODONE HYDROCHLORIDE 10 MG: 10 TABLET ORAL at 02:12

## 2024-12-16 RX ADMIN — SUCRALFATE 1 G: 1 TABLET ORAL at 05:12

## 2024-12-16 RX ADMIN — SUCRALFATE 1 G: 1 TABLET ORAL at 11:12

## 2024-12-16 RX ADMIN — OXYCODONE HYDROCHLORIDE 10 MG: 10 TABLET ORAL at 09:12

## 2024-12-16 RX ADMIN — DOXYCYCLINE 100 MG: 100 INJECTION, POWDER, LYOPHILIZED, FOR SOLUTION INTRAVENOUS at 08:12

## 2024-12-16 RX ADMIN — OXYCODONE HYDROCHLORIDE 10 MG: 10 TABLET ORAL at 08:12

## 2024-12-16 RX ADMIN — OXYCODONE HYDROCHLORIDE 10 MG: 10 TABLET ORAL at 03:12

## 2024-12-16 RX ADMIN — ENOXAPARIN SODIUM 30 MG: 30 INJECTION SUBCUTANEOUS at 04:12

## 2024-12-16 RX ADMIN — ATORVASTATIN CALCIUM 40 MG: 40 TABLET, FILM COATED ORAL at 08:12

## 2024-12-16 RX ADMIN — SUCRALFATE 1 G: 1 TABLET ORAL at 08:12

## 2024-12-16 RX ADMIN — GABAPENTIN 300 MG: 300 CAPSULE ORAL at 08:12

## 2024-12-16 RX ADMIN — SUCRALFATE 1 G: 1 TABLET ORAL at 04:12

## 2024-12-16 NOTE — PLAN OF CARE
12/16/24 0836   Discharge Assessment   Assessment Type Discharge Planning Assessment   Confirmed/corrected address, phone number and insurance Yes   Confirmed Demographics Correct on Facesheet   Source of Information patient   Reason For Admission Arrives via [facility] with reports of nausea and vomiting that started today. PMH lung CA on chemo last run on 12/11. Also reports generalized body cramps   People in Home sibling(s)   Do you expect to return to your current living situation? Yes   Do you have help at home or someone to help you manage your care at home? Yes   Who are your caregiver(s) and their phone number(s)? Franca (sister) [phone]   Prior to hospitilization cognitive status: Alert/Oriented   Current cognitive status: Alert/Oriented   Walking or Climbing Stairs Difficulty no   Dressing/Bathing Difficulty no   Home Accessibility wheelchair accessible   Home Layout Able to live on 1st floor   Equipment Currently Used at Home none   Readmission within 30 days? No   Patient currently being followed by outpatient case management? No   Do you currently have service(s) that help you manage your care at home? No   Do you take prescription medications? Yes   Do you have prescription coverage? Yes   Coverage [facility] Medicaid   Do you have any problems affording any of your prescribed medications? No   Is the patient taking medications as prescribed? yes   How do you get to doctors appointments? car, drives self   Are you on dialysis? No   Do you take coumadin? No   Discharge Plan A Home with family   Discharge Plan B Home   DME Needed Upon Discharge  other (see comments)  (TBD)   Discharge Plan discussed with: Patient   Transition of Care Barriers None

## 2024-12-16 NOTE — PROGRESS NOTES
[facility] Hospital Medicine Progress Note        Chief Complaint: Inpatient Follow-up     HPI:    38 y.o. 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  Patient reports his symptoms have been present over the past 2 days PTA and he has had poor oral intake since then secondary to the abdominal pain with nausea and vomiting.  He is currently receiving chemotherapy treatments for his lung cancer with his last treatment on 12/11/2024.  Lab work reviewed demonstrated WBCs 12.55, chloride 89, BUN 21.7, creatinine 3.88, glucose 240, calcium 12.5, magnesium 2.90, protein 12.4, albumin 5.3, lipase 70, liver enzymes unremarkable.  CT of chest abdomen and pelvis without IV contrast impression reviewed demonstrated moderate decrease in size of nodule opacities with associated ground-glass component in the right lung and left lower lobe, no new nodules are identified, no acute thoracic pathology identified.     Initial vital signs BP 95/62 pulse 145 respirations 27 temperature 96.1° F O2 saturation 100% on room air.  Patient received antiemetic therapy, 2 L normal saline boluses, and IV morphine in the ED with improvement in hemodynamics and heart rate.  Heart rate had trended down to 94 at the time of examination.  Patient reporting nausea and vomiting has improved and improvement in his abdominal pain.     Interval Hx:  Patient resting comfortably in bed.  Afebrile overnight.  On room air.  Good oxygen saturation.  No new complaints    Objective/physical exam:  General: In no acute distress, afebrile  Chest: Clear to auscultation bilaterally  Heart: RRR, +S1, S2, no appreciable murmur  Abdomen: Soft, nontender, BS +,  mild TTP upon deep palpation over epigastrium, no guarding, no rigidity.  MSK: Warm, no lower extremity edema, no clubbing or cyanosis  Neurologic: Alert and oriented x 4, Strength 5/5 in all 4 extremities    VITAL SIGNS: 24 HRS MIN & MAX LAST   Temp  Min: 98 °F (36.7 °C)  Max: 98.9 °F (37.2 °C) 98.2 °F (36.8 °C)   BP  Min: 116/76  Max: 134/80 134/80   Pulse  Min: 92  Max: 99  92   Resp  Min: 14  Max: 20 18   SpO2  Min: 97 %  Max: 100 % 98 %       Recent Labs   Lab 12/11/24  0854 12/14/24  2004 12/15/24  0420   WBC 6.40 12.55* 13.54*   RBC 4.04* 5.17 4.13*   HGB 13.7* 17.1 13.8*   HCT 40.5* 51.8 41.4*   .2* 100.2* 100.2*   MCH 33.9* 33.1* 33.4*   MCHC 33.8 33.0 33.3   RDW 14.5 14.4 14.4    446* 345   MPV 8.9 8.8 8.6       Recent Labs   Lab 12/11/24  0854 12/14/24  2004 12/15/24  0420    139 133*   K 3.5 3.5 4.2    89* 101   CO2 25 24 21*   BUN 8.8* 21.7* 20.8*   CREATININE 0.84 3.88* 1.80*   CALCIUM 9.9 12.5* 9.0   MG  --  2.90*  --    ALBUMIN 3.4* 5.3* 3.6   ALKPHOS 78 126 79   ALT 12 15 10   AST 13 17 16   BILITOT 0.3 0.4 0.3          Microbiology Results (last 7 days)       Procedure Component Value Units Date/Time    Blood Culture [[id number]]  (Normal) Collected: 12/14/24 2333    Order Status: Completed Specimen: Blood from Hand, Right Updated: 12/16/24 0000     Blood Culture No Growth At 24 Hours    Blood Culture [[id number]]  (Normal) Collected: 12/14/24 2333    Order Status: Completed Specimen: Blood from Hand, Left Updated: 12/16/24 0000     Blood Culture No Growth At 24 Hours    Urine culture [[id number]] Collected: 12/15/24 0147    Order Status: Sent Specimen: Urine Updated: 12/15/24 0223             Radiology:  CT Chest Abdomen Pelvis Without Contrast (XPD)  Narrative: EXAMINATION:  CT CHEST ABDOMEN PELVIS WITHOUT CONTRAST(XPD)    CLINICAL HISTORY:  epigastric pain, acute renal failure;    TECHNIQUE:  Low dose axial images, sagittal and coronal reformations were obtained from  the thoracic inlet to the pubic symphysis.    COMPARISON:  10/22/2024.    FINDINGS:  Lines and Tubes: Right internal jugular, catheter is present with its tip in the superior vena cave.Neck: The visualized soft tissues of the neck appear unremarkable.Mediastinum: No significant change is seen in the partially calcified confluent lymph nodes in the right parahilar space. They currently measure 2.7 x 3.1 cm on image 55 series 2. No new abnormal lymph nodes are seen.Heart: The heart size is within normal limits. Moderate coronary artery calcification is seen. Coronary stents are again seen.Aorta: Unremarkable appearing aorta.Pulmonary Arteries: Unremarkable.Lungs: There is mild non specific dependent change at the lung bases. Stable moderate paraseptal emphysematous changes and scarring are seen in both lungs, predominantly in the upper lobes. There is moderate decrease in sizes of the nodular opacities with associated ground-glass component in the right lung and left lower lobe. The largest lesion in the right upper lobe currently measures 11 x 7 mm on image 31 series 4. No new nodules are identified.Pleura: No effusions or pneumothorax are identified.Bony Structures:Spine: Mild multilevel spondylotic changes are seen in the thoracic spine.Ribs: The bilateral ribs appear unremarkable.Abdomen:Abdominal Wall: No abdominal wall pathology is seen.Liver: The liver appears unremarkable.Biliary System: No intrahepatic or extrahepatic biliary duct dilatation is seen.Gallbladder: The gallbladder is non-distended and appears otherwise unremarkable.Pancreas: The pancreas appears unremarkable.Spleen: Moderately atrophic spleen.Adrenals: The right adrenal gland appears unremarkable. A stable isodense left adrenal nodule is seen. It currently measures 1.8 x 1.9 cm on image 100 series 2.Kidneys: The kidneys appear unremarkable with no stones cysts masses or hydronephrosis.Aorta: The abdominal aorta appears  unremarkable.Bowel:Esophagus: The visualized esophagus appears unremarkable.Stomach: The stomach appears unremarkable.Duodenum: Unremarkable appearing duodenum.Small Bowel: The small bowel appears unremarkable.Colon: Nondistended.Appendix: The appendix is not identified but no inflammatory changes are seen in the right lower quadrant to suggest appendicitis. Follow-up as clinically indicated.Peritoneum: No intraperitoneal free air or ascites is seen.Pelvis:Bladder: The bladder is nondistended and cannot be definitively evaluated.Male:Prostate gland: The prostate gland appears unremarkable.Bony structures:Dorsal Spine: There is subtle multilevel spondylosis of the visualized dorsal spine.Bony Pelvis: There is a stable osseous defect in the anterior aspect of the left iliac bone.  Impression: 1. There is moderate decrease in sizes of the nodular opacities with associated ground-glass component in the right lung and left lower lobe. The largest lesion in the right upper lobe currently measures 11 x 7 mm on image 31 series 4. No new nodules are identified. Correlate with clinical and laboratory findings as regards additional evaluation and follow-up.2. No acute intrathoracic pathology identified. Details and other findings as discussed above.    I agree with the preliminary report.    Electronically signed by: Brandon Johnson  Date:    12/15/2024  Time:    08:15        Medications:  Scheduled Meds:   atorvastatin  40 mg Oral Daily    capsicum 0.075%   Topical (Top) TID    doxycycline IV (PEDS and ADULTS)  100 mg Intravenous Q12H    enoxparin  30 mg Subcutaneous Q24H (prophylaxis, 1700)    gabapentin  300 mg Oral QHS    [START ON 12/17/2024] mupirocin   Nasal BID    potassium chloride  40 mEq Oral Daily    sucralfate  1 g Oral QID (AC & HS)     Continuous Infusions:  PRN Meds:.  Current Facility-Administered Medications:     acetaminophen, 1,000 mg, Oral, Q6H PRN    acetaminophen, 650 mg, Oral, Q4H PRN    dextrose 10%,  12.5 g, Intravenous, PRN    dextrose 10%, 25 g, Intravenous, PRN    glucagon (human recombinant), 1 mg, Intramuscular, PRN    glucose, 16 g, Oral, PRN    glucose, 24 g, Oral, PRN    HYDROmorphone, 1 mg, Intravenous, Q6H PRN    naloxone, 0.02 mg, Intravenous, PRN    ondansetron, 4 mg, Intravenous, Q4H PRN    oxyCODONE, 10 mg, Oral, Q6H PRN    prochlorperazine, 5 mg, Intravenous, Q6H PRN    simethicone, 1 tablet, Oral, QID PRN    sodium chloride 0.9%, 10 mL, Intravenous, PRN    Nutrition:  Nutrition consulted. Most recent weight and BMI monitored-     Measurements:  Wt Readings from Last 1 Encounters:   12/15/24 50.8 kg (112 lb)   Body mass index is 18.08 kg/m².    Patient has been screened and assessed by RD.    Malnutrition Type:  Context:    Level:      Malnutrition Characteristic Summary:       Interventions/Recommendations (treatment strategy):           Assessment/Plan:   Severe sepsis  Pneumonia- right lung and left lower lobe per imaging  Acute kidney injury  IVVD with dehydration  Acute epigastric Abdominal pain- non-intractable  Thrombocytosis  Hyperglycemia  Immunocompromised state-currently receiving chemotherapy treatments  Large cell neuroendocrine lung cancer with metastatic disease  CAD/MI- s/p CABG    Patient remains on IV doxycycline empirically.  All cultures remained negative at this time.  He is on room air.  Labs are currently pending this morning.    He did have a continued leukocytosis yesterday though his renal function seems to be improving.  Will follow up labs this morning.  If leukocytosis improved could consider switching to oral agents.  Continue GI prophylaxis   IV fluids has been discontinued.    Could potentially look at discharge in the next 48 hours if continues to do well    Critical care diagnosis: sepsis, iv antibiotics  Critical care interventions: hands on evaluation, review of labs/radiographs/records and discussions with family  Critical care time spent: >32  minutes        Delbert Mercedes MD   12/16/2024     All diagnosis and differential diagnosis have been reviewed; assessment and plan has been documented; I have personally reviewed the labs and test results that are presently available; I have reviewed the patients medication list; I have reviewed the consulting providers response and recommendations. I have reviewed or attempted to review medical records based upon their availability    All of the patient's questions have been  addressed and answered. Patient's is agreeable to the above stated plan. I will continue to monitor closely and make adjustments to medical management as needed.  _____________________________________________________________________

## 2024-12-17 LAB
ABS NEUT (OLG): 2.02 X10(3)/MCL (ref 2.1–9.2)
ANION GAP SERPL CALC-SCNC: 6 MEQ/L
BACTERIA UR CULT: NO GROWTH
BUN SERPL-MCNC: 10.2 MG/DL (ref 8.9–20.6)
CALCIUM SERPL-MCNC: 9.4 MG/DL (ref 8.4–10.2)
CHLORIDE SERPL-SCNC: 103 MMOL/L (ref 98–107)
CO2 SERPL-SCNC: 28 MMOL/L (ref 22–29)
CREAT SERPL-MCNC: 0.81 MG/DL (ref 0.72–1.25)
CREAT/UREA NIT SERPL: 13
EOSINOPHIL NFR BLD MANUAL: 0.17 X10(3)/MCL (ref 0–0.9)
EOSINOPHIL NFR BLD MANUAL: 4 %
ERYTHROCYTE [DISTWIDTH] IN BLOOD BY AUTOMATED COUNT: 13.8 % (ref 11.5–17)
GFR SERPLBLD CREATININE-BSD FMLA CKD-EPI: >60 ML/MIN/1.73/M2
GLUCOSE SERPL-MCNC: 108 MG/DL (ref 74–100)
HCT VFR BLD AUTO: 41.3 % (ref 42–52)
HGB BLD-MCNC: 13.7 G/DL (ref 14–18)
INSTRUMENT WBC (OLG): 4.3 X10(3)/MCL
LYMPHOCYTES NFR BLD MANUAL: 0.99 X10(3)/MCL
LYMPHOCYTES NFR BLD MANUAL: 23 %
MCH RBC QN AUTO: 33.6 PG (ref 27–31)
MCHC RBC AUTO-ENTMCNC: 33.2 G/DL (ref 33–36)
MCV RBC AUTO: 101.2 FL (ref 80–94)
MONOCYTES NFR BLD MANUAL: 1.16 X10(3)/MCL (ref 0.1–1.3)
MONOCYTES NFR BLD MANUAL: 27 %
NEUTROPHILS NFR BLD MANUAL: 47 %
NRBC BLD AUTO-RTO: 0 %
PLATELET # BLD AUTO: 322 X10(3)/MCL (ref 130–400)
PLATELET # BLD EST: NORMAL 10*3/UL
PMV BLD AUTO: 8.4 FL (ref 7.4–10.4)
POTASSIUM SERPL-SCNC: 4.4 MMOL/L (ref 3.5–5.1)
RBC # BLD AUTO: 4.08 X10(6)/MCL (ref 4.7–6.1)
RBC MORPH BLD: NORMAL
SODIUM SERPL-SCNC: 137 MMOL/L (ref 136–145)
WBC # BLD AUTO: 4.28 X10(3)/MCL (ref 4.5–11.5)

## 2024-12-17 PROCEDURE — 63600175 PHARM REV CODE 636 W HCPCS: Performed by: INTERNAL MEDICINE

## 2024-12-17 PROCEDURE — 36415 COLL VENOUS BLD VENIPUNCTURE: CPT | Performed by: HOSPITALIST

## 2024-12-17 PROCEDURE — 11000001 HC ACUTE MED/SURG PRIVATE ROOM

## 2024-12-17 PROCEDURE — 85025 COMPLETE CBC W/AUTO DIFF WBC: CPT | Performed by: HOSPITALIST

## 2024-12-17 PROCEDURE — 25000003 PHARM REV CODE 250: Performed by: INTERNAL MEDICINE

## 2024-12-17 PROCEDURE — 25000003 PHARM REV CODE 250: Performed by: HOSPITALIST

## 2024-12-17 PROCEDURE — 25000003 PHARM REV CODE 250: Performed by: NURSE PRACTITIONER

## 2024-12-17 PROCEDURE — 80048 BASIC METABOLIC PNL TOTAL CA: CPT | Performed by: HOSPITALIST

## 2024-12-17 RX ADMIN — SUCRALFATE 1 G: 1 TABLET ORAL at 05:12

## 2024-12-17 RX ADMIN — OXYCODONE HYDROCHLORIDE 10 MG: 10 TABLET ORAL at 04:12

## 2024-12-17 RX ADMIN — SUCRALFATE 1 G: 1 TABLET ORAL at 04:12

## 2024-12-17 RX ADMIN — MUPIROCIN: 20 OINTMENT TOPICAL at 09:12

## 2024-12-17 RX ADMIN — ATORVASTATIN CALCIUM 40 MG: 40 TABLET, FILM COATED ORAL at 09:12

## 2024-12-17 RX ADMIN — OXYCODONE HYDROCHLORIDE 10 MG: 10 TABLET ORAL at 10:12

## 2024-12-17 RX ADMIN — MUPIROCIN: 20 OINTMENT TOPICAL at 08:12

## 2024-12-17 RX ADMIN — CAPSAICIN: 0.75 CREAM TOPICAL at 04:12

## 2024-12-17 RX ADMIN — GABAPENTIN 300 MG: 300 CAPSULE ORAL at 08:12

## 2024-12-17 RX ADMIN — CAPSAICIN: 0.75 CREAM TOPICAL at 09:12

## 2024-12-17 RX ADMIN — SUCRALFATE 1 G: 1 TABLET ORAL at 11:12

## 2024-12-17 RX ADMIN — SUCRALFATE 1 G: 1 TABLET ORAL at 08:12

## 2024-12-17 RX ADMIN — ENOXAPARIN SODIUM 30 MG: 30 INJECTION SUBCUTANEOUS at 04:12

## 2024-12-17 RX ADMIN — POTASSIUM CHLORIDE 40 MEQ: 1500 TABLET, EXTENDED RELEASE ORAL at 09:12

## 2024-12-17 NOTE — PROGRESS NOTES
[facility] Hospital Medicine Progress Note        Chief Complaint: Inpatient Follow-up     HPI:    38 y.o. 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  Patient reports his symptoms have been present over the past 2 days PTA and he has had poor oral intake since then secondary to the abdominal pain with nausea and vomiting.  He is currently receiving chemotherapy treatments for his lung cancer with his last treatment on 12/11/2024.  Lab work reviewed demonstrated WBCs 12.55, chloride 89, BUN 21.7, creatinine 3.88, glucose 240, calcium 12.5, magnesium 2.90, protein 12.4, albumin 5.3, lipase 70, liver enzymes unremarkable.  CT of chest abdomen and pelvis without IV contrast impression reviewed demonstrated moderate decrease in size of nodule opacities with associated ground-glass component in the right lung and left lower lobe, no new nodules are identified, no acute thoracic pathology identified.     Initial vital signs BP 95/62 pulse 145 respirations 27 temperature 96.1° F O2 saturation 100% on room air.  Patient received antiemetic therapy, 2 L normal saline boluses, and IV morphine in the ED with improvement in hemodynamics and heart rate.  Heart rate had trended down to 94 at the time of examination.  Patient reporting nausea and vomiting has improved and improvement in his abdominal pain.      Interval Hx:  Diet advanced.  Tolerating p.o. though still with some mild abdominal pain.  Discussed transitioning his antibiotic to oral and could potentially be discharged later today or tomorrow.  Nursing at the bedside.    Objective/physical exam:  General: In no acute distress, afebrile  Chest: Clear to  auscultation bilaterally  Heart: RRR, +S1, S2, no appreciable murmur  Abdomen: Soft, nontender, BS +, mild TTP upon deep palpation over epigastrium, no guarding, no rigidity.  MSK: Warm, no lower extremity edema, no clubbing or cyanosis  Neurologic: Alert and oriented x 4, Strength 5/5 in all 4 extremities    VITAL SIGNS: 24 HRS MIN & MAX LAST   Temp  Min: 98 °F (36.7 °C)  Max: 98.3 °F (36.8 °C) 98.1 °F (36.7 °C)   BP  Min: 111/71  Max: 131/86 119/83   Pulse  Min: 90  Max: 113  93   Resp  Min: 18  Max: 18 18   SpO2  Min: 98 %  Max: 100 % 98 %       Recent Labs   Lab 12/14/24  2004 12/15/24  0420 12/16/24  0547   WBC 12.55* 13.54* 5.69  5.69   RBC 5.17 4.13* 3.83*   HGB 17.1 13.8* 12.7*   HCT 51.8 41.4* 38.4*   .2* 100.2* 100.3*   MCH 33.1* 33.4* 33.2*   MCHC 33.0 33.3 33.1   RDW 14.4 14.4 14.0   * 345 339   MPV 8.8 8.6 8.5       Recent Labs   Lab 12/14/24  2004 12/15/24  0420 12/16/24  0547    133* 137   K 3.5 4.2 4.5   CL 89* 101 105   CO2 24 21* 25   BUN 21.7* 20.8* 9.6   CREATININE 3.88* 1.80* 0.75   CALCIUM 12.5* 9.0 9.2   MG 2.90*  --   --    ALBUMIN 5.3* 3.6 3.4*   ALKPHOS 126 79 77   ALT 15 10 11   AST 17 16 16   BILITOT 0.4 0.3 0.4          Microbiology Results (last 7 days)       Procedure Component Value Units Date/Time    Blood Culture [[id number]]  (Normal) Collected: 12/14/24 0123    Order Status: Completed Specimen: Blood from Hand, Right Updated: 12/17/24 0009     Blood Culture No Growth At 48 Hours    Blood Culture [[id number]]  (Normal) Collected: 12/14/24 2333    Order Status: Completed Specimen: Blood from Hand, Left Updated: 12/17/24 0009     Blood Culture No Growth At 48 Hours    Urine culture [[id number]] Collected: 12/15/24 0147    Order Status: Completed Specimen: Urine Updated: 12/16/24 0656     Urine Culture No Growth At 24 Hours             Radiology:  CT Chest Abdomen Pelvis Without Contrast (XPD)  Narrative: EXAMINATION:  CT CHEST ABDOMEN PELVIS WITHOUT  CONTRAST(XPD)    CLINICAL HISTORY:  epigastric pain, acute renal failure;    TECHNIQUE:  Low dose axial images, sagittal and coronal reformations were obtained from the thoracic inlet to the pubic symphysis.    COMPARISON:  10/22/2024.    FINDINGS:  Lines and Tubes: Right internal jugular, catheter is present with its tip in the superior vena cave.Neck: The visualized soft tissues of the neck appear unremarkable.Mediastinum: No significant change is seen in the partially calcified confluent lymph nodes in the right parahilar space. They currently measure 2.7 x 3.1 cm on image 55 series 2. No new abnormal lymph nodes are seen.Heart: The heart size is within normal limits. Moderate coronary artery calcification is seen. Coronary stents are again seen.Aorta: Unremarkable appearing aorta.Pulmonary Arteries: Unremarkable.Lungs: There is mild non specific dependent change at the lung bases. Stable moderate paraseptal emphysematous changes and scarring are seen in both lungs, predominantly in the upper lobes. There is moderate decrease in sizes of the nodular opacities with associated ground-glass component in the right lung and left lower lobe. The largest lesion in the right upper lobe currently measures 11 x 7 mm on image 31 series 4. No new nodules are identified.Pleura: No effusions or pneumothorax are identified.Bony Structures:Spine: Mild multilevel spondylotic changes are seen in the thoracic spine.Ribs: The bilateral ribs appear unremarkable.Abdomen:Abdominal Wall: No abdominal wall pathology is seen.Liver: The liver appears unremarkable.Biliary System: No intrahepatic or extrahepatic biliary duct dilatation is seen.Gallbladder: The gallbladder is non-distended and appears otherwise unremarkable.Pancreas: The pancreas appears unremarkable.Spleen: Moderately atrophic spleen.Adrenals: The right adrenal gland appears unremarkable. A stable isodense left adrenal nodule is seen. It currently measures 1.8 x 1.9 cm on  image 100 series 2.Kidneys: The kidneys appear unremarkable with no stones cysts masses or hydronephrosis.Aorta: The abdominal aorta appears unremarkable.Bowel:Esophagus: The visualized esophagus appears unremarkable.Stomach: The stomach appears unremarkable.Duodenum: Unremarkable appearing duodenum.Small Bowel: The small bowel appears unremarkable.Colon: Nondistended.Appendix: The appendix is not identified but no inflammatory changes are seen in the right lower quadrant to suggest appendicitis. Follow-up as clinically indicated.Peritoneum: No intraperitoneal free air or ascites is seen.Pelvis:Bladder: The bladder is nondistended and cannot be definitively evaluated.Male:Prostate gland: The prostate gland appears unremarkable.Bony structures:Dorsal Spine: There is subtle multilevel spondylosis of the visualized dorsal spine.Bony Pelvis: There is a stable osseous defect in the anterior aspect of the left iliac bone.  Impression: 1. There is moderate decrease in sizes of the nodular opacities with associated ground-glass component in the right lung and left lower lobe. The largest lesion in the right upper lobe currently measures 11 x 7 mm on image 31 series 4. No new nodules are identified. Correlate with clinical and laboratory findings as regards additional evaluation and follow-up.2. No acute intrathoracic pathology identified. Details and other findings as discussed above.    I agree with the preliminary report.    Electronically signed by: Brandon Johnson  Date:    12/15/2024  Time:    08:15        Medications:  Scheduled Meds:   atorvastatin  40 mg Oral Daily    capsicum 0.075%   Topical (Top) TID    enoxparin  30 mg Subcutaneous Q24H (prophylaxis, 1700)    gabapentin  300 mg Oral QHS    mupirocin   Nasal BID    potassium chloride  40 mEq Oral Daily    sucralfate  1 g Oral QID (AC & HS)     Continuous Infusions:  PRN Meds:.  Current Facility-Administered Medications:     acetaminophen, 1,000 mg, Oral, Q6H PRN     acetaminophen, 650 mg, Oral, Q4H PRN    dextrose 10%, 12.5 g, Intravenous, PRN    dextrose 10%, 25 g, Intravenous, PRN    glucagon (human recombinant), 1 mg, Intramuscular, PRN    glucose, 16 g, Oral, PRN    glucose, 24 g, Oral, PRN    HYDROmorphone, 1 mg, Intravenous, Q6H PRN    naloxone, 0.02 mg, Intravenous, PRN    ondansetron, 4 mg, Intravenous, Q4H PRN    oxyCODONE, 10 mg, Oral, Q6H PRN    prochlorperazine, 5 mg, Intravenous, Q6H PRN    simethicone, 1 tablet, Oral, QID PRN    sodium chloride 0.9%, 10 mL, Intravenous, PRN    Nutrition:  Nutrition consulted. Most recent weight and BMI monitored-     Measurements:  Wt Readings from Last 1 Encounters:   12/15/24 50.8 kg (112 lb)   Body mass index is 18.08 kg/m².    Patient has been screened and assessed by RD.    Malnutrition Type:  Context:    Level:      Malnutrition Characteristic Summary:       Interventions/Recommendations (treatment strategy):           Assessment/Plan:   Severe sepsis  Pneumonia- right lung and left lower lobe per imaging  Acute kidney injury  IVVD with dehydration  Acute epigastric Abdominal pain- non-intractable  Thrombocytosis  Hyperglycemia  Immunocompromised state-currently receiving chemotherapy treatments  Large cell neuroendocrine lung cancer with metastatic disease  CAD/MI- s/p CABG    He is planning to transitioned to oral doxycycline but looks like he has actually completed his course as of today.  No further leukocytosis or fevers.  Will hold off on further antibiotics at this time.    Renal function is back at baseline.    Advance diet as tolerated.  Off of IV fluids.    Home later today if tolerating p.o.      Delbert Mercedes MD   12/17/2024     All diagnosis and differential diagnosis have been reviewed; assessment and plan has been documented; I have personally reviewed the labs and test results that are presently available; I have reviewed the patients medication list; I have reviewed the consulting providers response and  recommendations. I have reviewed or attempted to review medical records based upon their availability    All of the patient's questions have been  addressed and answered. Patient's is agreeable to the above stated plan. I will continue to monitor closely and make adjustments to medical management as needed.  _____________________________________________________________________

## 2024-12-18 VITALS
TEMPERATURE: 97 F | WEIGHT: 112 LBS | RESPIRATION RATE: 18 BRPM | HEART RATE: 99 BPM | HEIGHT: 66 IN | BODY MASS INDEX: 18 KG/M2 | SYSTOLIC BLOOD PRESSURE: 115 MMHG | OXYGEN SATURATION: 100 % | DIASTOLIC BLOOD PRESSURE: 80 MMHG

## 2024-12-18 PROCEDURE — 63600175 PHARM REV CODE 636 W HCPCS: Performed by: INTERNAL MEDICINE

## 2024-12-18 PROCEDURE — 25000003 PHARM REV CODE 250: Performed by: NURSE PRACTITIONER

## 2024-12-18 PROCEDURE — 25000003 PHARM REV CODE 250: Performed by: HOSPITALIST

## 2024-12-18 RX ORDER — LEVOFLOXACIN 750 MG/1
750 TABLET ORAL DAILY
Qty: 5 TABLET | Refills: 0 | Status: SHIPPED | OUTPATIENT
Start: 2024-12-18 | End: 2024-12-23

## 2024-12-18 RX ORDER — OXYCODONE HYDROCHLORIDE 10 MG/1
10 TABLET ORAL EVERY 6 HOURS PRN
Qty: 10 TABLET | Refills: 0 | Status: SHIPPED | OUTPATIENT
Start: 2024-12-18 | End: 2024-12-23

## 2024-12-18 RX ADMIN — ATORVASTATIN CALCIUM 40 MG: 40 TABLET, FILM COATED ORAL at 10:12

## 2024-12-18 RX ADMIN — OXYCODONE HYDROCHLORIDE 10 MG: 10 TABLET ORAL at 11:12

## 2024-12-18 RX ADMIN — POTASSIUM CHLORIDE 40 MEQ: 1500 TABLET, EXTENDED RELEASE ORAL at 10:12

## 2024-12-18 RX ADMIN — SUCRALFATE 1 G: 1 TABLET ORAL at 05:12

## 2024-12-18 RX ADMIN — OXYCODONE HYDROCHLORIDE 10 MG: 10 TABLET ORAL at 05:12

## 2024-12-18 RX ADMIN — MUPIROCIN: 20 OINTMENT TOPICAL at 10:12

## 2024-12-18 RX ADMIN — CAPSAICIN: 0.75 CREAM TOPICAL at 10:12

## 2024-12-18 RX ADMIN — SUCRALFATE 1 G: 1 TABLET ORAL at 10:12

## 2024-12-18 RX ADMIN — SUCRALFATE 1 G: 1 TABLET ORAL at 04:12

## 2024-12-18 RX ADMIN — OXYCODONE HYDROCHLORIDE 10 MG: 10 TABLET ORAL at 06:12

## 2024-12-18 RX ADMIN — CAPSAICIN: 0.75 CREAM TOPICAL at 04:12

## 2024-12-18 RX ADMIN — ENOXAPARIN SODIUM 30 MG: 30 INJECTION SUBCUTANEOUS at 04:12

## 2024-12-18 NOTE — DISCHARGE SUMMARY
[facility] Hospital Medicine Discharge Summary    Admit Date: 12/14/2024  Discharge Date and Time: 12/18/20241:13 PM  Admitting Physician: JUNAID Team  Discharging Physician: Kyle Ballesteros MD.  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  Patient reports his symptoms have been present over the past 2 days PTA and he has had poor oral intake since then secondary to the abdominal pain with nausea and vomiting.  He is currently receiving chemotherapy treatments for his lung cancer with his last treatment on 12/11/2024.  Lab work reviewed demonstrated WBCs 12.55, chloride 89, BUN 21.7, creatinine 3.88, glucose 240, calcium 12.5, magnesium 2.90, protein 12.4, albumin 5.3, lipase 70, liver enzymes unremarkable.  CT of chest abdomen and pelvis without IV contrast impression reviewed demonstrated moderate decrease in size of nodule opacities with associated ground-glass component in the right lung and left lower lobe, no new nodules are identified, no acute thoracic pathology identified.     Initial vital signs BP 95/62 pulse 145 respirations 27 temperature 96.1° F O2 saturation 100% on room air.  Patient received antiemetic therapy, 2 L normal saline boluses, and IV  morphine in the ED with improvement in hemodynamics and heart rate.  Heart rate had trended down to 94 at the time of examination.  Patient reporting nausea and vomiting has improved and improvement in his abdominal pain. He was stable and asymptomatic. He was placed on po levaquin x 5 days. His vitals was also stable. He was discharged home in a stable condition.     Pt was seen and examined on the day of discharge  Vitals:  VITAL SIGNS: 24 HRS MIN & MAX LAST   Temp  Min: 97.3 °F (36.3 °C)  Max: 98.4 °F (36.9 °C) 97.5 °F (36.4 °C)   BP  Min: 114/79  Max: 130/86 122/84   Pulse  Min: 88  Max: 98  88   Resp  Min: 18  Max: 18 18   SpO2  Min: 97 %  Max: 100 % 100 %       Physical Exam:  Heart RRR  Lungs clear   Abdomen soft and non tender   Neuro: No FND      Procedures Performed: No admission procedures for hospital encounter.     Significant Diagnostic Studies: See Full reports for all details    Recent Labs   Lab 12/15/24  0420 12/16/24  0547 12/17/24  0641   WBC 13.54* 5.69  5.69 4.3  4.28*   RBC 4.13* 3.83* 4.08*   HGB 13.8* 12.7* 13.7*   HCT 41.4* 38.4* 41.3*   .2* 100.3* 101.2*   MCH 33.4* 33.2* 33.6*   MCHC 33.3 33.1 33.2   RDW 14.4 14.0 13.8    339 322   MPV 8.6 8.5 8.4       Recent Labs   Lab 12/14/24  2004 12/15/24  0420 12/16/24  0547 12/17/24  0641    133* 137 137   K 3.5 4.2 4.5 4.4   CL 89* 101 105 103   CO2 24 21* 25 28   BUN 21.7* 20.8* 9.6 10.2   CREATININE 3.88* 1.80* 0.75 0.81   CALCIUM 12.5* 9.0 9.2 9.4   MG 2.90*  --   --   --    ALBUMIN 5.3* 3.6 3.4*  --    ALKPHOS 126 79 77  --    ALT 15 10 11  --    AST 17 16 16  --    BILITOT 0.4 0.3 0.4  --         Microbiology Results (last 7 days)       Procedure Component Value Units Date/Time    Blood Culture [[id number]]  (Normal) Collected: 12/14/24 2333    Order Status: Completed Specimen: Blood from Hand, Right Updated: 12/18/24 0001     Blood Culture No Growth At 72 Hours    Blood Culture [[id number]]  (Normal) Collected:  12/14/24 2333    Order Status: Completed Specimen: Blood from Hand, Left Updated: 12/18/24 0001     Blood Culture No Growth At 72 Hours    Urine culture [[id number]] Collected: 12/15/24 0147    Order Status: Completed Specimen: Urine Updated: 12/17/24 0923     Urine Culture No Growth             CT Chest Abdomen Pelvis Without Contrast (XPD)  Narrative: EXAMINATION:  CT CHEST ABDOMEN PELVIS WITHOUT CONTRAST(XPD)    CLINICAL HISTORY:  epigastric pain, acute renal failure;    TECHNIQUE:  Low dose axial images, sagittal and coronal reformations were obtained from the thoracic inlet to the pubic symphysis.    COMPARISON:  10/22/2024.    FINDINGS:  Lines and Tubes: Right internal jugular, catheter is present with its tip in the superior vena cave.Neck: The visualized soft tissues of the neck appear unremarkable.Mediastinum: No significant change is seen in the partially calcified confluent lymph nodes in the right parahilar space. They currently measure 2.7 x 3.1 cm on image 55 series 2. No new abnormal lymph nodes are seen.Heart: The heart size is within normal limits. Moderate coronary artery calcification is seen. Coronary stents are again seen.Aorta: Unremarkable appearing aorta.Pulmonary Arteries: Unremarkable.Lungs: There is mild non specific dependent change at the lung bases. Stable moderate paraseptal emphysematous changes and scarring are seen in both lungs, predominantly in the upper lobes. There is moderate decrease in sizes of the nodular opacities with associated ground-glass component in the right lung and left lower lobe. The largest lesion in the right upper lobe currently measures 11 x 7 mm on image 31 series 4. No new nodules are identified.Pleura: No effusions or pneumothorax are identified.Bony Structures:Spine: Mild multilevel spondylotic changes are seen in the thoracic spine.Ribs: The bilateral ribs appear unremarkable.Abdomen:Abdominal Wall: No abdominal wall pathology is seen.Liver: The liver  appears unremarkable.Biliary System: No intrahepatic or extrahepatic biliary duct dilatation is seen.Gallbladder: The gallbladder is non-distended and appears otherwise unremarkable.Pancreas: The pancreas appears unremarkable.Spleen: Moderately atrophic spleen.Adrenals: The right adrenal gland appears unremarkable. A stable isodense left adrenal nodule is seen. It currently measures 1.8 x 1.9 cm on image 100 series 2.Kidneys: The kidneys appear unremarkable with no stones cysts masses or hydronephrosis.Aorta: The abdominal aorta appears unremarkable.Bowel:Esophagus: The visualized esophagus appears unremarkable.Stomach: The stomach appears unremarkable.Duodenum: Unremarkable appearing duodenum.Small Bowel: The small bowel appears unremarkable.Colon: Nondistended.Appendix: The appendix is not identified but no inflammatory changes are seen in the right lower quadrant to suggest appendicitis. Follow-up as clinically indicated.Peritoneum: No intraperitoneal free air or ascites is seen.Pelvis:Bladder: The bladder is nondistended and cannot be definitively evaluated.Male:Prostate gland: The prostate gland appears unremarkable.Bony structures:Dorsal Spine: There is subtle multilevel spondylosis of the visualized dorsal spine.Bony Pelvis: There is a stable osseous defect in the anterior aspect of the left iliac bone.  Impression: 1. There is moderate decrease in sizes of the nodular opacities with associated ground-glass component in the right lung and left lower lobe. The largest lesion in the right upper lobe currently measures 11 x 7 mm on image 31 series 4. No new nodules are identified. Correlate with clinical and laboratory findings as regards additional evaluation and follow-up.2. No acute intrathoracic pathology identified. Details and other findings as discussed above.    I agree with the preliminary report.    Electronically signed by: Brandon Johnson  Date:    12/15/2024  Time:    08:15         Medication List         START taking these medications      levoFLOXacin 750 MG tablet  Commonly known as: LEVAQUIN  Take 1 tablet (750 mg total) by mouth once daily. for 5 days     oxyCODONE 10 mg Tab immediate release tablet  Commonly known as: ROXICODONE  Take 1 tablet (10 mg total) by mouth every 6 (six) hours as needed for Pain.            CONTINUE taking these medications      atorvastatin 40 MG tablet  Commonly known as: LIPITOR     dexAMETHasone 4 MG Tab  Commonly known as: DECADRON  Take 2 tablets (8 mg total) by mouth once daily. on days 2-4 of each chemotherapy cycle     ergocalciferol 50,000 unit Cap  Commonly known as: ERGOCALCIFEROL  Take 1 capsule (50,000 Units total) by mouth every 7 days.     gabapentin 300 MG capsule  Commonly known as: NEURONTIN  Take 1 capsule (300 mg total) by mouth every evening.     hydrocortisone 2.5 % cream  Apply topically 2 (two) times daily.     ibuprofen 800 MG tablet  Commonly known as: ADVIL,MOTRIN  Take 1 tablet (800 mg total) by mouth 3 (three) times daily as needed for Pain.     ketoconazole 2 % shampoo  Commonly known as: NIZORAL  Apply topically twice a week.     OLANZapine 5 MG tablet  Commonly known as: ZyPREXA  Take 1 tablet by mouth nightly on days 1-4 of each chemotherapy cycle.     ondansetron 4 MG tablet  Commonly known as: ZOFRAN  Take 1 tablet (4 mg total) by mouth every 6 (six) hours as needed for Nausea.     potassium chloride 20 mEq  Commonly known as: K-TAB  Take 2 tablets (40 mEq total) by mouth once daily.            STOP taking these medications      doxycycline 100 MG Cap  Commonly known as: VIBRAMYCIN               Where to Get Your Medications        These medications were sent to [facility]/pharmacy #[id number] - [place] [address] AT [address] [address]      Phone: [phone]   levoFLOXacin 750 MG tablet  oxyCODONE 10 mg Tab immediate release tablet          Explained in detail to the patient about the discharge plan,  medications, and follow-up visits. Pt understands and agrees with the treatment plan  Discharge Disposition: Home or Self Care   Discharged Condition: stable  Diet-   Dietary Orders (From admission, onward)       Start     Ordered    12/16/24 0931  Diet Adult Regular  Diet effective now         12/16/24 0930                   Medications Per DC med rec  Activities as tolerated   Follow-up Information       Matty Gomez, SYDNIP Follow up in 2 week(s).    Specialty: Family Medicine  Contact information:  [facility]  [facility]  [place] [address]  [phone]                           For further questions contact hospitalist office    Discharge time 33 minutes    For worsening symptoms, chest pain, shortness of breath, increased abdominal pain, high grade fever, stroke or stroke like symptoms, immediately go to the nearest Emergency Room or call 911 as soon as possible.      Kyle Smart M.D, on 12/18/2024. at 1:13 PM.

## 2024-12-18 NOTE — PLAN OF CARE
12/18/24 1347   Final Note   Assessment Type Final Discharge Note   Anticipated Discharge Disposition Home   Post-Acute Status   Discharge Delays None known at this time

## 2024-12-19 NOTE — PLAN OF CARE
Problem: Adult Inpatient Plan of Care  Goal: Plan of Care Review  12/18/2024 1955 by Dawn Nguyễn RN  Outcome: Met  12/18/2024 1131 by Dawn Nguyễn RN  Outcome: Progressing  Goal: Patient-Specific Goal (Individualized)  12/18/2024 1955 by Dawn Nguyễn RN  Outcome: Met  12/18/2024 1131 by Dawn Nguyễn RN  Outcome: Progressing  Goal: Absence of Hospital-Acquired Illness or Injury  12/18/2024 1955 by Dawn Nguyễn RN  Outcome: Met  12/18/2024 1131 by Dawn Nguyễn RN  Outcome: Progressing  Goal: Optimal Comfort and Wellbeing  12/18/2024 1955 by Dawn Nguyễn RN  Outcome: Met  12/18/2024 1131 by Dawn Nguyễn RN  Outcome: Progressing  Goal: Readiness for Transition of Care  12/18/2024 1955 by Dawn Nguyễn RN  Outcome: Met  12/18/2024 1131 by Dawn Nguyễn RN  Outcome: Progressing     Problem: Acute Kidney Injury/Impairment  Goal: Fluid and Electrolyte Balance  12/18/2024 1955 by Dawn Nguyễn RN  Outcome: Met  12/18/2024 1131 by Dawn Nguyễn RN  Outcome: Progressing  Goal: Improved Oral Intake  12/18/2024 1955 by Dawn Nguyễn RN  Outcome: Met  12/18/2024 1131 by Dawn Nguyễn RN  Outcome: Progressing  Goal: Effective Renal Function  12/18/2024 1955 by Dawn Nguyễn RN  Outcome: Met  12/18/2024 1131 by Dawn Nguyễn RN  Outcome: Progressing     Problem: Infection  Goal: Absence of Infection Signs and Symptoms  12/18/2024 1955 by Dawn Nguyễn RN  Outcome: Met  12/18/2024 1131 by Dawn Nguyễn RN  Outcome: Progressing     Problem: Wound  Goal: Optimal Coping  12/18/2024 1955 by Dawn Nguyễn RN  Outcome: Met  12/18/2024 1131 by Dawn Nguyễn RN  Outcome: Progressing  Goal: Optimal Functional Ability  12/18/2024 1955 by Dawn Nguyễn RN  Outcome: Met  12/18/2024 1131 by Dawn Nguyễn RN  Outcome: Progressing  Goal: Absence of Infection Signs and Symptoms  12/18/2024 1955 by Dawn Nguyễn RN  Outcome: Met  12/18/2024 1131 by Dawn Nguyễn RN  Outcome: Progressing  Goal: Improved Oral  Intake  12/18/2024 1955 by Dawn Nguyễn RN  Outcome: Met  12/18/2024 1131 by Dawn Nguyễn RN  Outcome: Progressing  Goal: Optimal Pain Control and Function  12/18/2024 1955 by Dawn Nguyễn RN  Outcome: Met  12/18/2024 1131 by Dawn Nguyễn RN  Outcome: Progressing  Goal: Skin Health and Integrity  12/18/2024 1955 by Dawn Nguyễn RN  Outcome: Met  12/18/2024 1131 by Dawn Nguyễn RN  Outcome: Progressing  Goal: Optimal Wound Healing  12/18/2024 1955 by Dawn Nguyễn RN  Outcome: Met  12/18/2024 1131 by Dawn Nguyễn RN  Outcome: Progressing

## 2024-12-19 NOTE — NURSING
Pt. Ok to be discharged to home. Pt. Discharged in stable condition via uber. Pt. Educated on Acute kidney failure s/sx. Pt. Verbalized understanding.

## 2024-12-20 LAB
BACTERIA BLD CULT: NORMAL
BACTERIA BLD CULT: NORMAL

## 2025-01-02 ENCOUNTER — APPOINTMENT (OUTPATIENT)
Dept: HEMATOLOGY/ONCOLOGY | Facility: CLINIC | Age: 40
End: 2025-01-02
Payer: MEDICAID

## 2025-01-02 ENCOUNTER — OFFICE VISIT (OUTPATIENT)
Dept: HEMATOLOGY/ONCOLOGY | Facility: CLINIC | Age: 40
End: 2025-01-02
Payer: MEDICAID

## 2025-01-02 ENCOUNTER — INFUSION (OUTPATIENT)
Dept: INFUSION THERAPY | Facility: HOSPITAL | Age: 40
End: 2025-01-02
Attending: INTERNAL MEDICINE
Payer: MEDICAID

## 2025-01-02 VITALS
SYSTOLIC BLOOD PRESSURE: 109 MMHG | BODY MASS INDEX: 19.71 KG/M2 | HEART RATE: 94 BPM | HEIGHT: 66 IN | TEMPERATURE: 99 F | OXYGEN SATURATION: 97 % | WEIGHT: 122.63 LBS | DIASTOLIC BLOOD PRESSURE: 64 MMHG

## 2025-01-02 VITALS
TEMPERATURE: 99 F | RESPIRATION RATE: 18 BRPM | DIASTOLIC BLOOD PRESSURE: 82 MMHG | HEART RATE: 92 BPM | SYSTOLIC BLOOD PRESSURE: 118 MMHG | OXYGEN SATURATION: 97 %

## 2025-01-02 DIAGNOSIS — E27.8 ADRENAL MASS, LEFT: ICD-10-CM

## 2025-01-02 DIAGNOSIS — C7A.8 NEUROENDOCRINE CARCINOMA OF LUNG: Primary | ICD-10-CM

## 2025-01-02 DIAGNOSIS — C7A.1 LARGE CELL NEUROENDOCRINE CARCINOMA: ICD-10-CM

## 2025-01-02 DIAGNOSIS — C7A.8 NEUROENDOCRINE CARCINOMA OF LUNG: ICD-10-CM

## 2025-01-02 DIAGNOSIS — D75.839 THROMBOCYTOSIS: ICD-10-CM

## 2025-01-02 DIAGNOSIS — C77.1 SECONDARY MALIGNANCY OF MEDIASTINAL LYMPH NODES: ICD-10-CM

## 2025-01-02 DIAGNOSIS — R59.0 LOCALIZED ENLARGED LYMPH NODES: Primary | ICD-10-CM

## 2025-01-02 DIAGNOSIS — D63.8 ANEMIA, CHRONIC DISEASE: ICD-10-CM

## 2025-01-02 DIAGNOSIS — R91.1 NODULE OF LOWER LOBE OF LEFT LUNG: ICD-10-CM

## 2025-01-02 LAB
ALBUMIN SERPL-MCNC: 3.1 G/DL (ref 3.5–5)
ALBUMIN/GLOB SERPL: 0.8 RATIO (ref 1.1–2)
ALP SERPL-CCNC: 73 UNIT/L (ref 40–150)
ALT SERPL-CCNC: 6 UNIT/L (ref 0–55)
ANION GAP SERPL CALC-SCNC: 5 MEQ/L
AST SERPL-CCNC: 11 UNIT/L (ref 5–34)
BASOPHILS # BLD AUTO: 0.01 X10(3)/MCL
BASOPHILS NFR BLD AUTO: 0.2 %
BILIRUB SERPL-MCNC: 0.2 MG/DL
BUN SERPL-MCNC: 7.7 MG/DL (ref 8.9–20.6)
CALCIUM SERPL-MCNC: 8.9 MG/DL (ref 8.4–10.2)
CHLORIDE SERPL-SCNC: 107 MMOL/L (ref 98–107)
CO2 SERPL-SCNC: 26 MMOL/L (ref 22–29)
CREAT SERPL-MCNC: 0.89 MG/DL (ref 0.72–1.25)
CREAT/UREA NIT SERPL: 9
EOSINOPHIL # BLD AUTO: 0.1 X10(3)/MCL (ref 0–0.9)
EOSINOPHIL NFR BLD AUTO: 1.7 %
ERYTHROCYTE [DISTWIDTH] IN BLOOD BY AUTOMATED COUNT: 13.7 % (ref 11.5–17)
GFR SERPLBLD CREATININE-BSD FMLA CKD-EPI: >60 ML/MIN/1.73/M2
GLOBULIN SER-MCNC: 3.9 GM/DL (ref 2.4–3.5)
GLUCOSE SERPL-MCNC: 182 MG/DL (ref 74–100)
HCT VFR BLD AUTO: 36.7 % (ref 42–52)
HGB BLD-MCNC: 11.9 G/DL (ref 14–18)
IMM GRANULOCYTES # BLD AUTO: 0.01 X10(3)/MCL (ref 0–0.04)
IMM GRANULOCYTES NFR BLD AUTO: 0.2 %
LYMPHOCYTES # BLD AUTO: 1.21 X10(3)/MCL (ref 0.6–4.6)
LYMPHOCYTES NFR BLD AUTO: 20.5 %
MCH RBC QN AUTO: 33.1 PG (ref 27–31)
MCHC RBC AUTO-ENTMCNC: 32.4 G/DL (ref 33–36)
MCV RBC AUTO: 102.2 FL (ref 80–94)
MONOCYTES # BLD AUTO: 0.5 X10(3)/MCL (ref 0.1–1.3)
MONOCYTES NFR BLD AUTO: 8.5 %
NEUTROPHILS # BLD AUTO: 4.06 X10(3)/MCL (ref 2.1–9.2)
NEUTROPHILS NFR BLD AUTO: 68.9 %
NRBC BLD AUTO-RTO: 0 %
PLATELET # BLD AUTO: 311 X10(3)/MCL (ref 130–400)
PMV BLD AUTO: 8.7 FL (ref 7.4–10.4)
POTASSIUM SERPL-SCNC: 3.9 MMOL/L (ref 3.5–5.1)
PROT SERPL-MCNC: 7 GM/DL (ref 6.4–8.3)
RBC # BLD AUTO: 3.59 X10(6)/MCL (ref 4.7–6.1)
SODIUM SERPL-SCNC: 138 MMOL/L (ref 136–145)
T4 FREE SERPL-MCNC: 0.95 NG/DL (ref 0.7–1.48)
TSH SERPL-ACNC: 0.41 UIU/ML (ref 0.35–4.94)
WBC # BLD AUTO: 5.89 X10(3)/MCL (ref 4.5–11.5)

## 2025-01-02 PROCEDURE — 36415 COLL VENOUS BLD VENIPUNCTURE: CPT

## 2025-01-02 PROCEDURE — 63600175 PHARM REV CODE 636 W HCPCS: Performed by: NURSE PRACTITIONER

## 2025-01-02 PROCEDURE — 80053 COMPREHEN METABOLIC PANEL: CPT

## 2025-01-02 PROCEDURE — 85025 COMPLETE CBC W/AUTO DIFF WBC: CPT

## 2025-01-02 PROCEDURE — 25000003 PHARM REV CODE 250: Performed by: NURSE PRACTITIONER

## 2025-01-02 PROCEDURE — 84439 ASSAY OF FREE THYROXINE: CPT

## 2025-01-02 PROCEDURE — 84443 ASSAY THYROID STIM HORMONE: CPT

## 2025-01-02 PROCEDURE — 96413 CHEMO IV INFUSION 1 HR: CPT

## 2025-01-02 RX ORDER — DIPHENHYDRAMINE HYDROCHLORIDE 50 MG/ML
50 INJECTION INTRAMUSCULAR; INTRAVENOUS ONCE AS NEEDED
Status: CANCELLED | OUTPATIENT
Start: 2025-01-02

## 2025-01-02 RX ORDER — EPINEPHRINE 1 MG/ML
0.3 INJECTION INTRAMUSCULAR; INTRAVENOUS; SUBCUTANEOUS ONCE AS NEEDED
Status: DISCONTINUED | OUTPATIENT
Start: 2025-01-02 | End: 2025-01-02 | Stop reason: HOSPADM

## 2025-01-02 RX ORDER — EPINEPHRINE 0.3 MG/.3ML
0.3 INJECTION SUBCUTANEOUS ONCE AS NEEDED
Status: CANCELLED | OUTPATIENT
Start: 2025-01-02

## 2025-01-02 RX ORDER — SODIUM CHLORIDE 0.9 % (FLUSH) 0.9 %
10 SYRINGE (ML) INJECTION
Status: CANCELLED | OUTPATIENT
Start: 2025-01-02

## 2025-01-02 RX ORDER — SODIUM CHLORIDE 0.9 % (FLUSH) 0.9 %
10 SYRINGE (ML) INJECTION
Status: DISCONTINUED | OUTPATIENT
Start: 2025-01-02 | End: 2025-01-02 | Stop reason: HOSPADM

## 2025-01-02 RX ORDER — HEPARIN 100 UNIT/ML
500 SYRINGE INTRAVENOUS
Status: DISCONTINUED | OUTPATIENT
Start: 2025-01-02 | End: 2025-01-02 | Stop reason: HOSPADM

## 2025-01-02 RX ORDER — DIPHENHYDRAMINE HYDROCHLORIDE 50 MG/ML
50 INJECTION INTRAMUSCULAR; INTRAVENOUS ONCE AS NEEDED
Status: DISCONTINUED | OUTPATIENT
Start: 2025-01-02 | End: 2025-01-02 | Stop reason: HOSPADM

## 2025-01-02 RX ORDER — HEPARIN 100 UNIT/ML
500 SYRINGE INTRAVENOUS
Status: CANCELLED | OUTPATIENT
Start: 2025-01-02

## 2025-01-02 RX ORDER — PROCHLORPERAZINE EDISYLATE 5 MG/ML
10 INJECTION INTRAMUSCULAR; INTRAVENOUS ONCE AS NEEDED
Status: CANCELLED | OUTPATIENT
Start: 2025-01-02

## 2025-01-02 RX ORDER — PROCHLORPERAZINE EDISYLATE 5 MG/ML
10 INJECTION INTRAMUSCULAR; INTRAVENOUS ONCE AS NEEDED
Status: DISCONTINUED | OUTPATIENT
Start: 2025-01-02 | End: 2025-01-02 | Stop reason: HOSPADM

## 2025-01-02 RX ADMIN — HEPARIN 500 UNITS: 100 SYRINGE at 11:01

## 2025-01-02 RX ADMIN — ATEZOLIZUMAB 1200 MG: 1200 INJECTION, SOLUTION INTRAVENOUS at 11:01

## 2025-01-02 NOTE — NURSING
Patient had labs and virtual appointment with Dawn Montalvo NP. Patient complains of left groin muscle pull. Hurts on movement. Dawn Andres NP, aware.   Patient here for tecentriq C8D1. Tolerated well.
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
## 2025-01-07 ENCOUNTER — HOSPITAL ENCOUNTER (OUTPATIENT)
Dept: RADIOLOGY | Facility: HOSPITAL | Age: 40
Discharge: HOME | End: 2025-01-07
Attending: INTERNAL MEDICINE
Payer: MEDICAID

## 2025-01-07 DIAGNOSIS — R91.1 NODULE OF LOWER LOBE OF LEFT LUNG: ICD-10-CM

## 2025-01-07 DIAGNOSIS — C7A.1 LARGE CELL NEUROENDOCRINE CARCINOMA: ICD-10-CM

## 2025-01-07 DIAGNOSIS — C7A.8 NEUROENDOCRINE CARCINOMA OF LUNG: ICD-10-CM

## 2025-01-07 PROCEDURE — 71260 CT THORAX DX C+: CPT | Mod: TC

## 2025-01-07 PROCEDURE — 25500020 PHARM REV CODE 255

## 2025-01-07 RX ADMIN — IOHEXOL 100 ML: 350 INJECTION, SOLUTION INTRAVENOUS at 09:01

## 2025-01-18 ENCOUNTER — HOSPITAL ENCOUNTER (INPATIENT)
Facility: HOSPITAL | Age: 40
LOS: 4 days | Discharge: HOME | DRG: 313 | End: 2025-01-22
Attending: STUDENT IN AN ORGANIZED HEALTH CARE EDUCATION/TRAINING PROGRAM | Admitting: INTERNAL MEDICINE
Payer: MEDICAID

## 2025-01-18 DIAGNOSIS — C79.89 METASTATIC CANCER TO PELVIS: Primary | ICD-10-CM

## 2025-01-18 DIAGNOSIS — I25.10 CAD (CORONARY ARTERY DISEASE): ICD-10-CM

## 2025-01-18 DIAGNOSIS — R07.9 CHEST PAIN: ICD-10-CM

## 2025-01-18 DIAGNOSIS — R52 INTRACTABLE PAIN: ICD-10-CM

## 2025-01-18 LAB
ALBUMIN SERPL-MCNC: 3.9 G/DL (ref 3.5–5)
ALBUMIN/GLOB SERPL: 0.8 RATIO (ref 1.1–2)
ALP SERPL-CCNC: 95 UNIT/L (ref 40–150)
ALT SERPL-CCNC: 12 UNIT/L (ref 0–55)
ANION GAP SERPL CALC-SCNC: 12 MEQ/L
AST SERPL-CCNC: 13 UNIT/L (ref 5–34)
BACTERIA #/AREA URNS AUTO: ABNORMAL /HPF
BASOPHILS # BLD AUTO: 0.04 X10(3)/MCL
BASOPHILS NFR BLD AUTO: 0.3 %
BILIRUB SERPL-MCNC: 0.2 MG/DL
BILIRUB UR QL STRIP.AUTO: NEGATIVE
BNP BLD-MCNC: 10.1 PG/ML
BUN SERPL-MCNC: 15.1 MG/DL (ref 8.9–20.6)
CALCIUM SERPL-MCNC: 9.8 MG/DL (ref 8.4–10.2)
CHLORIDE SERPL-SCNC: 103 MMOL/L (ref 98–107)
CLARITY UR: CLEAR
CO2 SERPL-SCNC: 24 MMOL/L (ref 22–29)
COLOR UR AUTO: YELLOW
CREAT SERPL-MCNC: 0.84 MG/DL (ref 0.72–1.25)
CREAT/UREA NIT SERPL: 18
EOSINOPHIL # BLD AUTO: 0.03 X10(3)/MCL (ref 0–0.9)
EOSINOPHIL NFR BLD AUTO: 0.2 %
ERYTHROCYTE [DISTWIDTH] IN BLOOD BY AUTOMATED COUNT: 13.8 % (ref 11.5–17)
GFR SERPLBLD CREATININE-BSD FMLA CKD-EPI: >60 ML/MIN/1.73/M2
GLOBULIN SER-MCNC: 4.6 GM/DL (ref 2.4–3.5)
GLUCOSE SERPL-MCNC: 127 MG/DL (ref 74–100)
GLUCOSE UR QL STRIP: NORMAL
HCT VFR BLD AUTO: 40.8 % (ref 42–52)
HGB BLD-MCNC: 13.5 G/DL (ref 14–18)
HGB UR QL STRIP: NEGATIVE
IMM GRANULOCYTES # BLD AUTO: 0.05 X10(3)/MCL (ref 0–0.04)
IMM GRANULOCYTES NFR BLD AUTO: 0.3 %
KETONES UR QL STRIP: ABNORMAL
LEUKOCYTE ESTERASE UR QL STRIP: NEGATIVE
LIPASE SERPL-CCNC: 12 U/L
LYMPHOCYTES # BLD AUTO: 0.86 X10(3)/MCL (ref 0.6–4.6)
LYMPHOCYTES NFR BLD AUTO: 6 %
MAGNESIUM SERPL-MCNC: 1.9 MG/DL (ref 1.6–2.6)
MCH RBC QN AUTO: 32.8 PG (ref 27–31)
MCHC RBC AUTO-ENTMCNC: 33.1 G/DL (ref 33–36)
MCV RBC AUTO: 99.3 FL (ref 80–94)
MONOCYTES # BLD AUTO: 0.49 X10(3)/MCL (ref 0.1–1.3)
MONOCYTES NFR BLD AUTO: 3.4 %
MUCOUS THREADS URNS QL MICRO: ABNORMAL /LPF
NEUTROPHILS # BLD AUTO: 12.92 X10(3)/MCL (ref 2.1–9.2)
NEUTROPHILS NFR BLD AUTO: 89.8 %
NITRITE UR QL STRIP: NEGATIVE
NRBC BLD AUTO-RTO: 0 %
PH UR STRIP: 5.5 [PH]
PLATELET # BLD AUTO: 369 X10(3)/MCL (ref 130–400)
PMV BLD AUTO: 8.7 FL (ref 7.4–10.4)
POTASSIUM SERPL-SCNC: 3.5 MMOL/L (ref 3.5–5.1)
PROT SERPL-MCNC: 8.5 GM/DL (ref 6.4–8.3)
PROT UR QL STRIP: ABNORMAL
RBC # BLD AUTO: 4.11 X10(6)/MCL (ref 4.7–6.1)
RBC #/AREA URNS AUTO: ABNORMAL /HPF
SODIUM SERPL-SCNC: 139 MMOL/L (ref 136–145)
SP GR UR STRIP.AUTO: 1.03 (ref 1–1.03)
SQUAMOUS #/AREA URNS LPF: ABNORMAL /HPF
TROPONIN I SERPL-MCNC: <0.01 NG/ML (ref 0–0.04)
UROBILINOGEN UR STRIP-ACNC: 2
WBC # BLD AUTO: 14.39 X10(3)/MCL (ref 4.5–11.5)
WBC #/AREA URNS AUTO: ABNORMAL /HPF

## 2025-01-18 PROCEDURE — 96375 TX/PRO/DX INJ NEW DRUG ADDON: CPT

## 2025-01-18 PROCEDURE — 11000001 HC ACUTE MED/SURG PRIVATE ROOM

## 2025-01-18 PROCEDURE — 83690 ASSAY OF LIPASE: CPT | Performed by: STUDENT IN AN ORGANIZED HEALTH CARE EDUCATION/TRAINING PROGRAM

## 2025-01-18 PROCEDURE — 25500020 PHARM REV CODE 255: Performed by: STUDENT IN AN ORGANIZED HEALTH CARE EDUCATION/TRAINING PROGRAM

## 2025-01-18 PROCEDURE — 83880 ASSAY OF NATRIURETIC PEPTIDE: CPT | Performed by: STUDENT IN AN ORGANIZED HEALTH CARE EDUCATION/TRAINING PROGRAM

## 2025-01-18 PROCEDURE — 93005 ELECTROCARDIOGRAM TRACING: CPT

## 2025-01-18 PROCEDURE — 93010 ELECTROCARDIOGRAM REPORT: CPT | Mod: ,,, | Performed by: INTERNAL MEDICINE

## 2025-01-18 PROCEDURE — 85025 COMPLETE CBC W/AUTO DIFF WBC: CPT | Performed by: STUDENT IN AN ORGANIZED HEALTH CARE EDUCATION/TRAINING PROGRAM

## 2025-01-18 PROCEDURE — 63600175 PHARM REV CODE 636 W HCPCS: Performed by: STUDENT IN AN ORGANIZED HEALTH CARE EDUCATION/TRAINING PROGRAM

## 2025-01-18 PROCEDURE — 80053 COMPREHEN METABOLIC PANEL: CPT | Performed by: STUDENT IN AN ORGANIZED HEALTH CARE EDUCATION/TRAINING PROGRAM

## 2025-01-18 PROCEDURE — 25000003 PHARM REV CODE 250: Performed by: STUDENT IN AN ORGANIZED HEALTH CARE EDUCATION/TRAINING PROGRAM

## 2025-01-18 PROCEDURE — 83735 ASSAY OF MAGNESIUM: CPT | Performed by: STUDENT IN AN ORGANIZED HEALTH CARE EDUCATION/TRAINING PROGRAM

## 2025-01-18 PROCEDURE — 99285 EMERGENCY DEPT VISIT HI MDM: CPT | Mod: 25

## 2025-01-18 PROCEDURE — 84484 ASSAY OF TROPONIN QUANT: CPT | Performed by: STUDENT IN AN ORGANIZED HEALTH CARE EDUCATION/TRAINING PROGRAM

## 2025-01-18 PROCEDURE — 81001 URINALYSIS AUTO W/SCOPE: CPT | Performed by: STUDENT IN AN ORGANIZED HEALTH CARE EDUCATION/TRAINING PROGRAM

## 2025-01-18 PROCEDURE — 96374 THER/PROPH/DIAG INJ IV PUSH: CPT

## 2025-01-18 RX ORDER — ONDANSETRON HYDROCHLORIDE 2 MG/ML
4 INJECTION, SOLUTION INTRAVENOUS
Status: COMPLETED | OUTPATIENT
Start: 2025-01-18 | End: 2025-01-18

## 2025-01-18 RX ORDER — SODIUM CHLORIDE 9 MG/ML
1000 INJECTION, SOLUTION INTRAVENOUS
Status: COMPLETED | OUTPATIENT
Start: 2025-01-18 | End: 2025-01-18

## 2025-01-18 RX ORDER — MORPHINE SULFATE 4 MG/ML
4 INJECTION, SOLUTION INTRAMUSCULAR; INTRAVENOUS
Status: COMPLETED | OUTPATIENT
Start: 2025-01-18 | End: 2025-01-18

## 2025-01-18 RX ADMIN — ONDANSETRON 4 MG: 2 INJECTION INTRAMUSCULAR; INTRAVENOUS at 11:01

## 2025-01-18 RX ADMIN — SODIUM CHLORIDE 1000 ML: 9 INJECTION, SOLUTION INTRAVENOUS at 11:01

## 2025-01-18 RX ADMIN — MORPHINE SULFATE 4 MG: 4 INJECTION, SOLUTION INTRAMUSCULAR; INTRAVENOUS at 11:01

## 2025-01-18 RX ADMIN — IOHEXOL 100 ML: 350 INJECTION, SOLUTION INTRAVENOUS at 11:01

## 2025-01-19 PROBLEM — E43 SEVERE MALNUTRITION: Status: ACTIVE | Noted: 2025-01-19

## 2025-01-19 LAB
ALBUMIN SERPL-MCNC: 4 G/DL (ref 3.5–5)
ALBUMIN/GLOB SERPL: 1 RATIO (ref 1.1–2)
ALP SERPL-CCNC: 97 UNIT/L (ref 40–150)
ALT SERPL-CCNC: 14 UNIT/L (ref 0–55)
ANION GAP SERPL CALC-SCNC: 13 MEQ/L
APICAL FOUR CHAMBER EJECTION FRACTION: 64 %
AST SERPL-CCNC: 14 UNIT/L (ref 5–34)
AV INDEX (PROSTH): 0.69
AV MEAN GRADIENT: 2 MMHG
AV PEAK GRADIENT: 4 MMHG
AV VALVE AREA BY VELOCITY RATIO: 1.9 CM²
AV VALVE AREA: 2.2 CM²
AV VELOCITY RATIO: 0.6
B PERT.PT PRMT NPH QL NAA+NON-PROBE: NOT DETECTED
BASOPHILS # BLD AUTO: 0.02 X10(3)/MCL
BASOPHILS NFR BLD AUTO: 0.2 %
BILIRUB SERPL-MCNC: 0.4 MG/DL
BSA FOR ECHO PROCEDURE: 0.94 M2
BUN SERPL-MCNC: 11.6 MG/DL (ref 8.9–20.6)
C PNEUM DNA NPH QL NAA+NON-PROBE: NOT DETECTED
CALCIUM SERPL-MCNC: 9.8 MG/DL (ref 8.4–10.2)
CHLORIDE SERPL-SCNC: 102 MMOL/L (ref 98–107)
CO2 SERPL-SCNC: 21 MMOL/L (ref 22–29)
CREAT SERPL-MCNC: 0.81 MG/DL (ref 0.72–1.25)
CREAT/UREA NIT SERPL: 14
CV ECHO LV RWT: 0.47 CM
DOP CALC AO PEAK VEL: 1 M/S
DOP CALC AO VTI: 17 CM
DOP CALC LVOT AREA: 3.1 CM2
DOP CALC LVOT DIAMETER: 2 CM
DOP CALC LVOT PEAK VEL: 0.6 M/S
DOP CALC LVOT STROKE VOLUME: 36.7 CM3
DOP CALCLVOT PEAK VEL VTI: 11.7 CM
E WAVE DECELERATION TIME: 103 MSEC
E/A RATIO: 0.7
E/E' RATIO: 5 M/S
ECHO LV POSTERIOR WALL: 0.8 CM (ref 0.6–1.1)
EOSINOPHIL # BLD AUTO: 0 X10(3)/MCL (ref 0–0.9)
EOSINOPHIL NFR BLD AUTO: 0 %
ERYTHROCYTE [DISTWIDTH] IN BLOOD BY AUTOMATED COUNT: 13.7 % (ref 11.5–17)
FLUAV AG UPPER RESP QL IA.RAPID: NOT DETECTED
FLUBV AG UPPER RESP QL IA.RAPID: NOT DETECTED
FRACTIONAL SHORTENING: 32.4 % (ref 28–44)
GFR SERPLBLD CREATININE-BSD FMLA CKD-EPI: >60 ML/MIN/1.73/M2
GLOBULIN SER-MCNC: 4.1 GM/DL (ref 2.4–3.5)
GLUCOSE SERPL-MCNC: 146 MG/DL (ref 74–100)
HADV DNA NPH QL NAA+NON-PROBE: NOT DETECTED
HCOV 229E RNA NPH QL NAA+NON-PROBE: NOT DETECTED
HCOV HKU1 RNA NPH QL NAA+NON-PROBE: NOT DETECTED
HCOV NL63 RNA NPH QL NAA+NON-PROBE: NOT DETECTED
HCOV OC43 RNA NPH QL NAA+NON-PROBE: NOT DETECTED
HCT VFR BLD AUTO: 40.4 % (ref 42–52)
HGB BLD-MCNC: 13.5 G/DL (ref 14–18)
HMPV RNA NPH QL NAA+NON-PROBE: NOT DETECTED
HPIV1 RNA NPH QL NAA+NON-PROBE: NOT DETECTED
HPIV2 RNA NPH QL NAA+NON-PROBE: NOT DETECTED
HPIV3 RNA NPH QL NAA+NON-PROBE: NOT DETECTED
HPIV4 RNA NPH QL NAA+NON-PROBE: NOT DETECTED
IMM GRANULOCYTES # BLD AUTO: 0.06 X10(3)/MCL (ref 0–0.04)
IMM GRANULOCYTES NFR BLD AUTO: 0.5 %
INTERVENTRICULAR SEPTUM: 1 CM (ref 0.6–1.1)
LEFT ATRIUM AREA SYSTOLIC (APICAL 4 CHAMBER): 16.6 CM2
LEFT ATRIUM SIZE: 3.3 CM
LEFT INTERNAL DIMENSION IN SYSTOLE: 2.3 CM (ref 2.1–4)
LEFT VENTRICLE DIASTOLIC VOLUME INDEX: 29.43 ML/M2
LEFT VENTRICLE DIASTOLIC VOLUME: 46.8 ML
LEFT VENTRICLE END DIASTOLIC VOLUME APICAL 4 CHAMBER: 76.7 ML
LEFT VENTRICLE END SYSTOLIC VOLUME APICAL 4 CHAMBER: 47.3 ML
LEFT VENTRICLE MASS INDEX: 53.4 G/M2
LEFT VENTRICLE SYSTOLIC VOLUME INDEX: 10.9 ML/M2
LEFT VENTRICLE SYSTOLIC VOLUME: 17.3 ML
LEFT VENTRICULAR INTERNAL DIMENSION IN DIASTOLE: 3.4 CM (ref 3.5–6)
LEFT VENTRICULAR MASS: 84.9 G
LV LATERAL E/E' RATIO: 5 M/S
LV SEPTAL E/E' RATIO: 5.7 M/S
LVED V (TEICH): 46.8 ML
LVES V (TEICH): 17.3 ML
LVOT MG: 1 MMHG
LVOT MV: 0.4 CM/S
LYMPHOCYTES # BLD AUTO: 0.78 X10(3)/MCL (ref 0.6–4.6)
LYMPHOCYTES NFR BLD AUTO: 6.4 %
M PNEUMO DNA NPH QL NAA+NON-PROBE: NOT DETECTED
MAGNESIUM SERPL-MCNC: 1.8 MG/DL (ref 1.6–2.6)
MCH RBC QN AUTO: 33.2 PG (ref 27–31)
MCHC RBC AUTO-ENTMCNC: 33.4 G/DL (ref 33–36)
MCV RBC AUTO: 99.3 FL (ref 80–94)
MONOCYTES # BLD AUTO: 0.23 X10(3)/MCL (ref 0.1–1.3)
MONOCYTES NFR BLD AUTO: 1.9 %
MRSA PCR SCRN (OHS): NOT DETECTED
MV PEAK A VEL: 0.57 M/S
MV PEAK E VEL: 0.4 M/S
NEUTROPHILS # BLD AUTO: 11.15 X10(3)/MCL (ref 2.1–9.2)
NEUTROPHILS NFR BLD AUTO: 91 %
NRBC BLD AUTO-RTO: 0 %
OHS QRS DURATION: 82 MS
OHS QTC CALCULATION: 439 MS
PLATELET # BLD AUTO: 341 X10(3)/MCL (ref 130–400)
PMV BLD AUTO: 8.7 FL (ref 7.4–10.4)
POTASSIUM SERPL-SCNC: 4.1 MMOL/L (ref 3.5–5.1)
PROT SERPL-MCNC: 8.1 GM/DL (ref 6.4–8.3)
RA PRESSURE ESTIMATED: 3 MMHG
RBC # BLD AUTO: 4.07 X10(6)/MCL (ref 4.7–6.1)
RSV A 5' UTR RNA NPH QL NAA+PROBE: NOT DETECTED
RSV RNA NPH QL NAA+NON-PROBE: NOT DETECTED
RV+EV RNA NPH QL NAA+NON-PROBE: NOT DETECTED
SARS-COV-2 RNA RESP QL NAA+PROBE: NOT DETECTED
SODIUM SERPL-SCNC: 136 MMOL/L (ref 136–145)
TDI LATERAL: 0.08 M/S
TDI SEPTAL: 0.07 M/S
TDI: 0.08 M/S
TROPONIN I SERPL-MCNC: <0.01 NG/ML (ref 0–0.04)
WBC # BLD AUTO: 12.24 X10(3)/MCL (ref 4.5–11.5)
Z-SCORE OF LEFT VENTRICULAR DIMENSION IN END DIASTOLE: -2.89
Z-SCORE OF LEFT VENTRICULAR DIMENSION IN END SYSTOLE: -1.6

## 2025-01-19 PROCEDURE — 84484 ASSAY OF TROPONIN QUANT: CPT | Performed by: INTERNAL MEDICINE

## 2025-01-19 PROCEDURE — 87641 MR-STAPH DNA AMP PROBE: CPT | Performed by: NURSE PRACTITIONER

## 2025-01-19 PROCEDURE — 21400001 HC TELEMETRY ROOM

## 2025-01-19 PROCEDURE — 80053 COMPREHEN METABOLIC PANEL: CPT | Performed by: NURSE PRACTITIONER

## 2025-01-19 PROCEDURE — 25000003 PHARM REV CODE 250

## 2025-01-19 PROCEDURE — 63600175 PHARM REV CODE 636 W HCPCS: Performed by: NURSE PRACTITIONER

## 2025-01-19 PROCEDURE — 85025 COMPLETE CBC W/AUTO DIFF WBC: CPT | Performed by: NURSE PRACTITIONER

## 2025-01-19 PROCEDURE — 83735 ASSAY OF MAGNESIUM: CPT | Performed by: NURSE PRACTITIONER

## 2025-01-19 PROCEDURE — 36415 COLL VENOUS BLD VENIPUNCTURE: CPT | Performed by: INTERNAL MEDICINE

## 2025-01-19 PROCEDURE — 87798 DETECT AGENT NOS DNA AMP: CPT | Performed by: NURSE PRACTITIONER

## 2025-01-19 PROCEDURE — 96375 TX/PRO/DX INJ NEW DRUG ADDON: CPT

## 2025-01-19 PROCEDURE — 63600175 PHARM REV CODE 636 W HCPCS: Mod: TB | Performed by: STUDENT IN AN ORGANIZED HEALTH CARE EDUCATION/TRAINING PROGRAM

## 2025-01-19 PROCEDURE — 25000003 PHARM REV CODE 250: Performed by: NURSE PRACTITIONER

## 2025-01-19 PROCEDURE — 0241U COVID/RSV/FLU A&B PCR: CPT | Performed by: NURSE PRACTITIONER

## 2025-01-19 PROCEDURE — 87040 BLOOD CULTURE FOR BACTERIA: CPT | Performed by: STUDENT IN AN ORGANIZED HEALTH CARE EDUCATION/TRAINING PROGRAM

## 2025-01-19 PROCEDURE — 63600175 PHARM REV CODE 636 W HCPCS: Performed by: INTERNAL MEDICINE

## 2025-01-19 PROCEDURE — 63600175 PHARM REV CODE 636 W HCPCS: Performed by: STUDENT IN AN ORGANIZED HEALTH CARE EDUCATION/TRAINING PROGRAM

## 2025-01-19 PROCEDURE — 84484 ASSAY OF TROPONIN QUANT: CPT | Performed by: NURSE PRACTITIONER

## 2025-01-19 PROCEDURE — 36415 COLL VENOUS BLD VENIPUNCTURE: CPT | Performed by: STUDENT IN AN ORGANIZED HEALTH CARE EDUCATION/TRAINING PROGRAM

## 2025-01-19 PROCEDURE — 25000003 PHARM REV CODE 250: Performed by: INTERNAL MEDICINE

## 2025-01-19 RX ORDER — ASPIRIN 81 MG/1
81 TABLET ORAL DAILY
Status: DISCONTINUED | OUTPATIENT
Start: 2025-01-19 | End: 2025-01-22 | Stop reason: HOSPADM

## 2025-01-19 RX ORDER — NALOXONE HCL 0.4 MG/ML
0.02 VIAL (ML) INJECTION
Status: DISCONTINUED | OUTPATIENT
Start: 2025-01-19 | End: 2025-01-22 | Stop reason: HOSPADM

## 2025-01-19 RX ORDER — BISACODYL 10 MG/1
10 SUPPOSITORY RECTAL DAILY PRN
Status: DISCONTINUED | OUTPATIENT
Start: 2025-01-19 | End: 2025-01-22 | Stop reason: HOSPADM

## 2025-01-19 RX ORDER — IPRATROPIUM BROMIDE AND ALBUTEROL SULFATE 2.5; .5 MG/3ML; MG/3ML
3 SOLUTION RESPIRATORY (INHALATION) EVERY 4 HOURS PRN
Status: DISCONTINUED | OUTPATIENT
Start: 2025-01-19 | End: 2025-01-22 | Stop reason: HOSPADM

## 2025-01-19 RX ORDER — ALUMINUM HYDROXIDE, MAGNESIUM HYDROXIDE, AND SIMETHICONE 1200; 120; 1200 MG/30ML; MG/30ML; MG/30ML
30 SUSPENSION ORAL 4 TIMES DAILY PRN
Status: DISCONTINUED | OUTPATIENT
Start: 2025-01-19 | End: 2025-01-22 | Stop reason: HOSPADM

## 2025-01-19 RX ORDER — MUPIROCIN 20 MG/G
OINTMENT TOPICAL 2 TIMES DAILY
Status: DISCONTINUED | OUTPATIENT
Start: 2025-01-19 | End: 2025-01-22 | Stop reason: HOSPADM

## 2025-01-19 RX ORDER — TALC
6 POWDER (GRAM) TOPICAL NIGHTLY PRN
Status: DISCONTINUED | OUTPATIENT
Start: 2025-01-19 | End: 2025-01-22 | Stop reason: HOSPADM

## 2025-01-19 RX ORDER — HYDROMORPHONE HYDROCHLORIDE 2 MG/ML
1 INJECTION, SOLUTION INTRAMUSCULAR; INTRAVENOUS; SUBCUTANEOUS
Status: COMPLETED | OUTPATIENT
Start: 2025-01-19 | End: 2025-01-19

## 2025-01-19 RX ORDER — IBUPROFEN 200 MG
24 TABLET ORAL
Status: DISCONTINUED | OUTPATIENT
Start: 2025-01-19 | End: 2025-01-22 | Stop reason: HOSPADM

## 2025-01-19 RX ORDER — GLUCAGON 1 MG
1 KIT INJECTION
Status: DISCONTINUED | OUTPATIENT
Start: 2025-01-19 | End: 2025-01-22 | Stop reason: HOSPADM

## 2025-01-19 RX ORDER — IBUPROFEN 200 MG
16 TABLET ORAL
Status: DISCONTINUED | OUTPATIENT
Start: 2025-01-19 | End: 2025-01-22 | Stop reason: HOSPADM

## 2025-01-19 RX ORDER — PROCHLORPERAZINE EDISYLATE 5 MG/ML
5 INJECTION INTRAMUSCULAR; INTRAVENOUS EVERY 6 HOURS PRN
Status: DISCONTINUED | OUTPATIENT
Start: 2025-01-19 | End: 2025-01-22 | Stop reason: HOSPADM

## 2025-01-19 RX ORDER — CEFTRIAXONE 1 G/1
1 INJECTION, POWDER, FOR SOLUTION INTRAMUSCULAR; INTRAVENOUS
Status: DISCONTINUED | OUTPATIENT
Start: 2025-01-19 | End: 2025-01-19

## 2025-01-19 RX ORDER — AMOXICILLIN 250 MG
1 CAPSULE ORAL 2 TIMES DAILY PRN
Status: DISCONTINUED | OUTPATIENT
Start: 2025-01-19 | End: 2025-01-22 | Stop reason: HOSPADM

## 2025-01-19 RX ORDER — ENOXAPARIN SODIUM 100 MG/ML
40 INJECTION SUBCUTANEOUS EVERY 24 HOURS
Status: DISCONTINUED | OUTPATIENT
Start: 2025-01-19 | End: 2025-01-22 | Stop reason: HOSPADM

## 2025-01-19 RX ORDER — MORPHINE SULFATE 4 MG/ML
2 INJECTION, SOLUTION INTRAMUSCULAR; INTRAVENOUS EVERY 4 HOURS PRN
Status: DISCONTINUED | OUTPATIENT
Start: 2025-01-19 | End: 2025-01-19

## 2025-01-19 RX ORDER — OXYCODONE HYDROCHLORIDE 5 MG/1
5 TABLET ORAL EVERY 6 HOURS PRN
Status: DISCONTINUED | OUTPATIENT
Start: 2025-01-19 | End: 2025-01-19

## 2025-01-19 RX ORDER — HYDROMORPHONE HYDROCHLORIDE 2 MG/ML
1 INJECTION, SOLUTION INTRAMUSCULAR; INTRAVENOUS; SUBCUTANEOUS EVERY 6 HOURS PRN
Status: DISCONTINUED | OUTPATIENT
Start: 2025-01-19 | End: 2025-01-22 | Stop reason: HOSPADM

## 2025-01-19 RX ORDER — ATORVASTATIN CALCIUM 40 MG/1
40 TABLET, FILM COATED ORAL NIGHTLY
Status: DISCONTINUED | OUTPATIENT
Start: 2025-01-19 | End: 2025-01-22 | Stop reason: HOSPADM

## 2025-01-19 RX ORDER — OXYCODONE HYDROCHLORIDE 5 MG/1
5 TABLET ORAL EVERY 6 HOURS PRN
Status: DISCONTINUED | OUTPATIENT
Start: 2025-01-19 | End: 2025-01-22 | Stop reason: HOSPADM

## 2025-01-19 RX ORDER — ACETAMINOPHEN 500 MG
1000 TABLET ORAL EVERY 6 HOURS PRN
Status: DISCONTINUED | OUTPATIENT
Start: 2025-01-19 | End: 2025-01-22 | Stop reason: HOSPADM

## 2025-01-19 RX ORDER — HYDROMORPHONE HYDROCHLORIDE 2 MG/ML
1 INJECTION, SOLUTION INTRAMUSCULAR; INTRAVENOUS; SUBCUTANEOUS ONCE
Status: COMPLETED | OUTPATIENT
Start: 2025-01-19 | End: 2025-01-19

## 2025-01-19 RX ORDER — SODIUM CHLORIDE 0.9 % (FLUSH) 0.9 %
10 SYRINGE (ML) INJECTION
Status: DISCONTINUED | OUTPATIENT
Start: 2025-01-19 | End: 2025-01-22 | Stop reason: HOSPADM

## 2025-01-19 RX ORDER — ONDANSETRON HYDROCHLORIDE 2 MG/ML
4 INJECTION, SOLUTION INTRAVENOUS EVERY 4 HOURS PRN
Status: DISCONTINUED | OUTPATIENT
Start: 2025-01-19 | End: 2025-01-22 | Stop reason: HOSPADM

## 2025-01-19 RX ADMIN — PROCHLORPERAZINE EDISYLATE 5 MG: 5 INJECTION INTRAMUSCULAR; INTRAVENOUS at 08:01

## 2025-01-19 RX ADMIN — HYDROMORPHONE HYDROCHLORIDE 1 MG: 2 INJECTION, SOLUTION INTRAMUSCULAR; INTRAVENOUS; SUBCUTANEOUS at 06:01

## 2025-01-19 RX ADMIN — OXYCODONE HYDROCHLORIDE 5 MG: 5 TABLET ORAL at 04:01

## 2025-01-19 RX ADMIN — HYDROMORPHONE HYDROCHLORIDE 1 MG: 2 INJECTION, SOLUTION INTRAMUSCULAR; INTRAVENOUS; SUBCUTANEOUS at 12:01

## 2025-01-19 RX ADMIN — PIPERACILLIN SODIUM AND TAZOBACTAM SODIUM 4.5 G: 4; .5 INJECTION, POWDER, LYOPHILIZED, FOR SOLUTION INTRAVENOUS at 10:01

## 2025-01-19 RX ADMIN — MORPHINE SULFATE 2 MG: 4 INJECTION, SOLUTION INTRAMUSCULAR; INTRAVENOUS at 02:01

## 2025-01-19 RX ADMIN — PIPERACILLIN SODIUM AND TAZOBACTAM SODIUM 4.5 G: 4; .5 INJECTION, POWDER, LYOPHILIZED, FOR SOLUTION INTRAVENOUS at 02:01

## 2025-01-19 RX ADMIN — ATORVASTATIN CALCIUM 40 MG: 40 TABLET, FILM COATED ORAL at 08:01

## 2025-01-19 RX ADMIN — Medication 6 MG: at 02:01

## 2025-01-19 RX ADMIN — ENOXAPARIN SODIUM 40 MG: 40 INJECTION SUBCUTANEOUS at 05:01

## 2025-01-19 RX ADMIN — VANCOMYCIN HYDROCHLORIDE 750 MG: 750 INJECTION, POWDER, LYOPHILIZED, FOR SOLUTION INTRAVENOUS at 08:01

## 2025-01-19 RX ADMIN — PIPERACILLIN SODIUM AND TAZOBACTAM SODIUM 4.5 G: 4; .5 INJECTION, POWDER, LYOPHILIZED, FOR SOLUTION INTRAVENOUS at 06:01

## 2025-01-19 RX ADMIN — Medication 81 MG: at 02:01

## 2025-01-19 RX ADMIN — DEXTROSE MONOHYDRATE 1500 MG: 12500 INJECTION, SOLUTION INTRAVENOUS at 08:01

## 2025-01-19 RX ADMIN — HYDROMORPHONE HYDROCHLORIDE 1 MG: 2 INJECTION INTRAMUSCULAR; INTRAVENOUS; SUBCUTANEOUS at 10:01

## 2025-01-19 RX ADMIN — OXYCODONE HYDROCHLORIDE 5 MG: 5 TABLET ORAL at 02:01

## 2025-01-19 NOTE — ED NOTES
"First attempt at calling for report. Was told by the  that "a patient just went into a fib with RVR and the nurse would need a moment". Will call back in 10 minutes.  "

## 2025-01-19 NOTE — H&P
[facility] Hospital Medicine History & Physical Examination       Patient Name: Ranjeet Ball  MRN: [id number]  Patient Class: IP- Inpatient   Admission Date: 1/18/2025   Admitting Physician: JUNAID Service   Length of Stay: 0  Attending Physician: Dr Deann El  Primary Care Provider: Matty Gomez FNP  Face-to-Face encounter date: 01/19/2025  Code Status: Full code  Chief Complaint: Abdominal Pain (Pt arrives via [facility] unit 1 for N/V x 1 hr with diffuse abd pain and chest pain. History of MI and Lung cancer. On chemo last treatment 3wks ago sched to go wed next week. )        Screening for Social Drivers for health:  Patient screened for food insecurity, housing instability, transportation needs, utility difficulties, and interpersonal safety (select all that apply as identified as concern)  []Housing or Food  []Transportation Needs  []Utility Difficulties  []Interpersonal safety  [x]None    Patient information was obtained from patient, patient's family, past medical records and/or ER records.     HISTORY OF PRESENT ILLNESS:   Ranjeet Ball is a 39 y.o. male who PMH includes   has a past medical history of Cancer, Hyperlipidemia, and Myocardial infarction.. The patient presented to [facility] on 1/18/2025 with a primary complaint of   t medical history of lung cancer, currently on chemotherapy, CAD s/p stents/MI presenting to the ED via EMS for evaluation of chest and abdominal pain with associated nausea and vomiting onset 1 hour prior to arrival. Patient goes to [facility] for oncology services.         PAST MEDICAL HISTORY:     Past Medical History:   Diagnosis Date    Cancer     Hyperlipidemia     Myocardial infarction        PAST SURGICAL HISTORY:     Past Surgical History:   Procedure Laterality Date    CORONARY ARTERY BYPASS GRAFT      INSERTION OF TUNNELED CENTRAL VENOUS CATHETER (CVC) WITH SUBCUTANEOUS PORT Right 7/31/2024    Procedure: [id number];  Surgeon: Jenny  Renato ALDANA Jr., MD;  Location: [facility] OR;  Service: General;  Laterality: Right;    LAPAROTOMY, EXPLORATORY N/A 03/23/2023    Procedure: LAPAROTOMY, EXPLORATORY;  Surgeon: Alex Juárez MD;  Location: [facility] OR;  Service: General;  Laterality: N/A;    LYMPH NODE BIOPSY Right 6/14/2024    Procedure: BIOPSY, LYMPH NODE;  Surgeon: Renato Alvarado Jr., MD;  Location: [facility] OR;  Service: General;  Laterality: Right;  right supraclavicular lymph node    OPEN REDUCTION AND INTERNAL FIXATION (ORIF) OF FRACTURE OF DISTAL RADIUS Left 4/4/2024    Procedure: ORIF, FRACTURE, RADIUS, DISTAL;  Surgeon: Khoa Abdalla MD;  Location: [facility] OR;  Service: Orthopedics;  Laterality: Left;    ORIF FOREARM FRACTURE Right 12/7/2023    Procedure: ORIF, FRACTURE, RADIUS OR ULNA;  Surgeon: Khoa Abdalla MD;  Location: [facility] OR;  Service: Orthopedics;  Laterality: Right;  Call Wilmer       ALLERGIES:   Patient has no known allergies.    FAMILY HISTORY:   Reviewed and negative    SOCIAL HISTORY:     Social History     Tobacco Use    Smoking status: Former     Current packs/day: 0.15     Average packs/day: 0.2 packs/day for 25.1 years (3.8 ttl pk-yrs)     Types: Cigarettes     Start date: 12/4/1999    Smokeless tobacco: Never    Tobacco comments:     Pt states he quit cigs   Substance Use Topics    Alcohol use: Not Currently     Comment: occasionally        HOME MEDICATIONS:     Prior to Admission medications    Medication Sig Start Date End Date Taking? Authorizing Provider   atorvastatin (LIPITOR) 40 MG tablet Take 40 mg by mouth once daily.    Provider, Ja   dexAMETHasone (DECADRON) 4 MG Tab Take 2 tablets (8 mg total) by mouth once daily. on days 2-4 of each chemotherapy cycle 8/6/24   Artemio Delatorre MD   ergocalciferol (ERGOCALCIFEROL) 50,000 unit Cap Take 1 capsule (50,000 Units total) by mouth every 7 days. 5/20/24   Leona Medel MD   gabapentin (NEURONTIN) 300 MG capsule Take 1 capsule (300 mg total) by mouth every  evening. 2/7/24   Matty Gomez FNP   hydrocortisone 2.5 % cream Apply topically 2 (two) times daily. 8/27/24   Bertrand Hollins FNP   ibuprofen (ADVIL,MOTRIN) 800 MG tablet Take 1 tablet (800 mg total) by mouth 3 (three) times daily as needed for Pain. 12/2/24   Shahida Varghese PA   ketoconazole (NIZORAL) 2 % shampoo Apply topically twice a week.  Patient not taking: Reported on 1/2/2025 8/29/24   Bertrand Hollins FNP   OLANZapine (ZYPREXA) 5 MG tablet Take 1 tablet by mouth nightly on days 1-4 of each chemotherapy cycle. 8/6/24   Artemio Delatorre MD   ondansetron (ZOFRAN) 4 MG tablet Take 1 tablet (4 mg total) by mouth every 6 (six) hours as needed for Nausea. 9/17/24   Bertrand Hollins FNP   potassium chloride (K-TAB) 20 mEq Take 2 tablets (40 mEq total) by mouth once daily. 11/4/24   Artemio Delatorre MD       REVIEW OF SYSTEMS:   Except as documented, all other systems reviewed and negative     PHYSICAL EXAM:     VITAL SIGNS: 24 HRS MIN & MAX LAST   Temp  Min: 97.3 °F (36.3 °C)  Max: 97.7 °F (36.5 °C) 97.7 °F (36.5 °C)   BP  Min: 135/92  Max: 161/92 (!) 161/92   Pulse  Min: 81  Max: 93  85   Resp  Min: 12  Max: 20 18   SpO2  Min: 96 %  Max: 100 % 100 %       General appearance: Well-developed, well-nourished male in no apparent distress.  HENT: Atraumatic head. Moist mucous membranes of oral cavity.  Eyes: Normal extraocular movements.   Neck: Supple.   Lungs: Clear to auscultation bilaterally. No wheezing present.   Heart: Regular rate and rhythm. S1 and S2 present with no murmurs/gallop/rub. No pedal edema. No JVD present.   Abdomen: Soft, non-distended, non-tender. No rebound tenderness/guarding. Bowel sounds are normal.   Extremities: No cyanosis, clubbing, or edema.  Skin: No Rash.   Neuro: Motor and sensory exams grossly intact. Good tone. Muscle strength 5/5 in all 4 extremities  Psych/mental status: Appropriate mood and affect. Responds appropriately to questions.     LABS AND IMAGING:      Recent Labs   Lab 01/18/25 2232 01/19/25 0448   WBC 14.39* 12.24*   RBC 4.11* 4.07*   HGB 13.5* 13.5*   HCT 40.8* 40.4*   MCV 99.3* 99.3*   MCH 32.8* 33.2*   MCHC 33.1 33.4   RDW 13.8 13.7    341   MPV 8.7 8.7       Recent Labs   Lab 01/18/25 2232 01/19/25 0448    136   K 3.5 4.1    102   CO2 24 21*   BUN 15.1 11.6   CREATININE 0.84 0.81   CALCIUM 9.8 9.8   MG 1.90 1.80   ALBUMIN 3.9 4.0   ALKPHOS 95 97   ALT 12 14   AST 13 14   BILITOT 0.2 0.4       Microbiology Results (last 7 days)       Procedure Component Value Units Date/Time    Blood culture #1 **CANNOT BE ORDERED STAT** [[id number]] Collected: 01/19/25 0448    Order Status: Resulted Specimen: Blood from Arm, Left Updated: 01/19/25 0511    Blood culture #2 **CANNOT BE ORDERED STAT** [[id number]] Collected: 01/19/25 0448    Order Status: Resulted Specimen: Blood from Arm, Right Updated: 01/19/25 0509             CT Abdomen Pelvis With IV Contrast NO Oral Contrast  Narrative: EXAMINATION:  CT ABDOMEN PELVIS WITH IV CONTRAST    CLINICAL HISTORY:  Abdominal pain, acute, nonlocalized;    TECHNIQUE:  Multidetector IV contrast enhanced axial CT images of the abdomen and pelvis were obtained with coronal and sagittal reconstructions.    Automatic exposure control was utilized to reduce the patient's radiation dose.    DLP= NA    COMPARISON:  01/07/2025    FINDINGS:  01. HEPATOBILIARY: No focal hepatic lesion is identified, The gallbladder is normal.    02. SPLEEN: Normal    03. PANCREAS: No focal masses or ductal dilatation.    04. ADRENALS: Increase in size of heterogeneous left adrenal gland lesion currently measuring 2.3 x 2.8 cm.  Concern for neoplastic process.    05. KIDNEYS: The right kidney demonstrates no stone, hydronephrosis, or hydroureter. No focal mass identified. The left kidney demonstrates no stone, hydronephrosis, or hydroureter. No focal mass identified.    06. LYMPHADENOPATHY/RETROPERITONEUM: There is no retroperitoneal  lymphadenopathy. The abdominal aorta is normal in course and caliber.    07. BOWEL: No acute bowel related abnormalities. No evidence of appendiceal inflammation.    08. PELVIC VISCERA: The bladder wall is diffusely thickened..  No pelvic mass.    09. PELVIC LYMPH NODES: No lymphadenopathy.    10. PERITONEUM/ABDOMINAL WALL: No ascites or implant.    11. SKELETAL: No aggressive appearing lytic/blastic lesion. No acute fractures, subluxations or dislocations.    12. LUNG BASES: There is a new 4 mm pulmonary nodule in the right lung base (series 1, image 23).  There is a mild increase in the size of the 7 mm to pulmonary nodule with the visualized middle lobe (series 1, image 17).  Correlate with clinical and laboratory findings as regards to additional evaluation and follow-up.  Impression: The bladder wall is diffusely thickened.  Correlate with urinalysis.    Increase in size of heterogeneous left adrenal gland lesion currently measuring 2.3 x 2.8 cm.  Concern for neoplastic process.    There is a new 4 mm pulmonary nodule in the right lung base (series 1, image 23). There is a mild increase in the size of the 7 mm to pulmonary nodule with the visualized middle lobe (series 1, image 17). Correlate with clinical and laboratory findings as regards to additional evaluation and follow-up.    Electronically signed by: Genaro Maier  Date:    01/19/2025  Time:    10:42  CTA Chest Non-Coronary (PE Studies)  Narrative: EXAMINATION:  CTA CHEST NON CORONARY (PE STUDIES)    CLINICAL HISTORY:  Pulmonary embolism (PE) suspected, high prob;    TECHNIQUE:  Axial images of the chest were obtained with contrast. Sagittal and coronal reconstructed images were available for review. 3-D MIP reconstructions were performed from source imaging.    Automatic dose control was utilized to reduce patient radiation dose.    DLP= 668    COMPARISON:  No prior imaging available for comparison.    FINDINGS:  Soft Tissues: Unremarkable.Axilla: A  few mildly prominent lymph nodes are seen in the axilla.Neck: The visualized soft tissues of the neck appear unremarkable.Mediastinum: There is no significant change in the partially calcified confluent lymph nodes in the right parahilar space. Rosina measures 2.7 x 2.9 cm on Image 196 Series 6. Likewise, the rest of the enlarged hilar lymph nodes appear stable.Heart: The heart appears unremarkable.Aorta: Unremarkable appearing aorta.Pulmonary Arteries: Unremarkable.Lungs: There is mild non specific dependent change at the lung bases. Stable moderate paraseptal emphysematous changes and scarring are seen in both lungs, predominantly in the upper lobes. No significant change in the sizes of the nodular opacities with associated ground-glass component in the right lung and left lower lobe. The largest of these nodules is still in the apical segment of the right upper lobe and measures 0.9 x 0.7 cm on Image 23 Series 9. No new nodules seen.Pleura: No effusions or pneumothorax are identified.Bony Structures:Spine: Mild multilevel spondylolytic changes are seen in the thoracic spine.Ribs: The ribs appear unremarkable.Abdomen: Abdominal findings will be discussed separately in the abdomen CT report.  Impression: No evidence of pulmonary thromboembolism.    No significant change in the sizes of the nodular opacities with associated ground-glass component in the right lung and left lower lobe. The largest of these nodules is still in the apical segment of the right upper lobe and measures 0.9 x 0.7 cm on Image 23 Series 9. No new nodules seen. Correlate with clinical and laboratory findings as regards additional evaluation and follow-up.    There is no significant change in the partially calcified confluent lymph nodes in the right parahilar space. Rosina measures 2.7 x 2.9 cm on Image 196 Series 6. Likewise, the rest of the enlarged hilar lymph nodes appear stable.    Details and other findings as above.    Electronically  signed by: Genaro Maier  Date:    01/19/2025  Time:    09:43        ASSESSMENT & PLAN:   ASSESSMENT:  Acute chest pain- r/o - POA    PLAN:  Consult Cardiology Services appreciate assistance and recommendations  Trend out troponin levels   Continue with supplemental oxygen therapy wean as tolerated   Fall precautions         VTE Prophylaxis: will be placed on Heparin/Lovenox/ Xarelto/ SCD for DVT prophylaxis and will be advised to be as mobile as possible and sit in a chair as tolerated    Patient condition:  Stable/Fair/Gaurded/ Serious/ Critical    __________________________________________________________________________  INPATIENT LIST OF MEDICATIONS     Scheduled Meds:   enoxparin  40 mg Subcutaneous Daily    mupirocin   Nasal BID    piperacillin-tazobactam (Zosyn) IV (PEDS and ADULTS) (extended infusion is not appropriate)  4.5 g Intravenous Q8H    vancomycin 750 mg in D5W 250 mL IVPB (admixture device)  750 mg Intravenous Q12H     Continuous Infusions:  PRN Meds:.  Current Facility-Administered Medications:     acetaminophen, 1,000 mg, Oral, Q6H PRN    albuterol-ipratropium, 3 mL, Nebulization, Q4H PRN    aluminum-magnesium hydroxide-simethicone, 30 mL, Oral, QID PRN    bisacodyL, 10 mg, Rectal, Daily PRN    dextrose 50%, 12.5 g, Intravenous, PRN    dextrose 50%, 25 g, Intravenous, PRN    glucagon (human recombinant), 1 mg, Intramuscular, PRN    glucose, 16 g, Oral, PRN    glucose, 24 g, Oral, PRN    melatonin, 6 mg, Oral, Nightly PRN    naloxone, 0.02 mg, Intravenous, PRN    ondansetron, 4 mg, Intravenous, Q4H PRN    oxyCODONE, 5 mg, Oral, Q6H PRN    prochlorperazine, 5 mg, Intravenous, Q6H PRN    senna-docusate 8.6-50 mg, 1 tablet, Oral, BID PRN    sodium chloride 0.9%, 10 mL, Intravenous, PRN    vancomycin - pharmacy to dose, , Intravenous, pharmacy to manage frequency      I, CANDELARIO Allison have reviewed and discussed the case with Dr. _ Please see the following addendum for further assessment  and plan from their attending MD.  Yane JORGENSEN Simeon, FNP   01/19/2025    ________________________________________________________________________________    MD Addendum:  I, Dr. El assumed care of this patient today.  For the patient encounter, I performed the substantive portion of the visit, I reviewed the NP/PA documentation, treatment plan, and medical decision making.      Patient with complaints of abdominal pain.  CT abdomen and pelvis with and without contrast shows diffusely thickened bladder wall concerning for neoplastic process, patient also has new 4 mm pulmonary nodule in the left lung base.      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   He has been having left sided pain for at least a month but did not recall [facility] doing further imaging.    Will added dilaudid for breakthrough pain.  Consult oncologist for recommendations and palliative.     All diagnosis and differential diagnosis have been reviewed; assessment and plan has been documented; I have personally reviewed the labs and test results that are presently available; I have reviewed the patients medication list; I have reviewed the consulting providers response and recommendations. I have reviewed or attempted to review medical records based upon their availability.    I will continue to monitor closely and make adjustments to medical management as needed.     Dr. Deann El DO    I have spent 30 minutes on the day of the visit; time spent includes face to face time and non-face to face time preparing to see the patient (eg, review of tests), independently reviewing and interpreting medical records, both past and current; documenting clinical information in the electronic or other health record, and communicating results to the patient/family/caregiver and care coordinator and nursing team.

## 2025-01-19 NOTE — PLAN OF CARE
Problem: Adult Inpatient Plan of Care  Goal: Absence of Hospital-Acquired Illness or Injury  Outcome: Progressing     Problem: Infection  Goal: Absence of Infection Signs and Symptoms  Outcome: Progressing     Problem: Adult Inpatient Plan of Care  Goal: Plan of Care Review  Outcome: Not Progressing  Goal: Patient-Specific Goal (Individualized)  Outcome: Not Progressing  Goal: Optimal Comfort and Wellbeing  Outcome: Not Progressing     Problem: Pain Acute  Goal: Optimal Pain Control and Function  Outcome: Not Progressing

## 2025-01-19 NOTE — PROGRESS NOTES
"Pharmacokinetic Initial Assessment: IV Vancomycin    Assessment/Plan:    Initiate intravenous vancomycin with loading dose of 1500 mg once followed by a maintenance dose of vancomycin 750 mg IV every 12 hours  Desired empiric serum trough concentration is 10 to 20 mcg/mL  Draw vancomycin trough level 60 min prior to fourth dose on 1/20 at approximately 1300  Pharmacy will continue to follow and monitor vancomycin.      Please contact pharmacy at extension [phone] with any questions regarding this assessment.     Thank you for the consult,   Gloria Diallolaure       Patient brief summary:  Ranjeet Ball is a 39 y.o. male initiated on antimicrobial therapy with IV Vancomycin for treatment of suspected lower respiratory infection  MRSA PCR ordered and pending    Drug Allergies:   Review of patient's allergies indicates:  No Known Allergies    Actual Body Weight:   Wt Readings from Last 1 Encounters:   01/18/25 56.7 kg (125 lb)       Renal Function:   Estimated Creatinine Clearance: 93.8 mL/min (based on SCr of 0.84 mg/dL).,     Dialysis Method (if applicable):  N/A    CBC (last 72 hours):  Recent Labs   Lab Result Units 01/18/25  2232   WBC x10(3)/mcL 14.39*   Hgb g/dL 13.5*   Hct % 40.8*   Platelet x10(3)/mcL 369   Mono % % 3.4   Eos % % 0.2   Basophil % % 0.3       Metabolic Panel (last 72 hours):  Recent Labs   Lab Result Units 01/18/25  2232 01/18/25  2316   Sodium mmol/L 139  --    Potassium mmol/L 3.5  --    Chloride mmol/L 103  --    CO2 mmol/L 24  --    Glucose mg/dL 127*  --    Glucose, UA   --  Normal   Blood Urea Nitrogen mg/dL 15.1  --    Creatinine mg/dL 0.84  --    Albumin g/dL 3.9  --    Bilirubin Total mg/dL 0.2  --    ALP unit/L 95  --    AST unit/L 13  --    ALT unit/L 12  --    Magnesium Level mg/dL 1.90  --        Drug levels (last 3 results):  No results for input(s): "VANCOMYCINRA", "VANCORANDOM", "VANCOMYCINPE", "VANCOPEAK", "VANCOMYCINTR", "VANCOTROUGH" in the last 72 hours.    Microbiologic " Results:  Microbiology Results (last 7 days)       Procedure Component Value Units Date/Time    Blood culture #1 **CANNOT BE ORDERED STAT** [[id number]]     Order Status: Sent Specimen: Blood     Blood culture #2 **CANNOT BE ORDERED STAT** [[id number]]     Order Status: Sent Specimen: Blood

## 2025-01-19 NOTE — ED PROVIDER NOTES
Encounter Date: 1/18/2025    SCRIBE #1 NOTE: I, Zully Garg am scribing for, and in the presence of,  Jl García IV, MD. I have scribed the following portions of the note - Other sections scribed: HPI, ROS, PE.       History     Chief Complaint   Patient presents with    Abdominal Pain     Pt arrives via [facility] unit 1 for N/V x 1 hr with diffuse abd pain and chest pain. History of MI and Lung cancer. On chemo last treatment 3wks ago sched to go wed next week.      Patient is a 39 year old male with a past medical history of lung cancer, currently on chemotherapy, CAD s/p stents/MI presenting to the ED via EMS for evaluation of chest and abdominal pain with associated nausea and vomiting onset 1 hour prior to arrival. Patient goes to [facility] for oncology services.    The history is provided by the patient. No  was used.     Review of patient's allergies indicates:  No Known Allergies  Past Medical History:   Diagnosis Date    Cancer     Hyperlipidemia     Myocardial infarction      Past Surgical History:   Procedure Laterality Date    CORONARY ARTERY BYPASS GRAFT      INSERTION OF TUNNELED CENTRAL VENOUS CATHETER (CVC) WITH SUBCUTANEOUS PORT Right 7/31/2024    Procedure: [id number]-[id number];  Surgeon: Renato Alvarado Jr., MD;  Location: [facility] OR;  Service: General;  Laterality: Right;    LAPAROTOMY, EXPLORATORY N/A 03/23/2023    Procedure: LAPAROTOMY, EXPLORATORY;  Surgeon: Alex Juárez MD;  Location: [facility] OR;  Service: General;  Laterality: N/A;    LYMPH NODE BIOPSY Right 6/14/2024    Procedure: BIOPSY, LYMPH NODE;  Surgeon: Renato Alvarado Jr., MD;  Location: [facility] OR;  Service: General;  Laterality: Right;  right supraclavicular lymph node    OPEN REDUCTION AND INTERNAL FIXATION (ORIF) OF FRACTURE OF DISTAL RADIUS Left 4/4/2024    Procedure: ORIF, FRACTURE, RADIUS, DISTAL;  Surgeon: Khoa Abdalla MD;  Location: [facility] OR;  Service: Orthopedics;  Laterality: Left;    ORIF  FOREARM FRACTURE Right 12/7/2023    Procedure: ORIF, FRACTURE, RADIUS OR ULNA;  Surgeon: Khoa Abdalla MD;  Location: [facility] OR;  Service: Orthopedics;  Laterality: Right;  Call Wilmer     Family History   Problem Relation Name Age of Onset    Diabetes Mother      Heart disease Mother      Cancer Father      Diabetes Sister      Heart disease Brother      Stroke Maternal Grandmother      Heart disease Maternal Grandfather       Social History     Tobacco Use    Smoking status: Former     Current packs/day: 0.15     Average packs/day: 0.2 packs/day for 25.1 years (3.8 ttl pk-yrs)     Types: Cigarettes     Start date: 12/4/1999    Smokeless tobacco: Never    Tobacco comments:     Pt states he quit cigs   Substance Use Topics    Alcohol use: Not Currently     Comment: occasionally    Drug use: Yes     Frequency: 3.0 times per week     Types: Marijuana     Review of Systems   Constitutional:  Negative for chills and fever.   HENT:  Negative for congestion, rhinorrhea and sore throat.    Eyes:  Negative for visual disturbance.   Respiratory:  Negative for cough and shortness of breath.    Cardiovascular:  Positive for chest pain.   Gastrointestinal:  Positive for abdominal pain, nausea and vomiting.   Genitourinary:  Negative for dysuria and hematuria.   Musculoskeletal:  Negative for joint swelling.   Skin:  Negative for rash.   Neurological:  Negative for weakness.   Psychiatric/Behavioral:  Negative for confusion.    All other systems reviewed and are negative.      Physical Exam     Initial Vitals [01/18/25 2159]   BP Pulse Resp Temp SpO2   (!) 150/92 93 20 97.5 °F (36.4 °C) 98 %      MAP       --         Physical Exam    Nursing note and vitals reviewed.  Constitutional: He is not diaphoretic. He appears ill (chronic). No distress.   HENT:   Head: Normocephalic and atraumatic.   Neck: Neck supple.   Normal range of motion.  Cardiovascular:  Normal rate and regular rhythm.           Pulmonary/Chest: Breath sounds  normal. No respiratory distress.   Abdominal: Abdomen is soft. He exhibits no distension. There is no abdominal tenderness.   Musculoskeletal:         General: No edema.      Cervical back: Normal range of motion and neck supple.     Neurological: He is alert and oriented to person, place, and time. He has normal strength. No cranial nerve deficit or sensory deficit.   Skin: Skin is warm. Capillary refill takes less than 2 seconds.   Psychiatric: He has a normal mood and affect.         ED Course   Procedures  Labs Reviewed   COMPREHENSIVE METABOLIC PANEL - Abnormal       Result Value    Sodium 139      Potassium 3.5      Chloride 103      CO2 24      Glucose 127 (*)     Blood Urea Nitrogen 15.1      Creatinine 0.84      Calcium 9.8      Protein Total 8.5 (*)     Albumin 3.9      Globulin 4.6 (*)     Albumin/Globulin Ratio 0.8 (*)     Bilirubin Total 0.2      ALP 95      ALT 12      AST 13      eGFR >60      Anion Gap 12.0      BUN/Creatinine Ratio 18     URINALYSIS, REFLEX TO URINE CULTURE - Abnormal    Color, UA Yellow      Appearance, UA Clear      Specific Gravity, UA 1.033 (*)     pH, UA 5.5      Protein, UA 1+ (*)     Glucose, UA Normal      Ketones, UA 3+ (*)     Blood, UA Negative      Bilirubin, UA Negative      Urobilinogen, UA 2.0 (*)     Nitrites, UA Negative      Leukocyte Esterase, UA Negative      RBC, UA 0-5      WBC, UA 0-5      Bacteria, UA Trace      Squamous Epithelial Cells, UA Trace      Mucous, UA Few (*)    CBC WITH DIFFERENTIAL - Abnormal    WBC 14.39 (*)     RBC 4.11 (*)     Hgb 13.5 (*)     Hct 40.8 (*)     MCV 99.3 (*)     MCH 32.8 (*)     MCHC 33.1      RDW 13.8      Platelet 369      MPV 8.7      Neut % 89.8      Lymph % 6.0      Mono % 3.4      Eos % 0.2      Basophil % 0.3      Imm Grans % 0.3      Neut # 12.92 (*)     Lymph # 0.86      Mono # 0.49      Eos # 0.03      Baso # 0.04      Imm Gran # 0.05 (*)     NRBC% 0.0     B-TYPE NATRIURETIC PEPTIDE - Normal    Natriuretic Peptide  10.1     MAGNESIUM - Normal    Magnesium Level 1.90     LIPASE - Normal    Lipase Level 12     TROPONIN I - Normal    Troponin-I <0.010     BLOOD CULTURE OLG   BLOOD CULTURE [facility]   CBC W/ AUTO DIFFERENTIAL    Narrative:     The following orders were created for panel order CBC auto differential.  Procedure                               Abnormality         Status                     ---------                               -----------         ------                     CBC with Differential[[id number]]       Abnormal            Final result                 Please view results for these tests on the individual orders.   TROPONIN I   COVID/RSV/FLU A&B PCR   RESPIRATORY PANEL   MRSA PCR     EKG Readings: (Independently Interpreted)   Initial Reading: No STEMI. Rhythm: Normal Sinus Rhythm. Heart Rate: 96. Ectopy: No Ectopy. Conduction: Normal. ST Segments: Normal ST Segments. T Waves: Normal. Axis: Normal. Clinical Impression: Normal Sinus Rhythm   Performed at 2202  No ischemic changes       Imaging Results              CTA Chest Non-Coronary (PE Studies) (Preliminary result)  Result time 01/19/25 00:14:52      Preliminary result by Sonu Dihel MD (01/19/25 00:14:52)                   Narrative:    START OF REPORT:  Technique: CT Scan of the chest was performed with intravenous contrast with direct axial images as well as sagittal and coronal reconstruction images.    Dosage Information: Automated Exposure Control was utilized.    Comparison: Comparison is with study [id number] 21:49:45.    Clinical History: Pt arrives via [facility] unit 1 for N/V x 1 hr with diffuse abd pain and chest pain. History of MI and Lung cancer. On chemo last treatment 3wks ago sched to go wed next wee.    Findings:  Soft Tissues: Unremarkable.  Axilla: A few mildly prominent lymph nodes are seen in the axilla.  Neck: The visualized soft tissues of the neck appear unremarkable.  Mediastinum: There is no significant change in the partially  calcified confluent lymph nodes in the right parahilar space. Rosina measures 2.7 x 2.9 cm on Image 196 Series 6. Likewise, the rest of the enlarged hilar lymph nodes appear stable.  Heart: The heart appears unremarkable.  Aorta: Unremarkable appearing aorta.  Pulmonary Arteries: Unremarkable.  Lungs: There is mild non specific dependent change at the lung bases. Stable moderate paraseptal emphysematous changes and scarring are seen in both lungs, predominantly in the upper lobes. No significant change in the sizes of the nodular opacities with associated ground-glass component in the right lung and left lower lobe. The largest of these nodules is still in the apical segment of the right upper lobe and measures 0.9 x 0.7 cm on Image 23 Series 9. No new nodules seen.  Pleura: No effusions or pneumothorax are identified.  Bony Structures:  Spine: Mild multilevel spondylolytic changes are seen in the thoracic spine.  Ribs: The ribs appear unremarkable.  Abdomen: Abdominal findings will be discussed separately in the abdomen CT report.      Impression:  1. No significant change in the sizes of the nodular opacities with associated ground-glass component in the right lung and left lower lobe. The largest of these nodules is still in the apical segment of the right upper lobe and measures 0.9 x 0.7 cm on Image 23 Series 9. No new nodules seen. Correlate with clinical and laboratory findings as regards additional evaluation and follow-up.  2. There is no significant change in the partially calcified confluent lymph nodes in the right parahilar space. Rosina measures 2.7 x 2.9 cm on Image 196 Series 6. Likewise, the rest of the enlarged hilar lymph nodes appear stable.  3. No acute intrathoracic process identified. Details and other findings as discussed above.                                         CT Abdomen Pelvis With IV Contrast NO Oral Contrast (Preliminary result)  Result time 01/19/25 00:10:23      Preliminary result  by Sonu Diehl MD (01/19/25 00:10:23)                   Narrative:    START OF REPORT:  Technique: CT of the abdomen and pelvis was performed with axial images as well as sagittal and coronal reconstruction images with intravenous contrast.    Comparison: Comparison is with study dated 2024-12-14 21:49:45.    Clinical History: Pt arrives via [facility] unit 1 for N/V x 1 hr with diffuse abd pain and chest pain. History of MI and Lung cancer. On chemo last treatment 3wks ago sched to go wed next wee.    Dosage Information: Automated Exposure Control was utilized.    Findings:  Lines and Tubes: None.  Thorax:  Lungs: Minimal linear opacity is present at the visualized lung bases, consistent with nonspecific dependent changes scarring and atelectasis. There is a new 4 mm pulmonary nodule in the right lung base (Series 1, Image 23). There is a mild increase in size of the 7 mm pulmonary nodule in the visualized middle lobe (Series 1, Image 17).  Pleura: No effusions or thickening are seen.  Heart: The heart size is within normal limits.  Abdomen:  Abdominal Wall: No abdominal wall pathology is seen.  Liver: The liver appears unremarkable.  Biliary System: No intrahepatic or extrahepatic biliary duct dilatation is seen.  Gallbladder: The gallbladder appears unremarkable with no stones wall thickening or pericholecystic inflammatory changes or fluid.  Pancreas: The pancreas appears unremarkable.  Spleen: Moderately enlarged spleen.  Adrenals: The right adrenal gland appears unremarkable. There is an increase in the size of the heterogeneous lesion in the left adrenal gland, with the current measurement of 2.3 x 2.8 cm (Series 2, Image 31), compared to the previous measurement of 1.8 x 1.9 cm. This raises concern for a neoplastic process (primary or metastatic).  Kidneys: The kidneys appear unremarkable with no stones cysts masses or hydronephrosis with IV contrast decreasing sensitivity and specificity for stones.  Aorta: The  visualized abdominal aorta appears unremarkable.  IVC: Unremarkable.  Bowel:  Esophagus: The visualized distal esophagus appears unremarkable.  Stomach: The stomach appears unremarkable.  Duodenum: Unremarkable appearing duodenum.  Small Bowel: The small bowel appears unremarkable.  Colon: Nondistended.  Appendix: The appendix is not identified but no inflammatory changes are seen in the right lower quadrant to suggest appendicitis.  Peritoneum: No intraperitoneal free air or ascites is seen.    Pelvis:  Bladder: The bladder is nondistended but appears otherwise unremarkable.    Bony structures:  Dorsal Spine: There is subtle multilevel spondylosis of the visualized dorsal spine.  Bony Pelvis: A stable osseous defect is present in the anterior aspect of the left iliac bone.      Impression:  1. There is a new 4 mm pulmonary nodule in the right lung base (Series 1, Image 23). There is a mild increase in size of the 7 mm pulmonary nodule in the visualized middle lobe (Series 1, Image 17). Correlate with clinical and laboratory findings as regards additional evaluation and follow-up.  2. There is an increase in the size of the heterogeneous lesion in the left adrenal gland, with the current measurement of 2.3 x 2.8 cm (Series 2, Image 31), compared to the previous measurement of 1.8 x 1.9 cm. This raises concern for a neoplastic process (primary or metastatic). Correlate with clinical and laboratory findings as regards additional evaluation and follow-up.  3. No acute intraabdominal or pelvic solid organ or bowel pathology identified. Details and other findings as discussed above.                                         X-Ray Chest AP Portable (Final result)  Result time 01/18/25 22:19:27      Final result by Jerry Christianson MD (01/18/25 22:19:27)                   Impression:      No acute cardiopulmonary process identified.      Electronically signed by: Jerry Christianson  Date:    01/18/2025  Time:    22:19                Narrative:    EXAMINATION:  XR CHEST AP PORTABLE    CLINICAL HISTORY:  Chest pain, unspecified    TECHNIQUE:  One view    COMPARISON:  July 31, 2024.    FINDINGS:  Cardiopericardial silhouette is within normal limits.  MediPort terminates within the superior vena cava.  No acute dense focal or segmental consolidation, congestive process, pleural effusions or pneumothorax.                                       Medications   HYDROmorphone (PF) injection 1 mg (has no administration in time range)   cefTRIAXone injection 1 g (has no administration in time range)   azithromycin (ZITHROMAX) 500 mg in 0.9% NaCl 250 mL IVPB (admixture device) (has no administration in time range)   vancomycin 1,500 mg in D5W 250 mL IVPB (admixture device) (has no administration in time range)   morphine injection 4 mg (4 mg Intravenous Given 1/18/25 2302)   ondansetron injection 4 mg (4 mg Intravenous Given 1/18/25 2303)   0.9% NaCl infusion (1,000 mLs Intravenous New Bag 1/18/25 2300)   iohexoL (OMNIPAQUE 350) injection 100 mL (100 mLs Intravenous Given 1/18/25 2350)     Medical Decision Making  39-year-old with a history of lung cancer on chemo follows at [facility]  Presenting with chest pain abdominal pain  Poor historian  WIll treat pain, obtain labs, CTs, reassess     The differential diagnosis includes, but is not limited to:  Judging by the patient's chief complaint and pertinent history, the patient has the following possible differential diagnoses, including but not limited to the following.  Some of these are deemed to be lower likelihood and some more likely based on my physical exam and history combined with possible lab work and/or imaging studies.   Please see the pertinent studies, and refer to the HPI.  Some of these diagnoses will take further evaluation to fully rule out, perhaps as an outpatient and the patient was encouraged to follow up when discharged for more comprehensive evaluation.    appendicitis, biliary disease,  diverticulitis,  AAA, ACS, mesenteric ischemia, intraabdominal abcess, retroperitoneal abcess, gastritis, gastroenteritis, hepatitis, hernia, pancreatitis, inflammatory bowel disease, PUD, SBP, nephrolithiasis, DKA, sickle cell crisis, consitpation, GERD, IBS      Problems Addressed:  Chest pain: acute illness or injury that poses a threat to life or bodily functions  Intractable pain: acute illness or injury that poses a threat to life or bodily functions    Amount and/or Complexity of Data Reviewed  Labs: ordered.  Radiology: ordered and independent interpretation performed.     Details: CXR - no obvious infiltrates, consolidations, pleural effusions, or pneumothorax.      ECG/medicine tests: ordered and independent interpretation performed.     Details: Initial Reading: No STEMI. Rhythm: Normal Sinus Rhythm. Heart Rate: 96. Ectopy: No Ectopy. Conduction: Normal. ST Segments: Normal ST Segments. T Waves: Normal. Axis: Normal. Clinical Impression: Normal Sinus Rhythm   Performed at 2202  No ischemic changes   Discussion of management or test interpretation with external provider(s): Discussed with [facility]  Discussed with hospitalist - will admit     Risk  Prescription drug management.  Decision regarding hospitalization.            Scribe Attestation:   Scribe #1: I performed the above scribed service and the documentation accurately describes the services I performed. I attest to the accuracy of the note.    Attending Attestation:           Physician Attestation for Scribe:  Physician Attestation Statement for Scribe #1: I, Jl García IV, MD, reviewed documentation, as scribed by Zully Garg in my presence, and it is both accurate and complete.             ED Course as of 01/19/25 0044   Sat Jan 18, 2025   [id number] 39-year-old with a history of lung cancer on chemo follows at [facility]  Presenting with chest pain abdominal pain  Poor historian  WIll treat pain, obtain labs, CTs, reassess  [AC]   Sun Jan 19, 2025    0018 Paged [facility]  [PINA]   0027 Katarina [facility] - patient has extensive CAD, needs ACS rule out. Hospitalist paged  [AC]   0028 Paged hospitalist  [PINA]      ED Course User Index  [AC] Jl García IV, MD  [PINA] Devon Marrero                             Clinical Impression:  Final diagnoses:  [R07.9] Chest pain  [R52] Intractable pain          ED Disposition Condition    Admit                 Jl García IV, MD  01/19/25 0044

## 2025-01-19 NOTE — ASSESSMENT & PLAN NOTE
Patient has been screened and assessed by RD.    Malnutrition Type:  Context:  acute illness or injury  Level: severe    Malnutrition Characteristic Summary:  Weight Loss (Malnutrition): 5% in 1 month  Energy Intake (Malnutrition): less than or equal to 50% for greater than or equal to 5 days  Subcutaneous Fat (Malnutrition): moderate depletion  Muscle Mass (Malnutrition): moderate depletion    Interventions/Recommendations (treatment strategy):  Oral nutritional supplement

## 2025-01-19 NOTE — PROGRESS NOTES
Inpatient Nutrition Assessment    Admit Date: 1/18/2025   Total duration of encounter: 1 day   Patient Age: 39 y.o.    Nutrition Recommendation/Prescription     Consider liberalizing diet to regular.  Add Boost Breeze (provides 250 kcal, 9 g protein per serving) TID.  Encouraged intake.    Communication of Recommendations: reviewed with patient    Nutrition Assessment     Malnutrition Assessment/Nutrition-Focused Physical Exam    Malnutrition Context: acute illness or injury (01/19/25 1539)  Malnutrition Level: severe (01/19/25 1539)  Energy Intake (Malnutrition): less than or equal to 50% for greater than or equal to 5 days (01/19/25 1539)  Weight Loss (Malnutrition): 5% in 1 month (01/19/25 1539)  Subcutaneous Fat (Malnutrition): moderate depletion (01/19/25 1539)  Orbital Region (Subcutaneous Fat Loss): moderate depletion  Upper Arm Region (Subcutaneous Fat Loss): moderate depletion     Muscle Mass (Malnutrition): moderate depletion (01/19/25 1539)  [place] Region (Muscle Loss): moderate depletion  Clavicle Bone Region (Muscle Loss): moderate depletion                             A minimum of two characteristics is recommended for diagnosis of either severe or non-severe malnutrition.    Chart Review    Reason Seen: malnutrition screening tool (MST)    Malnutrition Screening Tool Results   Have you recently lost weight without trying?: Unsure  Have you been eating poorly because of a decreased appetite?: No   MST Score: 2   Diagnosis: chest pain, abdominal pain, lung cancer on chemotherapy    Relevant Medical History: lung cancer, currently on chemotherapy, CAD s/p stents/MI     Scheduled Medications:  aspirin, 81 mg, Daily  atorvastatin, 40 mg, QHS  enoxparin, 40 mg, Daily  mupirocin, , BID  piperacillin-tazobactam (Zosyn) IV (PEDS and ADULTS) (extended infusion is not appropriate), 4.5 g, Q8H  vancomycin 750 mg in D5W 250 mL IVPB (admixture device), 750 mg, Q12H    Continuous Infusions: none       PRN  "Medications:   acetaminophen, 1,000 mg, Q6H PRN  albuterol-ipratropium, 3 mL, Q4H PRN  aluminum-magnesium hydroxide-simethicone, 30 mL, QID PRN  bisacodyL, 10 mg, Daily PRN  dextrose 50%, 12.5 g, PRN  dextrose 50%, 25 g, PRN  glucagon (human recombinant), 1 mg, PRN  glucose, 16 g, PRN  glucose, 24 g, PRN  melatonin, 6 mg, Nightly PRN  naloxone, 0.02 mg, PRN  ondansetron, 4 mg, Q4H PRN  oxyCODONE, 5 mg, Q6H PRN  prochlorperazine, 5 mg, Q6H PRN  senna-docusate 8.6-50 mg, 1 tablet, BID PRN  sodium chloride 0.9%, 10 mL, PRN  vancomycin - pharmacy to dose, , pharmacy to manage frequency    Calorie Containing IV Medications: no significant kcals from medications at this time    Recent Labs   Lab 01/18/25  2232 01/19/25  0448    136   K 3.5 4.1   CALCIUM 9.8 9.8   MG 1.90 1.80    102   CO2 24 21*   BUN 15.1 11.6   CREATININE 0.84 0.81   EGFRNORACEVR >60 >60   GLUCOSE 127* 146*   BILITOT 0.2 0.4   ALKPHOS 95 97   ALT 12 14   AST 13 14   ALBUMIN 3.9 4.0   LIPASE 12  --    WBC 14.39* 12.24*   HGB 13.5* 13.5*   HCT 40.8* 40.4*     Nutrition Orders:  Diet Heart Healthy Standard Tray      Appetite/Oral Intake: poor/25-50% of meals  Factors Affecting Nutritional Intake: decreased appetite and pain  Social Needs Impacting Access to Food: unable to assess at this time; will attempt on follow-up  Food/P[employer]/Cultural Preferences: none reported  Food Allergies: none reported  Last Bowel Movement: 01/18/25  Wound(s): no pressure injuries documented at this time     Comments    1/19/25 Patient reports poor intake and significant weight loss over the past few weeks that he attributes to pain and decreased appetite. He reports that he has vanilla oral supplements at home and is supposed to drink them twice daily but only drinks 1 or less because he does not really like them. He is agreeable to trying Boost Breeze for now.    Anthropometrics    Height: 5' 6" (167.6 cm), Height Method: Stated  Last Weight: 52.8 kg (116 lb " 6.5 oz) (25 1533), Weight Method: Standard Scale  BMI (Calculated): 18.8   BMI Classification: normal (BMI 18.5-24.9)        Ideal Body Weight (IBW), Male: 142 lb     % Ideal Body Weight, Male (lb): 81.97 %                 Usual Body Weight (UBW), k.33 kg  % Usual Body Weight: 95.63  % Weight Change From Usual Weight: -4.57 %  Usual Weight Provided By: patient    Wt Readings from Last 5 Encounters:   25 52.8 kg (116 lb 6.5 oz)   25 55.6 kg (122 lb 9.6 oz)   12/15/24 50.8 kg (112 lb)   24 54.8 kg (120 lb 13 oz)   24 54.8 kg (120 lb 12.8 oz)     Weight Change(s) Since Admission:   () admission weight 56.7 kg stated, likely inaccurate, recent weight of 52.8 kg noted  Wt Readings from Last 1 Encounters:   25 1533 52.8 kg (116 lb 6.5 oz)   25 0300 52.8 kg (116 lb 6.5 oz)   25 56.7 kg (125 lb)   Admit Weight: 56.7 kg (125 lb) (25), Weight Method: Stated    Estimated Needs    Weight Used For Calorie Calculations: 52.8 kg (116 lb 6.5 oz)  Energy Calorie Requirements (kcal): 1933, 1.4 stress factor  Energy Need Method: [facility]  Weight Used For Protein Calculations: 52.8 kg (116 lb 6.5 oz)  Protein Requirements: 79 g, 1.5 g/kg  Fluid Requirements (mL): 1933, 1 ml/kcal  CHO Requirement: N/A     Enteral Nutrition Patient not receiving enteral nutrition at this time.    Parenteral Nutrition Patient not receiving parenteral nutrition support at this time.    Evaluation of Received Nutrient Intake    Calories: not meeting estimated needs  Protein: not meeting estimated needs    Patient Education Not applicable.    Nutrition Diagnosis     PES: Inadequate energy intake related to inability to consume sufficient nutrients as evidenced by less than 80% needs met. (new)  PES: Severe acute disease or injury related malnutrition related to suboptimal protein/energy intake as evidenced by less than or equal to 50% needs met for greater than or equal to 5  days, moderate fat depletion, moderate muscle depletion, and 5% weight loss in 1 month. (new)    Nutrition Interventions     Intervention(s): general/healthful diet, commercial beverage, and collaboration with other providers  Goal: Meet greater than 80% of nutritional needs by follow-up. (new)    Nutrition Goals & Monitoring     Dietitian will monitor: food and beverage intake, energy intake, weight, weight change, electrolyte/renal panel, and beliefs/attitudes  Discharge planning: continue heart healthy versus regular diet with Boost Breeze or similar oral supplements  Nutrition Risk/Follow-Up: moderate (follow-up in 3-5 days)   Please consult if re-assessment needed sooner.

## 2025-01-19 NOTE — CONSULTS
Inpatient consult to Cardiology  Consult performed by: Jess Hearn FNP  Consult ordered by: Jl García IV, MD  Reason for consult: CP        [facility] *    Cardiology  Consult Note    Patient Name: Ranjeet Ball  MRN: [id number]  Admission Date: 1/18/2025  Hospital Length of Stay: 0 days  Code Status: Full Code   Attending Provider: Bruce Barrow MD   Consulting Provider: CANDELARIO Fernández  Primary Care Physician: Matty Gomez FNP  Principal Problem:<principal problem not specified>    Patient information was obtained from patient, past medical records, and ER records.     Subjective:     Reason for Consult: CP    HPI: Mr. Ball is a 39 year old male who is known to [facility], Dr. Mandel. He presents to the ER with complaints of CP & abdominal pain. He reports associated symptoms of nausea & vomiting x 1 hour prior. He denies palpitations, fever, or chills. Significant labs include WBC 12.24, H&H 13.5/40.4. His troponin levels are negative x 3. CT abdomen and pelvis were obtained and demonstrated an increase in size of heterogeneous left adrenal gland lesion currently measuring 2.3 x 2.8 cm with concern for neoplastic process and a new 4 mm pulmonary nodule in the right lung base. CXR was obtained and negative for acute findings. [facility] has been consulted to further evaluate the patient's CP.     PMH: CAD, HTN, HLD, VF, ICMO, lung CA  PSH: L Distal Radius ORIF, Right ORIF, LHC   Family History: Mother - DM 2, heart disease; Father - CA  Social History: Former tobacco use. Reports occasional alcohol use. + marijuana use.     Previous Cardiac Diagnostics:   BLE Arterial US (1.22.24):  The study quality is good.   Triphasic waveforms noted throughout bilateral lower extremities with no significant stenosis detected.     Carotid US (1.22.24):  The study quality is good.   Triphasic waveforms noted throughout bilateral lower extremities with no significant stenosis detected.      Holter Monitor (10.13.23):  This is a good quality study.   Predominant rhythm is normal sinus rhythm.   The minimum heart rate recorded was 45 beats / minute (sinus bradycardia, 10/15/2023). The maximum heart rate is 152 beats / minute (sinus tachycardia, 10/13/2023). The mean heart rate is 77 beats / minute.   No evidence of AV block is noted.   No evidence of supraventricular tachycardia is noted.  No evidence of atrial fibrillation noted.   No evidence of ventricular tachycardia is noted.  No evidence of supraventricular arrhythmia is noted.  No evidence of ventricular arrhythmia is noted.  No pauses were noted.     MPI (10.11.23):  This is probably a normal perfusion study.   This scan is suggestive of low risk for future cardiovascular events.   Small reversible perfusion abnormality of moderate intensity in the apical segment. Large fixed perfusion abnormality of mild intensity in the inferior region.   Improvement with prone imaging suggesting artifact is noted.   The left ventricular cavity is noted to be normal on the stress study. The left ventricular ejection fraction was calculated to be 44% and left ventricular global function is mildly reduced.   The study quality is average.     TTE (9.28.23):  The study quality is good.   The left ventricle is normal in size.  Global left ventricular systolic function is mildly decreased. The left ventricular ejection fraction is 45-50%. Left ventricular diastolic function is normal.   Mild (1+) pulmonic and aortic regurgitation.   The pulmonary artery appears normal.     TTE (5.26.22):  The study quality is average.   The left ventricle is decreased in size. Global left ventricular systolic function is mildly decreased. The left ventricular ejection fraction is 40%.   Trace pulmonic, trace mitral, trace tricuspid, and mild (1+) aortic regurgitation.     LHC (4.28.18):  S/p stenting of the proximal RCA for 99% stenosis using 3 x 16 mm PIEDAD  S/p balloon  angioplasty or after the ostial PDA using a 2.5 x 12 balloon     [facility] (3.11.18):   of right coronary artery  99% lesion of the ostium/proximal left anterior descending successfully treated w/ a 3.5 mm x 20 Synergy drug-eluting stent w/ 0% residual stenosis and post inflated to 3.75 mm  30-40% lesion in the proximal circumflex  Elevated left ventricular end-diastolic pressure of 25-30 mmHg  Cardiogenic shock w/ PAPAI 0.4 and  0.77 with ejection fraction of 25-30% with hypokinesis of the anterior wall and akinesis of the inferior wall  No aortic stenosis or mitral regurgitation         Review of patient's allergies indicates:  No Known Allergies  Current Facility-Administered Medications on File Prior to Encounter   Medication    0.9%  NaCl infusion    0.9%  NaCl infusion    LIDOcaine (PF) 10 mg/ml (1%) injection 10 mg     Current Outpatient Medications on File Prior to Encounter   Medication Sig    atorvastatin (LIPITOR) 40 MG tablet Take 40 mg by mouth once daily.    dexAMETHasone (DECADRON) 4 MG Tab Take 2 tablets (8 mg total) by mouth once daily. on days 2-4 of each chemotherapy cycle    ergocalciferol (ERGOCALCIFEROL) 50,000 unit Cap Take 1 capsule (50,000 Units total) by mouth every 7 days.    gabapentin (NEURONTIN) 300 MG capsule Take 1 capsule (300 mg total) by mouth every evening.    hydrocortisone 2.5 % cream Apply topically 2 (two) times daily.    ibuprofen (ADVIL,MOTRIN) 800 MG tablet Take 1 tablet (800 mg total) by mouth 3 (three) times daily as needed for Pain.    ketoconazole (NIZORAL) 2 % shampoo Apply topically twice a week. (Patient not taking: Reported on 1/2/2025)    OLANZapine (ZYPREXA) 5 MG tablet Take 1 tablet by mouth nightly on days 1-4 of each chemotherapy cycle.    ondansetron (ZOFRAN) 4 MG tablet Take 1 tablet (4 mg total) by mouth every 6 (six) hours as needed for Nausea.    potassium chloride (K-TAB) 20 mEq Take 2 tablets (40 mEq total) by mouth once daily.       Review of Systems  "  Respiratory:  Negative for shortness of breath.    Cardiovascular:  Positive for chest pain. Negative for palpitations.   Gastrointestinal:  Positive for nausea and vomiting.       Objective:     Vital Signs (Most Recent):  Temp: 97.7 °F (36.5 °C) (01/19/25 0854)  Pulse: 85 (01/19/25 0854)  Resp: 18 (01/19/25 1004)  BP: (!) 161/92 (01/19/25 0854)  SpO2: 100 % (01/19/25 0854) Vital Signs (24h Range):  Temp:  [97.3 °F (36.3 °C)-97.7 °F (36.5 °C)] 97.7 °F (36.5 °C)  Pulse:  [81-93] 85  Resp:  [12-20] 18  SpO2:  [96 %-100 %] 100 %  BP: (135-161)/(80-95) 161/92   Weight: 52.8 kg (116 lb 6.5 oz)  Body mass index is 143.3 kg/m².  SpO2: 100 %     No intake or output data in the 24 hours ending 01/19/25 1056  Lines/Drains/Airways       Central Venous Catheter Line  Duration             Port A Cath Single Lumen 07/31/24 Internal Jugular Right 172 days              Peripheral Intravenous Line  Duration                  Peripheral IV - Single Lumen 01/18/25 2234 18 G Anterior;Distal;Right Upper Arm <1 day                  Significant Labs:   Chemistries:   Recent Labs   Lab 01/18/25 2232 01/19/25  0448    136   K 3.5 4.1    102   CO2 24 21*   BUN 15.1 11.6   CREATININE 0.84 0.81   CALCIUM 9.8 9.8   BILITOT 0.2 0.4   ALKPHOS 95 97   ALT 12 14   AST 13 14   GLUCOSE 127* 146*   MG 1.90 1.80   TROPONINI <0.010 <0.010        CBC/Anemia Labs: Coags:    Recent Labs   Lab 01/18/25 2232 01/19/25  0448   WBC 14.39* 12.24*   HGB 13.5* 13.5*   HCT 40.8* 40.4*    341   MCV 99.3* 99.3*   RDW 13.8 13.7    No results for input(s): "PT", "INR", "APTT" in the last 168 hours.     Significant Imaging:  Imaging Results              CTA Chest Non-Coronary (PE Studies) (Final result)  Result time 01/19/25 09:43:04      Final result by Genaro Maier MD (01/19/25 09:43:04)                   Impression:      No evidence of pulmonary thromboembolism.    No significant change in the sizes of the nodular opacities with " associated ground-glass component in the right lung and left lower lobe. The largest of these nodules is still in the apical segment of the right upper lobe and measures 0.9 x 0.7 cm on Image 23 Series 9. No new nodules seen. Correlate with clinical and laboratory findings as regards additional evaluation and follow-up.    There is no significant change in the partially calcified confluent lymph nodes in the right parahilar space. Rosina measures 2.7 x 2.9 cm on Image 196 Series 6. Likewise, the rest of the enlarged hilar lymph nodes appear stable.    Details and other findings as above.      Electronically signed by: Genaro Maier  Date:    01/19/2025  Time:    09:43               Narrative:    EXAMINATION:  CTA CHEST NON CORONARY (PE STUDIES)    CLINICAL HISTORY:  Pulmonary embolism (PE) suspected, high prob;    TECHNIQUE:  Axial images of the chest were obtained with contrast. Sagittal and coronal reconstructed images were available for review. 3-D MIP reconstructions were performed from source imaging.    Automatic dose control was utilized to reduce patient radiation dose.    DLP= 668    COMPARISON:  No prior imaging available for comparison.    FINDINGS:  Soft Tissues: Unremarkable.Axilla: A few mildly prominent lymph nodes are seen in the axilla.Neck: The visualized soft tissues of the neck appear unremarkable.Mediastinum: There is no significant change in the partially calcified confluent lymph nodes in the right parahilar space. Rosina measures 2.7 x 2.9 cm on Image 196 Series 6. Likewise, the rest of the enlarged hilar lymph nodes appear stable.Heart: The heart appears unremarkable.Aorta: Unremarkable appearing aorta.Pulmonary Arteries: Unremarkable.Lungs: There is mild non specific dependent change at the lung bases. Stable moderate paraseptal emphysematous changes and scarring are seen in both lungs, predominantly in the upper lobes. No significant change in the sizes of the nodular opacities with  associated ground-glass component in the right lung and left lower lobe. The largest of these nodules is still in the apical segment of the right upper lobe and measures 0.9 x 0.7 cm on Image 23 Series 9. No new nodules seen.Pleura: No effusions or pneumothorax are identified.Bony Structures:Spine: Mild multilevel spondylolytic changes are seen in the thoracic spine.Ribs: The ribs appear unremarkable.Abdomen: Abdominal findings will be discussed separately in the abdomen CT report.                        Preliminary result by Sonu Diehl MD (01/19/25 00:14:52)                   Impression:    1. No significant change in the sizes of the nodular opacities with associated ground-glass component in the right lung and left lower lobe. The largest of these nodules is still in the apical segment of the right upper lobe and measures 0.9 x 0.7 cm on Image 23 Series 9. No new nodules seen. Correlate with clinical and laboratory findings as regards additional evaluation and follow-up.  2. There is no significant change in the partially calcified confluent lymph nodes in the right parahilar space. Rosina measures 2.7 x 2.9 cm on Image 196 Series 6. Likewise, the rest of the enlarged hilar lymph nodes appear stable.  3. No acute intrathoracic process identified. Details and other findings as discussed above.               Narrative:    START OF REPORT:  Technique: CT Scan of the chest was performed with intravenous contrast with direct axial images as well as sagittal and coronal reconstruction images.    Dosage Information: Automated Exposure Control was utilized.    Comparison: Comparison is with study dated2024-12-14 21:49:45.    Clinical History: Pt arrives via [facility] unit 1 for N/V x 1 hr with diffuse abd pain and chest pain. History of MI and Lung cancer. On chemo last treatment 3wks ago sched to go wed next wee.    Findings:  Soft Tissues: Unremarkable.  Axilla: A few mildly prominent lymph nodes are seen in the  axilla.  Neck: The visualized soft tissues of the neck appear unremarkable.  Mediastinum: There is no significant change in the partially calcified confluent lymph nodes in the right parahilar space. Rosina measures 2.7 x 2.9 cm on Image 196 Series 6. Likewise, the rest of the enlarged hilar lymph nodes appear stable.  Heart: The heart appears unremarkable.  Aorta: Unremarkable appearing aorta.  Pulmonary Arteries: Unremarkable.  Lungs: There is mild non specific dependent change at the lung bases. Stable moderate paraseptal emphysematous changes and scarring are seen in both lungs, predominantly in the upper lobes. No significant change in the sizes of the nodular opacities with associated ground-glass component in the right lung and left lower lobe. The largest of these nodules is still in the apical segment of the right upper lobe and measures 0.9 x 0.7 cm on Image 23 Series 9. No new nodules seen.  Pleura: No effusions or pneumothorax are identified.  Bony Structures:  Spine: Mild multilevel spondylolytic changes are seen in the thoracic spine.  Ribs: The ribs appear unremarkable.  Abdomen: Abdominal findings will be discussed separately in the abdomen CT report.                                         CT Abdomen Pelvis With IV Contrast NO Oral Contrast (Final result)  Result time 01/19/25 10:42:00      Final result by Genaro Maier MD (01/19/25 10:42:00)                   Impression:      The bladder wall is diffusely thickened.  Correlate with urinalysis.    Increase in size of heterogeneous left adrenal gland lesion currently measuring 2.3 x 2.8 cm.  Concern for neoplastic process.    There is a new 4 mm pulmonary nodule in the right lung base (series 1, image 23). There is a mild increase in the size of the 7 mm to pulmonary nodule with the visualized middle lobe (series 1, image 17). Correlate with clinical and laboratory findings as regards to additional evaluation and follow-up.      Electronically  signed by: Genaro Maier  Date:    01/19/2025  Time:    10:42               Narrative:    EXAMINATION:  CT ABDOMEN PELVIS WITH IV CONTRAST    CLINICAL HISTORY:  Abdominal pain, acute, nonlocalized;    TECHNIQUE:  Multidetector IV contrast enhanced axial CT images of the abdomen and pelvis were obtained with coronal and sagittal reconstructions.    Automatic exposure control was utilized to reduce the patient's radiation dose.    DLP= NA    COMPARISON:  01/07/2025    FINDINGS:  01. HEPATOBILIARY: No focal hepatic lesion is identified, The gallbladder is normal.    02. SPLEEN: Normal    03. PANCREAS: No focal masses or ductal dilatation.    04. ADRENALS: Increase in size of heterogeneous left adrenal gland lesion currently measuring 2.3 x 2.8 cm.  Concern for neoplastic process.    05. KIDNEYS: The right kidney demonstrates no stone, hydronephrosis, or hydroureter. No focal mass identified. The left kidney demonstrates no stone, hydronephrosis, or hydroureter. No focal mass identified.    06. LYMPHADENOPATHY/RETROPERITONEUM: There is no retroperitoneal lymphadenopathy. The abdominal aorta is normal in course and caliber.    07. BOWEL: No acute bowel related abnormalities. No evidence of appendiceal inflammation.    08. PELVIC VISCERA: The bladder wall is diffusely thickened..  No pelvic mass.    09. PELVIC LYMPH NODES: No lymphadenopathy.    10. PERITONEUM/ABDOMINAL WALL: No ascites or implant.    11. SKELETAL: No aggressive appearing lytic/blastic lesion. No acute fractures, subluxations or dislocations.    12. LUNG BASES: There is a new 4 mm pulmonary nodule in the right lung base (series 1, image 23).  There is a mild increase in the size of the 7 mm to pulmonary nodule with the visualized middle lobe (series 1, image 17).  Correlate with clinical and laboratory findings as regards to additional evaluation and follow-up.                        Preliminary result by Sonu Diehl MD (01/19/25 00:10:23)                    Impression:    1. There is a new 4 mm pulmonary nodule in the right lung base (Series 1, Image 23). There is a mild increase in size of the 7 mm pulmonary nodule in the visualized middle lobe (Series 1, Image 17). Correlate with clinical and laboratory findings as regards additional evaluation and follow-up.  2. There is an increase in the size of the heterogeneous lesion in the left adrenal gland, with the current measurement of 2.3 x 2.8 cm (Series 2, Image 31), compared to the previous measurement of 1.8 x 1.9 cm. This raises concern for a neoplastic process (primary or metastatic). Correlate with clinical and laboratory findings as regards additional evaluation and follow-up.  3. No acute intraabdominal or pelvic solid organ or bowel pathology identified. Details and other findings as discussed above.               Narrative:    START OF REPORT:  Technique: CT of the abdomen and pelvis was performed with axial images as well as sagittal and coronal reconstruction images with intravenous contrast.    Comparison: Comparison is with study dated 2024-12-14 21:49:45.    Clinical History: Pt arrives via [facility] unit 1 for N/V x 1 hr with diffuse abd pain and chest pain. History of MI and Lung cancer. On chemo last treatment 3wks ago sched to go wed next wee.    Dosage Information: Automated Exposure Control was utilized.    Findings:  Lines and Tubes: None.  Thorax:  Lungs: Minimal linear opacity is present at the visualized lung bases, consistent with nonspecific dependent changes scarring and atelectasis. There is a new 4 mm pulmonary nodule in the right lung base (Series 1, Image 23). There is a mild increase in size of the 7 mm pulmonary nodule in the visualized middle lobe (Series 1, Image 17).  Pleura: No effusions or thickening are seen.  Heart: The heart size is within normal limits.  Abdomen:  Abdominal Wall: No abdominal wall pathology is seen.  Liver: The liver appears unremarkable.  Biliary System: No  intrahepatic or extrahepatic biliary duct dilatation is seen.  Gallbladder: The gallbladder appears unremarkable with no stones wall thickening or pericholecystic inflammatory changes or fluid.  Pancreas: The pancreas appears unremarkable.  Spleen: Moderately enlarged spleen.  Adrenals: The right adrenal gland appears unremarkable. There is an increase in the size of the heterogeneous lesion in the left adrenal gland, with the current measurement of 2.3 x 2.8 cm (Series 2, Image 31), compared to the previous measurement of 1.8 x 1.9 cm. This raises concern for a neoplastic process (primary or metastatic).  Kidneys: The kidneys appear unremarkable with no stones cysts masses or hydronephrosis with IV contrast decreasing sensitivity and specificity for stones.  Aorta: The visualized abdominal aorta appears unremarkable.  IVC: Unremarkable.  Bowel:  Esophagus: The visualized distal esophagus appears unremarkable.  Stomach: The stomach appears unremarkable.  Duodenum: Unremarkable appearing duodenum.  Small Bowel: The small bowel appears unremarkable.  Colon: Nondistended.  Appendix: The appendix is not identified but no inflammatory changes are seen in the right lower quadrant to suggest appendicitis.  Peritoneum: No intraperitoneal free air or ascites is seen.    Pelvis:  Bladder: The bladder is nondistended but appears otherwise unremarkable.    Bony structures:  Dorsal Spine: There is subtle multilevel spondylosis of the visualized dorsal spine.  Bony Pelvis: A stable osseous defect is present in the anterior aspect of the left iliac bone.                                         X-Ray Chest AP Portable (Final result)  Result time 01/18/25 22:19:27      Final result by Jerry Christianson MD (01/18/25 22:19:27)                   Impression:      No acute cardiopulmonary process identified.      Electronically signed by: Jerry Christianson  Date:    01/18/2025  Time:    22:19               Narrative:    EXAMINATION:  XR CHEST  AP PORTABLE    CLINICAL HISTORY:  Chest pain, unspecified    TECHNIQUE:  One view    COMPARISON:  July 31, 2024.    FINDINGS:  Cardiopericardial silhouette is within normal limits.  MediPort terminates within the superior vena cava.  No acute dense focal or segmental consolidation, congestive process, pleural effusions or pneumothorax.                                    EKG:       Telemetry:  SR    Physical Exam  HENT:      Head: Normocephalic.      Nose: Nose normal.      Mouth/Throat:      Mouth: Mucous membranes are moist.   Eyes:      Extraocular Movements: Extraocular movements intact.   Cardiovascular:      Rate and Rhythm: Normal rate and regular rhythm.      Pulses: Normal pulses.   Pulmonary:      Effort: Pulmonary effort is normal.   Abdominal:      Palpations: Abdomen is soft.   Skin:     General: Skin is warm and dry.   Neurological:      Mental Status: He is alert and oriented to person, place, and time.   Psychiatric:         Behavior: Behavior normal.         Home Medications:   Current Facility-Administered Medications on File Prior to Encounter   Medication Dose Route Frequency Provider Last Rate Last Admin    0.9%  NaCl infusion   Intravenous Continuous Lexi Lynn MD        0.9%  NaCl infusion   Intravenous Continuous Lexi Lynn MD        LIDOcaine (PF) 10 mg/ml (1%) injection 10 mg  1 mL Intradermal Once Lexi Lynn MD         Current Outpatient Medications on File Prior to Encounter   Medication Sig Dispense Refill    atorvastatin (LIPITOR) 40 MG tablet Take 40 mg by mouth once daily.      dexAMETHasone (DECADRON) 4 MG Tab Take 2 tablets (8 mg total) by mouth once daily. on days 2-4 of each chemotherapy cycle 6 tablet 3    ergocalciferol (ERGOCALCIFEROL) 50,000 unit Cap Take 1 capsule (50,000 Units total) by mouth every 7 days. 8 capsule 0    gabapentin (NEURONTIN) 300 MG capsule Take 1 capsule (300 mg total) by mouth every evening. 30 capsule 1    hydrocortisone 2.5 %  cream Apply topically 2 (two) times daily. 28 g 0    ibuprofen (ADVIL,MOTRIN) 800 MG tablet Take 1 tablet (800 mg total) by mouth 3 (three) times daily as needed for Pain. 30 tablet 0    ketoconazole (NIZORAL) 2 % shampoo Apply topically twice a week. (Patient not taking: Reported on 1/2/2025) 120 mL 2    OLANZapine (ZYPREXA) 5 MG tablet Take 1 tablet by mouth nightly on days 1-4 of each chemotherapy cycle. 4 tablet 3    ondansetron (ZOFRAN) 4 MG tablet Take 1 tablet (4 mg total) by mouth every 6 (six) hours as needed for Nausea. 30 tablet 1    potassium chloride (K-TAB) 20 mEq Take 2 tablets (40 mEq total) by mouth once daily. 28 tablet 0     Current Schedule Inpatient Medications:   enoxparin  40 mg Subcutaneous Daily    mupirocin   Nasal BID    piperacillin-tazobactam (Zosyn) IV (PEDS and ADULTS) (extended infusion is not appropriate)  4.5 g Intravenous Q8H    vancomycin 750 mg in D5W 250 mL IVPB (admixture device)  750 mg Intravenous Q12H     Continuous Infusions:    Assessment:   CP/Abdominal Pain   CAD    - LHC (4.28.18): stenting of the proximal RCA for 99% stenosis using 3 x 16 mm PIEDAD; Balloon angioplasty or after the ostial PDA using a 2.5 x 12 balloon    - Hx STEMI  ICMO    - LVEF 45-50% (9.28.23)  Leukocytosis  HTN  Lung CA    Plan:   Echo pending.   Start ASA 81 mg daily due to hx of CAD.   Resume statin.         Thank you for your consult.     CANDELARIO Fernández  Cardiology  [facility]                           --------------   CARDIOLOGY ATTENDING ADDENDUM   ---------------  01/19/2025 1:51 PM    I evaluated Ranjeet Ball.  The patient is a 39 y.o. male with:   Chief Complaint   Patient presents with    Abdominal Pain     Pt arrives via [facility] unit 1 for N/V x 1 hr with diffuse abd pain and chest pain. History of MI and Lung cancer. On chemo last treatment 3wks ago sched to go wed next week.          ROS    GEN   No fever  No weakness  RESP  Occas SOB  Previously had wheezing  SKIN   "+ pruritus  + rash on back  CARD  + CP occurs at random, lasts for a few minutes, NTG seems to help No palpitations  VASC  No cyanosis  No claudication  HEM   No adenopathy  No bleeding  GI  No heart burn  No melena  NEURO  No dizziness  No syncope       Scheduled Medications   aspirin  81 mg Oral Daily    atorvastatin  40 mg Oral QHS    enoxparin  40 mg Subcutaneous Daily    mupirocin   Nasal BID    piperacillin-tazobactam (Zosyn) IV (PEDS and ADULTS) (extended infusion is not appropriate)  4.5 g Intravenous Q8H    vancomycin 750 mg in D5W 250 mL IVPB (admixture device)  750 mg Intravenous Q12H       Blood pressure 110/67, pulse 82, temperature 97.6 °F (36.4 °C), temperature source Oral, resp. rate 18, height 5' 6" (1.676 m), weight 52.8 kg (116 lb 6.5 oz), SpO2 98%.  PE    GEN  No acute distress  Not ill appearing  NECK  Normal carotid upstroke and volume  Supple  CV   Normal rate  Regular rhythm    PUL  No respiratory distress  No wheezing  No crackles     ABD  No distention  No tenderness  LOW EXT   No deformity   No edema    SKIN  No bruising  No rash  NEURO  Awake and alert    No severe weakness in upper or lower extremities.       Labs  Last BMP BMP  Lab Results   Component Value Date     01/19/2025    K 4.1 01/19/2025     01/19/2025    CO2 21 (L) 01/19/2025    BUN 11.6 01/19/2025    CREATININE 0.81 01/19/2025    CALCIUM 9.8 01/19/2025    EGFRNORACEVR >60 01/19/2025      Last CBC     Lab Results   Component Value Date    WBC 12.24 (H) 01/19/2025    HGB 13.5 (L) 01/19/2025    HCT 40.4 (L) 01/19/2025    MCV 99.3 (H) 01/19/2025     01/19/2025           BNP    Lab Results   Component Value Date    BNP 10.1 01/18/2025    BNP 30.3 12/14/2024    BNP 27 03/10/2020     Troponin   Lab Results   Component Value Date    TROPONINI <0.010 01/19/2025    TROPONINI <0.010 01/19/2025    TROPONINI <0.010 01/18/2025     Last lipids    Lab Results   Component Value Date    CHOL 129 01/02/2024    HDL 41 " 01/02/2024    LDL 78.00 01/02/2024    TRIG 49 01/02/2024    TOTALCHOLEST 3 01/02/2024         Echo  No results found for this or any previous visit.    Stress test  No results found for this or any previous visit.    Coronary Angiogram  Results for orders placed in visit on 04/26/18    CATH LAB PROCEDURE    Holter Monitor  No cardiac monitor results found for the past 12 months    Assessment/Plan  I agree with the assessment and plan as outlined above  Patient states he has f/u a[[t with Dr Mandel on 1/30/25    Sonu Givens MD    Cardiologist

## 2025-01-20 LAB — VANCOMYCIN TROUGH SERPL-MCNC: 12.1 UG/ML (ref 15–20)

## 2025-01-20 PROCEDURE — 63600175 PHARM REV CODE 636 W HCPCS: Performed by: STUDENT IN AN ORGANIZED HEALTH CARE EDUCATION/TRAINING PROGRAM

## 2025-01-20 PROCEDURE — 25000003 PHARM REV CODE 250

## 2025-01-20 PROCEDURE — 25000003 PHARM REV CODE 250: Performed by: NURSE PRACTITIONER

## 2025-01-20 PROCEDURE — 80202 ASSAY OF VANCOMYCIN: CPT | Performed by: INTERNAL MEDICINE

## 2025-01-20 PROCEDURE — 63600175 PHARM REV CODE 636 W HCPCS: Performed by: NURSE PRACTITIONER

## 2025-01-20 PROCEDURE — 36415 COLL VENOUS BLD VENIPUNCTURE: CPT | Performed by: INTERNAL MEDICINE

## 2025-01-20 PROCEDURE — 21400001 HC TELEMETRY ROOM

## 2025-01-20 PROCEDURE — 25000003 PHARM REV CODE 250: Performed by: INTERNAL MEDICINE

## 2025-01-20 PROCEDURE — 25000003 PHARM REV CODE 250: Performed by: STUDENT IN AN ORGANIZED HEALTH CARE EDUCATION/TRAINING PROGRAM

## 2025-01-20 RX ADMIN — ATORVASTATIN CALCIUM 40 MG: 40 TABLET, FILM COATED ORAL at 09:01

## 2025-01-20 RX ADMIN — OXYCODONE HYDROCHLORIDE 5 MG: 5 TABLET ORAL at 12:01

## 2025-01-20 RX ADMIN — MUPIROCIN: 20 OINTMENT TOPICAL at 09:01

## 2025-01-20 RX ADMIN — ENOXAPARIN SODIUM 40 MG: 40 INJECTION SUBCUTANEOUS at 06:01

## 2025-01-20 RX ADMIN — PIPERACILLIN SODIUM AND TAZOBACTAM SODIUM 4.5 G: 4; .5 INJECTION, POWDER, LYOPHILIZED, FOR SOLUTION INTRAVENOUS at 06:01

## 2025-01-20 RX ADMIN — PIPERACILLIN SODIUM AND TAZOBACTAM SODIUM 4.5 G: 4; .5 INJECTION, POWDER, LYOPHILIZED, FOR SOLUTION INTRAVENOUS at 05:01

## 2025-01-20 RX ADMIN — PIPERACILLIN SODIUM AND TAZOBACTAM SODIUM 4.5 G: 4; .5 INJECTION, POWDER, LYOPHILIZED, FOR SOLUTION INTRAVENOUS at 10:01

## 2025-01-20 RX ADMIN — HYDROMORPHONE HYDROCHLORIDE 1 MG: 2 INJECTION, SOLUTION INTRAMUSCULAR; INTRAVENOUS; SUBCUTANEOUS at 10:01

## 2025-01-20 RX ADMIN — Medication 81 MG: at 10:01

## 2025-01-20 RX ADMIN — Medication 6 MG: at 12:01

## 2025-01-20 RX ADMIN — VANCOMYCIN HYDROCHLORIDE 750 MG: 750 INJECTION, POWDER, LYOPHILIZED, FOR SOLUTION INTRAVENOUS at 06:01

## 2025-01-20 RX ADMIN — VANCOMYCIN HYDROCHLORIDE 750 MG: 750 INJECTION, POWDER, LYOPHILIZED, FOR SOLUTION INTRAVENOUS at 10:01

## 2025-01-20 RX ADMIN — MUPIROCIN: 20 OINTMENT TOPICAL at 10:01

## 2025-01-20 RX ADMIN — HYDROMORPHONE HYDROCHLORIDE 1 MG: 2 INJECTION, SOLUTION INTRAMUSCULAR; INTRAVENOUS; SUBCUTANEOUS at 06:01

## 2025-01-20 NOTE — PLAN OF CARE
Problem: Adult Inpatient Plan of Care  Goal: Absence of Hospital-Acquired Illness or Injury  Outcome: Progressing  Goal: Optimal Comfort and Wellbeing  Outcome: Progressing     Problem: Acute Kidney Injury/Impairment  Goal: Fluid and Electrolyte Balance  Outcome: Progressing  Goal: Improved Oral Intake  Outcome: Progressing  Goal: Effective Renal Function  Outcome: Progressing     Problem: Infection  Goal: Absence of Infection Signs and Symptoms  Outcome: Progressing

## 2025-01-20 NOTE — PROGRESS NOTES
[facility] *    Cardiology  Progress Note    Patient Name: Ranjeet Ball  MRN: [id number]  Admission Date: 1/18/2025  Hospital Length of Stay: 1 days  Code Status: Full Code   Attending Provider: Bruce Barrow MD   Consulting Provider: CANDELARIO Fernández  Primary Care Physician: Matty Gomez FNP  Principal Problem:<principal problem not specified>    Patient information was obtained from patient, past medical records, and ER records.     Subjective:     Reason for Consult: CP    HPI: Mr. Ball is a 39 year old male who is known to [facility], Dr. Mandel. He presents to the ER with complaints of CP & abdominal pain. He reports associated symptoms of nausea & vomiting x 1 hour prior. He denies palpitations, fever, or chills. Significant labs include WBC 12.24, H&H 13.5/40.4. His troponin levels are negative x 3. CT abdomen and pelvis were obtained and demonstrated an increase in size of heterogeneous left adrenal gland lesion currently measuring 2.3 x 2.8 cm with concern for neoplastic process and a new 4 mm pulmonary nodule in the right lung base. CXR was obtained and negative for acute findings. [facility] has been consulted to further evaluate the patient's CP.     1.20.25: NAD. Denies CP, SOB, or palps. IV antibiotics improving. SR on tele. BP stable.     PMH: CAD, HTN, HLD, VF, ICMO, lung CA  PSH: L Distal Radius ORIF, Right ORIF, LHC   Family History: Mother - DM 2, heart disease; Father - CA  Social History: Former tobacco use. Reports occasional alcohol use. + marijuana use.     Previous Cardiac Diagnostics:   TTE (1.19.25):  Left Ventricle: The left ventricle is dilated. Normal wall thickness. There is normal systolic function with a visually estimated ejection fraction of 60 - 65%. Grade I diastolic dysfunction.  Right Ventricle: Normal right ventricular cavity size. Systolic function could not be assesed.  Aortic Valve: There is mild to moderate aortic regurgitation.  IVC/SVC: Normal  venous pressure at 3 mmHg.  Pericardium: There is no pericardial effusion.    BLE Arterial US (1.22.24):  The study quality is good.   Triphasic waveforms noted throughout bilateral lower extremities with no significant stenosis detected.     Carotid US (1.22.24):  The study quality is good.   Triphasic waveforms noted throughout bilateral lower extremities with no significant stenosis detected.     Holter Monitor (10.13.23):  This is a good quality study.   Predominant rhythm is normal sinus rhythm.   The minimum heart rate recorded was 45 beats / minute (sinus bradycardia, 10/15/2023). The maximum heart rate is 152 beats / minute (sinus tachycardia, 10/13/2023). The mean heart rate is 77 beats / minute.   No evidence of AV block is noted.   No evidence of supraventricular tachycardia is noted.  No evidence of atrial fibrillation noted.   No evidence of ventricular tachycardia is noted.  No evidence of supraventricular arrhythmia is noted.  No evidence of ventricular arrhythmia is noted.  No pauses were noted.     MPI (10.11.23):  This is probably a normal perfusion study.   This scan is suggestive of low risk for future cardiovascular events.   Small reversible perfusion abnormality of moderate intensity in the apical segment. Large fixed perfusion abnormality of mild intensity in the inferior region.   Improvement with prone imaging suggesting artifact is noted.   The left ventricular cavity is noted to be normal on the stress study. The left ventricular ejection fraction was calculated to be 44% and left ventricular global function is mildly reduced.   The study quality is average.     TTE (9.28.23):  The study quality is good.   The left ventricle is normal in size.  Global left ventricular systolic function is mildly decreased. The left ventricular ejection fraction is 45-50%. Left ventricular diastolic function is normal.   Mild (1+) pulmonic and aortic regurgitation.   The pulmonary artery appears normal.      TTE (5.26.22):  The study quality is average.   The left ventricle is decreased in size. Global left ventricular systolic function is mildly decreased. The left ventricular ejection fraction is 40%.   Trace pulmonic, trace mitral, trace tricuspid, and mild (1+) aortic regurgitation.     LHC (4.28.18):  S/p stenting of the proximal RCA for 99% stenosis using 3 x 16 mm PIEDAD  S/p balloon angioplasty or after the ostial PDA using a 2.5 x 12 balloon     [facility] (3.11.18):   of right coronary artery  99% lesion of the ostium/proximal left anterior descending successfully treated w/ a 3.5 mm x 20 Synergy drug-eluting stent w/ 0% residual stenosis and post inflated to 3.75 mm  30-40% lesion in the proximal circumflex  Elevated left ventricular end-diastolic pressure of 25-30 mmHg  Cardiogenic shock w/ PAPAI 0.4 and  0.77 with ejection fraction of 25-30% with hypokinesis of the anterior wall and akinesis of the inferior wall  No aortic stenosis or mitral regurgitation         Review of patient's allergies indicates:  No Known Allergies  Current Facility-Administered Medications on File Prior to Encounter   Medication    0.9%  NaCl infusion    0.9%  NaCl infusion    LIDOcaine (PF) 10 mg/ml (1%) injection 10 mg     Current Outpatient Medications on File Prior to Encounter   Medication Sig    atorvastatin (LIPITOR) 40 MG tablet Take 40 mg by mouth once daily.    dexAMETHasone (DECADRON) 4 MG Tab Take 2 tablets (8 mg total) by mouth once daily. on days 2-4 of each chemotherapy cycle    ergocalciferol (ERGOCALCIFEROL) 50,000 unit Cap Take 1 capsule (50,000 Units total) by mouth every 7 days.    gabapentin (NEURONTIN) 300 MG capsule Take 1 capsule (300 mg total) by mouth every evening.    hydrocortisone 2.5 % cream Apply topically 2 (two) times daily.    ibuprofen (ADVIL,MOTRIN) 800 MG tablet Take 1 tablet (800 mg total) by mouth 3 (three) times daily as needed for Pain.    ketoconazole (NIZORAL) 2 % shampoo Apply topically  twice a week. (Patient not taking: Reported on 1/2/2025)    OLANZapine (ZYPREXA) 5 MG tablet Take 1 tablet by mouth nightly on days 1-4 of each chemotherapy cycle.    ondansetron (ZOFRAN) 4 MG tablet Take 1 tablet (4 mg total) by mouth every 6 (six) hours as needed for Nausea.    potassium chloride (K-TAB) 20 mEq Take 2 tablets (40 mEq total) by mouth once daily.       Review of Systems   Respiratory:  Negative for shortness of breath.    Cardiovascular:  Negative for chest pain and palpitations.   Gastrointestinal:  Positive for nausea and vomiting.       Objective:     Vital Signs (Most Recent):  Temp: 97.5 °F (36.4 °C) (01/20/25 1116)  Pulse: 71 (01/20/25 1116)  Resp: 18 (01/20/25 1041)  BP: 118/79 (01/20/25 1116)  SpO2: 99 % (01/20/25 1116) Vital Signs (24h Range):  Temp:  [97.5 °F (36.4 °C)-97.9 °F (36.6 °C)] 97.5 °F (36.4 °C)  Pulse:  [71-86] 71  Resp:  [17-18] 18  SpO2:  [95 %-99 %] 99 %  BP: ()/(65-79) 118/79   Weight: 52.8 kg (116 lb 6.5 oz)  Body mass index is 18.79 kg/m².  SpO2: 99 %       Intake/Output Summary (Last 24 hours) at 1/20/2025 1424  Last data filed at 1/20/2025 1404  Gross per 24 hour   Intake 119 ml   Output --   Net 119 ml     Lines/Drains/Airways       Central Venous Catheter Line  Duration             Port A Cath Single Lumen 07/31/24 Internal Jugular Right 173 days              Peripheral Intravenous Line  Duration                  Peripheral IV - Single Lumen 01/18/25 2234 18 G Anterior;Distal;Right Upper Arm 1 day                  Significant Labs:   Chemistries:   Recent Labs   Lab 01/18/25  2232 01/19/25  0448 01/19/25  1109 01/19/25  1804    136  --   --    K 3.5 4.1  --   --     102  --   --    CO2 24 21*  --   --    BUN 15.1 11.6  --   --    CREATININE 0.84 0.81  --   --    CALCIUM 9.8 9.8  --   --    BILITOT 0.2 0.4  --   --    ALKPHOS 95 97  --   --    ALT 12 14  --   --    AST 13 14  --   --    GLUCOSE 127* 146*  --   --    MG 1.90 1.80  --   --    TROPONINI  "<0.010 <0.010 <0.010 <0.010        CBC/Anemia Labs: Coags:    Recent Labs   Lab 01/18/25  2232 01/19/25  0448   WBC 14.39* 12.24*   HGB 13.5* 13.5*   HCT 40.8* 40.4*    341   MCV 99.3* 99.3*   RDW 13.8 13.7    No results for input(s): "PT", "INR", "APTT" in the last 168 hours.     Significant Imaging:  Imaging Results              CTA Chest Non-Coronary (PE Studies) (Final result)  Result time 01/19/25 09:43:04      Final result by Genaro Maier MD (01/19/25 09:43:04)                   Impression:      No evidence of pulmonary thromboembolism.    No significant change in the sizes of the nodular opacities with associated ground-glass component in the right lung and left lower lobe. The largest of these nodules is still in the apical segment of the right upper lobe and measures 0.9 x 0.7 cm on Image 23 Series 9. No new nodules seen. Correlate with clinical and laboratory findings as regards additional evaluation and follow-up.    There is no significant change in the partially calcified confluent lymph nodes in the right parahilar space. Rosina measures 2.7 x 2.9 cm on Image 196 Series 6. Likewise, the rest of the enlarged hilar lymph nodes appear stable.    Details and other findings as above.      Electronically signed by: Genaro Maier  Date:    01/19/2025  Time:    09:43               Narrative:    EXAMINATION:  CTA CHEST NON CORONARY (PE STUDIES)    CLINICAL HISTORY:  Pulmonary embolism (PE) suspected, high prob;    TECHNIQUE:  Axial images of the chest were obtained with contrast. Sagittal and coronal reconstructed images were available for review. 3-D MIP reconstructions were performed from source imaging.    Automatic dose control was utilized to reduce patient radiation dose.    DLP= 668    COMPARISON:  No prior imaging available for comparison.    FINDINGS:  Soft Tissues: Unremarkable.Axilla: A few mildly prominent lymph nodes are seen in the axilla.Neck: The visualized soft tissues of the neck " appear unremarkable.Mediastinum: There is no significant change in the partially calcified confluent lymph nodes in the right parahilar space. Rosina measures 2.7 x 2.9 cm on Image 196 Series 6. Likewise, the rest of the enlarged hilar lymph nodes appear stable.Heart: The heart appears unremarkable.Aorta: Unremarkable appearing aorta.Pulmonary Arteries: Unremarkable.Lungs: There is mild non specific dependent change at the lung bases. Stable moderate paraseptal emphysematous changes and scarring are seen in both lungs, predominantly in the upper lobes. No significant change in the sizes of the nodular opacities with associated ground-glass component in the right lung and left lower lobe. The largest of these nodules is still in the apical segment of the right upper lobe and measures 0.9 x 0.7 cm on Image 23 Series 9. No new nodules seen.Pleura: No effusions or pneumothorax are identified.Bony Structures:Spine: Mild multilevel spondylolytic changes are seen in the thoracic spine.Ribs: The ribs appear unremarkable.Abdomen: Abdominal findings will be discussed separately in the abdomen CT report.                        Preliminary result by Sonu Diehl MD (01/19/25 00:14:52)                   Impression:    1. No significant change in the sizes of the nodular opacities with associated ground-glass component in the right lung and left lower lobe. The largest of these nodules is still in the apical segment of the right upper lobe and measures 0.9 x 0.7 cm on Image 23 Series 9. No new nodules seen. Correlate with clinical and laboratory findings as regards additional evaluation and follow-up.  2. There is no significant change in the partially calcified confluent lymph nodes in the right parahilar space. Rosina measures 2.7 x 2.9 cm on Image 196 Series 6. Likewise, the rest of the enlarged hilar lymph nodes appear stable.  3. No acute intrathoracic process identified. Details and other findings as discussed above.                Narrative:    START OF REPORT:  Technique: CT Scan of the chest was performed with intravenous contrast with direct axial images as well as sagittal and coronal reconstruction images.    Dosage Information: Automated Exposure Control was utilized.    Comparison: Comparison is with study dated2024-12-14 21:49:45.    Clinical History: Pt arrives via [facility] unit 1 for N/V x 1 hr with diffuse abd pain and chest pain. History of MI and Lung cancer. On chemo last treatment 3wks ago sched to go wed next wee.    Findings:  Soft Tissues: Unremarkable.  Axilla: A few mildly prominent lymph nodes are seen in the axilla.  Neck: The visualized soft tissues of the neck appear unremarkable.  Mediastinum: There is no significant change in the partially calcified confluent lymph nodes in the right parahilar space. Rosina measures 2.7 x 2.9 cm on Image 196 Series 6. Likewise, the rest of the enlarged hilar lymph nodes appear stable.  Heart: The heart appears unremarkable.  Aorta: Unremarkable appearing aorta.  Pulmonary Arteries: Unremarkable.  Lungs: There is mild non specific dependent change at the lung bases. Stable moderate paraseptal emphysematous changes and scarring are seen in both lungs, predominantly in the upper lobes. No significant change in the sizes of the nodular opacities with associated ground-glass component in the right lung and left lower lobe. The largest of these nodules is still in the apical segment of the right upper lobe and measures 0.9 x 0.7 cm on Image 23 Series 9. No new nodules seen.  Pleura: No effusions or pneumothorax are identified.  Bony Structures:  Spine: Mild multilevel spondylolytic changes are seen in the thoracic spine.  Ribs: The ribs appear unremarkable.  Abdomen: Abdominal findings will be discussed separately in the abdomen CT report.                                         CT Abdomen Pelvis With IV Contrast NO Oral Contrast (Final result)  Result time 01/19/25 10:42:00      Final  result by Genaro Maier MD (01/19/25 10:42:00)                   Impression:      The bladder wall is diffusely thickened.  Correlate with urinalysis.    Increase in size of heterogeneous left adrenal gland lesion currently measuring 2.3 x 2.8 cm.  Concern for neoplastic process.    There is a new 4 mm pulmonary nodule in the right lung base (series 1, image 23). There is a mild increase in the size of the 7 mm to pulmonary nodule with the visualized middle lobe (series 1, image 17). Correlate with clinical and laboratory findings as regards to additional evaluation and follow-up.      Electronically signed by: Genaro Maier  Date:    01/19/2025  Time:    10:42               Narrative:    EXAMINATION:  CT ABDOMEN PELVIS WITH IV CONTRAST    CLINICAL HISTORY:  Abdominal pain, acute, nonlocalized;    TECHNIQUE:  Multidetector IV contrast enhanced axial CT images of the abdomen and pelvis were obtained with coronal and sagittal reconstructions.    Automatic exposure control was utilized to reduce the patient's radiation dose.    DLP= NA    COMPARISON:  01/07/2025    FINDINGS:  01. HEPATOBILIARY: No focal hepatic lesion is identified, The gallbladder is normal.    02. SPLEEN: Normal    03. PANCREAS: No focal masses or ductal dilatation.    04. ADRENALS: Increase in size of heterogeneous left adrenal gland lesion currently measuring 2.3 x 2.8 cm.  Concern for neoplastic process.    05. KIDNEYS: The right kidney demonstrates no stone, hydronephrosis, or hydroureter. No focal mass identified. The left kidney demonstrates no stone, hydronephrosis, or hydroureter. No focal mass identified.    06. LYMPHADENOPATHY/RETROPERITONEUM: There is no retroperitoneal lymphadenopathy. The abdominal aorta is normal in course and caliber.    07. BOWEL: No acute bowel related abnormalities. No evidence of appendiceal inflammation.    08. PELVIC VISCERA: The bladder wall is diffusely thickened..  No pelvic mass.    09. PELVIC LYMPH  NODES: No lymphadenopathy.    10. PERITONEUM/ABDOMINAL WALL: No ascites or implant.    11. SKELETAL: No aggressive appearing lytic/blastic lesion. No acute fractures, subluxations or dislocations.    12. LUNG BASES: There is a new 4 mm pulmonary nodule in the right lung base (series 1, image 23).  There is a mild increase in the size of the 7 mm to pulmonary nodule with the visualized middle lobe (series 1, image 17).  Correlate with clinical and laboratory findings as regards to additional evaluation and follow-up.                        Preliminary result by Sonu Diehl MD (01/19/25 00:10:23)                   Impression:    1. There is a new 4 mm pulmonary nodule in the right lung base (Series 1, Image 23). There is a mild increase in size of the 7 mm pulmonary nodule in the visualized middle lobe (Series 1, Image 17). Correlate with clinical and laboratory findings as regards additional evaluation and follow-up.  2. There is an increase in the size of the heterogeneous lesion in the left adrenal gland, with the current measurement of 2.3 x 2.8 cm (Series 2, Image 31), compared to the previous measurement of 1.8 x 1.9 cm. This raises concern for a neoplastic process (primary or metastatic). Correlate with clinical and laboratory findings as regards additional evaluation and follow-up.  3. No acute intraabdominal or pelvic solid organ or bowel pathology identified. Details and other findings as discussed above.               Narrative:    START OF REPORT:  Technique: CT of the abdomen and pelvis was performed with axial images as well as sagittal and coronal reconstruction images with intravenous contrast.    Comparison: Comparison is with study dated 2024-12-14 21:49:45.    Clinical History: Pt arrives via [facility] unit 1 for N/V x 1 hr with diffuse abd pain and chest pain. History of MI and Lung cancer. On chemo last treatment 3wks ago sched to go wed next wee.    Dosage Information: Automated Exposure Control was  utilized.    Findings:  Lines and Tubes: None.  Thorax:  Lungs: Minimal linear opacity is present at the visualized lung bases, consistent with nonspecific dependent changes scarring and atelectasis. There is a new 4 mm pulmonary nodule in the right lung base (Series 1, Image 23). There is a mild increase in size of the 7 mm pulmonary nodule in the visualized middle lobe (Series 1, Image 17).  Pleura: No effusions or thickening are seen.  Heart: The heart size is within normal limits.  Abdomen:  Abdominal Wall: No abdominal wall pathology is seen.  Liver: The liver appears unremarkable.  Biliary System: No intrahepatic or extrahepatic biliary duct dilatation is seen.  Gallbladder: The gallbladder appears unremarkable with no stones wall thickening or pericholecystic inflammatory changes or fluid.  Pancreas: The pancreas appears unremarkable.  Spleen: Moderately enlarged spleen.  Adrenals: The right adrenal gland appears unremarkable. There is an increase in the size of the heterogeneous lesion in the left adrenal gland, with the current measurement of 2.3 x 2.8 cm (Series 2, Image 31), compared to the previous measurement of 1.8 x 1.9 cm. This raises concern for a neoplastic process (primary or metastatic).  Kidneys: The kidneys appear unremarkable with no stones cysts masses or hydronephrosis with IV contrast decreasing sensitivity and specificity for stones.  Aorta: The visualized abdominal aorta appears unremarkable.  IVC: Unremarkable.  Bowel:  Esophagus: The visualized distal esophagus appears unremarkable.  Stomach: The stomach appears unremarkable.  Duodenum: Unremarkable appearing duodenum.  Small Bowel: The small bowel appears unremarkable.  Colon: Nondistended.  Appendix: The appendix is not identified but no inflammatory changes are seen in the right lower quadrant to suggest appendicitis.  Peritoneum: No intraperitoneal free air or ascites is seen.    Pelvis:  Bladder: The bladder is nondistended but  appears otherwise unremarkable.    Bony structures:  Dorsal Spine: There is subtle multilevel spondylosis of the visualized dorsal spine.  Bony Pelvis: A stable osseous defect is present in the anterior aspect of the left iliac bone.                                         X-Ray Chest AP Portable (Final result)  Result time 01/18/25 22:19:27      Final result by Jerry Christianson MD (01/18/25 22:19:27)                   Impression:      No acute cardiopulmonary process identified.      Electronically signed by: Jerry Christianson  Date:    01/18/2025  Time:    22:19               Narrative:    EXAMINATION:  XR CHEST AP PORTABLE    CLINICAL HISTORY:  Chest pain, unspecified    TECHNIQUE:  One view    COMPARISON:  July 31, 2024.    FINDINGS:  Cardiopericardial silhouette is within normal limits.  MediPort terminates within the superior vena cava.  No acute dense focal or segmental consolidation, congestive process, pleural effusions or pneumothorax.                                    EKG:       Telemetry:  SR    Physical Exam  HENT:      Head: Normocephalic.      Nose: Nose normal.      Mouth/Throat:      Mouth: Mucous membranes are moist.   Eyes:      Extraocular Movements: Extraocular movements intact.   Cardiovascular:      Rate and Rhythm: Normal rate and regular rhythm.      Pulses: Normal pulses.   Pulmonary:      Effort: Pulmonary effort is normal.   Abdominal:      Palpations: Abdomen is soft.   Skin:     General: Skin is warm and dry.   Neurological:      Mental Status: He is alert and oriented to person, place, and time.   Psychiatric:         Behavior: Behavior normal.         Home Medications:   Current Facility-Administered Medications on File Prior to Encounter   Medication Dose Route Frequency Provider Last Rate Last Admin    0.9%  NaCl infusion   Intravenous Continuous Lexi Lynn MD        0.9%  NaCl infusion   Intravenous Continuous Lexi Lynn MD        LIDOcaine (PF) 10 mg/ml (1%)  injection 10 mg  1 mL Intradermal Once Lexi Lynn MD         Current Outpatient Medications on File Prior to Encounter   Medication Sig Dispense Refill    atorvastatin (LIPITOR) 40 MG tablet Take 40 mg by mouth once daily.      dexAMETHasone (DECADRON) 4 MG Tab Take 2 tablets (8 mg total) by mouth once daily. on days 2-4 of each chemotherapy cycle 6 tablet 3    ergocalciferol (ERGOCALCIFEROL) 50,000 unit Cap Take 1 capsule (50,000 Units total) by mouth every 7 days. 8 capsule 0    gabapentin (NEURONTIN) 300 MG capsule Take 1 capsule (300 mg total) by mouth every evening. 30 capsule 1    hydrocortisone 2.5 % cream Apply topically 2 (two) times daily. 28 g 0    ibuprofen (ADVIL,MOTRIN) 800 MG tablet Take 1 tablet (800 mg total) by mouth 3 (three) times daily as needed for Pain. 30 tablet 0    ketoconazole (NIZORAL) 2 % shampoo Apply topically twice a week. (Patient not taking: Reported on 1/2/2025) 120 mL 2    OLANZapine (ZYPREXA) 5 MG tablet Take 1 tablet by mouth nightly on days 1-4 of each chemotherapy cycle. 4 tablet 3    ondansetron (ZOFRAN) 4 MG tablet Take 1 tablet (4 mg total) by mouth every 6 (six) hours as needed for Nausea. 30 tablet 1    potassium chloride (K-TAB) 20 mEq Take 2 tablets (40 mEq total) by mouth once daily. 28 tablet 0     Current Schedule Inpatient Medications:   aspirin  81 mg Oral Daily    atorvastatin  40 mg Oral QHS    enoxparin  40 mg Subcutaneous Daily    mupirocin   Nasal BID    piperacillin-tazobactam (Zosyn) IV (PEDS and ADULTS) (extended infusion is not appropriate)  4.5 g Intravenous Q8H    vancomycin 750 mg in D5W 250 mL IVPB (admixture device)  750 mg Intravenous Q12H     Continuous Infusions:    Assessment:   CP/Abdominal Pain   CAD    - LHC (4.28.18): stenting of the proximal RCA for 99% stenosis using 3 x 16 mm PIEDAD; Balloon angioplasty or after the ostial PDA using a 2.5 x 12 balloon    - Hx STEMI  ICMO    - LVEF 45-50% (9.28.23)  Leukocytosis  HTN  Lung CA    Plan:    Echo reviewed. LVEF intact. No wall motion abnormalities noted.   Continue ASA 81 mg daily due to hx of CAD.   Resume statin.   His chest pain has resolved. No further cardiac workup needed.  Keep scheduled f/u with Dr. Mandel.       Jess Hearn, P  Cardiology  [facility]

## 2025-01-20 NOTE — DISCHARGE SUMMARY
[facility]  Hospital Medicine Progress Note      Chief Complaint:  Abdominal pain       HPI: (personally reviewed by me and is documented from initial H&P)     Ranjeet Ball is a 39 y.o. male who  has a past medical history of Cancer, Hyperlipidemia, and Myocardial infarction..     The patient presented to [facility] on 1/18/2025 with a primary complaint of   t medical history of lung cancer, currently on chemotherapy, CAD s/p stents/MI presenting to the ED via EMS for evaluation of chest and abdominal pain with associated nausea and vomiting onset 1 hour prior to arrival. Patient goes to [facility] for oncology services.         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  Patient tells me he hates to boost and would like ensure but there is no ensure in-house, will change to strawberry boost.  Nurse notified of change.    Otherwise all needs are met.    Objective Assessment:  Physical Exam      Vital signs have been personally reviewed by me   General: Appears comfortable, no acute distress.  Neuro:     Integumentary: Warm, dry, intact.  Musculoskeletal: Purposeful movement noted.     Respiratory: No accessory muscle use. Breath sounds are equal.  Cardiovascular: Regular rate.       VITAL SIGNS: 24 HRS MIN & MAX LAST   Temp  Min: 97.5 °F (36.4 °C)  Max: 97.9 °F (36.6 °C) 97.5 °F (36.4 °C)   BP  Min: 99/66  Max: 122/78 112/72   Pulse  Min: 78  Max: 86  83   Resp  Min: 17  Max: 18 18   SpO2  Min: 95 %  Max: 99 % 99 %     Echo    Left Ventricle: The left ventricle is dilated. Normal wall thickness.   There is normal systolic function with a visually estimated ejection   fraction of 60 - 65%. Grade I diastolic dysfunction.    Right Ventricle: Normal right ventricular cavity size. Systolic   function could  not be assesed.    Aortic Valve: There is mild to moderate aortic regurgitation.    IVC/SVC: Normal venous pressure at 3 mmHg.    Pericardium: There is no pericardial effusion.  CT Abdomen Pelvis With IV Contrast NO Oral Contrast  Narrative: EXAMINATION:  CT ABDOMEN PELVIS WITH IV CONTRAST    CLINICAL HISTORY:  Abdominal pain, acute, nonlocalized;    TECHNIQUE:  Multidetector IV contrast enhanced axial CT images of the abdomen and pelvis were obtained with coronal and sagittal reconstructions.    Automatic exposure control was utilized to reduce the patient's radiation dose.    DLP= NA    COMPARISON:  01/07/2025    FINDINGS:  01. HEPATOBILIARY: No focal hepatic lesion is identified, The gallbladder is normal.    02. SPLEEN: Normal    03. PANCREAS: No focal masses or ductal dilatation.    04. ADRENALS: Increase in size of heterogeneous left adrenal gland lesion currently measuring 2.3 x 2.8 cm.  Concern for neoplastic process.    05. KIDNEYS: The right kidney demonstrates no stone, hydronephrosis, or hydroureter. No focal mass identified. The left kidney demonstrates no stone, hydronephrosis, or hydroureter. No focal mass identified.    06. LYMPHADENOPATHY/RETROPERITONEUM: There is no retroperitoneal lymphadenopathy. The abdominal aorta is normal in course and caliber.    07. BOWEL: No acute bowel related abnormalities. No evidence of appendiceal inflammation.    08. PELVIC VISCERA: The bladder wall is diffusely thickened..  No pelvic mass.    09. PELVIC LYMPH NODES: No lymphadenopathy.    10. PERITONEUM/ABDOMINAL WALL: No ascites or implant.    11. SKELETAL: No aggressive appearing lytic/blastic lesion. No acute fractures, subluxations or dislocations.    12. LUNG BASES: There is a new 4 mm pulmonary nodule in the right lung base (series 1, image 23).  There is a mild increase in the size of the 7 mm to pulmonary nodule with the visualized middle lobe (series 1, image 17).  Correlate with clinical and laboratory  findings as regards to additional evaluation and follow-up.  Impression: The bladder wall is diffusely thickened.  Correlate with urinalysis.    Increase in size of heterogeneous left adrenal gland lesion currently measuring 2.3 x 2.8 cm.  Concern for neoplastic process.    There is a new 4 mm pulmonary nodule in the right lung base (series 1, image 23). There is a mild increase in the size of the 7 mm to pulmonary nodule with the visualized middle lobe (series 1, image 17). Correlate with clinical and laboratory findings as regards to additional evaluation and follow-up.    Electronically signed by: Genaro Maier  Date:    01/19/2025  Time:    10:42  CTA Chest Non-Coronary (PE Studies)  Narrative: EXAMINATION:  CTA CHEST NON CORONARY (PE STUDIES)    CLINICAL HISTORY:  Pulmonary embolism (PE) suspected, high prob;    TECHNIQUE:  Axial images of the chest were obtained with contrast. Sagittal and coronal reconstructed images were available for review. 3-D MIP reconstructions were performed from source imaging.    Automatic dose control was utilized to reduce patient radiation dose.    DLP= 668    COMPARISON:  No prior imaging available for comparison.    FINDINGS:  Soft Tissues: Unremarkable.Axilla: A few mildly prominent lymph nodes are seen in the axilla.Neck: The visualized soft tissues of the neck appear unremarkable.Mediastinum: There is no significant change in the partially calcified confluent lymph nodes in the right parahilar space. Rosina measures 2.7 x 2.9 cm on Image 196 Series 6. Likewise, the rest of the enlarged hilar lymph nodes appear stable.Heart: The heart appears unremarkable.Aorta: Unremarkable appearing aorta.Pulmonary Arteries: Unremarkable.Lungs: There is mild non specific dependent change at the lung bases. Stable moderate paraseptal emphysematous changes and scarring are seen in both lungs, predominantly in the upper lobes. No significant change in the sizes of the nodular opacities with  associated ground-glass component in the right lung and left lower lobe. The largest of these nodules is still in the apical segment of the right upper lobe and measures 0.9 x 0.7 cm on Image 23 Series 9. No new nodules seen.Pleura: No effusions or pneumothorax are identified.Bony Structures:Spine: Mild multilevel spondylolytic changes are seen in the thoracic spine.Ribs: The ribs appear unremarkable.Abdomen: Abdominal findings will be discussed separately in the abdomen CT report.  Impression: No evidence of pulmonary thromboembolism.    No significant change in the sizes of the nodular opacities with associated ground-glass component in the right lung and left lower lobe. The largest of these nodules is still in the apical segment of the right upper lobe and measures 0.9 x 0.7 cm on Image 23 Series 9. No new nodules seen. Correlate with clinical and laboratory findings as regards additional evaluation and follow-up.    There is no significant change in the partially calcified confluent lymph nodes in the right parahilar space. Rosina measures 2.7 x 2.9 cm on Image 196 Series 6. Likewise, the rest of the enlarged hilar lymph nodes appear stable.    Details and other findings as above.    Electronically signed by: Genaro Maier  Date:    01/19/2025  Time:    09:43    Recent Labs   Lab 01/18/25 2232 01/19/25 0448   WBC 14.39* 12.24*   RBC 4.11* 4.07*   HGB 13.5* 13.5*   HCT 40.8* 40.4*   MCV 99.3* 99.3*   MCH 32.8* 33.2*   MCHC 33.1 33.4   RDW 13.8 13.7    341   MPV 8.7 8.7       Recent Labs   Lab 01/18/25 2232 01/19/25 0448    136   K 3.5 4.1    102   CO2 24 21*   BUN 15.1 11.6   CREATININE 0.84 0.81   CALCIUM 9.8 9.8   MG 1.90 1.80   ALBUMIN 3.9 4.0   ALKPHOS 95 97   ALT 12 14   AST 13 14   BILITOT 0.2 0.4     Microbiology Results (last 7 days)       Procedure Component Value Units Date/Time    Blood culture #1 **CANNOT BE ORDERED STAT** [[id number]]  (Normal) Collected: 01/19/25 0448     Order Status: Completed Specimen: Blood from Arm, Left Updated: 01/20/25 0901     Blood Culture No Growth At 24 Hours    Blood culture #2 **CANNOT BE ORDERED STAT** [[id number]]  (Normal) Collected: 01/19/25 0448    Order Status: Completed Specimen: Blood from Arm, Right Updated: 01/20/25 0901     Blood Culture No Growth At 24 Hours               Medications for Hospital Course     Scheduled Med:   aspirin  81 mg Oral Daily    atorvastatin  40 mg Oral QHS    enoxparin  40 mg Subcutaneous Daily    mupirocin   Nasal BID    piperacillin-tazobactam (Zosyn) IV (PEDS and ADULTS) (extended infusion is not appropriate)  4.5 g Intravenous Q8H    vancomycin 750 mg in D5W 250 mL IVPB (admixture device)  750 mg Intravenous Q12H      Continuous Infusions:       PRN Meds:    Current Facility-Administered Medications:     acetaminophen, 1,000 mg, Oral, Q6H PRN    albuterol-ipratropium, 3 mL, Nebulization, Q4H PRN    aluminum-magnesium hydroxide-simethicone, 30 mL, Oral, QID PRN    bisacodyL, 10 mg, Rectal, Daily PRN    dextrose 50%, 12.5 g, Intravenous, PRN    dextrose 50%, 25 g, Intravenous, PRN    glucagon (human recombinant), 1 mg, Intramuscular, PRN    glucose, 16 g, Oral, PRN    glucose, 24 g, Oral, PRN    HYDROmorphone, 1 mg, Intravenous, Q6H PRN    melatonin, 6 mg, Oral, Nightly PRN    naloxone, 0.02 mg, Intravenous, PRN    ondansetron, 4 mg, Intravenous, Q4H PRN    oxyCODONE, 5 mg, Oral, Q6H PRN    prochlorperazine, 5 mg, Intravenous, Q6H PRN    senna-docusate 8.6-50 mg, 1 tablet, Oral, BID PRN    sodium chloride 0.9%, 10 mL, Intravenous, PRN    vancomycin - pharmacy to dose, , Intravenous, pharmacy to manage frequency     Assessment and Plan          Chronic medical issues:  has a past medical history of Cancer, Hyperlipidemia, and Myocardial infarction..      __________________________________________________________________________________________________________________________________  Acute chest pain  Large cell  neuroendocrine carcinoma   Neuroendocrine carcinoma of the lung  Metastatic adrenal mass  New 4 mm pulmonary nodule      Increase in size of adrenal mass from previous imaging on 06/14/2024 in the new 4 mm pulmonary nodule in right lung base.  I do not see where a PET-CT scan has been performed but this will likely need to be done, will inform patient's oncologists at [facility] of results and move forward appropriately.  Patient will likely have to discharge and follow up with oncologist appropriately.      Continue supportive care  Continue checking vital signs q4hrs.  Reviewed and restarted appropriate home medications.     DVT prophylaxis initiated   Nurse notified to page me if any changes occur       _______________________________________________________________________________________________________________________________      I have spent 40 minutes on the day of the visit; time spent includes face to face time and non-face to face time preparing to see the patient (eg, review of tests), independently reviewing and interpreting medical records, both past and current; documenting clinical information in the electronic or other health record, and communicating results to the patient/family/caregiver and care coordinator and nursing team.      All diagnosis and differential diagnosis have been reviewed,  interpreted and communicated appropriately to care team. assessment and plan has been documented; I have personally reviewed the labs and test results that are presently available and pertinent to this hospital course; I have reviewed medical records based upon their availability.    All of the patient's questions have been  addressed and answered. Patient's is agreeable to the above stated plan.   I will continue to monitor closely and make adjustments to medical management as needed.    Deann El,      01/20/2025     This note was created with the assistance of Dragon voice recognition software. There  may be transcription errors as a result of using this technology however minimal. Effort has been made to assure accuracy of transcription but any obvious errors or omissions should be clarified with the author of the document.

## 2025-01-21 PROCEDURE — 63600175 PHARM REV CODE 636 W HCPCS: Performed by: INTERNAL MEDICINE

## 2025-01-21 PROCEDURE — 25000003 PHARM REV CODE 250: Performed by: NURSE PRACTITIONER

## 2025-01-21 PROCEDURE — 63600175 PHARM REV CODE 636 W HCPCS: Mod: JZ | Performed by: STUDENT IN AN ORGANIZED HEALTH CARE EDUCATION/TRAINING PROGRAM

## 2025-01-21 PROCEDURE — 25000003 PHARM REV CODE 250: Performed by: INTERNAL MEDICINE

## 2025-01-21 PROCEDURE — 25000003 PHARM REV CODE 250

## 2025-01-21 PROCEDURE — 63600175 PHARM REV CODE 636 W HCPCS: Performed by: NURSE PRACTITIONER

## 2025-01-21 PROCEDURE — 25000003 PHARM REV CODE 250: Performed by: STUDENT IN AN ORGANIZED HEALTH CARE EDUCATION/TRAINING PROGRAM

## 2025-01-21 PROCEDURE — 21400001 HC TELEMETRY ROOM

## 2025-01-21 RX ORDER — SODIUM CHLORIDE 0.9 % (FLUSH) 0.9 %
10 SYRINGE (ML) INJECTION
Status: CANCELLED | OUTPATIENT
Start: 2025-01-23

## 2025-01-21 RX ORDER — DIPHENHYDRAMINE HYDROCHLORIDE 50 MG/ML
50 INJECTION INTRAMUSCULAR; INTRAVENOUS ONCE AS NEEDED
Status: CANCELLED | OUTPATIENT
Start: 2025-01-23

## 2025-01-21 RX ORDER — DIPHENHYDRAMINE HYDROCHLORIDE 50 MG/ML
50 INJECTION INTRAMUSCULAR; INTRAVENOUS ONCE AS NEEDED
OUTPATIENT
Start: 2025-02-13

## 2025-01-21 RX ORDER — PROCHLORPERAZINE EDISYLATE 5 MG/ML
10 INJECTION INTRAMUSCULAR; INTRAVENOUS ONCE AS NEEDED
Status: CANCELLED | OUTPATIENT
Start: 2025-01-23

## 2025-01-21 RX ORDER — HEPARIN 100 UNIT/ML
500 SYRINGE INTRAVENOUS
OUTPATIENT
Start: 2025-02-13

## 2025-01-21 RX ORDER — PROCHLORPERAZINE EDISYLATE 5 MG/ML
10 INJECTION INTRAMUSCULAR; INTRAVENOUS ONCE AS NEEDED
OUTPATIENT
Start: 2025-02-13

## 2025-01-21 RX ORDER — EPINEPHRINE 0.3 MG/.3ML
0.3 INJECTION SUBCUTANEOUS ONCE AS NEEDED
Status: CANCELLED | OUTPATIENT
Start: 2025-01-23

## 2025-01-21 RX ORDER — HEPARIN 100 UNIT/ML
500 SYRINGE INTRAVENOUS
Status: CANCELLED | OUTPATIENT
Start: 2025-01-23

## 2025-01-21 RX ORDER — SODIUM CHLORIDE 0.9 % (FLUSH) 0.9 %
10 SYRINGE (ML) INJECTION
OUTPATIENT
Start: 2025-02-13

## 2025-01-21 RX ORDER — EPINEPHRINE 0.3 MG/.3ML
0.3 INJECTION SUBCUTANEOUS ONCE AS NEEDED
OUTPATIENT
Start: 2025-02-13

## 2025-01-21 RX ADMIN — HYDROMORPHONE HYDROCHLORIDE 1 MG: 2 INJECTION, SOLUTION INTRAMUSCULAR; INTRAVENOUS; SUBCUTANEOUS at 05:01

## 2025-01-21 RX ADMIN — OXYCODONE HYDROCHLORIDE 5 MG: 5 TABLET ORAL at 02:01

## 2025-01-21 RX ADMIN — HYDROMORPHONE HYDROCHLORIDE 1 MG: 2 INJECTION, SOLUTION INTRAMUSCULAR; INTRAVENOUS; SUBCUTANEOUS at 06:01

## 2025-01-21 RX ADMIN — OXYCODONE HYDROCHLORIDE 5 MG: 5 TABLET ORAL at 09:01

## 2025-01-21 RX ADMIN — PIPERACILLIN SODIUM AND TAZOBACTAM SODIUM 4.5 G: 4; .5 INJECTION, POWDER, LYOPHILIZED, FOR SOLUTION INTRAVENOUS at 08:01

## 2025-01-21 RX ADMIN — VANCOMYCIN HYDROCHLORIDE 1000 MG: 1 INJECTION, POWDER, LYOPHILIZED, FOR SOLUTION INTRAVENOUS at 06:01

## 2025-01-21 RX ADMIN — ENOXAPARIN SODIUM 40 MG: 40 INJECTION SUBCUTANEOUS at 05:01

## 2025-01-21 RX ADMIN — Medication 81 MG: at 09:01

## 2025-01-21 RX ADMIN — PIPERACILLIN SODIUM AND TAZOBACTAM SODIUM 4.5 G: 4; .5 INJECTION, POWDER, LYOPHILIZED, FOR SOLUTION INTRAVENOUS at 10:01

## 2025-01-21 RX ADMIN — PIPERACILLIN SODIUM AND TAZOBACTAM SODIUM 4.5 G: 4; .5 INJECTION, POWDER, LYOPHILIZED, FOR SOLUTION INTRAVENOUS at 03:01

## 2025-01-21 RX ADMIN — ATORVASTATIN CALCIUM 40 MG: 40 TABLET, FILM COATED ORAL at 08:01

## 2025-01-21 RX ADMIN — OXYCODONE HYDROCHLORIDE 5 MG: 5 TABLET ORAL at 08:01

## 2025-01-21 NOTE — PROGRESS NOTES
[facility]  Hospital Medicine Progress Note      Chief Complaint:  Abdominal pain       HPI: (personally reviewed by me and is documented from initial H&P)     Ranjeet Ball is a 39 y.o. male who  has a past medical history of Cancer, Hyperlipidemia, and Myocardial infarction..     The patient presented to [facility] on 1/18/2025 with a primary complaint of   t medical history of lung cancer, currently on chemotherapy, CAD s/p stents/MI presenting to the ED via EMS for evaluation of chest and abdominal pain with associated nausea and vomiting onset 1 hour prior to arrival. Patient goes to [facility] for oncology services.         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  Patient tells me he hates to boost and would like ensure but there is no ensure in-house, will change to strawberry boost.  Nurse notified of change.    Otherwise all needs are met.    Objective Assessment:  Physical Exam      Vital signs have been personally reviewed by me   General: Appears comfortable, no acute distress.  Neuro:     Integumentary: Warm, dry, intact.  Musculoskeletal: Purposeful movement noted.     Respiratory: No accessory muscle use. Breath sounds are equal.  Cardiovascular: Regular rate.       VITAL SIGNS: 24 HRS MIN & MAX LAST   Temp  Min: 97.5 °F (36.4 °C)  Max: 98 °F (36.7 °C) 97.9 °F (36.6 °C)   BP  Min: 112/76  Max: 129/80 112/76   Pulse  Min: 71  Max: 86  84   Resp  Min: 16  Max: 18 18   SpO2  Min: 97 %  Max: 99 % 99 %     Echo    Left Ventricle: The left ventricle is dilated. Normal wall thickness.   There is normal systolic function with a visually estimated ejection   fraction of 60 - 65%. Grade I diastolic dysfunction.    Right Ventricle: Normal right ventricular cavity size. Systolic   function could  not be assesed.    Aortic Valve: There is mild to moderate aortic regurgitation.    IVC/SVC: Normal venous pressure at 3 mmHg.    Pericardium: There is no pericardial effusion.  CT Abdomen Pelvis With IV Contrast NO Oral Contrast  Narrative: EXAMINATION:  CT ABDOMEN PELVIS WITH IV CONTRAST    CLINICAL HISTORY:  Abdominal pain, acute, nonlocalized;    TECHNIQUE:  Multidetector IV contrast enhanced axial CT images of the abdomen and pelvis were obtained with coronal and sagittal reconstructions.    Automatic exposure control was utilized to reduce the patient's radiation dose.    DLP= NA    COMPARISON:  01/07/2025    FINDINGS:  01. HEPATOBILIARY: No focal hepatic lesion is identified, The gallbladder is normal.    02. SPLEEN: Normal    03. PANCREAS: No focal masses or ductal dilatation.    04. ADRENALS: Increase in size of heterogeneous left adrenal gland lesion currently measuring 2.3 x 2.8 cm.  Concern for neoplastic process.    05. KIDNEYS: The right kidney demonstrates no stone, hydronephrosis, or hydroureter. No focal mass identified. The left kidney demonstrates no stone, hydronephrosis, or hydroureter. No focal mass identified.    06. LYMPHADENOPATHY/RETROPERITONEUM: There is no retroperitoneal lymphadenopathy. The abdominal aorta is normal in course and caliber.    07. BOWEL: No acute bowel related abnormalities. No evidence of appendiceal inflammation.    08. PELVIC VISCERA: The bladder wall is diffusely thickened..  No pelvic mass.    09. PELVIC LYMPH NODES: No lymphadenopathy.    10. PERITONEUM/ABDOMINAL WALL: No ascites or implant.    11. SKELETAL: No aggressive appearing lytic/blastic lesion. No acute fractures, subluxations or dislocations.    12. LUNG BASES: There is a new 4 mm pulmonary nodule in the right lung base (series 1, image 23).  There is a mild increase in the size of the 7 mm to pulmonary nodule with the visualized middle lobe (series 1, image 17).  Correlate with clinical and laboratory  findings as regards to additional evaluation and follow-up.  Impression: The bladder wall is diffusely thickened.  Correlate with urinalysis.    Increase in size of heterogeneous left adrenal gland lesion currently measuring 2.3 x 2.8 cm.  Concern for neoplastic process.    There is a new 4 mm pulmonary nodule in the right lung base (series 1, image 23). There is a mild increase in the size of the 7 mm to pulmonary nodule with the visualized middle lobe (series 1, image 17). Correlate with clinical and laboratory findings as regards to additional evaluation and follow-up.    Electronically signed by: Genaro Maier  Date:    01/19/2025  Time:    10:42  CTA Chest Non-Coronary (PE Studies)  Narrative: EXAMINATION:  CTA CHEST NON CORONARY (PE STUDIES)    CLINICAL HISTORY:  Pulmonary embolism (PE) suspected, high prob;    TECHNIQUE:  Axial images of the chest were obtained with contrast. Sagittal and coronal reconstructed images were available for review. 3-D MIP reconstructions were performed from source imaging.    Automatic dose control was utilized to reduce patient radiation dose.    DLP= 668    COMPARISON:  No prior imaging available for comparison.    FINDINGS:  Soft Tissues: Unremarkable.Axilla: A few mildly prominent lymph nodes are seen in the axilla.Neck: The visualized soft tissues of the neck appear unremarkable.Mediastinum: There is no significant change in the partially calcified confluent lymph nodes in the right parahilar space. Rosina measures 2.7 x 2.9 cm on Image 196 Series 6. Likewise, the rest of the enlarged hilar lymph nodes appear stable.Heart: The heart appears unremarkable.Aorta: Unremarkable appearing aorta.Pulmonary Arteries: Unremarkable.Lungs: There is mild non specific dependent change at the lung bases. Stable moderate paraseptal emphysematous changes and scarring are seen in both lungs, predominantly in the upper lobes. No significant change in the sizes of the nodular opacities with  associated ground-glass component in the right lung and left lower lobe. The largest of these nodules is still in the apical segment of the right upper lobe and measures 0.9 x 0.7 cm on Image 23 Series 9. No new nodules seen.Pleura: No effusions or pneumothorax are identified.Bony Structures:Spine: Mild multilevel spondylolytic changes are seen in the thoracic spine.Ribs: The ribs appear unremarkable.Abdomen: Abdominal findings will be discussed separately in the abdomen CT report.  Impression: No evidence of pulmonary thromboembolism.    No significant change in the sizes of the nodular opacities with associated ground-glass component in the right lung and left lower lobe. The largest of these nodules is still in the apical segment of the right upper lobe and measures 0.9 x 0.7 cm on Image 23 Series 9. No new nodules seen. Correlate with clinical and laboratory findings as regards additional evaluation and follow-up.    There is no significant change in the partially calcified confluent lymph nodes in the right parahilar space. Rosina measures 2.7 x 2.9 cm on Image 196 Series 6. Likewise, the rest of the enlarged hilar lymph nodes appear stable.    Details and other findings as above.    Electronically signed by: Genaro Maier  Date:    01/19/2025  Time:    09:43    Recent Labs   Lab 01/18/25 2232 01/19/25 0448   WBC 14.39* 12.24*   RBC 4.11* 4.07*   HGB 13.5* 13.5*   HCT 40.8* 40.4*   MCV 99.3* 99.3*   MCH 32.8* 33.2*   MCHC 33.1 33.4   RDW 13.8 13.7    341   MPV 8.7 8.7       Recent Labs   Lab 01/18/25 2232 01/19/25 0448    136   K 3.5 4.1    102   CO2 24 21*   BUN 15.1 11.6   CREATININE 0.84 0.81   CALCIUM 9.8 9.8   MG 1.90 1.80   ALBUMIN 3.9 4.0   ALKPHOS 95 97   ALT 12 14   AST 13 14   BILITOT 0.2 0.4     Microbiology Results (last 7 days)       Procedure Component Value Units Date/Time    Blood culture #1 **CANNOT BE ORDERED STAT** [[id number]]  (Normal) Collected: 01/19/25 0448     Order Status: Completed Specimen: Blood from Arm, Left Updated: 01/20/25 0901     Blood Culture No Growth At 24 Hours    Blood culture #2 **CANNOT BE ORDERED STAT** [[id number]]  (Normal) Collected: 01/19/25 0448    Order Status: Completed Specimen: Blood from Arm, Right Updated: 01/20/25 0901     Blood Culture No Growth At 24 Hours               Medications for Hospital Course     Scheduled Med:   aspirin  81 mg Oral Daily    atorvastatin  40 mg Oral QHS    enoxparin  40 mg Subcutaneous Daily    mupirocin   Nasal BID    piperacillin-tazobactam (Zosyn) IV (PEDS and ADULTS) (extended infusion is not appropriate)  4.5 g Intravenous Q8H    vancomycin (VANCOCIN) 1,000 mg in D5W 250 mL IVPB (admixture device)  1,000 mg Intravenous Q12H      Continuous Infusions:       PRN Meds:    Current Facility-Administered Medications:     acetaminophen, 1,000 mg, Oral, Q6H PRN    albuterol-ipratropium, 3 mL, Nebulization, Q4H PRN    aluminum-magnesium hydroxide-simethicone, 30 mL, Oral, QID PRN    bisacodyL, 10 mg, Rectal, Daily PRN    dextrose 50%, 12.5 g, Intravenous, PRN    dextrose 50%, 25 g, Intravenous, PRN    glucagon (human recombinant), 1 mg, Intramuscular, PRN    glucose, 16 g, Oral, PRN    glucose, 24 g, Oral, PRN    HYDROmorphone, 1 mg, Intravenous, Q6H PRN    melatonin, 6 mg, Oral, Nightly PRN    naloxone, 0.02 mg, Intravenous, PRN    ondansetron, 4 mg, Intravenous, Q4H PRN    oxyCODONE, 5 mg, Oral, Q6H PRN    prochlorperazine, 5 mg, Intravenous, Q6H PRN    senna-docusate 8.6-50 mg, 1 tablet, Oral, BID PRN    sodium chloride 0.9%, 10 mL, Intravenous, PRN    vancomycin - pharmacy to dose, , Intravenous, pharmacy to manage frequency     Assessment and Plan          Chronic medical issues:  has a past medical history of Cancer, Hyperlipidemia, and Myocardial infarction..      __________________________________________________________________________________________________________________________________  Acute chest  pain  Large cell neuroendocrine carcinoma   Neuroendocrine carcinoma of the lung  Metastatic adrenal mass  New 4 mm pulmonary nodule      Increase in size of adrenal mass from previous imaging on 06/14/2024 in the new 4 mm pulmonary nodule in right lung base.  I do not see where a PET-CT scan has been performed but this will likely need to be done, will inform patient's oncologists at [facility] of results and move forward appropriately.  Patient will likely have to discharge and follow up with oncologist appropriately.      Continue supportive care  Continue checking vital signs q4hrs.  Reviewed and restarted appropriate home medications.     DVT prophylaxis initiated   Nurse notified to page me if any changes occur       _______________________________________________________________________________________________________________________________      I have spent 40 minutes on the day of the visit; time spent includes face to face time and non-face to face time preparing to see the patient (eg, review of tests), independently reviewing and interpreting medical records, both past and current; documenting clinical information in the electronic or other health record, and communicating results to the patient/family/caregiver and care coordinator and nursing team.      All diagnosis and differential diagnosis have been reviewed,  interpreted and communicated appropriately to care team. assessment and plan has been documented; I have personally reviewed the labs and test results that are presently available and pertinent to this hospital course; I have reviewed medical records based upon their availability.    All of the patient's questions have been  addressed and answered. Patient's is agreeable to the above stated plan.   I will continue to monitor closely and make adjustments to medical management as needed.    Deann El,      01/20/2025    This note was created with the assistance of Dragon voice recognition  software. There may be transcription errors as a result of using this technology however minimal. Effort has been made to assure accuracy of transcription but any obvious errors or omissions should be clarified with the author of the document.

## 2025-01-21 NOTE — PLAN OF CARE
01/21/25 0924   Discharge Assessment   Assessment Type Discharge Planning Assessment   Confirmed/corrected address, phone number and insurance Yes   Confirmed Demographics Correct on Facesheet   Source of Information patient   When was your last doctors appointment?   (DR Gomez a yr ago)   Communicated ASHLEY with patient/caregiver Yes   Reason For Admission severe malnutrition   People in Home sibling(s)   Do you expect to return to your current living situation? Yes   Do you have help at home or someone to help you manage your care at home? Yes   Who are your caregiver(s) and their phone number(s)? Franca Ball (Sister)  [phone]   Prior to hospitilization cognitive status: Alert/Oriented   Current cognitive status: Alert/Oriented   Walking or Climbing Stairs Difficulty no   Dressing/Bathing Difficulty no   Equipment Currently Used at Home none   Readmission within 30 days? No   Patient currently being followed by outpatient case management? No   Do you currently have service(s) that help you manage your care at home? No   Do you take prescription medications? Yes   Do you have prescription coverage? Yes   Coverage annabel   Do you have any problems affording any of your prescribed medications? No   Is the patient taking medications as prescribed? yes   Who is going to help you get home at discharge? Franca Ball (Sister)  [phone]   How do you get to doctors appointments? family or friend will provide   Are you on dialysis? No   Do you take coumadin? No   Discharge Plan A Home with family   Discharge Plan B Home with family   DME Needed Upon Discharge  none   Discharge Plan discussed with: Patient   Transition of Care Barriers None   Physical Activity   On average, how many days per week do you engage in moderate to strenuous exercise (like a brisk walk)? 0 days   On average, how many minutes do you engage in exercise at this level? 0 min   Housing Stability   In the last 12 months, was there a time when you  were not able to pay the mortgage or rent on time? N   At any time in the past 12 months, were you homeless or living in a shelter (including now)? N   Food Insecurity   Within the past 12 months, you worried that your food would run out before you got the money to buy more. Never true   Within the past 12 months, the food you bought just didn't last and you didn't have money to get more. Never true   Stress   Do you feel stress - tense, restless, nervous, or anxious, or unable to sleep at night because your mind is troubled all the time - these days? Very much   Social Isolation   How often do you feel lonely or isolated from those around you?  Never   Alcohol Use   Q1: How often do you have a drink containing alcohol? Never   Utilities   In the past 12 months has the electric, gas, oil, or water company threatened to shut off services in your home? No   Health Literacy   How often do you need to have someone help you when you read instructions, pamphlets, or other written material from your doctor or pharmacy? Never   OTHER   Name(s) of People in Home Franca Ball (Sister)  [phone]

## 2025-01-21 NOTE — PROGRESS NOTES
Pharmacokinetic Assessment Follow Up: IV Vancomycin    Vancomycin serum concentration assessment(s):    The trough level was drawn correctly and can be used to guide therapy at this time. The measurement is below the desired definitive target range of 15 to 20 mcg/mL.    Vancomycin Regimen Plan:    Change regimen to Vancomycin 1000 mg IV every 12 hours with next serum trough concentration measured at 1800 prior to 1900 dose on 1/22    Scheduled Administration Times        Drug levels (last 3 results):  Recent Labs   Lab Result Units 01/20/25  1832   Vancomycin Trough ug/ml 12.1*       Vancomycin Administrations:  vancomycin given in the last 96 hours                     vancomycin 750 mg in D5W 250 mL IVPB (admixture device) (mg) 750 mg New Bag 01/20/25 1850     750 mg New Bag  1029     750 mg New Bag 01/19/25 2039    vancomycin 1,500 mg in D5W 250 mL IVPB (admixture device) (mg) 1,500 mg New Bag 01/19/25 0827                    Pharmacy will continue to follow and monitor vancomycin.    Please contact pharmacy at extension [phone] for questions regarding this assessment.    Thank you for the consult,   Albert Garg       Patient brief summary:  Ranjeet Ball is a 39 y.o. male initiated on antimicrobial therapy with IV Vancomycin for treatment of lower respiratory infection    The patient's current regimen is 1000 mg q12h    Drug Allergies:   Review of patient's allergies indicates:  No Known Allergies    Actual Body Weight:  Wt Readings from Last 1 Encounters:   01/19/25 52.8 kg (116 lb 6.5 oz)       Renal Function:   Estimated Creatinine Clearance: 90.5 mL/min (based on SCr of 0.81 mg/dL).,     Dialysis Method (if applicable):  N/A    CBC (last 72 hours):  Recent Labs   Lab Result Units 01/18/25  2232 01/19/25  0448   WBC x10(3)/mcL 14.39* 12.24*   Hgb g/dL 13.5* 13.5*   Hct % 40.8* 40.4*   Platelet x10(3)/mcL 369 341   Mono % % 3.4 1.9   Eos % % 0.2 0.0   Basophil % % 0.3 0.2       Metabolic Panel (last 72  hours):  Recent Labs   Lab Result Units 01/18/25 2232 01/18/25  2316 01/19/25 0448   Sodium mmol/L 139  --  136   Potassium mmol/L 3.5  --  4.1   Chloride mmol/L 103  --  102   CO2 mmol/L 24  --  21*   Glucose mg/dL 127*  --  146*   Glucose, UA   --  Normal  --    Blood Urea Nitrogen mg/dL 15.1  --  11.6   Creatinine mg/dL 0.84  --  0.81   Albumin g/dL 3.9  --  4.0   Bilirubin Total mg/dL 0.2  --  0.4   ALP unit/L 95  --  97   AST unit/L 13  --  14   ALT unit/L 12  --  14   Magnesium Level mg/dL 1.90  --  1.80       Microbiologic Results:  Microbiology Results (last 7 days)       Procedure Component Value Units Date/Time    Blood culture #1 **CANNOT BE ORDERED STAT** [[id number]]  (Normal) Collected: 01/19/25 0448    Order Status: Completed Specimen: Blood from Arm, Left Updated: 01/20/25 0901     Blood Culture No Growth At 24 Hours    Blood culture #2 **CANNOT BE ORDERED STAT** [[id number]]  (Normal) Collected: 01/19/25 0448    Order Status: Completed Specimen: Blood from Arm, Right Updated: 01/20/25 0901     Blood Culture No Growth At 24 Hours

## 2025-01-22 ENCOUNTER — TELEPHONE (OUTPATIENT)
Dept: HEMATOLOGY/ONCOLOGY | Facility: CLINIC | Age: 40
End: 2025-01-22
Payer: MEDICAID

## 2025-01-22 VITALS
WEIGHT: 116.38 LBS | DIASTOLIC BLOOD PRESSURE: 81 MMHG | TEMPERATURE: 98 F | OXYGEN SATURATION: 100 % | SYSTOLIC BLOOD PRESSURE: 130 MMHG | HEIGHT: 66 IN | HEART RATE: 105 BPM | BODY MASS INDEX: 18.7 KG/M2 | RESPIRATION RATE: 18 BRPM

## 2025-01-22 PROBLEM — R07.9 CHEST PAIN: Status: ACTIVE | Noted: 2025-01-22

## 2025-01-22 PROCEDURE — 25000003 PHARM REV CODE 250: Performed by: NURSE PRACTITIONER

## 2025-01-22 PROCEDURE — 25000003 PHARM REV CODE 250: Performed by: STUDENT IN AN ORGANIZED HEALTH CARE EDUCATION/TRAINING PROGRAM

## 2025-01-22 PROCEDURE — 63600175 PHARM REV CODE 636 W HCPCS: Performed by: NURSE PRACTITIONER

## 2025-01-22 PROCEDURE — 25000003 PHARM REV CODE 250: Performed by: INTERNAL MEDICINE

## 2025-01-22 PROCEDURE — 25000003 PHARM REV CODE 250

## 2025-01-22 PROCEDURE — 63600175 PHARM REV CODE 636 W HCPCS: Mod: JZ | Performed by: STUDENT IN AN ORGANIZED HEALTH CARE EDUCATION/TRAINING PROGRAM

## 2025-01-22 PROCEDURE — 63600175 PHARM REV CODE 636 W HCPCS: Performed by: INTERNAL MEDICINE

## 2025-01-22 RX ORDER — ASPIRIN 81 MG/1
81 TABLET ORAL DAILY
COMMUNITY
Start: 2025-01-23

## 2025-01-22 RX ORDER — OXYCODONE HYDROCHLORIDE 5 MG/1
5 TABLET ORAL EVERY 12 HOURS PRN
Qty: 6 TABLET | Refills: 0 | Status: SHIPPED | OUTPATIENT
Start: 2025-01-22 | End: 2025-01-25

## 2025-01-22 RX ADMIN — PIPERACILLIN SODIUM AND TAZOBACTAM SODIUM 4.5 G: 4; .5 INJECTION, POWDER, LYOPHILIZED, FOR SOLUTION INTRAVENOUS at 03:01

## 2025-01-22 RX ADMIN — HYDROMORPHONE HYDROCHLORIDE 1 MG: 2 INJECTION, SOLUTION INTRAMUSCULAR; INTRAVENOUS; SUBCUTANEOUS at 03:01

## 2025-01-22 RX ADMIN — Medication 81 MG: at 08:01

## 2025-01-22 RX ADMIN — VANCOMYCIN HYDROCHLORIDE 1000 MG: 1 INJECTION, POWDER, LYOPHILIZED, FOR SOLUTION INTRAVENOUS at 08:01

## 2025-01-22 RX ADMIN — OXYCODONE HYDROCHLORIDE 5 MG: 5 TABLET ORAL at 08:01

## 2025-01-22 NOTE — DISCHARGE SUMMARY
I put in the pt's PCP who use to be Dr. Yonas Freeman but has since left the practice and a Dr. Wilber Wisdom has taken over his pt's and I put in # and Fax # and faxed over the last office visit note    [facility] Medicine Discharge Summary    Admit Date: 1/18/2025  Discharge Date and Time: 1/22/20251:40 PM    Admitting Physician: JUNAID Team  Discharging Physician: Deann El DO.  Primary Care Physician: Matty Gomez FNP    Discharge Diagnoses:  Acute chest pain  Large cell neuroendocrine carcinoma   Neuroendocrine carcinoma of the lung  Metastatic adrenal mass  New 4 mm pulmonary nodule    Hospital Course:   Chief Complaint:  Abdominal pain     HPI: (personally reviewed by me and is documented from initial H&P)      Ranjeet Ball is a 39 y.o. male who  has a past medical history of Cancer, Hyperlipidemia, and Myocardial infarction..      The patient presented to [facility] on 1/18/2025 with a primary complaint of   t medical history of lung cancer, currently on chemotherapy, CAD s/p stents/MI presenting to the ED via EMS for evaluation of chest and abdominal pain with associated nausea and vomiting onset 1 hour prior to arrival. Patient goes to [facility] for oncology services.     Interval History:     01/21/2025 no overnight events.  No new complaints.    Increase in size of adrenal mass from previous imaging on 06/14/2024 in the new 4 mm pulmonary nodule in right lung base.  Patient will likely have to discharge and follow up with oncologist appropriately, plan to continue treatments accordingly.    Hospital course and discharge care plan has been discussed with patient/and or family , patient/ and or family  voices understanding and agreement with plan. All questions have been answered to the best of my ability. Patient is advised to return to ED or call 911 in case of emergency and or if symptoms worsen.      Vitals:  VITAL SIGNS: 24 HRS MIN & MAX LAST   Temp  Min: 97.9 °F (36.6 °C)  Max: 98.2 °F (36.8 °C) 98.2 °F (36.8 °C)   BP  Min: 114/72  Max: 130/81 130/81   Pulse  Min: 83  Max: 107  105   Resp  Min: 18  Max: 20 18   SpO2  Min: 95 %  Max: 100 % 100 %       Physical  Exam:    General: Appears comfortable, no acute distress.  Integumentary: Warm, dry, intact.    Neuro: awake, alert, oriented      Musculoskeletal: Purposeful movement noted.   Respiratory: No accessory muscle use. Breath sounds are equal.  Cardiovascular: Regular rate. No peripheral edema.      _______________________________________________________________________________________________________________________________________________________    Explained in detail to the patient about the discharge plan, medications, and follow-up visits. Pt understands and agrees with the treatment plan  Discharge Disposition: Home or Self Care   Discharged Condition: stable  Diet-   Dietary Orders (From admission, onward)       Start     Ordered    01/20/25 1705  Dietary nutrition supplements BID; Boost Very High Calorie Nutritional Drink - Strawberry  Continuous        Question Answer Comment   Frequency: BID    Select PO Supplement: Boost Very High Calorie Nutritional Drink - Strawberry        01/20/25 1712    01/19/25 1558  Dietary nutrition supplements TID; Boost Breeze - Any flavor  Continuous        Question Answer Comment   Frequency: TID    Select PO Supplement: Boost Breeze - Any flavor        01/19/25 1557    01/19/25 0044  Diet Heart Healthy Standard Tray  Diet effective now        Question:  Tray type:  Answer:  Standard Tray    01/19/25 0043                   Medications Per DC med rec  Activities as tolerated   Follow-up Information       Rojelio Mandel MD Follow up.    Specialty: Cardiology  Why: 1 week  Contact information:  [address] [place] [address] [phone]                           For further questions contact hospitalist office    Discharge time 33 minutes    For worsening symptoms, chest pain, shortness of breath, increased abdominal pain, high grade fever, stroke or stroke like symptoms, immediately go to the nearest Emergency Room or call 911 as soon as possible.      Deann BRIGGS  Bladimir MATTSON on 1/22/2025. at 1:40 PM.    Please see below for significant diagnostic studies and test.   _______________________________________________________________________________________________________________________________________    Significant Diagnostic Studies: See Full reports for all details  Procedures Performed: No admission procedures for hospital encounter.         Recent Labs   Lab 01/18/25 2232 01/19/25 0448   WBC 14.39* 12.24*   RBC 4.11* 4.07*   HGB 13.5* 13.5*   HCT 40.8* 40.4*   MCV 99.3* 99.3*   MCH 32.8* 33.2*   MCHC 33.1 33.4   RDW 13.8 13.7    341   MPV 8.7 8.7       Recent Labs   Lab 01/18/25 2232 01/19/25 0448    136   K 3.5 4.1    102   CO2 24 21*   BUN 15.1 11.6   CREATININE 0.84 0.81   CALCIUM 9.8 9.8   MG 1.90 1.80   ALBUMIN 3.9 4.0   ALKPHOS 95 97   ALT 12 14   AST 13 14   BILITOT 0.2 0.4        Microbiology Results (last 7 days)       Procedure Component Value Units Date/Time    Blood culture #1 **CANNOT BE ORDERED STAT** [[id number]]  (Normal) Collected: 01/19/25 0448    Order Status: Completed Specimen: Blood from Arm, Left Updated: 01/22/25 0900     Blood Culture No Growth At 72 Hours    Blood culture #2 **CANNOT BE ORDERED STAT** [[id number]]  (Normal) Collected: 01/19/25 0448    Order Status: Completed Specimen: Blood from Arm, Right Updated: 01/22/25 0900     Blood Culture No Growth At 72 Hours             Echo    Left Ventricle: The left ventricle is dilated. Normal wall thickness.   There is normal systolic function with a visually estimated ejection   fraction of 60 - 65%. Grade I diastolic dysfunction.    Right Ventricle: Normal right ventricular cavity size. Systolic   function could not be assesed.    Aortic Valve: There is mild to moderate aortic regurgitation.    IVC/SVC: Normal venous pressure at 3 mmHg.    Pericardium: There is no pericardial effusion.  CT Abdomen Pelvis With IV Contrast NO Oral Contrast  Narrative: EXAMINATION:  CT  ABDOMEN PELVIS WITH IV CONTRAST    CLINICAL HISTORY:  Abdominal pain, acute, nonlocalized;    TECHNIQUE:  Multidetector IV contrast enhanced axial CT images of the abdomen and pelvis were obtained with coronal and sagittal reconstructions.    Automatic exposure control was utilized to reduce the patient's radiation dose.    DLP= NA    COMPARISON:  01/07/2025    FINDINGS:  01. HEPATOBILIARY: No focal hepatic lesion is identified, The gallbladder is normal.    02. SPLEEN: Normal    03. PANCREAS: No focal masses or ductal dilatation.    04. ADRENALS: Increase in size of heterogeneous left adrenal gland lesion currently measuring 2.3 x 2.8 cm.  Concern for neoplastic process.    05. KIDNEYS: The right kidney demonstrates no stone, hydronephrosis, or hydroureter. No focal mass identified. The left kidney demonstrates no stone, hydronephrosis, or hydroureter. No focal mass identified.    06. LYMPHADENOPATHY/RETROPERITONEUM: There is no retroperitoneal lymphadenopathy. The abdominal aorta is normal in course and caliber.    07. BOWEL: No acute bowel related abnormalities. No evidence of appendiceal inflammation.    08. PELVIC VISCERA: The bladder wall is diffusely thickened..  No pelvic mass.    09. PELVIC LYMPH NODES: No lymphadenopathy.    10. PERITONEUM/ABDOMINAL WALL: No ascites or implant.    11. SKELETAL: No aggressive appearing lytic/blastic lesion. No acute fractures, subluxations or dislocations.    12. LUNG BASES: There is a new 4 mm pulmonary nodule in the right lung base (series 1, image 23).  There is a mild increase in the size of the 7 mm to pulmonary nodule with the visualized middle lobe (series 1, image 17).  Correlate with clinical and laboratory findings as regards to additional evaluation and follow-up.  Impression: The bladder wall is diffusely thickened.  Correlate with urinalysis.    Increase in size of heterogeneous left adrenal gland lesion currently measuring 2.3 x 2.8 cm.  Concern for neoplastic  process.    There is a new 4 mm pulmonary nodule in the right lung base (series 1, image 23). There is a mild increase in the size of the 7 mm to pulmonary nodule with the visualized middle lobe (series 1, image 17). Correlate with clinical and laboratory findings as regards to additional evaluation and follow-up.    Electronically signed by: Genaro Maier  Date:    01/19/2025  Time:    10:42  CTA Chest Non-Coronary (PE Studies)  Narrative: EXAMINATION:  CTA CHEST NON CORONARY (PE STUDIES)    CLINICAL HISTORY:  Pulmonary embolism (PE) suspected, high prob;    TECHNIQUE:  Axial images of the chest were obtained with contrast. Sagittal and coronal reconstructed images were available for review. 3-D MIP reconstructions were performed from source imaging.    Automatic dose control was utilized to reduce patient radiation dose.    DLP= 668    COMPARISON:  No prior imaging available for comparison.    FINDINGS:  Soft Tissues: Unremarkable.Axilla: A few mildly prominent lymph nodes are seen in the axilla.Neck: The visualized soft tissues of the neck appear unremarkable.Mediastinum: There is no significant change in the partially calcified confluent lymph nodes in the right parahilar space. Rosina measures 2.7 x 2.9 cm on Image 196 Series 6. Likewise, the rest of the enlarged hilar lymph nodes appear stable.Heart: The heart appears unremarkable.Aorta: Unremarkable appearing aorta.Pulmonary Arteries: Unremarkable.Lungs: There is mild non specific dependent change at the lung bases. Stable moderate paraseptal emphysematous changes and scarring are seen in both lungs, predominantly in the upper lobes. No significant change in the sizes of the nodular opacities with associated ground-glass component in the right lung and left lower lobe. The largest of these nodules is still in the apical segment of the right upper lobe and measures 0.9 x 0.7 cm on Image 23 Series 9. No new nodules seen.Pleura: No effusions or pneumothorax are  identified.Bony Structures:Spine: Mild multilevel spondylolytic changes are seen in the thoracic spine.Ribs: The ribs appear unremarkable.Abdomen: Abdominal findings will be discussed separately in the abdomen CT report.  Impression: No evidence of pulmonary thromboembolism.    No significant change in the sizes of the nodular opacities with associated ground-glass component in the right lung and left lower lobe. The largest of these nodules is still in the apical segment of the right upper lobe and measures 0.9 x 0.7 cm on Image 23 Series 9. No new nodules seen. Correlate with clinical and laboratory findings as regards additional evaluation and follow-up.    There is no significant change in the partially calcified confluent lymph nodes in the right parahilar space. Rosina measures 2.7 x 2.9 cm on Image 196 Series 6. Likewise, the rest of the enlarged hilar lymph nodes appear stable.    Details and other findings as above.    Electronically signed by: Genaro Maier  Date:    01/19/2025  Time:    09:43         Medication List        START taking these medications      aspirin 81 MG EC tablet  Commonly known as: ECOTRIN  Take 1 tablet (81 mg total) by mouth once daily.  Start taking on: January 23, 2025     ketoconazole 2 % shampoo  Commonly known as: NIZORAL  Apply topically twice a week.     oxyCODONE 5 MG immediate release tablet  Commonly known as: ROXICODONE  Take 1 tablet (5 mg total) by mouth every 12 (twelve) hours as needed for Pain.            CONTINUE taking these medications      atorvastatin 40 MG tablet  Commonly known as: LIPITOR     dexAMETHasone 4 MG Tab  Commonly known as: DECADRON  Take 2 tablets (8 mg total) by mouth once daily. on days 2-4 of each chemotherapy cycle     ergocalciferol 50,000 unit Cap  Commonly known as: ERGOCALCIFEROL  Take 1 capsule (50,000 Units total) by mouth every 7 days.     gabapentin 300 MG capsule  Commonly known as: NEURONTIN  Take 1 capsule (300 mg total) by mouth  every evening.     hydrocortisone 2.5 % cream  Apply topically 2 (two) times daily.     ibuprofen 800 MG tablet  Commonly known as: ADVIL,MOTRIN  Take 1 tablet (800 mg total) by mouth 3 (three) times daily as needed for Pain.     OLANZapine 5 MG tablet  Commonly known as: ZyPREXA  Take 1 tablet by mouth nightly on days 1-4 of each chemotherapy cycle.     ondansetron 4 MG tablet  Commonly known as: ZOFRAN  Take 1 tablet (4 mg total) by mouth every 6 (six) hours as needed for Nausea.     potassium chloride 20 mEq  Commonly known as: K-TAB  Take 2 tablets (40 mEq total) by mouth once daily.               Where to Get Your Medications        These medications were sent to [facility]/pharmacy #[id number] - CLARISSA Latif  [address] [place] [address] [address] [place] [address]      Phone: [phone]   oxyCODONE 5 MG immediate release tablet       You can get these medications from any pharmacy    You don't need a prescription for these medications  aspirin 81 MG EC tablet

## 2025-01-22 NOTE — PROGRESS NOTES
[facility]  Hospital Medicine Progress Note      Chief Complaint:  Abdominal pain       HPI: (personally reviewed by me and is documented from initial H&P)     Ranjeet Ball is a 39 y.o. male who  has a past medical history of Cancer, Hyperlipidemia, and Myocardial infarction..     The patient presented to [facility] on 1/18/2025 with a primary complaint of   t medical history of lung cancer, currently on chemotherapy, CAD s/p stents/MI presenting to the ED via EMS for evaluation of chest and abdominal pain with associated nausea and vomiting onset 1 hour prior to arrival. Patient goes to [facility] for oncology services.         Interval History:          01/21/2025 no overnight events.  No new complaints.    Objective Assessment:  Physical Exam      Vital signs have been personally reviewed by me   General: Appears comfortable, no acute distress.  Neuro:     Integumentary: Warm, dry, intact.  Musculoskeletal: Purposeful movement noted.     Respiratory: No accessory muscle use. Breath sounds are equal.  Cardiovascular: Regular rate.       VITAL SIGNS: 24 HRS MIN & MAX LAST   Temp  Min: 97.4 °F (36.3 °C)  Max: 98.2 °F (36.8 °C) 98.2 °F (36.8 °C)   BP  Min: 112/76  Max: 129/80 116/76   Pulse  Min: 82  Max: 88  88   Resp  Min: 16  Max: 18 18   SpO2  Min: 96 %  Max: 99 % 99 %     Echo    Left Ventricle: The left ventricle is dilated. Normal wall thickness.   There is normal systolic function with a visually estimated ejection   fraction of 60 - 65%. Grade I diastolic dysfunction.    Right Ventricle: Normal right ventricular cavity size. Systolic   function could not be assesed.    Aortic Valve: There is mild to moderate aortic regurgitation.    IVC/SVC: Normal venous pressure at 3 mmHg.    Pericardium: There is no pericardial effusion.  CT Abdomen Pelvis With IV Contrast NO Oral Contrast  Narrative: EXAMINATION:  CT ABDOMEN PELVIS WITH IV CONTRAST    CLINICAL HISTORY:  Abdominal pain, acute,  nonlocalized;    TECHNIQUE:  Multidetector IV contrast enhanced axial CT images of the abdomen and pelvis were obtained with coronal and sagittal reconstructions.    Automatic exposure control was utilized to reduce the patient's radiation dose.    DLP= NA    COMPARISON:  01/07/2025    FINDINGS:  01. HEPATOBILIARY: No focal hepatic lesion is identified, The gallbladder is normal.    02. SPLEEN: Normal    03. PANCREAS: No focal masses or ductal dilatation.    04. ADRENALS: Increase in size of heterogeneous left adrenal gland lesion currently measuring 2.3 x 2.8 cm.  Concern for neoplastic process.    05. KIDNEYS: The right kidney demonstrates no stone, hydronephrosis, or hydroureter. No focal mass identified. The left kidney demonstrates no stone, hydronephrosis, or hydroureter. No focal mass identified.    06. LYMPHADENOPATHY/RETROPERITONEUM: There is no retroperitoneal lymphadenopathy. The abdominal aorta is normal in course and caliber.    07. BOWEL: No acute bowel related abnormalities. No evidence of appendiceal inflammation.    08. PELVIC VISCERA: The bladder wall is diffusely thickened..  No pelvic mass.    09. PELVIC LYMPH NODES: No lymphadenopathy.    10. PERITONEUM/ABDOMINAL WALL: No ascites or implant.    11. SKELETAL: No aggressive appearing lytic/blastic lesion. No acute fractures, subluxations or dislocations.    12. LUNG BASES: There is a new 4 mm pulmonary nodule in the right lung base (series 1, image 23).  There is a mild increase in the size of the 7 mm to pulmonary nodule with the visualized middle lobe (series 1, image 17).  Correlate with clinical and laboratory findings as regards to additional evaluation and follow-up.  Impression: The bladder wall is diffusely thickened.  Correlate with urinalysis.    Increase in size of heterogeneous left adrenal gland lesion currently measuring 2.3 x 2.8 cm.  Concern for neoplastic process.    There is a new 4 mm pulmonary nodule in the right lung base  (series 1, image 23). There is a mild increase in the size of the 7 mm to pulmonary nodule with the visualized middle lobe (series 1, image 17). Correlate with clinical and laboratory findings as regards to additional evaluation and follow-up.    Electronically signed by: Genaro Maier  Date:    01/19/2025  Time:    10:42  CTA Chest Non-Coronary (PE Studies)  Narrative: EXAMINATION:  CTA CHEST NON CORONARY (PE STUDIES)    CLINICAL HISTORY:  Pulmonary embolism (PE) suspected, high prob;    TECHNIQUE:  Axial images of the chest were obtained with contrast. Sagittal and coronal reconstructed images were available for review. 3-D MIP reconstructions were performed from source imaging.    Automatic dose control was utilized to reduce patient radiation dose.    DLP= 668    COMPARISON:  No prior imaging available for comparison.    FINDINGS:  Soft Tissues: Unremarkable.Axilla: A few mildly prominent lymph nodes are seen in the axilla.Neck: The visualized soft tissues of the neck appear unremarkable.Mediastinum: There is no significant change in the partially calcified confluent lymph nodes in the right parahilar space. Rosina measures 2.7 x 2.9 cm on Image 196 Series 6. Likewise, the rest of the enlarged hilar lymph nodes appear stable.Heart: The heart appears unremarkable.Aorta: Unremarkable appearing aorta.Pulmonary Arteries: Unremarkable.Lungs: There is mild non specific dependent change at the lung bases. Stable moderate paraseptal emphysematous changes and scarring are seen in both lungs, predominantly in the upper lobes. No significant change in the sizes of the nodular opacities with associated ground-glass component in the right lung and left lower lobe. The largest of these nodules is still in the apical segment of the right upper lobe and measures 0.9 x 0.7 cm on Image 23 Series 9. No new nodules seen.Pleura: No effusions or pneumothorax are identified.Bony Structures:Spine: Mild multilevel spondylolytic changes  are seen in the thoracic spine.Ribs: The ribs appear unremarkable.Abdomen: Abdominal findings will be discussed separately in the abdomen CT report.  Impression: No evidence of pulmonary thromboembolism.    No significant change in the sizes of the nodular opacities with associated ground-glass component in the right lung and left lower lobe. The largest of these nodules is still in the apical segment of the right upper lobe and measures 0.9 x 0.7 cm on Image 23 Series 9. No new nodules seen. Correlate with clinical and laboratory findings as regards additional evaluation and follow-up.    There is no significant change in the partially calcified confluent lymph nodes in the right parahilar space. Rosina measures 2.7 x 2.9 cm on Image 196 Series 6. Likewise, the rest of the enlarged hilar lymph nodes appear stable.    Details and other findings as above.    Electronically signed by: Genaro Maier  Date:    01/19/2025  Time:    09:43    Recent Labs   Lab 01/18/25 2232 01/19/25 0448   WBC 14.39* 12.24*   RBC 4.11* 4.07*   HGB 13.5* 13.5*   HCT 40.8* 40.4*   MCV 99.3* 99.3*   MCH 32.8* 33.2*   MCHC 33.1 33.4   RDW 13.8 13.7    341   MPV 8.7 8.7       Recent Labs   Lab 01/18/25 2232 01/19/25 0448    136   K 3.5 4.1    102   CO2 24 21*   BUN 15.1 11.6   CREATININE 0.84 0.81   CALCIUM 9.8 9.8   MG 1.90 1.80   ALBUMIN 3.9 4.0   ALKPHOS 95 97   ALT 12 14   AST 13 14   BILITOT 0.2 0.4     Microbiology Results (last 7 days)       Procedure Component Value Units Date/Time    Blood culture #1 **CANNOT BE ORDERED STAT** [[id number]]  (Normal) Collected: 01/19/25 0448    Order Status: Completed Specimen: Blood from Arm, Left Updated: 01/21/25 0900     Blood Culture No Growth At 48 Hours    Blood culture #2 **CANNOT BE ORDERED STAT** [[id number]]  (Normal) Collected: 01/19/25 0448    Order Status: Completed Specimen: Blood from Arm, Right Updated: 01/21/25 0900     Blood Culture No Growth At 48 Hours                Medications for Hospital Course     Scheduled Med:   aspirin  81 mg Oral Daily    atorvastatin  40 mg Oral QHS    enoxparin  40 mg Subcutaneous Daily    mupirocin   Nasal BID    piperacillin-tazobactam (Zosyn) IV (PEDS and ADULTS) (extended infusion is not appropriate)  4.5 g Intravenous Q8H    vancomycin (VANCOCIN) 1,000 mg in D5W 250 mL IVPB (admixture device)  1,000 mg Intravenous Q12H      Continuous Infusions:       PRN Meds:    Current Facility-Administered Medications:     acetaminophen, 1,000 mg, Oral, Q6H PRN    albuterol-ipratropium, 3 mL, Nebulization, Q4H PRN    aluminum-magnesium hydroxide-simethicone, 30 mL, Oral, QID PRN    bisacodyL, 10 mg, Rectal, Daily PRN    dextrose 50%, 12.5 g, Intravenous, PRN    dextrose 50%, 25 g, Intravenous, PRN    glucagon (human recombinant), 1 mg, Intramuscular, PRN    glucose, 16 g, Oral, PRN    glucose, 24 g, Oral, PRN    HYDROmorphone, 1 mg, Intravenous, Q6H PRN    melatonin, 6 mg, Oral, Nightly PRN    naloxone, 0.02 mg, Intravenous, PRN    ondansetron, 4 mg, Intravenous, Q4H PRN    oxyCODONE, 5 mg, Oral, Q6H PRN    prochlorperazine, 5 mg, Intravenous, Q6H PRN    senna-docusate 8.6-50 mg, 1 tablet, Oral, BID PRN    sodium chloride 0.9%, 10 mL, Intravenous, PRN    vancomycin - pharmacy to dose, , Intravenous, pharmacy to manage frequency     Assessment and Plan          Chronic medical issues:  has a past medical history of Cancer, Hyperlipidemia, and Myocardial infarction..      __________________________________________________________________________________________________________________________________  Acute chest pain  Large cell neuroendocrine carcinoma   Neuroendocrine carcinoma of the lung  Metastatic adrenal mass  New 4 mm pulmonary nodule      Increase in size of adrenal mass from previous imaging on 06/14/2024 in the new 4 mm pulmonary nodule in right lung base.  I do not see where a PET-CT scan has been performed but this will likely need to be  done, will inform patient's oncologists at [facility] of results and move forward appropriately.  Patient will likely have to discharge and follow up with oncologist appropriately.      Continue supportive care  Continue checking vital signs q4hrs.  Reviewed and restarted appropriate home medications.     DVT prophylaxis initiated   Nurse notified to page me if any changes occur       _______________________________________________________________________________________________________________________________      I have spent 40 minutes on the day of the visit; time spent includes face to face time and non-face to face time preparing to see the patient (eg, review of tests), independently reviewing and interpreting medical records, both past and current; documenting clinical information in the electronic or other health record, and communicating results to the patient/family/caregiver and care coordinator and nursing team.      All diagnosis and differential diagnosis have been reviewed,  interpreted and communicated appropriately to care team. assessment and plan has been documented; I have personally reviewed the labs and test results that are presently available and pertinent to this hospital course; I have reviewed medical records based upon their availability.    All of the patient's questions have been  addressed and answered. Patient's is agreeable to the above stated plan.   I will continue to monitor closely and make adjustments to medical management as needed.    Deann El,      01/20/2025    This note was created with the assistance of Dragon voice recognition software. There may be transcription errors as a result of using this technology however minimal. Effort has been made to assure accuracy of transcription but any obvious errors or omissions should be clarified with the author of the document.

## 2025-01-24 LAB
BACTERIA BLD CULT: NORMAL
BACTERIA BLD CULT: NORMAL

## 2025-01-27 PROBLEM — C79.72: Status: ACTIVE | Noted: 2025-01-27

## 2025-01-28 ENCOUNTER — TELEPHONE (OUTPATIENT)
Dept: HEMATOLOGY/ONCOLOGY | Facility: CLINIC | Age: 40
End: 2025-01-28
Payer: MEDICAID

## 2025-01-28 DIAGNOSIS — R59.0 LOCALIZED ENLARGED LYMPH NODES: Primary | ICD-10-CM
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
## 2025-01-29 ENCOUNTER — INFUSION (OUTPATIENT)
Dept: INFUSION THERAPY | Facility: HOSPITAL | Age: 40
End: 2025-01-29
Attending: INTERNAL MEDICINE
Payer: MEDICAID

## 2025-01-29 ENCOUNTER — APPOINTMENT (OUTPATIENT)
Dept: HEMATOLOGY/ONCOLOGY | Facility: CLINIC | Age: 40
End: 2025-01-29
Payer: MEDICAID

## 2025-01-29 ENCOUNTER — OFFICE VISIT (OUTPATIENT)
Dept: HEMATOLOGY/ONCOLOGY | Facility: CLINIC | Age: 40
End: 2025-01-29
Attending: INTERNAL MEDICINE
Payer: MEDICAID

## 2025-01-29 VITALS
HEART RATE: 97 BPM | TEMPERATURE: 98 F | HEIGHT: 66 IN | DIASTOLIC BLOOD PRESSURE: 73 MMHG | OXYGEN SATURATION: 99 % | RESPIRATION RATE: 18 BRPM | SYSTOLIC BLOOD PRESSURE: 111 MMHG | BODY MASS INDEX: 19.07 KG/M2 | WEIGHT: 118.63 LBS

## 2025-01-29 DIAGNOSIS — C7A.1 LARGE CELL NEUROENDOCRINE CARCINOMA: ICD-10-CM

## 2025-01-29 DIAGNOSIS — C77.0 SECONDARY MALIGNANCY OF SUPRACLAVICULAR LYMPH NODES: ICD-10-CM

## 2025-01-29 DIAGNOSIS — C77.1 SECONDARY MALIGNANCY OF MEDIASTINAL LYMPH NODES: ICD-10-CM

## 2025-01-29 DIAGNOSIS — C79.89 METASTATIC CANCER TO PELVIS: Primary | ICD-10-CM

## 2025-01-29 DIAGNOSIS — E27.8 ADRENAL MASS, LEFT: ICD-10-CM

## 2025-01-29 DIAGNOSIS — C78.01 MALIGNANT NEOPLASM METASTATIC TO HILUS OF LUNG WITH UNKNOWN PRIMARY SITE, RIGHT: ICD-10-CM

## 2025-01-29 DIAGNOSIS — R91.1 NODULE OF LOWER LOBE OF LEFT LUNG: Primary | ICD-10-CM

## 2025-01-29 DIAGNOSIS — C79.89 METASTATIC CANCER TO PELVIS: ICD-10-CM

## 2025-01-29 DIAGNOSIS — Z51.11 CHEMOTHERAPY MANAGEMENT, ENCOUNTER FOR: ICD-10-CM

## 2025-01-29 DIAGNOSIS — R91.1 NODULE OF LOWER LOBE OF LEFT LUNG: ICD-10-CM

## 2025-01-29 DIAGNOSIS — R04.2 HEMOPTYSIS: ICD-10-CM

## 2025-01-29 DIAGNOSIS — K62.9 RECTAL ABNORMALITY: ICD-10-CM

## 2025-01-29 DIAGNOSIS — C34.90 MALIGNANT NEOPLASM OF LUNG, UNSPECIFIED LATERALITY, UNSPECIFIED PART OF LUNG: ICD-10-CM

## 2025-01-29 DIAGNOSIS — D75.839 THROMBOCYTOSIS: ICD-10-CM

## 2025-01-29 DIAGNOSIS — R59.0 LOCALIZED ENLARGED LYMPH NODES: ICD-10-CM

## 2025-01-29 DIAGNOSIS — C7A.8 NEUROENDOCRINE CARCINOMA OF LUNG: ICD-10-CM

## 2025-01-29 DIAGNOSIS — Z29.89 IMMUNOTHERAPY ENCOUNTER: ICD-10-CM

## 2025-01-29 DIAGNOSIS — C79.72 SECONDARY MALIGNANT NEOPLASM OF CORTEX OF LEFT ADRENAL GLAND: ICD-10-CM

## 2025-01-29 DIAGNOSIS — R06.00 DYSPNEA, UNSPECIFIED TYPE: ICD-10-CM

## 2025-01-29 DIAGNOSIS — C7A.8 NEUROENDOCRINE CARCINOMA OF LUNG: Primary | ICD-10-CM

## 2025-01-29 DIAGNOSIS — K92.1 BLOODY STOOL: ICD-10-CM

## 2025-01-29 DIAGNOSIS — C80.1 MALIGNANT NEOPLASM METASTATIC TO HILUS OF LUNG WITH UNKNOWN PRIMARY SITE, RIGHT: ICD-10-CM

## 2025-01-29 LAB
ALBUMIN SERPL-MCNC: 3.7 G/DL (ref 3.5–5)
ALBUMIN/GLOB SERPL: 0.8 RATIO (ref 1.1–2)
ALP SERPL-CCNC: 76 UNIT/L (ref 40–150)
ALT SERPL-CCNC: 11 UNIT/L (ref 0–55)
ANION GAP SERPL CALC-SCNC: 8 MEQ/L
AST SERPL-CCNC: 14 UNIT/L (ref 5–34)
BASOPHILS # BLD AUTO: 0.02 X10(3)/MCL
BASOPHILS NFR BLD AUTO: 0.3 %
BILIRUB SERPL-MCNC: 0.2 MG/DL
BUN SERPL-MCNC: 12.6 MG/DL (ref 8.9–20.6)
CALCIUM SERPL-MCNC: 9.8 MG/DL (ref 8.4–10.2)
CHLORIDE SERPL-SCNC: 102 MMOL/L (ref 98–107)
CO2 SERPL-SCNC: 28 MMOL/L (ref 22–29)
CREAT SERPL-MCNC: 0.82 MG/DL (ref 0.72–1.25)
CREAT/UREA NIT SERPL: 15
EOSINOPHIL # BLD AUTO: 0.15 X10(3)/MCL (ref 0–0.9)
EOSINOPHIL NFR BLD AUTO: 2.2 %
ERYTHROCYTE [DISTWIDTH] IN BLOOD BY AUTOMATED COUNT: 14.1 % (ref 11.5–17)
GFR SERPLBLD CREATININE-BSD FMLA CKD-EPI: >60 ML/MIN/1.73/M2
GLOBULIN SER-MCNC: 4.7 GM/DL (ref 2.4–3.5)
GLUCOSE SERPL-MCNC: 112 MG/DL (ref 74–100)
HCT VFR BLD AUTO: 41.7 % (ref 42–52)
HGB BLD-MCNC: 13.7 G/DL (ref 14–18)
IMM GRANULOCYTES # BLD AUTO: 0.02 X10(3)/MCL (ref 0–0.04)
IMM GRANULOCYTES NFR BLD AUTO: 0.3 %
LYMPHOCYTES # BLD AUTO: 1.38 X10(3)/MCL (ref 0.6–4.6)
LYMPHOCYTES NFR BLD AUTO: 20.6 %
MAGNESIUM SERPL-MCNC: 1.8 MG/DL (ref 1.6–2.6)
MCH RBC QN AUTO: 32.5 PG (ref 27–31)
MCHC RBC AUTO-ENTMCNC: 32.9 G/DL (ref 33–36)
MCV RBC AUTO: 99 FL (ref 80–94)
MONOCYTES # BLD AUTO: 0.51 X10(3)/MCL (ref 0.1–1.3)
MONOCYTES NFR BLD AUTO: 7.6 %
NEUTROPHILS # BLD AUTO: 4.63 X10(3)/MCL (ref 2.1–9.2)
NEUTROPHILS NFR BLD AUTO: 69 %
NRBC BLD AUTO-RTO: 0 %
PLATELET # BLD AUTO: 302 X10(3)/MCL (ref 130–400)
PMV BLD AUTO: 9 FL (ref 7.4–10.4)
POTASSIUM SERPL-SCNC: 3.5 MMOL/L (ref 3.5–5.1)
PROT SERPL-MCNC: 8.4 GM/DL (ref 6.4–8.3)
RBC # BLD AUTO: 4.21 X10(6)/MCL (ref 4.7–6.1)
SODIUM SERPL-SCNC: 138 MMOL/L (ref 136–145)
WBC # BLD AUTO: 6.71 X10(3)/MCL (ref 4.5–11.5)

## 2025-01-29 PROCEDURE — 83735 ASSAY OF MAGNESIUM: CPT

## 2025-01-29 PROCEDURE — 96413 CHEMO IV INFUSION 1 HR: CPT

## 2025-01-29 PROCEDURE — 63600175 PHARM REV CODE 636 W HCPCS: Mod: JZ,TB | Performed by: INTERNAL MEDICINE

## 2025-01-29 PROCEDURE — 36415 COLL VENOUS BLD VENIPUNCTURE: CPT

## 2025-01-29 PROCEDURE — 3074F SYST BP LT 130 MM HG: CPT | Mod: CPTII,,, | Performed by: INTERNAL MEDICINE

## 2025-01-29 PROCEDURE — 99215 OFFICE O/P EST HI 40 MIN: CPT | Mod: S$PBB,,, | Performed by: INTERNAL MEDICINE

## 2025-01-29 PROCEDURE — 3078F DIAST BP <80 MM HG: CPT | Mod: CPTII,,, | Performed by: INTERNAL MEDICINE

## 2025-01-29 PROCEDURE — 80053 COMPREHEN METABOLIC PANEL: CPT

## 2025-01-29 PROCEDURE — 99214 OFFICE O/P EST MOD 30 MIN: CPT | Mod: PBBFAC | Performed by: INTERNAL MEDICINE

## 2025-01-29 PROCEDURE — 25000003 PHARM REV CODE 250: Performed by: INTERNAL MEDICINE

## 2025-01-29 PROCEDURE — 1111F DSCHRG MED/CURRENT MED MERGE: CPT | Mod: CPTII,,, | Performed by: INTERNAL MEDICINE

## 2025-01-29 PROCEDURE — 1159F MED LIST DOCD IN RCRD: CPT | Mod: CPTII,,, | Performed by: INTERNAL MEDICINE

## 2025-01-29 PROCEDURE — 1160F RVW MEDS BY RX/DR IN RCRD: CPT | Mod: CPTII,,, | Performed by: INTERNAL MEDICINE

## 2025-01-29 PROCEDURE — 3008F BODY MASS INDEX DOCD: CPT | Mod: CPTII,,, | Performed by: INTERNAL MEDICINE

## 2025-01-29 PROCEDURE — 85025 COMPLETE CBC W/AUTO DIFF WBC: CPT

## 2025-01-29 RX ORDER — EPINEPHRINE 1 MG/ML
0.3 INJECTION INTRAMUSCULAR; INTRAVENOUS; SUBCUTANEOUS ONCE AS NEEDED
Status: DISCONTINUED | OUTPATIENT
Start: 2025-01-29 | End: 2025-01-29 | Stop reason: HOSPADM

## 2025-01-29 RX ORDER — SODIUM CHLORIDE 0.9 % (FLUSH) 0.9 %
10 SYRINGE (ML) INJECTION
Status: DISCONTINUED | OUTPATIENT
Start: 2025-01-29 | End: 2025-01-29 | Stop reason: HOSPADM

## 2025-01-29 RX ORDER — DIPHENHYDRAMINE HYDROCHLORIDE 50 MG/ML
50 INJECTION INTRAMUSCULAR; INTRAVENOUS ONCE AS NEEDED
Status: DISCONTINUED | OUTPATIENT
Start: 2025-01-29 | End: 2025-01-29 | Stop reason: HOSPADM

## 2025-01-29 RX ORDER — PROCHLORPERAZINE EDISYLATE 5 MG/ML
10 INJECTION INTRAMUSCULAR; INTRAVENOUS ONCE AS NEEDED
Status: DISCONTINUED | OUTPATIENT
Start: 2025-01-29 | End: 2025-01-29 | Stop reason: HOSPADM

## 2025-01-29 RX ORDER — HEPARIN 100 UNIT/ML
500 SYRINGE INTRAVENOUS
Status: DISCONTINUED | OUTPATIENT
Start: 2025-01-29 | End: 2025-01-29 | Stop reason: HOSPADM

## 2025-01-29 RX ADMIN — ATEZOLIZUMAB 1200 MG: 1200 INJECTION, SOLUTION INTRAVENOUS at 11:01

## 2025-01-29 NOTE — Clinical Note
Orders for 01/29/2025: Continue Tecentriq every 3 weeks Re-stage with contrast-enhanced CT scans of C/A/P in April TSH and free T4 every 6 weeks CBC and CMP every 3 weeks with each cycle of Tecentriq EGD and colonoscopy for evaluation of hematemesis and for evaluation of rectal wall thickening noted on imaging studies Follow-up with NP in 3 weeks

## 2025-01-31 NOTE — PHYSICIAN QUERY
Please document your best medical opinion regarding the etiology of diagnosis:  Other etiology (please specify): clinical undetermined

## 2025-02-17 DIAGNOSIS — C79.89 METASTATIC CANCER TO PELVIS: Primary | ICD-10-CM

## 2025-02-18 ENCOUNTER — TELEPHONE (OUTPATIENT)
Dept: HEMATOLOGY/ONCOLOGY | Facility: CLINIC | Age: 40
End: 2025-02-18
Payer: MEDICAID

## 2025-02-19 ENCOUNTER — DOCUMENTATION ONLY (OUTPATIENT)
Dept: INFUSION THERAPY | Facility: HOSPITAL | Age: 40
End: 2025-02-19
Payer: MEDICAID

## 2025-02-19 ENCOUNTER — TELEPHONE (OUTPATIENT)
Dept: HEMATOLOGY/ONCOLOGY | Facility: CLINIC | Age: 40
End: 2025-02-19
Payer: MEDICAID

## 2025-02-25 ENCOUNTER — INFUSION (OUTPATIENT)
Dept: INFUSION THERAPY | Facility: HOSPITAL | Age: 40
End: 2025-02-25
Attending: INTERNAL MEDICINE
Payer: MEDICAID

## 2025-02-25 ENCOUNTER — LAB VISIT (OUTPATIENT)
Dept: HEMATOLOGY/ONCOLOGY | Facility: CLINIC | Age: 40
End: 2025-02-25
Payer: MEDICAID

## 2025-02-25 ENCOUNTER — OFFICE VISIT (OUTPATIENT)
Dept: HEMATOLOGY/ONCOLOGY | Facility: CLINIC | Age: 40
End: 2025-02-25
Payer: MEDICAID

## 2025-02-25 VITALS
DIASTOLIC BLOOD PRESSURE: 88 MMHG | RESPIRATION RATE: 17 BRPM | WEIGHT: 121.63 LBS | SYSTOLIC BLOOD PRESSURE: 148 MMHG | OXYGEN SATURATION: 98 % | BODY MASS INDEX: 19.55 KG/M2 | HEART RATE: 86 BPM | HEIGHT: 66 IN | TEMPERATURE: 98 F

## 2025-02-25 VITALS
DIASTOLIC BLOOD PRESSURE: 92 MMHG | WEIGHT: 121.63 LBS | HEART RATE: 85 BPM | HEIGHT: 66 IN | SYSTOLIC BLOOD PRESSURE: 158 MMHG | BODY MASS INDEX: 19.55 KG/M2 | OXYGEN SATURATION: 98 % | RESPIRATION RATE: 18 BRPM | TEMPERATURE: 98 F

## 2025-02-25 DIAGNOSIS — C79.89 METASTATIC CANCER TO PELVIS: ICD-10-CM

## 2025-02-25 DIAGNOSIS — G62.0 PERIPHERAL NEUROPATHY DUE TO AND NOT CONCURRENT WITH CHEMOTHERAPY: ICD-10-CM

## 2025-02-25 DIAGNOSIS — E27.8 ADRENAL MASS, LEFT: ICD-10-CM

## 2025-02-25 DIAGNOSIS — T45.1X5S PERIPHERAL NEUROPATHY DUE TO AND NOT CONCURRENT WITH CHEMOTHERAPY: ICD-10-CM

## 2025-02-25 DIAGNOSIS — G62.0 PERIPHERAL NEUROPATHY DUE TO CHEMOTHERAPY: ICD-10-CM

## 2025-02-25 DIAGNOSIS — D84.821 IMMUNODEFICIENCY DUE TO CHEMOTHERAPY: ICD-10-CM

## 2025-02-25 DIAGNOSIS — T45.1X5A PERIPHERAL NEUROPATHY DUE TO CHEMOTHERAPY: ICD-10-CM

## 2025-02-25 DIAGNOSIS — T45.1X5A IMMUNODEFICIENCY DUE TO CHEMOTHERAPY: ICD-10-CM

## 2025-02-25 DIAGNOSIS — C34.90 MALIGNANT NEOPLASM OF LUNG, UNSPECIFIED LATERALITY, UNSPECIFIED PART OF LUNG: Primary | ICD-10-CM

## 2025-02-25 DIAGNOSIS — C7A.8 NEUROENDOCRINE CARCINOMA OF LUNG: Primary | ICD-10-CM

## 2025-02-25 DIAGNOSIS — C77.1 SECONDARY MALIGNANCY OF MEDIASTINAL LYMPH NODES: ICD-10-CM

## 2025-02-25 DIAGNOSIS — Z79.899 IMMUNODEFICIENCY DUE TO CHEMOTHERAPY: ICD-10-CM

## 2025-02-25 LAB
ALBUMIN SERPL-MCNC: 3.8 G/DL (ref 3.5–5)
ALBUMIN/GLOB SERPL: 0.8 RATIO (ref 1.1–2)
ALP SERPL-CCNC: 75 UNIT/L (ref 40–150)
ALT SERPL-CCNC: 15 UNIT/L (ref 0–55)
ANION GAP SERPL CALC-SCNC: 5 MEQ/L
AST SERPL-CCNC: 16 UNIT/L (ref 5–34)
BASOPHILS # BLD AUTO: 0.04 X10(3)/MCL
BASOPHILS NFR BLD AUTO: 0.6 %
BILIRUB SERPL-MCNC: 0.2 MG/DL
BUN SERPL-MCNC: 10.3 MG/DL (ref 8.9–20.6)
CALCIUM SERPL-MCNC: 9.8 MG/DL (ref 8.4–10.2)
CHLORIDE SERPL-SCNC: 107 MMOL/L (ref 98–107)
CO2 SERPL-SCNC: 27 MMOL/L (ref 22–29)
CREAT SERPL-MCNC: 0.81 MG/DL (ref 0.72–1.25)
CREAT/UREA NIT SERPL: 13
EOSINOPHIL # BLD AUTO: 0.27 X10(3)/MCL (ref 0–0.9)
EOSINOPHIL NFR BLD AUTO: 4.1 %
ERYTHROCYTE [DISTWIDTH] IN BLOOD BY AUTOMATED COUNT: 14.4 % (ref 11.5–17)
GFR SERPLBLD CREATININE-BSD FMLA CKD-EPI: >60 ML/MIN/1.73/M2
GLOBULIN SER-MCNC: 4.7 GM/DL (ref 2.4–3.5)
GLUCOSE SERPL-MCNC: 102 MG/DL (ref 74–100)
HCT VFR BLD AUTO: 44.1 % (ref 42–52)
HGB BLD-MCNC: 14.1 G/DL (ref 14–18)
IMM GRANULOCYTES # BLD AUTO: 0.02 X10(3)/MCL (ref 0–0.04)
IMM GRANULOCYTES NFR BLD AUTO: 0.3 %
LYMPHOCYTES # BLD AUTO: 1.2 X10(3)/MCL (ref 0.6–4.6)
LYMPHOCYTES NFR BLD AUTO: 18 %
MAGNESIUM SERPL-MCNC: 1.9 MG/DL (ref 1.6–2.6)
MCH RBC QN AUTO: 31.3 PG (ref 27–31)
MCHC RBC AUTO-ENTMCNC: 32 G/DL (ref 33–36)
MCV RBC AUTO: 98 FL (ref 80–94)
MONOCYTES # BLD AUTO: 0.49 X10(3)/MCL (ref 0.1–1.3)
MONOCYTES NFR BLD AUTO: 7.4 %
NEUTROPHILS # BLD AUTO: 4.63 X10(3)/MCL (ref 2.1–9.2)
NEUTROPHILS NFR BLD AUTO: 69.6 %
NRBC BLD AUTO-RTO: 0 %
PLATELET # BLD AUTO: 329 X10(3)/MCL (ref 130–400)
PMV BLD AUTO: 8.7 FL (ref 7.4–10.4)
POTASSIUM SERPL-SCNC: 4 MMOL/L (ref 3.5–5.1)
PROT SERPL-MCNC: 8.5 GM/DL (ref 6.4–8.3)
RBC # BLD AUTO: 4.5 X10(6)/MCL (ref 4.7–6.1)
SODIUM SERPL-SCNC: 139 MMOL/L (ref 136–145)
T4 FREE SERPL-MCNC: 0.91 NG/DL (ref 0.7–1.48)
TSH SERPL-ACNC: 0.82 UIU/ML (ref 0.35–4.94)
WBC # BLD AUTO: 6.65 X10(3)/MCL (ref 4.5–11.5)

## 2025-02-25 PROCEDURE — 3079F DIAST BP 80-89 MM HG: CPT | Mod: CPTII,,,

## 2025-02-25 PROCEDURE — 3008F BODY MASS INDEX DOCD: CPT | Mod: CPTII,,,

## 2025-02-25 PROCEDURE — 3077F SYST BP >= 140 MM HG: CPT | Mod: CPTII,,,

## 2025-02-25 PROCEDURE — 83735 ASSAY OF MAGNESIUM: CPT

## 2025-02-25 PROCEDURE — 84443 ASSAY THYROID STIM HORMONE: CPT

## 2025-02-25 PROCEDURE — 84439 ASSAY OF FREE THYROXINE: CPT

## 2025-02-25 PROCEDURE — 36415 COLL VENOUS BLD VENIPUNCTURE: CPT

## 2025-02-25 PROCEDURE — 85025 COMPLETE CBC W/AUTO DIFF WBC: CPT

## 2025-02-25 PROCEDURE — 99214 OFFICE O/P EST MOD 30 MIN: CPT | Mod: PBBFAC,25

## 2025-02-25 PROCEDURE — 25000003 PHARM REV CODE 250: Performed by: INTERNAL MEDICINE

## 2025-02-25 PROCEDURE — 96413 CHEMO IV INFUSION 1 HR: CPT

## 2025-02-25 PROCEDURE — 80053 COMPREHEN METABOLIC PANEL: CPT

## 2025-02-25 PROCEDURE — 99215 OFFICE O/P EST HI 40 MIN: CPT | Mod: S$PBB,,,

## 2025-02-25 PROCEDURE — 63600175 PHARM REV CODE 636 W HCPCS: Mod: JZ,TB | Performed by: INTERNAL MEDICINE

## 2025-02-25 RX ORDER — SODIUM CHLORIDE 0.9 % (FLUSH) 0.9 %
10 SYRINGE (ML) INJECTION
Status: DISCONTINUED | OUTPATIENT
Start: 2025-02-25 | End: 2025-02-25 | Stop reason: HOSPADM

## 2025-02-25 RX ORDER — DULOXETIN HYDROCHLORIDE 30 MG/1
30 CAPSULE, DELAYED RELEASE ORAL NIGHTLY
Qty: 30 CAPSULE | Refills: 0 | Status: SHIPPED | OUTPATIENT
Start: 2025-02-25

## 2025-02-25 RX ORDER — PROCHLORPERAZINE EDISYLATE 5 MG/ML
10 INJECTION INTRAMUSCULAR; INTRAVENOUS ONCE AS NEEDED
Status: DISCONTINUED | OUTPATIENT
Start: 2025-02-25 | End: 2025-02-25 | Stop reason: HOSPADM

## 2025-02-25 RX ORDER — HEPARIN 100 UNIT/ML
500 SYRINGE INTRAVENOUS
Status: DISCONTINUED | OUTPATIENT
Start: 2025-02-25 | End: 2025-02-25 | Stop reason: HOSPADM

## 2025-02-25 RX ORDER — DIPHENHYDRAMINE HYDROCHLORIDE 50 MG/ML
50 INJECTION INTRAMUSCULAR; INTRAVENOUS ONCE AS NEEDED
Status: DISCONTINUED | OUTPATIENT
Start: 2025-02-25 | End: 2025-02-25 | Stop reason: HOSPADM

## 2025-02-25 RX ORDER — EPINEPHRINE 1 MG/ML
0.3 INJECTION INTRAMUSCULAR; INTRAVENOUS; SUBCUTANEOUS ONCE AS NEEDED
Status: DISCONTINUED | OUTPATIENT
Start: 2025-02-25 | End: 2025-02-25 | Stop reason: HOSPADM

## 2025-02-25 RX ADMIN — HEPARIN 500 UNITS: 100 SYRINGE at 10:02

## 2025-02-25 RX ADMIN — ATEZOLIZUMAB 1200 MG: 1200 INJECTION, SOLUTION INTRAVENOUS at 09:02

## 2025-02-25 RX ADMIN — SODIUM CHLORIDE: 9 INJECTION, SOLUTION INTRAVENOUS at 10:02

## 2025-02-25 NOTE — NURSING
0917 Patient is here for labs, NP visit & C10 Tecentriq q 3 wks. He voices no c/o. Lab results are within parameters.     1040 Infusion completed w/o incident.
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
## 2025-02-26 ENCOUNTER — TELEPHONE (OUTPATIENT)
Dept: HEMATOLOGY/ONCOLOGY | Facility: CLINIC | Age: 40
End: 2025-02-26
Payer: MEDICAID

## 2025-02-26 NOTE — NURSING
Patient requesting nutritional supplement through [facility]. Patient has received maximum amount through [facility]. Consulted with dietician, Nessa Escobar RD who provided recommendations. Referral sent to [facility]. Patient notified.

## 2025-02-27 ENCOUNTER — OFFICE VISIT (OUTPATIENT)
Dept: GASTROENTEROLOGY | Facility: CLINIC | Age: 40
End: 2025-02-27
Attending: INTERNAL MEDICINE
Payer: MEDICAID

## 2025-02-27 VITALS
BODY MASS INDEX: 19.83 KG/M2 | HEIGHT: 65 IN | TEMPERATURE: 98 F | WEIGHT: 119 LBS | HEART RATE: 98 BPM | RESPIRATION RATE: 16 BRPM | SYSTOLIC BLOOD PRESSURE: 122 MMHG | DIASTOLIC BLOOD PRESSURE: 81 MMHG | OXYGEN SATURATION: 99 %

## 2025-02-27 DIAGNOSIS — R93.89 ABNORMAL CT SCAN: ICD-10-CM

## 2025-02-27 DIAGNOSIS — K92.0 HEMATEMESIS WITH NAUSEA: Primary | ICD-10-CM

## 2025-02-27 DIAGNOSIS — K92.1 BLOODY STOOL: ICD-10-CM

## 2025-02-27 PROCEDURE — 99214 OFFICE O/P EST MOD 30 MIN: CPT | Mod: PBBFAC | Performed by: NURSE PRACTITIONER

## 2025-02-27 PROCEDURE — 1159F MED LIST DOCD IN RCRD: CPT | Mod: CPTII,,, | Performed by: NURSE PRACTITIONER

## 2025-02-27 PROCEDURE — 1160F RVW MEDS BY RX/DR IN RCRD: CPT | Mod: CPTII,,, | Performed by: NURSE PRACTITIONER

## 2025-02-27 PROCEDURE — 3074F SYST BP LT 130 MM HG: CPT | Mod: CPTII,,, | Performed by: NURSE PRACTITIONER

## 2025-02-27 PROCEDURE — 3079F DIAST BP 80-89 MM HG: CPT | Mod: CPTII,,, | Performed by: NURSE PRACTITIONER

## 2025-02-27 PROCEDURE — 99204 OFFICE O/P NEW MOD 45 MIN: CPT | Mod: S$PBB,,, | Performed by: NURSE PRACTITIONER

## 2025-02-27 PROCEDURE — 3008F BODY MASS INDEX DOCD: CPT | Mod: CPTII,,, | Performed by: NURSE PRACTITIONER

## 2025-02-27 NOTE — PROGRESS NOTES
Subjective:       Patient ID: Ranjeet Ball is a 39 y.o. male.    Chief Complaint: Rectal Bleeding (C/O bright red blood in stool.  Not every BM.  Denies constipation.  Maternal Aunt  of colon ca at age 38)    This 39-year-old  male with hyperlipidemia, MI s/p stent placement x2, and large cell neuroendocrine carcinoma, metastatic (mediastinal lymphadenopathy, right hilar lymphadenopathy, biopsy-proven right supraclavicular lymphadenopathy, 18 mm left adrenal nodule, no lung mass on Cts; left lower lobe lung nodule; lytic metastases anterior left ilium) s/p chemotherapy (last treatment 2025) and radiation is referred for EGD and colonoscopy.  He presents unaccompanied.  He reports intermittent red blood with bowel movements noted on the tissue after wiping and in the toilet bowl since 2024.  The bleeding is noted mostly with straining.  He is unsure if he has a history of hemorrhoids.  He reports regular formed stools 1-2 times daily and feels completely evacuated majority of the time.  His appetite is fair and his weight has fluctuated.  His average weight prior to lung cancer diagnosis was 130 lb.  He is 119 lb today.  He denies fever, chills, nausea, vomiting, hematemesis, odynophagia, dysphagia, acid reflux, pyrosis, or early satiety.  He has intermittent epigastric abdominal discomfort described as aching and sharp at times.  He is unable to identify specific triggers or relieving factors.  He denies melena, fecal urgency, fecal incontinence, or pain with defecation.    CT scan abdomen and pelvis with IV contrast 2025 revealed bladder wall diffusely thickened.  Increase in size of heterogeneous left adrenal gland lesion currently measuring 2.3 x 2.8 cm.  Concern for neoplastic process.  There is a new 4 mm pulmonary nodule in the right lung base (series 1, image 23). There is a mild increase in the size of the 7 mm to pulmonary nodule with the visualized  middle lobe (series 1, image 17). Correlate with clinical and laboratory findings as regards to additional evaluation and follow-up.     CTA chest non coronary January 19, 2025 revealed no evidence of pulmonary thromboembolism.  No significant change in the sizes of the nodular opacities with associated ground-glass component in the right lung and left lower lobe. The largest of these nodules is still in the apical segment of the right upper lobe and measures 0.9 x 0.7 cm on Image 23 Series 9.  No new nodules seen.  Correlate with clinical and laboratory findings as regards additional evaluation and follow-up.  There is no significant change in the partially calcified confluent lymph nodes in the right parahilar space.  This measures 2.7 x 2.9 cm on Image 196 Series 6.  Likewise, the rest of the enlarged hilar lymph nodes appear stable.    CT scan abdomen and pelvis with IV and oral contrast June 14, 2024 revealed a left adrenal nodule is new compared to prior, metastatic disease is possible.  PET-CT may further stage.  Rectal wall thickening, recommend correlation with colonoscopy to evaluate for malignancy.  Small amount of pelvic free fluid.     He denies ever having an EGD or colonoscopy done.  He denies regular NSAID use or use of blood thinners.  He denies tobacco or alcohol use.  He is a former smoker.  He smokes marijuana a few times per week.  His father has a history of colon cancer diagnosed at age 70 and his maternal aunt has a history of colon cancer diagnosed at age 37.    Review of patient's allergies indicates:  No Known Allergies    Past Medical History:   Diagnosis Date    Cancer     Hyperlipidemia     Myocardial infarction      Past Surgical History:   Procedure Laterality Date    CORONARY ARTERY BYPASS GRAFT      INSERTION OF TUNNELED CENTRAL VENOUS CATHETER (CVC) WITH SUBCUTANEOUS PORT Right 7/31/2024    Procedure: [id number];  Surgeon: Renato Alvarado Jr., MD;  Location: [facility] OR;   Service: General;  Laterality: Right;    LAPAROTOMY, EXPLORATORY N/A 03/23/2023    Procedure: LAPAROTOMY, EXPLORATORY;  Surgeon: Alex Juárez MD;  Location: [facility] OR;  Service: General;  Laterality: N/A;    LYMPH NODE BIOPSY Right 6/14/2024    Procedure: BIOPSY, LYMPH NODE;  Surgeon: Renato Alvarado Jr., MD;  Location: [facility] OR;  Service: General;  Laterality: Right;  right supraclavicular lymph node    OPEN REDUCTION AND INTERNAL FIXATION (ORIF) OF FRACTURE OF DISTAL RADIUS Left 4/4/2024    Procedure: ORIF, FRACTURE, RADIUS, DISTAL;  Surgeon: Koha Abdalla MD;  Location: [facility] OR;  Service: Orthopedics;  Laterality: Left;    ORIF FOREARM FRACTURE Right 12/7/2023    Procedure: ORIF, FRACTURE, RADIUS OR ULNA;  Surgeon: Khoa Abdalla MD;  Location: [facility] OR;  Service: Orthopedics;  Laterality: Right;  Coral Guillen     Family History:   family history includes Cancer in his father; Colon cancer in his maternal aunt; Diabetes in his mother and sister; Heart disease in his brother, maternal grandfather, and mother; Stroke in his maternal grandmother.    Social History:    reports that he has quit smoking. His smoking use included cigarettes. He started smoking about 25 years ago. He has a 3.8 pack-year smoking history. He has never used smokeless tobacco. He reports that he does not currently use alcohol. He reports current drug use. Frequency: 3.00 times per week. Drug: Marijuana.    Review of Systems  Negative except as noted in the HPI.      Objective:      Physical Exam  Constitutional:       Appearance: Normal appearance.      Comments: thin   HENT:      Head: Normocephalic.      Mouth/Throat:      Mouth: Mucous membranes are moist.   Eyes:      Extraocular Movements: Extraocular movements intact.      Conjunctiva/sclera: Conjunctivae normal.      Pupils: Pupils are equal, round, and reactive to light.   Cardiovascular:      Rate and Rhythm: Normal rate and regular rhythm.      Pulses: Normal pulses.       Heart sounds: Normal heart sounds.   Pulmonary:      Effort: Pulmonary effort is normal.      Breath sounds: Normal breath sounds.   Abdominal:      General: Bowel sounds are normal.      Palpations: Abdomen is soft.   Musculoskeletal:         General: Normal range of motion.      Cervical back: Normal range of motion and neck supple.   Skin:     General: Skin is warm and dry.   Neurological:      General: No focal deficit present.      Mental Status: He is alert and oriented to person, place, and time.   Psychiatric:         Mood and Affect: Mood normal.         Behavior: Behavior normal.         Thought Content: Thought content normal.         Judgment: Judgment normal.         Home Medications:     Current Outpatient Medications   Medication Sig    aspirin (ECOTRIN) 81 MG EC tablet Take 1 tablet (81 mg total) by mouth once daily.    atorvastatin (LIPITOR) 40 MG tablet Take 40 mg by mouth once daily.    dexAMETHasone (DECADRON) 4 MG Tab Take 2 tablets (8 mg total) by mouth once daily. on days 2-4 of each chemotherapy cycle    DULoxetine (CYMBALTA) 30 MG capsule Take 1 capsule (30 mg total) by mouth every evening.    ergocalciferol (ERGOCALCIFEROL) 50,000 unit Cap Take 1 capsule (50,000 Units total) by mouth every 7 days.    gabapentin (NEURONTIN) 300 MG capsule Take 1 capsule (300 mg total) by mouth every evening.    ibuprofen (ADVIL,MOTRIN) 800 MG tablet Take 1 tablet (800 mg total) by mouth 3 (three) times daily as needed for Pain.    OLANZapine (ZYPREXA) 5 MG tablet Take 1 tablet by mouth nightly on days 1-4 of each chemotherapy cycle.    ondansetron (ZOFRAN) 4 MG tablet Take 1 tablet (4 mg total) by mouth every 6 (six) hours as needed for Nausea.    potassium chloride (K-TAB) 20 mEq Take 2 tablets (40 mEq total) by mouth once daily.    hydrocortisone 2.5 % cream Apply topically 2 (two) times daily. (Patient not taking: Reported on 2/27/2025)    ketoconazole (NIZORAL) 2 % shampoo Apply topically twice a week.  (Patient not taking: Reported on 2/27/2025)     No current facility-administered medications for this visit.     Facility-Administered Medications Ordered in Other Visits   Medication Frequency    0.9%  NaCl infusion Continuous    0.9%  NaCl infusion Continuous    LIDOcaine (PF) 10 mg/ml (1%) injection 10 mg Once     Laboratory Results:     Recent Results (from the past 12 weeks)   Comprehensive Metabolic Panel    Collection Time: 12/11/24  8:54 AM   Result Value Ref Range    Sodium 142 136 - 145 mmol/L    Potassium 3.5 3.5 - 5.1 mmol/L    Chloride 105 98 - 107 mmol/L    CO2 25 22 - 29 mmol/L    Glucose 106 (H) 74 - 100 mg/dL    Blood Urea Nitrogen 8.8 (L) 8.9 - 20.6 mg/dL    Creatinine 0.84 0.72 - 1.25 mg/dL    Calcium 9.9 8.4 - 10.2 mg/dL    Protein Total 7.9 6.4 - 8.3 gm/dL    Albumin 3.4 (L) 3.5 - 5.0 g/dL    Globulin 4.5 (H) 2.4 - 3.5 gm/dL    Albumin/Globulin Ratio 0.8 (L) 1.1 - 2.0 ratio    Bilirubin Total 0.3 <=1.5 mg/dL    ALP 78 40 - 150 unit/L    ALT 12 0 - 55 unit/L    AST 13 5 - 34 unit/L    eGFR >60 mL/min/1.73/m2    Anion Gap 12.0 mEq/L    BUN/Creatinine Ratio 10    CBC with Differential    Collection Time: 12/11/24  8:54 AM   Result Value Ref Range    WBC 6.40 4.50 - 11.50 x10(3)/mcL    RBC 4.04 (L) 4.70 - 6.10 x10(6)/mcL    Hgb 13.7 (L) 14.0 - 18.0 g/dL    Hct 40.5 (L) 42.0 - 52.0 %    .2 (H) 80.0 - 94.0 fL    MCH 33.9 (H) 27.0 - 31.0 pg    MCHC 33.8 33.0 - 36.0 g/dL    RDW 14.5 11.5 - 17.0 %    Platelet 351 130 - 400 x10(3)/mcL    MPV 8.9 7.4 - 10.4 fL    Neut % 67.4 %    Lymph % 18.3 %    Mono % 10.6 %    Eos % 2.8 %    Basophil % 0.6 %    Lymph # 1.17 0.6 - 4.6 x10(3)/mcL    Neut # 4.31 2.1 - 9.2 x10(3)/mcL    Mono # 0.68 0.1 - 1.3 x10(3)/mcL    Eos # 0.18 0 - 0.9 x10(3)/mcL    Baso # 0.04 <=0.2 x10(3)/mcL    Imm Gran # 0.02 0 - 0.04 x10(3)/mcL    Imm Grans % 0.3 %    NRBC% 0.0 %   T4, Free    Collection Time: 12/11/24  8:54 AM   Result Value Ref Range    Thyroxine Free 1.04 0.70 - 1.48  ng/dL   TSH    Collection Time: 12/11/24  8:54 AM   Result Value Ref Range    TSH 0.631 0.350 - 4.940 uIU/mL   EKG 12-lead    Collection Time: 12/14/24  7:06 PM   Result Value Ref Range    QRS Duration 72 ms    [facility] QTC Calculation 465 ms   Comprehensive Metabolic Panel    Collection Time: 12/14/24  8:04 PM   Result Value Ref Range    Sodium 139 136 - 145 mmol/L    Potassium 3.5 3.5 - 5.1 mmol/L    Chloride 89 (L) 98 - 107 mmol/L    CO2 24 22 - 29 mmol/L    Glucose 240 (H) 74 - 100 mg/dL    Blood Urea Nitrogen 21.7 (H) 8.9 - 20.6 mg/dL    Creatinine 3.88 (H) 0.72 - 1.25 mg/dL    Calcium 12.5 (HH) 8.4 - 10.2 mg/dL    Protein Total 12.4 (H) 6.4 - 8.3 gm/dL    Albumin 5.3 (H) 3.5 - 5.0 g/dL    Globulin 7.1 (H) 2.4 - 3.5 gm/dL    Albumin/Globulin Ratio 0.7 (L) 1.1 - 2.0 ratio    Bilirubin Total 0.4 <=1.5 mg/dL     40 - 150 unit/L    ALT 15 0 - 55 unit/L    AST 17 5 - 34 unit/L    eGFR 19 mL/min/1.73/m2    Anion Gap 26.0 mEq/L    BUN/Creatinine Ratio 6    BNP    Collection Time: 12/14/24  8:04 PM   Result Value Ref Range    Natriuretic Peptide 30.3 <=100.0 pg/mL   Magnesium    Collection Time: 12/14/24  8:04 PM   Result Value Ref Range    Magnesium Level 2.90 (H) 1.60 - 2.60 mg/dL   Lipase    Collection Time: 12/14/24  8:04 PM   Result Value Ref Range    Lipase Level 70 (H) <=60 U/L   CBC with Differential    Collection Time: 12/14/24  8:04 PM   Result Value Ref Range    WBC 12.55 (H) 4.50 - 11.50 x10(3)/mcL    RBC 5.17 4.70 - 6.10 x10(6)/mcL    Hgb 17.1 14.0 - 18.0 g/dL    Hct 51.8 42.0 - 52.0 %    .2 (H) 80.0 - 94.0 fL    MCH 33.1 (H) 27.0 - 31.0 pg    MCHC 33.0 33.0 - 36.0 g/dL    RDW 14.4 11.5 - 17.0 %    Platelet 446 (H) 130 - 400 x10(3)/mcL    MPV 8.8 7.4 - 10.4 fL    Neut % 84.2 %    Lymph % 8.8 %    Mono % 6.1 %    Eos % 0.1 %    Basophil % 0.2 %    Lymph # 1.11 0.6 - 4.6 x10(3)/mcL    Neut # 10.55 (H) 2.1 - 9.2 x10(3)/mcL    Mono # 0.77 0.1 - 1.3 x10(3)/mcL    Eos # 0.01 0 - 0.9 x10(3)/mcL    Baso #  0.03 <=0.2 x10(3)/mcL    Imm Gran # 0.08 (H) 0 - 0.04 x10(3)/mcL    Imm Grans % 0.6 %    NRBC% 0.0 %   Blood Culture    Collection Time: 12/14/24 11:33 PM    Specimen: Hand, Right; Blood   Result Value Ref Range    Blood Culture No Growth at 5 days    Blood Culture    Collection Time: 12/14/24 11:33 PM    Specimen: Hand, Left; Blood   Result Value Ref Range    Blood Culture No Growth at 5 days    Urinalysis, Reflex to Urine Culture    Collection Time: 12/15/24  1:47 AM    Specimen: Urine   Result Value Ref Range    Color, UA Yellow Yellow, Light-Yellow, Colorless, Straw, Dark-Yellow    Appearance, UA Turbid (A) Clear    Specific Gravity, UA 1.019 1.005 - 1.030    pH, UA 5.5 5.0 - 8.5    Protein, UA 2+ (A) Negative    Glucose, UA Normal Negative, Normal    Ketones, UA Negative Negative    Blood, UA 1+ (A) Negative    Bilirubin, UA Negative Negative    Urobilinogen, UA Normal 0.2, 1.0, Normal    Nitrites, UA Negative Negative    Leukocyte Esterase, UA Negative Negative    RBC, UA 0-5 None Seen, 0-2, 3-5, 0-5 /HPF    WBC, UA 21-50 (A) None Seen, 0-2, 3-5, 0-5 /HPF    Bacteria, UA Occasional (A) None Seen, Trace /HPF    Squamous Epithelial Cells, UA Occasional (A) None Seen, Trace, Rare /HPF    Transitional Epithelial Cells, UA Trace /HPF    Mucous, UA Few (A) None Seen /LPF    Hyaline Casts, UA >20 (A) None Seen /lpf    Granular Casts 6-10 (A) None Seen /lpf    Calcium Oxalate Crystals, UA Moderate (A) None Seen   MRSA PCR    Collection Time: 12/15/24  1:47 AM   Result Value Ref Range    MRSA PCR Screen Not Detected Not Detected   Urine culture    Collection Time: 12/15/24  1:47 AM    Specimen: Urine   Result Value Ref Range    Urine Culture No Growth    Comprehensive Metabolic Panel (CMP)    Collection Time: 12/15/24  4:20 AM   Result Value Ref Range    Sodium 133 (L) 136 - 145 mmol/L    Potassium 4.2 3.5 - 5.1 mmol/L    Chloride 101 98 - 107 mmol/L    CO2 21 (L) 22 - 29 mmol/L    Glucose 109 (H) 74 - 100 mg/dL     Blood Urea Nitrogen 20.8 (H) 8.9 - 20.6 mg/dL    Creatinine 1.80 (H) 0.72 - 1.25 mg/dL    Calcium 9.0 8.4 - 10.2 mg/dL    Protein Total 7.8 6.4 - 8.3 gm/dL    Albumin 3.6 3.5 - 5.0 g/dL    Globulin 4.2 (H) 2.4 - 3.5 gm/dL    Albumin/Globulin Ratio 0.9 (L) 1.1 - 2.0 ratio    Bilirubin Total 0.3 <=1.5 mg/dL    ALP 79 40 - 150 unit/L    ALT 10 0 - 55 unit/L    AST 16 5 - 34 unit/L    eGFR 48 mL/min/1.73/m2    Anion Gap 11.0 mEq/L    BUN/Creatinine Ratio 12    CBC with Differential    Collection Time: 12/15/24  4:20 AM   Result Value Ref Range    WBC 13.54 (H) 4.50 - 11.50 x10(3)/mcL    RBC 4.13 (L) 4.70 - 6.10 x10(6)/mcL    Hgb 13.8 (L) 14.0 - 18.0 g/dL    Hct 41.4 (L) 42.0 - 52.0 %    .2 (H) 80.0 - 94.0 fL    MCH 33.4 (H) 27.0 - 31.0 pg    MCHC 33.3 33.0 - 36.0 g/dL    RDW 14.4 11.5 - 17.0 %    Platelet 345 130 - 400 x10(3)/mcL    MPV 8.6 7.4 - 10.4 fL    Neut % 82.7 %    Lymph % 8.6 %    Mono % 8.1 %    Eos % 0.1 %    Basophil % 0.1 %    Lymph # 1.17 0.6 - 4.6 x10(3)/mcL    Neut # 11.19 (H) 2.1 - 9.2 x10(3)/mcL    Mono # 1.10 0.1 - 1.3 x10(3)/mcL    Eos # 0.01 0 - 0.9 x10(3)/mcL    Baso # 0.02 <=0.2 x10(3)/mcL    Imm Gran # 0.05 (H) 0 - 0.04 x10(3)/mcL    Imm Grans % 0.4 %    NRBC% 0.0 %   Comprehensive Metabolic Panel    Collection Time: 12/16/24  5:47 AM   Result Value Ref Range    Sodium 137 136 - 145 mmol/L    Potassium 4.5 3.5 - 5.1 mmol/L    Chloride 105 98 - 107 mmol/L    CO2 25 22 - 29 mmol/L    Glucose 98 74 - 100 mg/dL    Blood Urea Nitrogen 9.6 8.9 - 20.6 mg/dL    Creatinine 0.75 0.72 - 1.25 mg/dL    Calcium 9.2 8.4 - 10.2 mg/dL    Protein Total 7.1 6.4 - 8.3 gm/dL    Albumin 3.4 (L) 3.5 - 5.0 g/dL    Globulin 3.7 (H) 2.4 - 3.5 gm/dL    Albumin/Globulin Ratio 0.9 (L) 1.1 - 2.0 ratio    Bilirubin Total 0.4 <=1.5 mg/dL    ALP 77 40 - 150 unit/L    ALT 11 0 - 55 unit/L    AST 16 5 - 34 unit/L    eGFR >60 mL/min/1.73/m2    Anion Gap 7.0 mEq/L    BUN/Creatinine Ratio 13    CBC with Differential     Collection Time: 12/16/24  5:47 AM   Result Value Ref Range    WBC 5.69 4.50 - 11.50 x10(3)/mcL    RBC 3.83 (L) 4.70 - 6.10 x10(6)/mcL    Hgb 12.7 (L) 14.0 - 18.0 g/dL    Hct 38.4 (L) 42.0 - 52.0 %    .3 (H) 80.0 - 94.0 fL    MCH 33.2 (H) 27.0 - 31.0 pg    MCHC 33.1 33.0 - 36.0 g/dL    RDW 14.0 11.5 - 17.0 %    Platelet 339 130 - 400 x10(3)/mcL    MPV 8.5 7.4 - 10.4 fL    NRBC% 0.0 %   Manual Differential    Collection Time: 12/16/24  5:47 AM   Result Value Ref Range    WBC 5.69 x10(3)/mcL    Neutrophils % 69 %    Lymphs % 18 %    Monocytes % 12 %    Eosinophils % 1 %    Neutrophils Abs 3.9261 2.1 - 9.2 x10(3)/mcL    Lymphs Abs 1.0242 0.6 - 4.6 x10(3)/mcL    Monocytes Abs 0.6828 0.1 - 1.3 x10(3)/mcL    Eosinophils Abs 0.0569 0 - 0.9 x10(3)/mcL    Platelets Normal Normal, Adequate    RBC Morph Abnormal (A) Normal    Macrocytosis 1+ (A) (none)    Vacuolated Grans 1+ (A) (none)   Basic Metabolic Panel    Collection Time: 12/17/24  6:41 AM   Result Value Ref Range    Sodium 137 136 - 145 mmol/L    Potassium 4.4 3.5 - 5.1 mmol/L    Chloride 103 98 - 107 mmol/L    CO2 28 22 - 29 mmol/L    Glucose 108 (H) 74 - 100 mg/dL    Blood Urea Nitrogen 10.2 8.9 - 20.6 mg/dL    Creatinine 0.81 0.72 - 1.25 mg/dL    BUN/Creatinine Ratio 13     Calcium 9.4 8.4 - 10.2 mg/dL    Anion Gap 6.0 mEq/L    eGFR >60 mL/min/1.73/m2   CBC with Differential    Collection Time: 12/17/24  6:41 AM   Result Value Ref Range    WBC 4.28 (L) 4.50 - 11.50 x10(3)/mcL    RBC 4.08 (L) 4.70 - 6.10 x10(6)/mcL    Hgb 13.7 (L) 14.0 - 18.0 g/dL    Hct 41.3 (L) 42.0 - 52.0 %    .2 (H) 80.0 - 94.0 fL    MCH 33.6 (H) 27.0 - 31.0 pg    MCHC 33.2 33.0 - 36.0 g/dL    RDW 13.8 11.5 - 17.0 %    Platelet 322 130 - 400 x10(3)/mcL    MPV 8.4 7.4 - 10.4 fL    NRBC% 0.0 %   Manual Differential    Collection Time: 12/17/24  6:41 AM   Result Value Ref Range    WBC 4.3 x10(3)/mcL    Neutrophils % 47 %    Lymphs % 23 %    Monocytes % 27 %    Eosinophils % 4 %     Neutrophils Abs 2.021 (L) 2.1 - 9.2 x10(3)/mcL    Lymphs Abs 0.989 0.6 - 4.6 x10(3)/mcL    Monocytes Abs 1.161 0.1 - 1.3 x10(3)/mcL    Eosinophils Abs 0.172 0 - 0.9 x10(3)/mcL    Platelets Normal Normal, Adequate    RBC Morph Normal Normal   Comprehensive Metabolic Panel    Collection Time: 01/02/25  9:00 AM   Result Value Ref Range    Sodium 138 136 - 145 mmol/L    Potassium 3.9 3.5 - 5.1 mmol/L    Chloride 107 98 - 107 mmol/L    CO2 26 22 - 29 mmol/L    Glucose 182 (H) 74 - 100 mg/dL    Blood Urea Nitrogen 7.7 (L) 8.9 - 20.6 mg/dL    Creatinine 0.89 0.72 - 1.25 mg/dL    Calcium 8.9 8.4 - 10.2 mg/dL    Protein Total 7.0 6.4 - 8.3 gm/dL    Albumin 3.1 (L) 3.5 - 5.0 g/dL    Globulin 3.9 (H) 2.4 - 3.5 gm/dL    Albumin/Globulin Ratio 0.8 (L) 1.1 - 2.0 ratio    Bilirubin Total 0.2 <=1.5 mg/dL    ALP 73 40 - 150 unit/L    ALT 6 0 - 55 unit/L    AST 11 5 - 34 unit/L    eGFR >60 mL/min/1.73/m2    Anion Gap 5.0 mEq/L    BUN/Creatinine Ratio 9    CBC with Differential    Collection Time: 01/02/25  9:00 AM   Result Value Ref Range    WBC 5.89 4.50 - 11.50 x10(3)/mcL    RBC 3.59 (L) 4.70 - 6.10 x10(6)/mcL    Hgb 11.9 (L) 14.0 - 18.0 g/dL    Hct 36.7 (L) 42.0 - 52.0 %    .2 (H) 80.0 - 94.0 fL    MCH 33.1 (H) 27.0 - 31.0 pg    MCHC 32.4 (L) 33.0 - 36.0 g/dL    RDW 13.7 11.5 - 17.0 %    Platelet 311 130 - 400 x10(3)/mcL    MPV 8.7 7.4 - 10.4 fL    Neut % 68.9 %    Lymph % 20.5 %    Mono % 8.5 %    Eos % 1.7 %    Basophil % 0.2 %    Lymph # 1.21 0.6 - 4.6 x10(3)/mcL    Neut # 4.06 2.1 - 9.2 x10(3)/mcL    Mono # 0.50 0.1 - 1.3 x10(3)/mcL    Eos # 0.10 0 - 0.9 x10(3)/mcL    Baso # 0.01 <=0.2 x10(3)/mcL    Imm Gran # 0.01 0 - 0.04 x10(3)/mcL    Imm Grans % 0.2 %    NRBC% 0.0 %   T4, Free    Collection Time: 01/02/25  9:00 AM   Result Value Ref Range    Thyroxine Free 0.95 0.70 - 1.48 ng/dL   TSH    Collection Time: 01/02/25  9:00 AM   Result Value Ref Range    TSH 0.409 0.350 - 4.940 uIU/mL   EKG 12-lead    Collection Time:  01/18/25 10:02 PM   Result Value Ref Range    QRS Duration 82 ms    [facility] QTC Calculation 439 ms   Comprehensive metabolic panel    Collection Time: 01/18/25 10:32 PM   Result Value Ref Range    Sodium 139 136 - 145 mmol/L    Potassium 3.5 3.5 - 5.1 mmol/L    Chloride 103 98 - 107 mmol/L    CO2 24 22 - 29 mmol/L    Glucose 127 (H) 74 - 100 mg/dL    Blood Urea Nitrogen 15.1 8.9 - 20.6 mg/dL    Creatinine 0.84 0.72 - 1.25 mg/dL    Calcium 9.8 8.4 - 10.2 mg/dL    Protein Total 8.5 (H) 6.4 - 8.3 gm/dL    Albumin 3.9 3.5 - 5.0 g/dL    Globulin 4.6 (H) 2.4 - 3.5 gm/dL    Albumin/Globulin Ratio 0.8 (L) 1.1 - 2.0 ratio    Bilirubin Total 0.2 <=1.5 mg/dL    ALP 95 40 - 150 unit/L    ALT 12 0 - 55 unit/L    AST 13 5 - 34 unit/L    eGFR >60 mL/min/1.73/m2    Anion Gap 12.0 mEq/L    BUN/Creatinine Ratio 18    Brain natriuretic peptide    Collection Time: 01/18/25 10:32 PM   Result Value Ref Range    Natriuretic Peptide 10.1 <=100.0 pg/mL   Magnesium    Collection Time: 01/18/25 10:32 PM   Result Value Ref Range    Magnesium Level 1.90 1.60 - 2.60 mg/dL   Lipase    Collection Time: 01/18/25 10:32 PM   Result Value Ref Range    Lipase Level 12 <=60 U/L   Troponin I    Collection Time: 01/18/25 10:32 PM   Result Value Ref Range    Troponin-I <0.010 0.000 - 0.045 ng/mL   CBC with Differential    Collection Time: 01/18/25 10:32 PM   Result Value Ref Range    WBC 14.39 (H) 4.50 - 11.50 x10(3)/mcL    RBC 4.11 (L) 4.70 - 6.10 x10(6)/mcL    Hgb 13.5 (L) 14.0 - 18.0 g/dL    Hct 40.8 (L) 42.0 - 52.0 %    MCV 99.3 (H) 80.0 - 94.0 fL    MCH 32.8 (H) 27.0 - 31.0 pg    MCHC 33.1 33.0 - 36.0 g/dL    RDW 13.8 11.5 - 17.0 %    Platelet 369 130 - 400 x10(3)/mcL    MPV 8.7 7.4 - 10.4 fL    Neut % 89.8 %    Lymph % 6.0 %    Mono % 3.4 %    Eos % 0.2 %    Basophil % 0.3 %    Imm Grans % 0.3 %    Neut # 12.92 (H) 2.1 - 9.2 x10(3)/mcL    Lymph # 0.86 0.6 - 4.6 x10(3)/mcL    Mono # 0.49 0.1 - 1.3 x10(3)/mcL    Eos # 0.03 0 - 0.9 x10(3)/mcL    Baso # 0.04  <=0.2 x10(3)/mcL    Imm Gran # 0.05 (H) 0.00 - 0.04 x10(3)/mcL    NRBC% 0.0 %   Urinalysis, Reflex to Urine Culture    Collection Time: 01/18/25 11:16 PM    Specimen: Urine   Result Value Ref Range    Color, UA Yellow Yellow, Light-Yellow, Colorless, Straw, Dark-Yellow    Appearance, UA Clear Clear    Specific Gravity, UA 1.033 (H) 1.005 - 1.030    pH, UA 5.5 5.0 - 8.5    Protein, UA 1+ (A) Negative    Glucose, UA Normal Negative, Normal    Ketones, UA 3+ (A) Negative    Blood, UA Negative Negative    Bilirubin, UA Negative Negative    Urobilinogen, UA 2.0 (A) 0.2, 1.0, Normal    Nitrites, UA Negative Negative    Leukocyte Esterase, UA Negative Negative    RBC, UA 0-5 None Seen, 0-2, 3-5, 0-5 /HPF    WBC, UA 0-5 None Seen, 0-2, 3-5, 0-5 /HPF    Bacteria, UA Trace None Seen, Trace /HPF    Squamous Epithelial Cells, UA Trace None Seen, Trace, Rare /HPF    Mucous, UA Few (A) None Seen /LPF   Respiratory Panel    Collection Time: 01/19/25  1:47 AM   Result Value Ref Range    Adenovirus Not Detected Not Detected    Coronavirus 229E Not Detected Not Detected    Coronavirus HKU1 Not Detected Not Detected    Coronavirus NL63 Not Detected Not Detected    Coronavirus OC43 PCR, Common Cold Virus Not Detected Not Detected    Human Metapneumovirus Not Detected Not Detected    Parainfluenza Virus 1 Not Detected Not Detected    Parainfluenza Virus 2 Not Detected Not Detected    Parainfluenza Virus 3 Not Detected Not Detected    Parainfluenza Virus 4 Not Detected Not Detected    Bordetella pertussis (ptxP) Not Detected Not Detected    Chlamydia pneumoniae Not Detected Not Detected    Mycoplasma pneumoniae Not Detected Not Detected    Human Rhinovirus/Enterovirus Not Detected Not Detected    Bordetella parapertussis ([id number]) Not Detected Not Detected   MRSA PCR    Collection Time: 01/19/25  1:47 AM   Result Value Ref Range    MRSA PCR Screen Not Detected Not Detected   COVID/RSV/FLU A&B PCR    Collection Time: 01/19/25  1:57 AM    Result Value Ref Range    Influenza A PCR Not Detected Not Detected    Influenza B PCR Not Detected Not Detected    Respiratory Syncytial Virus PCR Not Detected Not Detected    SARS-CoV-2 PCR Not Detected Not Detected, Negative   Blood culture #1 **CANNOT BE ORDERED STAT**    Collection Time: 01/19/25  4:48 AM    Specimen: Arm, Left; Blood   Result Value Ref Range    Blood Culture No Growth at 5 days    Blood culture #2 **CANNOT BE ORDERED STAT**    Collection Time: 01/19/25  4:48 AM    Specimen: Arm, Right; Blood   Result Value Ref Range    Blood Culture No Growth at 5 days    Troponin I    Collection Time: 01/19/25  4:48 AM   Result Value Ref Range    Troponin-I <0.010 0.000 - 0.045 ng/mL   Comprehensive Metabolic Panel    Collection Time: 01/19/25  4:48 AM   Result Value Ref Range    Sodium 136 136 - 145 mmol/L    Potassium 4.1 3.5 - 5.1 mmol/L    Chloride 102 98 - 107 mmol/L    CO2 21 (L) 22 - 29 mmol/L    Glucose 146 (H) 74 - 100 mg/dL    Blood Urea Nitrogen 11.6 8.9 - 20.6 mg/dL    Creatinine 0.81 0.72 - 1.25 mg/dL    Calcium 9.8 8.4 - 10.2 mg/dL    Protein Total 8.1 6.4 - 8.3 gm/dL    Albumin 4.0 3.5 - 5.0 g/dL    Globulin 4.1 (H) 2.4 - 3.5 gm/dL    Albumin/Globulin Ratio 1.0 (L) 1.1 - 2.0 ratio    Bilirubin Total 0.4 <=1.5 mg/dL    ALP 97 40 - 150 unit/L    ALT 14 0 - 55 unit/L    AST 14 5 - 34 unit/L    eGFR >60 mL/min/1.73/m2    Anion Gap 13.0 mEq/L    BUN/Creatinine Ratio 14    Magnesium    Collection Time: 01/19/25  4:48 AM   Result Value Ref Range    Magnesium Level 1.80 1.60 - 2.60 mg/dL   CBC with Differential    Collection Time: 01/19/25  4:48 AM   Result Value Ref Range    WBC 12.24 (H) 4.50 - 11.50 x10(3)/mcL    RBC 4.07 (L) 4.70 - 6.10 x10(6)/mcL    Hgb 13.5 (L) 14.0 - 18.0 g/dL    Hct 40.4 (L) 42.0 - 52.0 %    MCV 99.3 (H) 80.0 - 94.0 fL    MCH 33.2 (H) 27.0 - 31.0 pg    MCHC 33.4 33.0 - 36.0 g/dL    RDW 13.7 11.5 - 17.0 %    Platelet 341 130 - 400 x10(3)/mcL    MPV 8.7 7.4 - 10.4 fL    Neut %  91.0 %    Lymph % 6.4 %    Mono % 1.9 %    Eos % 0.0 %    Basophil % 0.2 %    Imm Grans % 0.5 %    Neut # 11.15 (H) 2.1 - 9.2 x10(3)/mcL    Lymph # 0.78 0.6 - 4.6 x10(3)/mcL    Mono # 0.23 0.1 - 1.3 x10(3)/mcL    Eos # 0.00 0 - 0.9 x10(3)/mcL    Baso # 0.02 <=0.2 x10(3)/mcL    Imm Gran # 0.06 (H) 0.00 - 0.04 x10(3)/mcL    NRBC% 0.0 %   Troponin I    Collection Time: 01/19/25 11:09 AM   Result Value Ref Range    Troponin-I <0.010 0.000 - 0.045 ng/mL   Echo    Collection Time: 01/19/25  1:35 PM   Result Value Ref Range    BSA 0.94 m2    A4C EF 64 %    LVOT stroke volume 36.7 cm3    LVIDd 3.4 (A) 3.5 - 6.0 cm    LV Systolic Volume 17.30 mL    LV Systolic Volume Index 10.9 mL/m2    LVIDs 2.3 2.1 - 4.0 cm    LV Diastolic Volume 46.80 mL    LV ESV A4C 47.30 mL    LV Diastolic Volume Index 29.43 mL/m2    LV EDV A4C 76.70 mL    Left Ventricular End Systolic Volume by Teichholz Method 17.30 mL    Left Ventricular End Diastolic Volume by Teichholz Method 46.80 mL    IVS 1.0 0.6 - 1.1 cm    LVOT diameter 2.0 cm    LVOT area 3.1 cm2    FS 32.4 28 - 44 %    Left Ventricle Relative Wall Thickness 0.47 cm    PW 0.8 0.6 - 1.1 cm    LV mass 84.9 g    LV Mass Index 53.4 g/m2    MV Peak E Mahendra 0.40 m/s    TDI LATERAL 0.08 m/s    TDI SEPTAL 0.07 m/s    E/E' ratio 5 m/s    MV Peak A Mahendra 0.57 m/s    E/A ratio 0.70     E wave deceleration time 103 msec    LV SEPTAL E/E' RATIO 5.7 m/s    LV LATERAL E/E' RATIO 5.0 m/s    LVOT peak mahendra 0.6 m/s    Left Ventricular Outflow Tract Mean Velocity 0.40 cm/s    Left Ventricular Outflow Tract Mean Gradient 1.00 mmHg    LA size 3.3 cm    AV mean gradient 2 mmHg    AV peak gradient 4 mmHg    Ao peak mahendra 1.0 m/s    Ao VTI 17.0 cm    LVOT peak VTI 11.7 cm    AV valve area 2.2 cm²    AV Velocity Ratio 0.60     AV index (prosthetic) 0.69     GLORIA by Velocity Ratio 1.9 cm²    Mean e' 0.08 m/s    ZLVIDS -1.60     ZLVIDD -2.89     LA area A4C 16.60 cm2    Est. RA pres 3 mmHg   Troponin I    Collection Time:  01/19/25  6:04 PM   Result Value Ref Range    Troponin-I <0.010 0.000 - 0.045 ng/mL   VANCOMYCIN, TROUGH    Collection Time: 01/20/25  6:32 PM   Result Value Ref Range    Vancomycin Trough 12.1 (L) 15.0 - 20.0 ug/ml   Comprehensive Metabolic Panel    Collection Time: 01/29/25  9:20 AM   Result Value Ref Range    Sodium 138 136 - 145 mmol/L    Potassium 3.5 3.5 - 5.1 mmol/L    Chloride 102 98 - 107 mmol/L    CO2 28 22 - 29 mmol/L    Glucose 112 (H) 74 - 100 mg/dL    Blood Urea Nitrogen 12.6 8.9 - 20.6 mg/dL    Creatinine 0.82 0.72 - 1.25 mg/dL    Calcium 9.8 8.4 - 10.2 mg/dL    Protein Total 8.4 (H) 6.4 - 8.3 gm/dL    Albumin 3.7 3.5 - 5.0 g/dL    Globulin 4.7 (H) 2.4 - 3.5 gm/dL    Albumin/Globulin Ratio 0.8 (L) 1.1 - 2.0 ratio    Bilirubin Total 0.2 <=1.5 mg/dL    ALP 76 40 - 150 unit/L    ALT 11 0 - 55 unit/L    AST 14 5 - 34 unit/L    eGFR >60 mL/min/1.73/m2    Anion Gap 8.0 mEq/L    BUN/Creatinine Ratio 15    Magnesium    Collection Time: 01/29/25  9:20 AM   Result Value Ref Range    Magnesium Level 1.80 1.60 - 2.60 mg/dL   CBC with Differential    Collection Time: 01/29/25  9:20 AM   Result Value Ref Range    WBC 6.71 4.50 - 11.50 x10(3)/mcL    RBC 4.21 (L) 4.70 - 6.10 x10(6)/mcL    Hgb 13.7 (L) 14.0 - 18.0 g/dL    Hct 41.7 (L) 42.0 - 52.0 %    MCV 99.0 (H) 80.0 - 94.0 fL    MCH 32.5 (H) 27.0 - 31.0 pg    MCHC 32.9 (L) 33.0 - 36.0 g/dL    RDW 14.1 11.5 - 17.0 %    Platelet 302 130 - 400 x10(3)/mcL    MPV 9.0 7.4 - 10.4 fL    Neut % 69.0 %    Lymph % 20.6 %    Mono % 7.6 %    Eos % 2.2 %    Basophil % 0.3 %    Imm Grans % 0.3 %    Neut # 4.63 2.1 - 9.2 x10(3)/mcL    Lymph # 1.38 0.6 - 4.6 x10(3)/mcL    Mono # 0.51 0.1 - 1.3 x10(3)/mcL    Eos # 0.15 0 - 0.9 x10(3)/mcL    Baso # 0.02 <=0.2 x10(3)/mcL    Imm Gran # 0.02 0.00 - 0.04 x10(3)/mcL    NRBC% 0.0 %   TSH    Collection Time: 02/25/25  8:04 AM   Result Value Ref Range    TSH 0.822 0.350 - 4.940 uIU/mL   T4, Free    Collection Time: 02/25/25  8:04 AM   Result  Value Ref Range    Thyroxine Free 0.91 0.70 - 1.48 ng/dL   Comprehensive Metabolic Panel    Collection Time: 02/25/25  8:04 AM   Result Value Ref Range    Sodium 139 136 - 145 mmol/L    Potassium 4.0 3.5 - 5.1 mmol/L    Chloride 107 98 - 107 mmol/L    CO2 27 22 - 29 mmol/L    Glucose 102 (H) 74 - 100 mg/dL    Blood Urea Nitrogen 10.3 8.9 - 20.6 mg/dL    Creatinine 0.81 0.72 - 1.25 mg/dL    Calcium 9.8 8.4 - 10.2 mg/dL    Protein Total 8.5 (H) 6.4 - 8.3 gm/dL    Albumin 3.8 3.5 - 5.0 g/dL    Globulin 4.7 (H) 2.4 - 3.5 gm/dL    Albumin/Globulin Ratio 0.8 (L) 1.1 - 2.0 ratio    Bilirubin Total 0.2 <=1.5 mg/dL    ALP 75 40 - 150 unit/L    ALT 15 0 - 55 unit/L    AST 16 5 - 34 unit/L    eGFR >60 mL/min/1.73/m2    Anion Gap 5.0 mEq/L    BUN/Creatinine Ratio 13    Magnesium    Collection Time: 02/25/25  8:04 AM   Result Value Ref Range    Magnesium Level 1.90 1.60 - 2.60 mg/dL   CBC with Differential    Collection Time: 02/25/25  8:04 AM   Result Value Ref Range    WBC 6.65 4.50 - 11.50 x10(3)/mcL    RBC 4.50 (L) 4.70 - 6.10 x10(6)/mcL    Hgb 14.1 14.0 - 18.0 g/dL    Hct 44.1 42.0 - 52.0 %    MCV 98.0 (H) 80.0 - 94.0 fL    MCH 31.3 (H) 27.0 - 31.0 pg    MCHC 32.0 (L) 33.0 - 36.0 g/dL    RDW 14.4 11.5 - 17.0 %    Platelet 329 130 - 400 x10(3)/mcL    MPV 8.7 7.4 - 10.4 fL    Neut % 69.6 %    Lymph % 18.0 %    Mono % 7.4 %    Eos % 4.1 %    Basophil % 0.6 %    Imm Grans % 0.3 %    Neut # 4.63 2.1 - 9.2 x10(3)/mcL    Lymph # 1.20 0.6 - 4.6 x10(3)/mcL    Mono # 0.49 0.1 - 1.3 x10(3)/mcL    Eos # 0.27 0 - 0.9 x10(3)/mcL    Baso # 0.04 <=0.2 x10(3)/mcL    Imm Gran # 0.02 0.00 - 0.04 x10(3)/mcL    NRBC% 0.0 %     Imaging Results:     Narrative & Impression  EXAMINATION:  CT ABDOMEN PELVIS WITH IV CONTRAST     CLINICAL HISTORY:  Abdominal pain, acute, nonlocalized;     TECHNIQUE:  Multidetector IV contrast enhanced axial CT images of the abdomen and pelvis were obtained with coronal and sagittal reconstructions.     Automatic  exposure control was utilized to reduce the patient's radiation dose.     DLP= NA     COMPARISON:  01/07/2025     FINDINGS:  01. HEPATOBILIARY: No focal hepatic lesion is identified, The gallbladder is normal.     02. SPLEEN: Normal     03. PANCREAS: No focal masses or ductal dilatation.     04. ADRENALS: Increase in size of heterogeneous left adrenal gland lesion currently measuring 2.3 x 2.8 cm.  Concern for neoplastic process.     05. KIDNEYS: The right kidney demonstrates no stone, hydronephrosis, or hydroureter. No focal mass identified. The left kidney demonstrates no stone, hydronephrosis, or hydroureter. No focal mass identified.     06. LYMPHADENOPATHY/RETROPERITONEUM: There is no retroperitoneal lymphadenopathy. The abdominal aorta is normal in course and caliber.     07. BOWEL: No acute bowel related abnormalities. No evidence of appendiceal inflammation.     08. PELVIC VISCERA: The bladder wall is diffusely thickened..  No pelvic mass.     09. PELVIC LYMPH NODES: No lymphadenopathy.     10. PERITONEUM/ABDOMINAL WALL: No ascites or implant.     11. SKELETAL: No aggressive appearing lytic/blastic lesion. No acute fractures, subluxations or dislocations.     12. LUNG BASES: There is a new 4 mm pulmonary nodule in the right lung base (series 1, image 23).  There is a mild increase in the size of the 7 mm to pulmonary nodule with the visualized middle lobe (series 1, image 17).  Correlate with clinical and laboratory findings as regards to additional evaluation and follow-up.     Impression:     The bladder wall is diffusely thickened.  Correlate with urinalysis.     Increase in size of heterogeneous left adrenal gland lesion currently measuring 2.3 x 2.8 cm.  Concern for neoplastic process.     There is a new 4 mm pulmonary nodule in the right lung base (series 1, image 23). There is a mild increase in the size of the 7 mm to pulmonary nodule with the visualized middle lobe (series 1, image 17). Correlate  with clinical and laboratory findings as regards to additional evaluation and follow-up.      Electronically signed by:Genaro Maier  Date:                                            01/19/2025  Time:                                           10:42      Narrative & Impression  EXAMINATION:  CTA CHEST NON CORONARY (PE STUDIES)     CLINICAL HISTORY:  Pulmonary embolism (PE) suspected, high prob;     TECHNIQUE:  Axial images of the chest were obtained with contrast. Sagittal and coronal reconstructed images were available for review. 3-D MIP reconstructions were performed from source imaging.     Automatic dose control was utilized to reduce patient radiation dose.     DLP= 668     COMPARISON:  No prior imaging available for comparison.     FINDINGS:  Soft Tissues: Unremarkable.Axilla: A few mildly prominent lymph nodes are seen in the axilla.Neck: The visualized soft tissues of the neck appear unremarkable.Mediastinum: There is no significant change in the partially calcified confluent lymph nodes in the right parahilar space. Rosina measures 2.7 x 2.9 cm on Image 196 Series 6. Likewise, the rest of the enlarged hilar lymph nodes appear stable.Heart: The heart appears unremarkable.Aorta: Unremarkable appearing aorta.Pulmonary Arteries: Unremarkable.Lungs: There is mild non specific dependent change at the lung bases. Stable moderate paraseptal emphysematous changes and scarring are seen in both lungs, predominantly in the upper lobes. No significant change in the sizes of the nodular opacities with associated ground-glass component in the right lung and left lower lobe. The largest of these nodules is still in the apical segment of the right upper lobe and measures 0.9 x 0.7 cm on Image 23 Series 9. No new nodules seen.Pleura: No effusions or pneumothorax are identified.Bony Structures:Spine: Mild multilevel spondylolytic changes are seen in the thoracic spine.Ribs: The ribs appear unremarkable.Abdomen: Abdominal  findings will be discussed separately in the abdomen CT report.     Impression:     No evidence of pulmonary thromboembolism.     No significant change in the sizes of the nodular opacities with associated ground-glass component in the right lung and left lower lobe. The largest of these nodules is still in the apical segment of the right upper lobe and measures 0.9 x 0.7 cm on Image 23 Series 9. No new nodules seen. Correlate with clinical and laboratory findings as regards additional evaluation and follow-up.     There is no significant change in the partially calcified confluent lymph nodes in the right parahilar space. Rosina measures 2.7 x 2.9 cm on Image 196 Series 6. Likewise, the rest of the enlarged hilar lymph nodes appear stable.     Details and other findings as above.      Electronically signed by:Genaro Maier  Date:                                            01/19/2025  Time:                                           09:43    Assessment/Plan:     Problem List Items Addressed This Visit          GI    Hematemesis with nausea - Primary    Resolved  CT scan abdomen and pelvis with IV contrast January 19, 2025 revealed bladder wall diffusely thickened.  Increase in size of heterogeneous left adrenal gland lesion currently measuring 2.3 x 2.8 cm.  Concern for neoplastic process.  There is a new 4 mm pulmonary nodule in the right lung base (series 1, image 23). There is a mild increase in the size of the 7 mm to pulmonary nodule with the visualized middle lobe (series 1, image 17). Correlate with clinical and laboratory findings as regards to additional evaluation and follow-up.   CTA chest non coronary January 19, 2025 revealed no evidence of pulmonary thromboembolism.  No significant change in the sizes of the nodular opacities with associated ground-glass component in the right lung and left lower lobe. The largest of these nodules is still in the apical segment of the right upper lobe and measures 0.9 x  0.7 cm on Image 23 Series 9.  No new nodules seen.  Correlate with clinical and laboratory findings as regards additional evaluation and follow-up.  There is no significant change in the partially calcified confluent lymph nodes in the right parahilar space.  This measures 2.7 x 2.9 cm on Image 196 Series 6.  Likewise, the rest of the enlarged hilar lymph nodes appear stable.  CT scan abdomen and pelvis with IV and oral contrast June 14, 2024 revealed a left adrenal nodule is new compared to prior, metastatic disease is possible.  PET-CT may further stage.  Rectal wall thickening, recommend correlation with colonoscopy to evaluate for malignancy.  Small amount of pelvic free fluid.  EGD and colonoscopy  GERD lifestyle modifications  Reflux precautions  Limit NSAID use  Recommend continued cessation of tobacco and alcohol use   Will continue to follow results of laboratory and imaging  Call with updates   Follow-up clinic visit with NP in 6 months (after date of scheduled procedures)         Relevant Orders    Case Request Endoscopy: EGD (ESOPHAGOGASTRODUODENOSCOPY), COLONOSCOPY (Completed)    Bloody stool    See above  Hemorrhoidal banding discussed  ER precautions provided         Relevant Orders    Case Request Endoscopy: EGD (ESOPHAGOGASTRODUODENOSCOPY), COLONOSCOPY (Completed)       Other    Abnormal CT scan    See above         Relevant Orders    Case Request Endoscopy: EGD (ESOPHAGOGASTRODUODENOSCOPY), COLONOSCOPY (Completed)

## 2025-02-27 NOTE — ASSESSMENT & PLAN NOTE
Resolved  CT scan abdomen and pelvis with IV contrast January 19, 2025 revealed bladder wall diffusely thickened.  Increase in size of heterogeneous left adrenal gland lesion currently measuring 2.3 x 2.8 cm.  Concern for neoplastic process.  There is a new 4 mm pulmonary nodule in the right lung base (series 1, image 23). There is a mild increase in the size of the 7 mm to pulmonary nodule with the visualized middle lobe (series 1, image 17). Correlate with clinical and laboratory findings as regards to additional evaluation and follow-up.   CTA chest non coronary January 19, 2025 revealed no evidence of pulmonary thromboembolism.  No significant change in the sizes of the nodular opacities with associated ground-glass component in the right lung and left lower lobe. The largest of these nodules is still in the apical segment of the right upper lobe and measures 0.9 x 0.7 cm on Image 23 Series 9.  No new nodules seen.  Correlate with clinical and laboratory findings as regards additional evaluation and follow-up.  There is no significant change in the partially calcified confluent lymph nodes in the right parahilar space.  This measures 2.7 x 2.9 cm on Image 196 Series 6.  Likewise, the rest of the enlarged hilar lymph nodes appear stable.  CT scan abdomen and pelvis with IV and oral contrast June 14, 2024 revealed a left adrenal nodule is new compared to prior, metastatic disease is possible.  PET-CT may further stage.  Rectal wall thickening, recommend correlation with colonoscopy to evaluate for malignancy.  Small amount of pelvic free fluid.  EGD and colonoscopy  GERD lifestyle modifications  Reflux precautions  Limit NSAID use  Recommend continued cessation of tobacco and alcohol use   Will continue to follow results of laboratory and imaging  Call with updates   Follow-up clinic visit with NP in 6 months (after date of scheduled procedures)

## 2025-03-18 ENCOUNTER — OFFICE VISIT (OUTPATIENT)
Dept: HEMATOLOGY/ONCOLOGY | Facility: CLINIC | Age: 40
End: 2025-03-18
Payer: MEDICAID

## 2025-03-18 ENCOUNTER — INFUSION (OUTPATIENT)
Dept: INFUSION THERAPY | Facility: HOSPITAL | Age: 40
End: 2025-03-18
Attending: INTERNAL MEDICINE
Payer: MEDICAID

## 2025-03-18 VITALS
HEIGHT: 65 IN | BODY MASS INDEX: 19.33 KG/M2 | RESPIRATION RATE: 18 BRPM | OXYGEN SATURATION: 100 % | DIASTOLIC BLOOD PRESSURE: 67 MMHG | TEMPERATURE: 98 F | SYSTOLIC BLOOD PRESSURE: 115 MMHG | HEART RATE: 98 BPM | WEIGHT: 116 LBS

## 2025-03-18 VITALS
TEMPERATURE: 98 F | SYSTOLIC BLOOD PRESSURE: 120 MMHG | OXYGEN SATURATION: 100 % | RESPIRATION RATE: 20 BRPM | DIASTOLIC BLOOD PRESSURE: 79 MMHG | HEART RATE: 95 BPM

## 2025-03-18 DIAGNOSIS — T45.1X5A PERIPHERAL NEUROPATHY DUE TO CHEMOTHERAPY: ICD-10-CM

## 2025-03-18 DIAGNOSIS — Z79.899 IMMUNODEFICIENCY DUE TO CHEMOTHERAPY: ICD-10-CM

## 2025-03-18 DIAGNOSIS — C7A.8 NEUROENDOCRINE CARCINOMA OF LUNG: Primary | ICD-10-CM

## 2025-03-18 DIAGNOSIS — D84.821 IMMUNODEFICIENCY DUE TO CHEMOTHERAPY: ICD-10-CM

## 2025-03-18 DIAGNOSIS — G62.0 PERIPHERAL NEUROPATHY DUE TO CHEMOTHERAPY: ICD-10-CM

## 2025-03-18 DIAGNOSIS — C7A.1 LARGE CELL NEUROENDOCRINE CARCINOMA: Primary | ICD-10-CM

## 2025-03-18 DIAGNOSIS — G89.3 CANCER RELATED PAIN: ICD-10-CM

## 2025-03-18 DIAGNOSIS — T45.1X5A IMMUNODEFICIENCY DUE TO CHEMOTHERAPY: ICD-10-CM

## 2025-03-18 PROCEDURE — 63600175 PHARM REV CODE 636 W HCPCS

## 2025-03-18 PROCEDURE — 1159F MED LIST DOCD IN RCRD: CPT | Mod: CPTII,,,

## 2025-03-18 PROCEDURE — 25000003 PHARM REV CODE 250

## 2025-03-18 PROCEDURE — 3078F DIAST BP <80 MM HG: CPT | Mod: CPTII,,,

## 2025-03-18 PROCEDURE — 1160F RVW MEDS BY RX/DR IN RCRD: CPT | Mod: CPTII,,,

## 2025-03-18 PROCEDURE — 99215 OFFICE O/P EST HI 40 MIN: CPT | Mod: S$PBB,,,

## 2025-03-18 PROCEDURE — 3008F BODY MASS INDEX DOCD: CPT | Mod: CPTII,,,

## 2025-03-18 PROCEDURE — 3074F SYST BP LT 130 MM HG: CPT | Mod: CPTII,,,

## 2025-03-18 PROCEDURE — 96413 CHEMO IV INFUSION 1 HR: CPT

## 2025-03-18 PROCEDURE — 99214 OFFICE O/P EST MOD 30 MIN: CPT | Mod: PBBFAC,25

## 2025-03-18 RX ORDER — EPINEPHRINE 0.3 MG/.3ML
0.3 INJECTION SUBCUTANEOUS ONCE AS NEEDED
Status: CANCELLED | OUTPATIENT
Start: 2025-03-18

## 2025-03-18 RX ORDER — HEPARIN 100 UNIT/ML
500 SYRINGE INTRAVENOUS
Status: CANCELLED | OUTPATIENT
Start: 2025-03-18

## 2025-03-18 RX ORDER — HEPARIN 100 UNIT/ML
500 SYRINGE INTRAVENOUS
Status: DISCONTINUED | OUTPATIENT
Start: 2025-03-18 | End: 2025-03-18 | Stop reason: HOSPADM

## 2025-03-18 RX ORDER — EPINEPHRINE 1 MG/ML
0.3 INJECTION INTRAMUSCULAR; INTRAVENOUS; SUBCUTANEOUS ONCE AS NEEDED
Status: DISCONTINUED | OUTPATIENT
Start: 2025-03-18 | End: 2025-03-18 | Stop reason: HOSPADM

## 2025-03-18 RX ORDER — HYDROCODONE BITARTRATE AND ACETAMINOPHEN 5; 325 MG/1; MG/1
1 TABLET ORAL EVERY 6 HOURS PRN
Qty: 84 TABLET | Refills: 0 | Status: SHIPPED | OUTPATIENT
Start: 2025-03-18 | End: 2025-03-18

## 2025-03-18 RX ORDER — PROCHLORPERAZINE EDISYLATE 5 MG/ML
10 INJECTION INTRAMUSCULAR; INTRAVENOUS ONCE AS NEEDED
Status: DISCONTINUED | OUTPATIENT
Start: 2025-03-18 | End: 2025-03-18 | Stop reason: HOSPADM

## 2025-03-18 RX ORDER — DIPHENHYDRAMINE HYDROCHLORIDE 50 MG/ML
50 INJECTION, SOLUTION INTRAMUSCULAR; INTRAVENOUS ONCE AS NEEDED
Status: DISCONTINUED | OUTPATIENT
Start: 2025-03-18 | End: 2025-03-18 | Stop reason: HOSPADM

## 2025-03-18 RX ORDER — SODIUM CHLORIDE 0.9 % (FLUSH) 0.9 %
10 SYRINGE (ML) INJECTION
Status: CANCELLED | OUTPATIENT
Start: 2025-03-18

## 2025-03-18 RX ORDER — DIPHENHYDRAMINE HYDROCHLORIDE 50 MG/ML
50 INJECTION, SOLUTION INTRAMUSCULAR; INTRAVENOUS ONCE AS NEEDED
Status: CANCELLED | OUTPATIENT
Start: 2025-03-18

## 2025-03-18 RX ORDER — PROCHLORPERAZINE EDISYLATE 5 MG/ML
10 INJECTION INTRAMUSCULAR; INTRAVENOUS ONCE AS NEEDED
Status: CANCELLED | OUTPATIENT
Start: 2025-03-18

## 2025-03-18 RX ORDER — SODIUM CHLORIDE 0.9 % (FLUSH) 0.9 %
10 SYRINGE (ML) INJECTION
Status: DISCONTINUED | OUTPATIENT
Start: 2025-03-18 | End: 2025-03-18 | Stop reason: HOSPADM

## 2025-03-18 RX ORDER — HYDROCODONE BITARTRATE AND ACETAMINOPHEN 5; 325 MG/1; MG/1
1 TABLET ORAL EVERY 6 HOURS PRN
Qty: 84 TABLET | Refills: 0 | Status: SHIPPED | OUTPATIENT
Start: 2025-03-18 | End: 2025-04-08

## 2025-03-18 RX ADMIN — HEPARIN 500 UNITS: 100 SYRINGE at 12:03

## 2025-03-18 RX ADMIN — SODIUM CHLORIDE: 9 INJECTION, SOLUTION INTRAVENOUS at 11:03

## 2025-03-18 RX ADMIN — ATEZOLIZUMAB 1200 MG: 1200 INJECTION, SOLUTION INTRAVENOUS at 11:03

## 2025-03-18 NOTE — Clinical Note
Proceed with Tecentriq today in infusion  Please move CT scans to 4/4  Schedule an appt with MD to discuss CT scans on 4/8 with labs prior (CBC/CMP/Mag level/TSH/Free T4) followed by infusion for Tercentriq
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
## 2025-04-04 ENCOUNTER — HOSPITAL ENCOUNTER (OUTPATIENT)
Dept: RADIOLOGY | Facility: HOSPITAL | Age: 40
Discharge: HOME | End: 2025-04-04
Attending: INTERNAL MEDICINE
Payer: MEDICAID

## 2025-04-04 ENCOUNTER — RESULTS FOLLOW-UP (OUTPATIENT)
Dept: HEMATOLOGY/ONCOLOGY | Facility: CLINIC | Age: 40
End: 2025-04-04

## 2025-04-04 DIAGNOSIS — C77.0 SECONDARY MALIGNANCY OF SUPRACLAVICULAR LYMPH NODES: ICD-10-CM

## 2025-04-04 DIAGNOSIS — C79.89 METASTATIC CANCER TO PELVIS: ICD-10-CM

## 2025-04-04 DIAGNOSIS — C77.1 SECONDARY MALIGNANCY OF MEDIASTINAL LYMPH NODES: ICD-10-CM

## 2025-04-04 PROCEDURE — 25500020 PHARM REV CODE 255: Performed by: INTERNAL MEDICINE

## 2025-04-04 PROCEDURE — 74177 CT ABD & PELVIS W/CONTRAST: CPT | Mod: TC

## 2025-04-04 RX ADMIN — IOHEXOL 100 ML: 350 INJECTION, SOLUTION INTRAVENOUS at 11:04
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
## 2025-04-08 ENCOUNTER — OFFICE VISIT (OUTPATIENT)
Dept: HEMATOLOGY/ONCOLOGY | Facility: CLINIC | Age: 40
End: 2025-04-08
Attending: INTERNAL MEDICINE
Payer: MEDICAID

## 2025-04-08 ENCOUNTER — LAB VISIT (OUTPATIENT)
Dept: HEMATOLOGY/ONCOLOGY | Facility: CLINIC | Age: 40
End: 2025-04-08
Payer: MEDICAID

## 2025-04-08 VITALS
HEIGHT: 65 IN | HEART RATE: 103 BPM | OXYGEN SATURATION: 98 % | DIASTOLIC BLOOD PRESSURE: 68 MMHG | RESPIRATION RATE: 20 BRPM | SYSTOLIC BLOOD PRESSURE: 105 MMHG | WEIGHT: 116.38 LBS | TEMPERATURE: 98 F | BODY MASS INDEX: 19.39 KG/M2

## 2025-04-08 DIAGNOSIS — M79.661 PAIN IN BOTH LOWER LEGS: ICD-10-CM

## 2025-04-08 DIAGNOSIS — C79.89 METASTATIC CANCER TO PELVIS: ICD-10-CM

## 2025-04-08 DIAGNOSIS — E27.8 ADRENAL MASS, LEFT: ICD-10-CM

## 2025-04-08 DIAGNOSIS — C7A.1 LARGE CELL NEUROENDOCRINE CARCINOMA: ICD-10-CM

## 2025-04-08 DIAGNOSIS — M79.662 PAIN IN BOTH LOWER LEGS: ICD-10-CM

## 2025-04-08 DIAGNOSIS — R91.1 NODULE OF LOWER LOBE OF LEFT LUNG: ICD-10-CM

## 2025-04-08 DIAGNOSIS — C80.1 MALIGNANT NEOPLASM METASTATIC TO HILUS OF LUNG WITH UNKNOWN PRIMARY SITE, RIGHT: ICD-10-CM

## 2025-04-08 DIAGNOSIS — C7A.8 NEUROENDOCRINE CARCINOMA OF LUNG: ICD-10-CM

## 2025-04-08 DIAGNOSIS — K92.0 HEMATEMESIS WITH NAUSEA: ICD-10-CM

## 2025-04-08 DIAGNOSIS — Z51.11 CHEMOTHERAPY MANAGEMENT, ENCOUNTER FOR: ICD-10-CM

## 2025-04-08 DIAGNOSIS — C34.90 MALIGNANT NEOPLASM OF LUNG, UNSPECIFIED LATERALITY, UNSPECIFIED PART OF LUNG: ICD-10-CM

## 2025-04-08 DIAGNOSIS — D75.839 THROMBOCYTOSIS: ICD-10-CM

## 2025-04-08 DIAGNOSIS — R91.1 NODULE OF LOWER LOBE OF LEFT LUNG: Primary | ICD-10-CM

## 2025-04-08 DIAGNOSIS — C77.0 SECONDARY MALIGNANCY OF SUPRACLAVICULAR LYMPH NODES: ICD-10-CM

## 2025-04-08 DIAGNOSIS — D63.8 ANEMIA, CHRONIC DISEASE: ICD-10-CM

## 2025-04-08 DIAGNOSIS — G89.3 CANCER RELATED PAIN: ICD-10-CM

## 2025-04-08 DIAGNOSIS — C77.1 SECONDARY MALIGNANCY OF MEDIASTINAL LYMPH NODES: ICD-10-CM

## 2025-04-08 DIAGNOSIS — C79.72 SECONDARY MALIGNANT NEOPLASM OF CORTEX OF LEFT ADRENAL GLAND: ICD-10-CM

## 2025-04-08 DIAGNOSIS — C78.01 MALIGNANT NEOPLASM METASTATIC TO HILUS OF LUNG WITH UNKNOWN PRIMARY SITE, RIGHT: ICD-10-CM

## 2025-04-08 DIAGNOSIS — G89.3 CANCER RELATED PAIN: Primary | ICD-10-CM

## 2025-04-08 DIAGNOSIS — K92.1 BLOODY STOOL: ICD-10-CM

## 2025-04-08 DIAGNOSIS — K62.9 RECTAL ABNORMALITY: ICD-10-CM

## 2025-04-08 LAB
ALBUMIN SERPL-MCNC: 3.6 G/DL (ref 3.5–5)
ALBUMIN/GLOB SERPL: 0.8 RATIO (ref 1.1–2)
ALP SERPL-CCNC: 81 UNIT/L (ref 40–150)
ALT SERPL-CCNC: 9 UNIT/L (ref 0–55)
ANION GAP SERPL CALC-SCNC: 8 MEQ/L
AST SERPL-CCNC: 18 UNIT/L (ref 11–45)
BASOPHILS # BLD AUTO: 0.03 X10(3)/MCL
BASOPHILS NFR BLD AUTO: 0.3 %
BILIRUB SERPL-MCNC: 0.4 MG/DL
BUN SERPL-MCNC: 10.3 MG/DL (ref 8.9–20.6)
CALCIUM SERPL-MCNC: 9.7 MG/DL (ref 8.4–10.2)
CHLORIDE SERPL-SCNC: 105 MMOL/L (ref 98–107)
CO2 SERPL-SCNC: 26 MMOL/L (ref 22–29)
CREAT SERPL-MCNC: 0.92 MG/DL (ref 0.72–1.25)
CREAT/UREA NIT SERPL: 11
EOSINOPHIL # BLD AUTO: 0.24 X10(3)/MCL (ref 0–0.9)
EOSINOPHIL NFR BLD AUTO: 2.5 %
ERYTHROCYTE [DISTWIDTH] IN BLOOD BY AUTOMATED COUNT: 14.7 % (ref 11.5–17)
GFR SERPLBLD CREATININE-BSD FMLA CKD-EPI: >60 ML/MIN/1.73/M2
GLOBULIN SER-MCNC: 4.7 GM/DL (ref 2.4–3.5)
GLUCOSE SERPL-MCNC: 107 MG/DL (ref 74–100)
HCT VFR BLD AUTO: 41.3 % (ref 42–52)
HGB BLD-MCNC: 13.8 G/DL (ref 14–18)
IMM GRANULOCYTES # BLD AUTO: 0.03 X10(3)/MCL (ref 0–0.04)
IMM GRANULOCYTES NFR BLD AUTO: 0.3 %
LYMPHOCYTES # BLD AUTO: 1.13 X10(3)/MCL (ref 0.6–4.6)
LYMPHOCYTES NFR BLD AUTO: 11.6 %
MAGNESIUM SERPL-MCNC: 2 MG/DL (ref 1.6–2.6)
MCH RBC QN AUTO: 32.3 PG (ref 27–31)
MCHC RBC AUTO-ENTMCNC: 33.4 G/DL (ref 33–36)
MCV RBC AUTO: 96.7 FL (ref 80–94)
MONOCYTES # BLD AUTO: 0.67 X10(3)/MCL (ref 0.1–1.3)
MONOCYTES NFR BLD AUTO: 6.9 %
NEUTROPHILS # BLD AUTO: 7.66 X10(3)/MCL (ref 2.1–9.2)
NEUTROPHILS NFR BLD AUTO: 78.4 %
NRBC BLD AUTO-RTO: 0 %
PLATELET # BLD AUTO: 344 X10(3)/MCL (ref 130–400)
PMV BLD AUTO: 8.6 FL (ref 7.4–10.4)
POTASSIUM SERPL-SCNC: 3.7 MMOL/L (ref 3.5–5.1)
PROT SERPL-MCNC: 8.3 GM/DL (ref 6.4–8.3)
RBC # BLD AUTO: 4.27 X10(6)/MCL (ref 4.7–6.1)
SODIUM SERPL-SCNC: 139 MMOL/L (ref 136–145)
T4 FREE SERPL-MCNC: 1.14 NG/DL (ref 0.7–1.48)
TSH SERPL-ACNC: 0.59 UIU/ML (ref 0.35–4.94)
WBC # BLD AUTO: 9.76 X10(3)/MCL (ref 4.5–11.5)

## 2025-04-08 PROCEDURE — 83735 ASSAY OF MAGNESIUM: CPT

## 2025-04-08 PROCEDURE — 36415 COLL VENOUS BLD VENIPUNCTURE: CPT

## 2025-04-08 PROCEDURE — 80053 COMPREHEN METABOLIC PANEL: CPT

## 2025-04-08 PROCEDURE — 3008F BODY MASS INDEX DOCD: CPT | Mod: CPTII,,, | Performed by: INTERNAL MEDICINE

## 2025-04-08 PROCEDURE — 3074F SYST BP LT 130 MM HG: CPT | Mod: CPTII,,, | Performed by: INTERNAL MEDICINE

## 2025-04-08 PROCEDURE — 1159F MED LIST DOCD IN RCRD: CPT | Mod: CPTII,,, | Performed by: INTERNAL MEDICINE

## 2025-04-08 PROCEDURE — 84439 ASSAY OF FREE THYROXINE: CPT

## 2025-04-08 PROCEDURE — 99214 OFFICE O/P EST MOD 30 MIN: CPT | Mod: PBBFAC | Performed by: INTERNAL MEDICINE

## 2025-04-08 PROCEDURE — 84443 ASSAY THYROID STIM HORMONE: CPT

## 2025-04-08 PROCEDURE — 3078F DIAST BP <80 MM HG: CPT | Mod: CPTII,,, | Performed by: INTERNAL MEDICINE

## 2025-04-08 PROCEDURE — 99215 OFFICE O/P EST HI 40 MIN: CPT | Mod: S$PBB,,, | Performed by: INTERNAL MEDICINE

## 2025-04-08 PROCEDURE — 1160F RVW MEDS BY RX/DR IN RCRD: CPT | Mod: CPTII,,, | Performed by: INTERNAL MEDICINE

## 2025-04-08 PROCEDURE — 85025 COMPLETE CBC W/AUTO DIFF WBC: CPT

## 2025-04-08 RX ORDER — HEPARIN 100 UNIT/ML
500 SYRINGE INTRAVENOUS
OUTPATIENT
Start: 2025-04-08

## 2025-04-08 RX ORDER — HEPARIN 100 UNIT/ML
500 SYRINGE INTRAVENOUS
OUTPATIENT
Start: 2025-04-09

## 2025-04-08 RX ORDER — PROCHLORPERAZINE EDISYLATE 5 MG/ML
10 INJECTION INTRAMUSCULAR; INTRAVENOUS ONCE AS NEEDED
OUTPATIENT
Start: 2025-04-09

## 2025-04-08 RX ORDER — SODIUM CHLORIDE 0.9 % (FLUSH) 0.9 %
10 SYRINGE (ML) INJECTION
OUTPATIENT
Start: 2025-04-10

## 2025-04-08 RX ORDER — DIPHENHYDRAMINE HYDROCHLORIDE 50 MG/ML
50 INJECTION, SOLUTION INTRAMUSCULAR; INTRAVENOUS ONCE AS NEEDED
OUTPATIENT
Start: 2025-04-09

## 2025-04-08 RX ORDER — HEPARIN 100 UNIT/ML
500 SYRINGE INTRAVENOUS
OUTPATIENT
Start: 2025-04-10

## 2025-04-08 RX ORDER — SODIUM CHLORIDE 0.9 % (FLUSH) 0.9 %
10 SYRINGE (ML) INJECTION
OUTPATIENT
Start: 2025-04-09

## 2025-04-08 RX ORDER — HYDROCODONE BITARTRATE AND ACETAMINOPHEN 5; 325 MG/1; MG/1
1 TABLET ORAL EVERY 6 HOURS PRN
Qty: 84 TABLET | Refills: 0 | Status: SHIPPED | OUTPATIENT
Start: 2025-04-08 | End: 2025-04-29

## 2025-04-08 RX ORDER — DIPHENHYDRAMINE HYDROCHLORIDE 50 MG/ML
50 INJECTION, SOLUTION INTRAMUSCULAR; INTRAVENOUS ONCE AS NEEDED
OUTPATIENT
Start: 2025-04-08

## 2025-04-08 RX ORDER — PROCHLORPERAZINE EDISYLATE 5 MG/ML
10 INJECTION INTRAMUSCULAR; INTRAVENOUS ONCE AS NEEDED
OUTPATIENT
Start: 2025-04-10

## 2025-04-08 RX ORDER — SODIUM CHLORIDE 0.9 % (FLUSH) 0.9 %
10 SYRINGE (ML) INJECTION
OUTPATIENT
Start: 2025-04-08

## 2025-04-08 RX ORDER — EPINEPHRINE 0.3 MG/.3ML
0.3 INJECTION SUBCUTANEOUS ONCE AS NEEDED
OUTPATIENT
Start: 2025-04-10

## 2025-04-08 RX ORDER — PROCHLORPERAZINE EDISYLATE 5 MG/ML
10 INJECTION INTRAMUSCULAR; INTRAVENOUS ONCE AS NEEDED
OUTPATIENT
Start: 2025-04-08

## 2025-04-08 RX ORDER — EPINEPHRINE 0.3 MG/.3ML
0.3 INJECTION SUBCUTANEOUS ONCE AS NEEDED
OUTPATIENT
Start: 2025-04-08

## 2025-04-08 RX ORDER — EPINEPHRINE 0.3 MG/.3ML
0.3 INJECTION SUBCUTANEOUS ONCE AS NEEDED
OUTPATIENT
Start: 2025-04-09

## 2025-04-08 RX ORDER — DIPHENHYDRAMINE HYDROCHLORIDE 50 MG/ML
50 INJECTION, SOLUTION INTRAMUSCULAR; INTRAVENOUS ONCE AS NEEDED
OUTPATIENT
Start: 2025-04-10

## 2025-04-08 NOTE — Clinical Note
Orders for 4 8025:  Proceed with chemotherapy  At this time, re-stage with FDG PET-CT and brain MRI with and without contrast Six weeks after starting chemotherapy, re-stage with CT scans of C/A/P with contrast CBC and CMP weekly TSH and free T4 every 6 weeks EGD and colonoscopy for evaluation of history of hematemesis and rectal wall thickening Follow-up with me in 2 weeks, with scans

## 2025-04-22 ENCOUNTER — INFUSION (OUTPATIENT)
Dept: INFUSION THERAPY | Facility: HOSPITAL | Age: 40
End: 2025-04-22
Attending: INTERNAL MEDICINE
Payer: MEDICAID

## 2025-04-22 VITALS
WEIGHT: 112.31 LBS | SYSTOLIC BLOOD PRESSURE: 103 MMHG | HEIGHT: 65 IN | RESPIRATION RATE: 18 BRPM | DIASTOLIC BLOOD PRESSURE: 71 MMHG | TEMPERATURE: 98 F | BODY MASS INDEX: 18.71 KG/M2 | OXYGEN SATURATION: 97 % | HEART RATE: 105 BPM

## 2025-04-22 DIAGNOSIS — C7A.8 NEUROENDOCRINE CARCINOMA OF LUNG: Primary | ICD-10-CM

## 2025-04-22 PROCEDURE — 96413 CHEMO IV INFUSION 1 HR: CPT

## 2025-04-22 PROCEDURE — 96375 TX/PRO/DX INJ NEW DRUG ADDON: CPT

## 2025-04-22 PROCEDURE — 25000003 PHARM REV CODE 250: Performed by: INTERNAL MEDICINE

## 2025-04-22 PROCEDURE — 96367 TX/PROPH/DG ADDL SEQ IV INF: CPT

## 2025-04-22 PROCEDURE — 96417 CHEMO IV INFUS EACH ADDL SEQ: CPT

## 2025-04-22 PROCEDURE — 63600175 PHARM REV CODE 636 W HCPCS: Performed by: INTERNAL MEDICINE

## 2025-04-22 RX ORDER — HEPARIN 100 UNIT/ML
500 SYRINGE INTRAVENOUS
Status: DISCONTINUED | OUTPATIENT
Start: 2025-04-22 | End: 2025-04-22 | Stop reason: HOSPADM

## 2025-04-22 RX ORDER — PROCHLORPERAZINE EDISYLATE 5 MG/ML
10 INJECTION INTRAMUSCULAR; INTRAVENOUS ONCE AS NEEDED
Status: DISCONTINUED | OUTPATIENT
Start: 2025-04-22 | End: 2025-04-22 | Stop reason: HOSPADM

## 2025-04-22 RX ORDER — EPINEPHRINE 1 MG/ML
0.3 INJECTION INTRAMUSCULAR; INTRAVENOUS; SUBCUTANEOUS ONCE AS NEEDED
Status: DISCONTINUED | OUTPATIENT
Start: 2025-04-22 | End: 2025-04-22 | Stop reason: HOSPADM

## 2025-04-22 RX ORDER — OLANZAPINE 5 MG/1
TABLET, FILM COATED ORAL
Qty: 4 TABLET | Refills: 3 | Status: SHIPPED | OUTPATIENT
Start: 2025-04-22

## 2025-04-22 RX ORDER — PROCHLORPERAZINE MALEATE 5 MG
10 TABLET ORAL EVERY 6 HOURS PRN
Qty: 20 TABLET | Refills: 5 | Status: SHIPPED | OUTPATIENT
Start: 2025-04-22

## 2025-04-22 RX ORDER — DIPHENHYDRAMINE HYDROCHLORIDE 50 MG/ML
50 INJECTION, SOLUTION INTRAMUSCULAR; INTRAVENOUS ONCE AS NEEDED
Status: DISCONTINUED | OUTPATIENT
Start: 2025-04-22 | End: 2025-04-22 | Stop reason: HOSPADM

## 2025-04-22 RX ORDER — SODIUM CHLORIDE 0.9 % (FLUSH) 0.9 %
10 SYRINGE (ML) INJECTION
Status: DISCONTINUED | OUTPATIENT
Start: 2025-04-22 | End: 2025-04-22 | Stop reason: HOSPADM

## 2025-04-22 RX ORDER — DEXAMETHASONE 4 MG/1
8 TABLET ORAL DAILY
Qty: 6 TABLET | Refills: 3 | Status: SHIPPED | OUTPATIENT
Start: 2025-04-22

## 2025-04-22 RX ADMIN — ATEZOLIZUMAB 1200 MG: 1200 INJECTION, SOLUTION INTRAVENOUS at 10:04

## 2025-04-22 RX ADMIN — CARBOPLATIN 600 MG: 10 INJECTION, SOLUTION INTRAVENOUS at 12:04

## 2025-04-22 RX ADMIN — HEPARIN 500 UNITS: 100 SYRINGE at 02:04

## 2025-04-22 RX ADMIN — SODIUM CHLORIDE: 9 INJECTION, SOLUTION INTRAVENOUS at 10:04

## 2025-04-22 RX ADMIN — ETOPOSIDE 156 MG: 20 INJECTION, SOLUTION INTRAVENOUS at 01:04

## 2025-04-22 RX ADMIN — DEXAMETHASONE SODIUM PHOSPHATE 0.25 MG: 4 INJECTION, SOLUTION INTRA-ARTICULAR; INTRALESIONAL; INTRAMUSCULAR; INTRAVENOUS; SOFT TISSUE at 12:04

## 2025-04-22 RX ADMIN — APREPITANT 130 MG: 130 INJECTION, EMULSION INTRAVENOUS at 11:04

## 2025-04-23 ENCOUNTER — INFUSION (OUTPATIENT)
Dept: INFUSION THERAPY | Facility: HOSPITAL | Age: 40
End: 2025-04-23
Attending: INTERNAL MEDICINE
Payer: MEDICAID

## 2025-04-23 VITALS
DIASTOLIC BLOOD PRESSURE: 81 MMHG | TEMPERATURE: 98 F | SYSTOLIC BLOOD PRESSURE: 130 MMHG | OXYGEN SATURATION: 98 % | RESPIRATION RATE: 18 BRPM | HEART RATE: 97 BPM

## 2025-04-23 DIAGNOSIS — C7A.8 NEUROENDOCRINE CARCINOMA OF LUNG: Primary | ICD-10-CM

## 2025-04-23 PROCEDURE — A4216 STERILE WATER/SALINE, 10 ML: HCPCS | Performed by: INTERNAL MEDICINE

## 2025-04-23 PROCEDURE — 96413 CHEMO IV INFUSION 1 HR: CPT

## 2025-04-23 PROCEDURE — 63600175 PHARM REV CODE 636 W HCPCS: Performed by: INTERNAL MEDICINE

## 2025-04-23 PROCEDURE — 25000003 PHARM REV CODE 250: Performed by: INTERNAL MEDICINE

## 2025-04-23 RX ORDER — EPINEPHRINE 1 MG/ML
0.3 INJECTION INTRAMUSCULAR; INTRAVENOUS; SUBCUTANEOUS ONCE AS NEEDED
Status: DISCONTINUED | OUTPATIENT
Start: 2025-04-23 | End: 2025-04-23 | Stop reason: HOSPADM

## 2025-04-23 RX ORDER — DIPHENHYDRAMINE HYDROCHLORIDE 50 MG/ML
50 INJECTION, SOLUTION INTRAMUSCULAR; INTRAVENOUS ONCE AS NEEDED
Status: DISCONTINUED | OUTPATIENT
Start: 2025-04-23 | End: 2025-04-23 | Stop reason: HOSPADM

## 2025-04-23 RX ORDER — PROCHLORPERAZINE EDISYLATE 5 MG/ML
10 INJECTION INTRAMUSCULAR; INTRAVENOUS ONCE AS NEEDED
Status: DISCONTINUED | OUTPATIENT
Start: 2025-04-23 | End: 2025-04-23 | Stop reason: HOSPADM

## 2025-04-23 RX ORDER — HEPARIN 100 UNIT/ML
500 SYRINGE INTRAVENOUS
Status: DISCONTINUED | OUTPATIENT
Start: 2025-04-23 | End: 2025-04-23 | Stop reason: HOSPADM

## 2025-04-23 RX ORDER — SODIUM CHLORIDE 0.9 % (FLUSH) 0.9 %
10 SYRINGE (ML) INJECTION
Status: DISCONTINUED | OUTPATIENT
Start: 2025-04-23 | End: 2025-04-23 | Stop reason: HOSPADM

## 2025-04-23 RX ADMIN — ETOPOSIDE 156 MG: 20 INJECTION, SOLUTION INTRAVENOUS at 09:04

## 2025-04-23 RX ADMIN — SODIUM CHLORIDE: 9 INJECTION, SOLUTION INTRAVENOUS at 09:04

## 2025-04-23 RX ADMIN — SODIUM CHLORIDE, PRESERVATIVE FREE 10 ML: 5 INJECTION INTRAVENOUS at 10:04

## 2025-04-24 ENCOUNTER — INFUSION (OUTPATIENT)
Dept: INFUSION THERAPY | Facility: HOSPITAL | Age: 40
End: 2025-04-24
Attending: INTERNAL MEDICINE
Payer: MEDICAID

## 2025-04-24 VITALS
HEART RATE: 99 BPM | SYSTOLIC BLOOD PRESSURE: 128 MMHG | RESPIRATION RATE: 18 BRPM | OXYGEN SATURATION: 98 % | TEMPERATURE: 99 F | DIASTOLIC BLOOD PRESSURE: 77 MMHG

## 2025-04-24 DIAGNOSIS — C7A.8 NEUROENDOCRINE CARCINOMA OF LUNG: Primary | ICD-10-CM

## 2025-04-24 DIAGNOSIS — G89.3 CANCER RELATED PAIN: Primary | ICD-10-CM

## 2025-04-24 PROCEDURE — 96413 CHEMO IV INFUSION 1 HR: CPT

## 2025-04-24 PROCEDURE — 63600175 PHARM REV CODE 636 W HCPCS: Performed by: INTERNAL MEDICINE

## 2025-04-24 PROCEDURE — A4216 STERILE WATER/SALINE, 10 ML: HCPCS | Performed by: INTERNAL MEDICINE

## 2025-04-24 PROCEDURE — 25000003 PHARM REV CODE 250: Performed by: INTERNAL MEDICINE

## 2025-04-24 PROCEDURE — 25000003 PHARM REV CODE 250

## 2025-04-24 RX ORDER — HYDROCODONE BITARTRATE AND ACETAMINOPHEN 7.5; 325 MG/1; MG/1
1 TABLET ORAL EVERY 6 HOURS PRN
Qty: 84 TABLET | Refills: 0 | Status: SHIPPED | OUTPATIENT
Start: 2025-04-24

## 2025-04-24 RX ORDER — EPINEPHRINE 0.3 MG/.3ML
0.3 INJECTION SUBCUTANEOUS ONCE AS NEEDED
Status: DISCONTINUED | OUTPATIENT
Start: 2025-04-24 | End: 2025-04-24 | Stop reason: HOSPADM

## 2025-04-24 RX ORDER — HYDROCODONE BITARTRATE AND ACETAMINOPHEN 10; 325 MG/1; MG/1
1 TABLET ORAL ONCE
Status: COMPLETED | OUTPATIENT
Start: 2025-04-24 | End: 2025-04-24

## 2025-04-24 RX ORDER — HYDROCODONE BITARTRATE AND ACETAMINOPHEN 10; 325 MG/1; MG/1
1 TABLET ORAL ONCE
Refills: 0 | Status: CANCELLED
Start: 2025-04-24 | End: 2025-04-24

## 2025-04-24 RX ORDER — PROCHLORPERAZINE EDISYLATE 5 MG/ML
10 INJECTION INTRAMUSCULAR; INTRAVENOUS ONCE AS NEEDED
Status: DISCONTINUED | OUTPATIENT
Start: 2025-04-24 | End: 2025-04-24 | Stop reason: HOSPADM

## 2025-04-24 RX ORDER — SODIUM CHLORIDE 0.9 % (FLUSH) 0.9 %
10 SYRINGE (ML) INJECTION
Status: DISCONTINUED | OUTPATIENT
Start: 2025-04-24 | End: 2025-04-24 | Stop reason: HOSPADM

## 2025-04-24 RX ORDER — DIPHENHYDRAMINE HYDROCHLORIDE 50 MG/ML
50 INJECTION, SOLUTION INTRAMUSCULAR; INTRAVENOUS ONCE AS NEEDED
Status: DISCONTINUED | OUTPATIENT
Start: 2025-04-24 | End: 2025-04-24 | Stop reason: HOSPADM

## 2025-04-24 RX ORDER — HEPARIN 100 UNIT/ML
500 SYRINGE INTRAVENOUS
Status: DISCONTINUED | OUTPATIENT
Start: 2025-04-24 | End: 2025-04-24 | Stop reason: HOSPADM

## 2025-04-24 RX ADMIN — HYDROCODONE BITARTRATE AND ACETAMINOPHEN 1 TABLET: 10; 325 TABLET ORAL at 10:04

## 2025-04-24 RX ADMIN — SODIUM CHLORIDE: 9 INJECTION, SOLUTION INTRAVENOUS at 09:04

## 2025-04-24 RX ADMIN — ETOPOSIDE 156 MG: 20 INJECTION, SOLUTION INTRAVENOUS at 10:04

## 2025-04-24 RX ADMIN — HEPARIN 500 UNITS: 100 SYRINGE at 11:04

## 2025-04-24 RX ADMIN — SODIUM CHLORIDE, PRESERVATIVE FREE 10 ML: 5 INJECTION INTRAVENOUS at 11:04

## 2025-04-24 NOTE — PROGRESS NOTES
Adjusted pain medication due to increasing pain in the right upper lobe and lower back area.     Plan:   D/C Norco 5mg Q6 PRN pain   Changed to Norco 7.5mg Q6 PRN Pain- RX sent to pharmacy

## 2025-04-25 ENCOUNTER — HOSPITAL ENCOUNTER (OUTPATIENT)
Dept: RADIOLOGY | Facility: HOSPITAL | Age: 40
Discharge: HOME | End: 2025-04-25
Attending: INTERNAL MEDICINE
Payer: MEDICAID

## 2025-04-25 ENCOUNTER — INFUSION (OUTPATIENT)
Dept: INFUSION THERAPY | Facility: HOSPITAL | Age: 40
End: 2025-04-25
Attending: INTERNAL MEDICINE
Payer: MEDICAID

## 2025-04-25 VITALS
DIASTOLIC BLOOD PRESSURE: 56 MMHG | TEMPERATURE: 98 F | HEART RATE: 108 BPM | OXYGEN SATURATION: 98 % | SYSTOLIC BLOOD PRESSURE: 91 MMHG

## 2025-04-25 DIAGNOSIS — R91.1 NODULE OF LOWER LOBE OF LEFT LUNG: ICD-10-CM

## 2025-04-25 DIAGNOSIS — C7A.1 LARGE CELL NEUROENDOCRINE CARCINOMA: ICD-10-CM

## 2025-04-25 DIAGNOSIS — C7A.8 NEUROENDOCRINE CARCINOMA OF LUNG: Primary | ICD-10-CM

## 2025-04-25 DIAGNOSIS — C78.01 MALIGNANT NEOPLASM METASTATIC TO HILUS OF LUNG WITH UNKNOWN PRIMARY SITE, RIGHT: ICD-10-CM

## 2025-04-25 DIAGNOSIS — C80.1 MALIGNANT NEOPLASM METASTATIC TO HILUS OF LUNG WITH UNKNOWN PRIMARY SITE, RIGHT: ICD-10-CM

## 2025-04-25 PROCEDURE — A9552 F18 FDG: HCPCS | Performed by: INTERNAL MEDICINE

## 2025-04-25 PROCEDURE — 78815 PET IMAGE W/CT SKULL-THIGH: CPT | Mod: TC

## 2025-04-25 PROCEDURE — 63600175 PHARM REV CODE 636 W HCPCS: Mod: JZ,TB | Performed by: INTERNAL MEDICINE

## 2025-04-25 PROCEDURE — 96372 THER/PROPH/DIAG INJ SC/IM: CPT

## 2025-04-25 RX ORDER — FLUDEOXYGLUCOSE F18 500 MCI/ML
10 INJECTION INTRAVENOUS
Status: COMPLETED | OUTPATIENT
Start: 2025-04-25 | End: 2025-04-25

## 2025-04-25 RX ADMIN — PEGFILGRASTIM 6 MG: 6 INJECTION SUBCUTANEOUS at 09:04

## 2025-04-25 RX ADMIN — FLUDEOXYGLUCOSE F-18 10.1 MILLICURIE: 500 INJECTION INTRAVENOUS at 11:04

## 2025-04-28 ENCOUNTER — HOSPITAL ENCOUNTER (OUTPATIENT)
Facility: HOSPITAL | Age: 40
Discharge: HOME | End: 2025-04-30
Attending: EMERGENCY MEDICINE | Admitting: STUDENT IN AN ORGANIZED HEALTH CARE EDUCATION/TRAINING PROGRAM
Payer: MEDICAID

## 2025-04-28 DIAGNOSIS — R11.2 NAUSEA AND VOMITING, UNSPECIFIED VOMITING TYPE: Primary | ICD-10-CM

## 2025-04-28 DIAGNOSIS — D72.829 LEUKOCYTOSIS, UNSPECIFIED TYPE: ICD-10-CM

## 2025-04-28 DIAGNOSIS — C7A.8 NEUROENDOCRINE CARCINOMA: ICD-10-CM

## 2025-04-28 DIAGNOSIS — K92.2 GI BLEED: ICD-10-CM

## 2025-04-28 DIAGNOSIS — E86.0 DEHYDRATION: ICD-10-CM

## 2025-04-28 DIAGNOSIS — J18.9 ATYPICAL PNEUMONIA: ICD-10-CM

## 2025-04-28 LAB
ABS NEUT CALC (OHS): 32.29 X10(3)/MCL (ref 2.1–9.2)
ALBUMIN SERPL-MCNC: 3.7 G/DL (ref 3.5–5)
ALBUMIN/GLOB SERPL: 0.7 RATIO (ref 1.1–2)
ALP SERPL-CCNC: 139 UNIT/L (ref 40–150)
ALT SERPL-CCNC: 14 UNIT/L (ref 0–55)
ANION GAP SERPL CALC-SCNC: 16 MEQ/L
AST SERPL-CCNC: 13 UNIT/L (ref 11–45)
BILIRUB SERPL-MCNC: 0.3 MG/DL
BUN SERPL-MCNC: 12 MG/DL (ref 8.9–20.6)
CALCIUM SERPL-MCNC: 10.4 MG/DL (ref 8.4–10.2)
CHLORIDE SERPL-SCNC: 98 MMOL/L (ref 98–107)
CO2 SERPL-SCNC: 21 MMOL/L (ref 22–29)
CREAT SERPL-MCNC: 0.75 MG/DL (ref 0.72–1.25)
CREAT/UREA NIT SERPL: 16
ERYTHROCYTE [DISTWIDTH] IN BLOOD BY AUTOMATED COUNT: 13.6 % (ref 11.5–17)
GFR SERPLBLD CREATININE-BSD FMLA CKD-EPI: >60 ML/MIN/1.73/M2
GLOBULIN SER-MCNC: 5.5 GM/DL (ref 2.4–3.5)
GLUCOSE SERPL-MCNC: 151 MG/DL (ref 74–100)
GROUP & RH: NORMAL
HCT VFR BLD AUTO: 38.9 % (ref 42–52)
HGB BLD-MCNC: 13.2 G/DL (ref 14–18)
INDIRECT COOMBS: NORMAL
INR PPP: 1
LACTATE SERPL-SCNC: 2 MMOL/L (ref 0.5–2.2)
LIPASE SERPL-CCNC: 8 U/L
LYMPHOCYTES NFR BLD MANUAL: 0.35 X10(3)/MCL (ref 0.6–4.6)
LYMPHOCYTES NFR BLD MANUAL: 1 % (ref 13–40)
MCH RBC QN AUTO: 32.6 PG (ref 27–31)
MCHC RBC AUTO-ENTMCNC: 33.9 G/DL (ref 33–36)
MCV RBC AUTO: 96 FL (ref 80–94)
METAMYELOCYTES NFR BLD MANUAL: 2 %
MONOCYTES NFR BLD MANUAL: 2.13 X10(3)/MCL (ref 0.1–1.3)
MONOCYTES NFR BLD MANUAL: 6 % (ref 2–11)
NEUTROPHILS NFR BLD MANUAL: 89 % (ref 47–80)
NEUTS BAND NFR BLD MANUAL: 2 % (ref 0–11)
NRBC BLD AUTO-RTO: 0 %
PLATELET # BLD AUTO: 291 X10(3)/MCL (ref 130–400)
PLATELET # BLD EST: ADEQUATE 10*3/UL
PMV BLD AUTO: 8.5 FL (ref 7.4–10.4)
POTASSIUM SERPL-SCNC: 3.7 MMOL/L (ref 3.5–5.1)
PROT SERPL-MCNC: 9.2 GM/DL (ref 6.4–8.3)
PROTHROMBIN TIME: 13.3 SECONDS (ref 11.4–14)
RBC # BLD AUTO: 4.05 X10(6)/MCL (ref 4.7–6.1)
RBC MORPH BLD: NORMAL
SODIUM SERPL-SCNC: 135 MMOL/L (ref 136–145)
SPECIMEN OUTDATE: NORMAL
WBC # BLD AUTO: 35.48 X10(3)/MCL (ref 4.5–11.5)
WBC VACUOLES (OHS): SLIGHT

## 2025-04-28 PROCEDURE — 96361 HYDRATE IV INFUSION ADD-ON: CPT

## 2025-04-28 PROCEDURE — 83690 ASSAY OF LIPASE: CPT | Performed by: EMERGENCY MEDICINE

## 2025-04-28 PROCEDURE — 81001 URINALYSIS AUTO W/SCOPE: CPT | Performed by: EMERGENCY MEDICINE

## 2025-04-28 PROCEDURE — 25500020 PHARM REV CODE 255

## 2025-04-28 PROCEDURE — 87040 BLOOD CULTURE FOR BACTERIA: CPT | Performed by: EMERGENCY MEDICINE

## 2025-04-28 PROCEDURE — 25000003 PHARM REV CODE 250: Performed by: EMERGENCY MEDICINE

## 2025-04-28 PROCEDURE — 85610 PROTHROMBIN TIME: CPT | Performed by: EMERGENCY MEDICINE

## 2025-04-28 PROCEDURE — 99285 EMERGENCY DEPT VISIT HI MDM: CPT | Mod: 25

## 2025-04-28 PROCEDURE — 96375 TX/PRO/DX INJ NEW DRUG ADDON: CPT

## 2025-04-28 PROCEDURE — 86900 BLOOD TYPING SEROLOGIC ABO: CPT | Performed by: EMERGENCY MEDICINE

## 2025-04-28 PROCEDURE — 83605 ASSAY OF LACTIC ACID: CPT | Performed by: EMERGENCY MEDICINE

## 2025-04-28 PROCEDURE — 93005 ELECTROCARDIOGRAM TRACING: CPT

## 2025-04-28 PROCEDURE — 96365 THER/PROPH/DIAG IV INF INIT: CPT

## 2025-04-28 PROCEDURE — 85027 COMPLETE CBC AUTOMATED: CPT | Performed by: EMERGENCY MEDICINE

## 2025-04-28 PROCEDURE — 63600175 PHARM REV CODE 636 W HCPCS: Performed by: EMERGENCY MEDICINE

## 2025-04-28 PROCEDURE — 80053 COMPREHEN METABOLIC PANEL: CPT | Performed by: EMERGENCY MEDICINE

## 2025-04-28 RX ORDER — PANTOPRAZOLE SODIUM 40 MG/10ML
80 INJECTION, POWDER, LYOPHILIZED, FOR SOLUTION INTRAVENOUS
Status: COMPLETED | OUTPATIENT
Start: 2025-04-28 | End: 2025-04-28

## 2025-04-28 RX ADMIN — PIPERACILLIN SODIUM AND TAZOBACTAM SODIUM 4.5 G: 4; .5 INJECTION, POWDER, LYOPHILIZED, FOR SOLUTION INTRAVENOUS at 11:04

## 2025-04-28 RX ADMIN — SODIUM CHLORIDE 1000 ML: 9 INJECTION, SOLUTION INTRAVENOUS at 09:04

## 2025-04-28 RX ADMIN — IOHEXOL 98 ML: 350 INJECTION, SOLUTION INTRAVENOUS at 11:04

## 2025-04-28 RX ADMIN — PANTOPRAZOLE SODIUM 80 MG: 40 INJECTION, POWDER, FOR SOLUTION INTRAVENOUS at 09:04

## 2025-04-29 LAB
ABS NEUT CALC (OHS): 21.81 X10(3)/MCL (ref 2.1–9.2)
ACCEPTIBLE SP GR UR QL: 1.03 (ref 1–1.03)
ALBUMIN SERPL-MCNC: 3.3 G/DL (ref 3.5–5)
ALBUMIN/GLOB SERPL: 0.6 RATIO (ref 1.1–2)
ALP SERPL-CCNC: 128 UNIT/L (ref 40–150)
ALT SERPL-CCNC: 13 UNIT/L (ref 0–55)
AMPHET UR QL SCN: NEGATIVE
ANION GAP SERPL CALC-SCNC: 13 MEQ/L
AST SERPL-CCNC: 14 UNIT/L (ref 11–45)
B PERT.PT PRMT NPH QL NAA+NON-PROBE: NOT DETECTED
BACTERIA #/AREA URNS AUTO: ABNORMAL /HPF
BARBITURATE SCN PRESENT UR: NEGATIVE
BENZODIAZ UR QL SCN: NEGATIVE
BILIRUB SERPL-MCNC: 0.3 MG/DL
BILIRUB UR QL STRIP.AUTO: NEGATIVE
BUN SERPL-MCNC: 9.5 MG/DL (ref 8.9–20.6)
C PNEUM DNA NPH QL NAA+NON-PROBE: NOT DETECTED
CALCIUM SERPL-MCNC: 9.7 MG/DL (ref 8.4–10.2)
CANNABINOIDS UR QL SCN: NEGATIVE
CHLORIDE SERPL-SCNC: 99 MMOL/L (ref 98–107)
CLARITY UR: CLEAR
CO2 SERPL-SCNC: 22 MMOL/L (ref 22–29)
COCAINE UR QL SCN: NEGATIVE
COLOR UR AUTO: YELLOW
CREAT SERPL-MCNC: 0.68 MG/DL (ref 0.72–1.25)
CREAT/UREA NIT SERPL: 14
ERYTHROCYTE [DISTWIDTH] IN BLOOD BY AUTOMATED COUNT: 13.8 % (ref 11.5–17)
FENTANYL UR QL SCN: NEGATIVE
FLUAV AG UPPER RESP QL IA.RAPID: NOT DETECTED
FLUBV AG UPPER RESP QL IA.RAPID: NOT DETECTED
GFR SERPLBLD CREATININE-BSD FMLA CKD-EPI: >60 ML/MIN/1.73/M2
GLOBULIN SER-MCNC: 5.1 GM/DL (ref 2.4–3.5)
GLUCOSE SERPL-MCNC: 127 MG/DL (ref 74–100)
GLUCOSE UR QL STRIP: NORMAL
HADV DNA NPH QL NAA+NON-PROBE: NOT DETECTED
HCOV 229E RNA NPH QL NAA+NON-PROBE: NOT DETECTED
HCOV HKU1 RNA NPH QL NAA+NON-PROBE: NOT DETECTED
HCOV NL63 RNA NPH QL NAA+NON-PROBE: NOT DETECTED
HCOV OC43 RNA NPH QL NAA+NON-PROBE: NOT DETECTED
HCT VFR BLD AUTO: 36.1 % (ref 42–52)
HGB BLD-MCNC: 12.4 G/DL (ref 14–18)
HGB UR QL STRIP: NEGATIVE
HMPV RNA NPH QL NAA+NON-PROBE: NOT DETECTED
HPIV1 RNA NPH QL NAA+NON-PROBE: NOT DETECTED
HPIV2 RNA NPH QL NAA+NON-PROBE: NOT DETECTED
HPIV3 RNA NPH QL NAA+NON-PROBE: NOT DETECTED
HPIV4 RNA NPH QL NAA+NON-PROBE: NOT DETECTED
HYALINE CASTS #/AREA URNS LPF: ABNORMAL /LPF
KETONES UR QL STRIP: NEGATIVE
LEUKOCYTE ESTERASE UR QL STRIP: NEGATIVE
LYMPHOCYTES NFR BLD MANUAL: 2.62 X10(3)/MCL (ref 0.6–4.6)
LYMPHOCYTES NFR BLD MANUAL: 9 % (ref 13–40)
M PNEUMO DNA NPH QL NAA+NON-PROBE: NOT DETECTED
MCH RBC QN AUTO: 32.1 PG (ref 27–31)
MCHC RBC AUTO-ENTMCNC: 34.3 G/DL (ref 33–36)
MCV RBC AUTO: 93.5 FL (ref 80–94)
MDMA UR QL SCN: NEGATIVE
METAMYELOCYTES NFR BLD MANUAL: 5 %
MONOCYTES NFR BLD MANUAL: 11 % (ref 2–11)
MONOCYTES NFR BLD MANUAL: 3.2 X10(3)/MCL (ref 0.1–1.3)
MUCOUS THREADS URNS QL MICRO: ABNORMAL /LPF
NEUTROPHILS NFR BLD MANUAL: 75 % (ref 47–80)
NITRITE UR QL STRIP: NEGATIVE
NRBC BLD AUTO-RTO: 0 %
OHS QRS DURATION: 88 MS
OHS QTC CALCULATION: 450 MS
OPIATES UR QL SCN: POSITIVE
PCP UR QL: NEGATIVE
PH UR STRIP: 6 [PH]
PH UR: 6.5 [PH] (ref 3–11)
PLATELET # BLD AUTO: 281 X10(3)/MCL (ref 130–400)
PLATELET # BLD EST: NORMAL 10*3/UL
PMV BLD AUTO: 9.1 FL (ref 7.4–10.4)
POCT GLUCOSE: 135 MG/DL (ref 70–110)
POTASSIUM SERPL-SCNC: 4.3 MMOL/L (ref 3.5–5.1)
PROT SERPL-MCNC: 8.4 GM/DL (ref 6.4–8.3)
PROT UR QL STRIP: ABNORMAL
RBC # BLD AUTO: 3.86 X10(6)/MCL (ref 4.7–6.1)
RBC #/AREA URNS AUTO: ABNORMAL /HPF
RBC MORPH BLD: NORMAL
RSV A 5' UTR RNA NPH QL NAA+PROBE: NOT DETECTED
RSV RNA NPH QL NAA+NON-PROBE: NOT DETECTED
RV+EV RNA NPH QL NAA+NON-PROBE: NOT DETECTED
SARS-COV-2 RNA RESP QL NAA+PROBE: NOT DETECTED
SODIUM SERPL-SCNC: 134 MMOL/L (ref 136–145)
SP GR UR STRIP.AUTO: >1.05 (ref 1–1.03)
SQUAMOUS #/AREA URNS LPF: ABNORMAL /HPF
UROBILINOGEN UR STRIP-ACNC: NORMAL
WBC # BLD AUTO: 29.08 X10(3)/MCL (ref 4.5–11.5)
WBC #/AREA URNS AUTO: ABNORMAL /HPF

## 2025-04-29 PROCEDURE — 63600175 PHARM REV CODE 636 W HCPCS: Performed by: EMERGENCY MEDICINE

## 2025-04-29 PROCEDURE — 80307 DRUG TEST PRSMV CHEM ANLYZR: CPT

## 2025-04-29 PROCEDURE — 25000003 PHARM REV CODE 250

## 2025-04-29 PROCEDURE — G0378 HOSPITAL OBSERVATION PER HR: HCPCS

## 2025-04-29 PROCEDURE — 36415 COLL VENOUS BLD VENIPUNCTURE: CPT

## 2025-04-29 PROCEDURE — 0241U COVID/RSV/FLU A&B PCR: CPT

## 2025-04-29 PROCEDURE — 96375 TX/PRO/DX INJ NEW DRUG ADDON: CPT

## 2025-04-29 PROCEDURE — 85027 COMPLETE CBC AUTOMATED: CPT

## 2025-04-29 PROCEDURE — 63600175 PHARM REV CODE 636 W HCPCS

## 2025-04-29 PROCEDURE — 96367 TX/PROPH/DG ADDL SEQ IV INF: CPT

## 2025-04-29 PROCEDURE — 96376 TX/PRO/DX INJ SAME DRUG ADON: CPT

## 2025-04-29 PROCEDURE — 87632 RESP VIRUS 6-11 TARGETS: CPT

## 2025-04-29 PROCEDURE — 80053 COMPREHEN METABOLIC PANEL: CPT

## 2025-04-29 RX ORDER — PROCHLORPERAZINE MALEATE 10 MG
10 TABLET ORAL EVERY 6 HOURS PRN
Status: DISCONTINUED | OUTPATIENT
Start: 2025-04-29 | End: 2025-04-30 | Stop reason: HOSPADM

## 2025-04-29 RX ORDER — TALC
6 POWDER (GRAM) TOPICAL NIGHTLY PRN
Status: DISCONTINUED | OUTPATIENT
Start: 2025-04-29 | End: 2025-04-30 | Stop reason: HOSPADM

## 2025-04-29 RX ORDER — ATORVASTATIN CALCIUM 40 MG/1
40 TABLET, FILM COATED ORAL DAILY
Status: DISCONTINUED | OUTPATIENT
Start: 2025-04-29 | End: 2025-04-30 | Stop reason: HOSPADM

## 2025-04-29 RX ORDER — POTASSIUM CHLORIDE 20 MEQ/1
40 TABLET, EXTENDED RELEASE ORAL DAILY
Status: DISCONTINUED | OUTPATIENT
Start: 2025-04-29 | End: 2025-04-30 | Stop reason: HOSPADM

## 2025-04-29 RX ORDER — ONDANSETRON 4 MG/1
4 TABLET, FILM COATED ORAL EVERY 6 HOURS PRN
Status: DISCONTINUED | OUTPATIENT
Start: 2025-04-29 | End: 2025-04-30 | Stop reason: HOSPADM

## 2025-04-29 RX ORDER — SODIUM CHLORIDE 0.9 % (FLUSH) 0.9 %
10 SYRINGE (ML) INJECTION
Status: DISCONTINUED | OUTPATIENT
Start: 2025-04-29 | End: 2025-04-30 | Stop reason: HOSPADM

## 2025-04-29 RX ORDER — GABAPENTIN 300 MG/1
300 CAPSULE ORAL NIGHTLY
Status: DISCONTINUED | OUTPATIENT
Start: 2025-04-29 | End: 2025-04-30 | Stop reason: HOSPADM

## 2025-04-29 RX ORDER — POLYETHYLENE GLYCOL 3350 17 G/17G
17 POWDER, FOR SOLUTION ORAL 2 TIMES DAILY PRN
Status: DISCONTINUED | OUTPATIENT
Start: 2025-04-29 | End: 2025-04-30 | Stop reason: HOSPADM

## 2025-04-29 RX ORDER — LEVOFLOXACIN 5 MG/ML
750 INJECTION, SOLUTION INTRAVENOUS
Status: DISCONTINUED | OUTPATIENT
Start: 2025-04-29 | End: 2025-04-30 | Stop reason: HOSPADM

## 2025-04-29 RX ORDER — DEXAMETHASONE 4 MG/1
8 TABLET ORAL DAILY
Status: DISCONTINUED | OUTPATIENT
Start: 2025-04-29 | End: 2025-04-29

## 2025-04-29 RX ORDER — HYDROCODONE BITARTRATE AND ACETAMINOPHEN 7.5; 325 MG/1; MG/1
1 TABLET ORAL EVERY 4 HOURS PRN
Refills: 0 | Status: DISCONTINUED | OUTPATIENT
Start: 2025-04-29 | End: 2025-04-29

## 2025-04-29 RX ORDER — ACETAMINOPHEN 325 MG/1
650 TABLET ORAL EVERY 8 HOURS PRN
Status: DISCONTINUED | OUTPATIENT
Start: 2025-04-29 | End: 2025-04-30 | Stop reason: HOSPADM

## 2025-04-29 RX ORDER — DULOXETIN HYDROCHLORIDE 30 MG/1
30 CAPSULE, DELAYED RELEASE ORAL NIGHTLY
Status: DISCONTINUED | OUTPATIENT
Start: 2025-04-29 | End: 2025-04-30 | Stop reason: HOSPADM

## 2025-04-29 RX ORDER — HYDROMORPHONE HYDROCHLORIDE 1 MG/ML
0.5 INJECTION, SOLUTION INTRAMUSCULAR; INTRAVENOUS; SUBCUTANEOUS EVERY 6 HOURS PRN
Status: DISCONTINUED | OUTPATIENT
Start: 2025-04-29 | End: 2025-04-30 | Stop reason: HOSPADM

## 2025-04-29 RX ORDER — OXYCODONE AND ACETAMINOPHEN 10; 325 MG/1; MG/1
1 TABLET ORAL ONCE
Refills: 0 | Status: COMPLETED | OUTPATIENT
Start: 2025-04-29 | End: 2025-04-29

## 2025-04-29 RX ORDER — ERGOCALCIFEROL 1.25 MG/1
50000 CAPSULE ORAL
Status: DISCONTINUED | OUTPATIENT
Start: 2025-04-29 | End: 2025-04-30 | Stop reason: HOSPADM

## 2025-04-29 RX ORDER — HYDROCODONE BITARTRATE AND ACETAMINOPHEN 7.5; 325 MG/1; MG/1
1 TABLET ORAL EVERY 4 HOURS PRN
Refills: 0 | Status: DISCONTINUED | OUTPATIENT
Start: 2025-04-29 | End: 2025-04-30 | Stop reason: HOSPADM

## 2025-04-29 RX ADMIN — GABAPENTIN 300 MG: 300 CAPSULE ORAL at 04:04

## 2025-04-29 RX ADMIN — HYDROCODONE BITARTRATE AND ACETAMINOPHEN 1 TABLET: 7.5; 325 TABLET ORAL at 05:04

## 2025-04-29 RX ADMIN — ONDANSETRON HYDROCHLORIDE 4 MG: 4 TABLET, FILM COATED ORAL at 07:04

## 2025-04-29 RX ADMIN — ONDANSETRON HYDROCHLORIDE 4 MG: 4 TABLET, FILM COATED ORAL at 08:04

## 2025-04-29 RX ADMIN — HYDROCODONE BITARTRATE AND ACETAMINOPHEN 1 TABLET: 7.5; 325 TABLET ORAL at 07:04

## 2025-04-29 RX ADMIN — GABAPENTIN 300 MG: 300 CAPSULE ORAL at 08:04

## 2025-04-29 RX ADMIN — HYDROMORPHONE HYDROCHLORIDE 0.5 MG: 1 INJECTION, SOLUTION INTRAMUSCULAR; INTRAVENOUS; SUBCUTANEOUS at 07:04

## 2025-04-29 RX ADMIN — OXYCODONE HYDROCHLORIDE AND ACETAMINOPHEN 1 TABLET: 10; 325 TABLET ORAL at 01:04

## 2025-04-29 RX ADMIN — ATORVASTATIN CALCIUM 40 MG: 40 TABLET, FILM COATED ORAL at 08:04

## 2025-04-29 RX ADMIN — ERGOCALCIFEROL 50000 UNITS: 1.25 CAPSULE ORAL at 08:04

## 2025-04-29 RX ADMIN — HYDROMORPHONE HYDROCHLORIDE 0.5 MG: 1 INJECTION, SOLUTION INTRAMUSCULAR; INTRAVENOUS; SUBCUTANEOUS at 11:04

## 2025-04-29 RX ADMIN — LEVOFLOXACIN 750 MG: 5 INJECTION, SOLUTION INTRAVENOUS at 01:04

## 2025-04-29 RX ADMIN — HYDROCODONE BITARTRATE AND ACETAMINOPHEN 1 TABLET: 7.5; 325 TABLET ORAL at 11:04

## 2025-04-29 RX ADMIN — DULOXETINE HYDROCHLORIDE 30 MG: 30 CAPSULE, DELAYED RELEASE ORAL at 08:04

## 2025-04-29 RX ADMIN — DULOXETINE HYDROCHLORIDE 30 MG: 30 CAPSULE, DELAYED RELEASE ORAL at 04:04
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
## 2025-04-29 NOTE — PLAN OF CARE
Problem: Adult Inpatient Plan of Care  Goal: Plan of Care Review  4/29/2025 0438 by Stephanie Ricks RN  Outcome: Progressing  4/29/2025 0437 by Stephanie Ricks RN  Outcome: Progressing  Goal: Patient-Specific Goal (Individualized)  4/29/2025 0438 by Stephanie Ricks RN  Outcome: Progressing  4/29/2025 0437 by Stephanie Ricks RN  Outcome: Progressing  Goal: Absence of Hospital-Acquired Illness or Injury  4/29/2025 0438 by Stephanie Ricks, RN  Outcome: Progressing  4/29/2025 0437 by Stephanie Ricks RN  Outcome: Progressing  Goal: Optimal Comfort and Wellbeing  4/29/2025 0438 by Stephanie Ricks RN  Outcome: Progressing  4/29/2025 0437 by Stephanie Ricks RN  Outcome: Progressing  Goal: Readiness for Transition of Care  4/29/2025 0438 by Stephanie Ricks RN  Outcome: Progressing  4/29/2025 0437 by Stephanie Ricks RN  Outcome: Progressing     Problem: Acute Kidney Injury/Impairment  Goal: Fluid and Electrolyte Balance  4/29/2025 0438 by Stephanie Ricks RN  Outcome: Progressing  4/29/2025 0437 by Stephanie Ricks RN  Outcome: Progressing  Goal: Improved Oral Intake  4/29/2025 0438 by Stephanie Ricks RN  Outcome: Progressing  4/29/2025 0437 by Stephanie Ricks RN  Outcome: Progressing  Goal: Effective Renal Function  4/29/2025 0438 by Stephanie Ricks, RN  Outcome: Progressing  4/29/2025 0437 by Stephanie Ricks RN  Outcome: Progressing     Problem: Infection  Goal: Absence of Infection Signs and Symptoms  4/29/2025 0438 by Stephanie Ricks, RN  Outcome: Progressing  4/29/2025 0437 by Stephanie Ricks RN  Outcome: Progressing     Problem: Wound  Goal: Optimal Coping  4/29/2025 0438 by Stephanie Ricks, RN  Outcome: Progressing  4/29/2025 0437 by Stephanie Ricks RN  Outcome: Progressing  Goal: Optimal Functional Ability  4/29/2025 0438 by Stephanie Ricks, RN  Outcome: Progressing  4/29/2025 0437 by Stephanie Ricks, RN  Outcome:  Progressing  Goal: Absence of Infection Signs and Symptoms  4/29/2025 0438 by Stephanie Ricks RN  Outcome: Progressing  4/29/2025 0437 by Stephanie Ricks RN  Outcome: Progressing  Goal: Improved Oral Intake  4/29/2025 0438 by Stephanie Ricks, RN  Outcome: Progressing  4/29/2025 0437 by Stephanie Ricks RN  Outcome: Progressing  Goal: Optimal Pain Control and Function  4/29/2025 0438 by Stephanie Ricks RN  Outcome: Progressing  4/29/2025 0437 by Stephanie Ricks RN  Outcome: Progressing  Goal: Skin Health and Integrity  4/29/2025 0438 by Stephanie Ricks RN  Outcome: Progressing  4/29/2025 0437 by Stephanie Ricks RN  Outcome: Progressing  Goal: Optimal Wound Healing  4/29/2025 0438 by Stephanie Ricks, RN  Outcome: Progressing  4/29/2025 0437 by Stephanie Ricks RN  Outcome: Progressing

## 2025-04-29 NOTE — ED PROVIDER NOTES
Encounter Date: 4/28/2025       History     Chief Complaint   Patient presents with    Abdominal Pain    Vomiting     Pt states abd pain and vomiting blood since yesterday. States hx of lung cancer last chemo on 4/25/2025     Chief Complaint: Abdominal pain, nausea, emesis - 3-day duration    History of Present Illness: 39-year-old male with metastatic large cell neuroendocrine lung cancer presenting with progressive abdominal symptomatology. Symptom onset approximately 72 hours prior, characterized by escalating epigastric discomfort, accompanied by intermittent nausea and emesis. Patient denies concurrent gastrointestinal pathologies, surgical interventions, fever, or diarrheal episodes.    Oncologic History: Metastatic large cell neuroendocrine carcinoma with extensive metastatic involvement, currently undergoing C1D1 Tecentriq, Carboplatin & Etoposide chemotherapy protocol.      Review of patient's allergies indicates:  No Known Allergies  Past Medical History:   Diagnosis Date    Cancer     Hyperlipidemia     Myocardial infarction      Past Surgical History:   Procedure Laterality Date    CORONARY ARTERY BYPASS GRAFT      INSERTION OF TUNNELED CENTRAL VENOUS CATHETER (CVC) WITH SUBCUTANEOUS PORT Right 7/31/2024    Procedure: [id number];  Surgeon: Renato Alvarado Jr., MD;  Location: [facility];  Service: General;  Laterality: Right;    LAPAROTOMY, EXPLORATORY N/A 03/23/2023    Procedure: LAPAROTOMY, EXPLORATORY;  Surgeon: Alex Juárez MD;  Location: [facility] OR;  Service: General;  Laterality: N/A;    LYMPH NODE BIOPSY Right 6/14/2024    Procedure: BIOPSY, LYMPH NODE;  Surgeon: Renato Alvarado Jr., MD;  Location: [facility] OR;  Service: General;  Laterality: Right;  right supraclavicular lymph node    OPEN REDUCTION AND INTERNAL FIXATION (ORIF) OF FRACTURE OF DISTAL RADIUS Left 4/4/2024    Procedure: ORIF, FRACTURE, RADIUS, DISTAL;  Surgeon: Khoa Abdalla MD;  Location: [facility];  Service:  Orthopedics;  Laterality: Left;    ORIF FOREARM FRACTURE Right 12/7/2023    Procedure: ORIF, FRACTURE, RADIUS OR ULNA;  Surgeon: Khoa Abdalla MD;  Location: [facility];  Service: Orthopedics;  Laterality: Right;  Call Wilmer     Family History   Problem Relation Name Age of Onset    Diabetes Mother      Heart disease Mother      Cancer Father      Diabetes Sister      Heart disease Brother      Colon cancer Maternal Aunt      Stroke Maternal Grandmother      Heart disease Maternal Grandfather       Social History[1]  Review of Systems    Physical Exam     Initial Vitals [04/28/25 1944]   BP Pulse Resp Temp SpO2   124/62 93 20 97.7 °F (36.5 °C) 100 %      MAP       --         Physical Exam    ED Course   Procedures  Labs Reviewed   COMPREHENSIVE METABOLIC PANEL - Abnormal       Result Value    Sodium 135 (*)     Potassium 3.7      Chloride 98      CO2 21 (*)     Glucose 151 (*)     Blood Urea Nitrogen 12.0      Creatinine 0.75      Calcium 10.4 (*)     Protein Total 9.2 (*)     Albumin 3.7      Globulin 5.5 (*)     Albumin/Globulin Ratio 0.7 (*)     Bilirubin Total 0.3            ALT 14      AST 13      eGFR >60      Anion Gap 16.0      BUN/Creatinine Ratio 16     CBC WITH DIFFERENTIAL - Abnormal    WBC 35.48 (*)     RBC 4.05 (*)     Hgb 13.2 (*)     Hct 38.9 (*)     MCV 96.0 (*)     MCH 32.6 (*)     MCHC 33.9      RDW 13.6      Platelet 291      MPV 8.5      NRBC% 0.0     MANUAL DIFFERENTIAL - Abnormal    Neutrophils % 89 (*)     Bands % 2      Lymphs % 1 (*)     Monocytes % 6      Metamyelocytes % 2 (*)     Neutrophils Abs Calc 32.2868 (*)     Lymphs Abs 0.3548 (*)     Monocytes Abs 2.1288 (*)     Platelets Adequate      RBC Morph Normal      Vacuolated Grans Slight (*)    URINALYSIS, REFLEX TO URINE CULTURE - Abnormal    Color, UA Yellow      Appearance, UA Clear      Specific Gravity, UA >1.050 (*)     pH, UA 6.0      Protein, UA 1+ (*)     Glucose, UA Normal      Ketones, UA Negative      Blood, UA  Negative      Bilirubin, UA Negative      Urobilinogen, UA Normal      Nitrites, UA Negative      Leukocyte Esterase, UA Negative      RBC, UA 0-5      WBC, UA 0-5      Bacteria, UA Trace (*)     Squamous Epithelial Cells, UA Occasional (*)     Mucous, UA Few (*)     Hyaline Casts, UA None Seen     PROTIME-INR - Normal    PT 13.3      INR 1.0      Narrative:     Protimes are used to monitor anticoagulant agents such as warfarin. PT INR values are based on the current patient normal mean and the ULYSSES value for the specific instrument reagent used.  **Routine theraputic target values for the INR are 2.0-3.0**   LIPASE - Normal    Lipase Level 8     LACTIC ACID, PLASMA - Normal    Lactic Acid Level 2.0     BLOOD CULTURE [facility]   BLOOD CULTURE [facility]   CBC W/ AUTO DIFFERENTIAL    Narrative:     The following orders were created for panel order CBC auto differential.  Procedure                               Abnormality         Status                     ---------                               -----------         ------                     CBC with Differential[[id number]]       Abnormal            Final result               Manual Differential[[id number]]         Abnormal            Final result                 Please view results for these tests on the individual orders.   EXTRA TUBES    Narrative:     The following orders were created for panel order EXTRA TUBES.  Procedure                               Abnormality         Status                     ---------                               -----------         ------                     Lavender Top Hold[[id number]]                                                          Gold Top Hold[[id number]]                                                                Please view results for these tests on the individual orders.   LAVENDER TOP HOLD   GOLD TOP HOLD   TYPE & SCREEN    Group & Rh O POS      Indirect Jerome GEL NEG      Specimen Outdate 05/01/2025 23:59            Imaging  Results              CT Abdomen Pelvis With IV Contrast NO Oral Contrast (Preliminary result)  Result time 04/28/25 23:40:21      Preliminary result by Franklin Benavides MD (04/28/25 23:40:21)                   Narrative:    START OF REPORT:  Technique: CT of the abdomen and pelvis was performed with axial images as well as sagittal and coronal reconstruction images with intravenous contrast.    Comparison: Comparison is with study dated 2025-04-04 10:07:32.    Clinical History: Abdominal pain, acute, nonlocalized.    Dosage Information: Automated Exposure Control was utilized 153.35 mGy.cm.    Findings:  Lines and Tubes: None.  Thorax:  Lungs: There is minimal nonspecific dependent change at the lung bases. There is a stable appearing 1.2 cm solid nodule in the right middle lobe (image 4 series 4). Focal ground glass and dense opacities are seen in the left lower lobe (image 15 series 4). There is also an apparent enlarged left infrahilar lymph node which measures 1.3 cm in shortest diameter best seen on image 3 series 2. These may reflect an infectious process such as atypical pneumonia with reactive lymphadenopathy with an underlying neoplastic process or focal scarring not entirely excluded.  Pleura: No effusions or thickening are seen. No pneumothorax is seen in the visualized lung bases.  Heart: The heart size is within normal limits.  Abdomen:  Abdominal Wall: No abdominal wall pathology is seen.  Liver: The liver appears unremarkable.  Biliary System: No intrahepatic or extrahepatic biliary duct dilatation is seen.  Gallbladder: The gallbladder appears unremarkable.  Pancreas: The pancreas appears unremarkable.  Spleen: The spleen appears unremarkable.  Adrenals: The right adrenal gland appears unremarkable. There is a no significant change in size of the heterogeneously-enhancing mass in the left adrenal gland. It currently measures 4.0 x 3.0 cm (APxT) on image 34 series 2.  Kidneys: The right kidney appears  unremarkable with no stones cysts masses or hydronephrosis. A single cyst measuring 3.9 mm is seen on Image 42, Series 2 in the mid pole of the left kidney. The left kidney otherwise appears unremarkable with no stones masses or hydronephrosis identified.  Aorta: The abdominal aorta appears unremarkable.  IVC: Unremarkable.  Bowel:  Esophagus: The visualized esophagus appears unremarkable.  Stomach: The stomach appears unremarkable.  Duodenum: Unremarkable appearing duodenum.  Small Bowel: The small bowel appears unremarkable.  Colon: Nondistended.  Appendix: The appendix appears unremarkable and is partially seen on Image 106, Series 2.  Peritoneum: No intraperitoneal free air or ascites is seen.    Pelvis:  Bladder: The bladder appears unremarkable.  Male:  Prostate gland: The prostate gland appears unremarkable.    Bony structures:  Dorsal Spine: The visualized dorsal spine appears unremarkable.  Bony Pelvis: There is no significant change in size of the focal lytic lesion in the right femoral head (image 121 series 2). This is of indeterminate etiology.      Impression:  1. Focal ground glass and dense opacities are seen in the left lower lobe (image 15 series 4). There is also an apparent enlarged left infrahilar lymph node which measures 1.3 cm in shortest diameter best seen on image 3 series 2. These may reflect an infectious process such as atypical pneumonia with reactive lymphadenopathy with an underlying neoplastic process or focal scarring not entirely excluded. Correlate with clinical and laboratory findings as regards additional evaluation and follow-up.  2. There is a no significant change in size of the heterogeneously-enhancing mass in the left adrenal gland. It currently measures 4.0 x 3.0 cm (APxT) on image 34 series 2. Correlate with clinical and laboratory findings as regards additional evaluation and follow-up.  3. There is no significant change in size of the focal lytic lesion in the right  femoral head (image 121 series 2). This is of indeterminate etiology. Correlate with clinical and laboratory findings as regards additional evaluation and follow-up.  4. No acute intraabdominal or pelvic solid organ or bowel pathology identified. Details and other findings as discussed above.                                         Medications   levoFLOXacin 750 mg/150 mL IVPB 750 mg (has no administration in time range)   sodium chloride 0.9% bolus 1,000 mL 1,000 mL (0 mLs Intravenous Stopped 4/28/25 2240)   pantoprazole injection 80 mg (80 mg Intravenous Given 4/28/25 2128)   piperacillin-tazobactam (ZOSYN) 4.5 g in D5W 100 mL IVPB (MB+) (0 g Intravenous Stopped 4/29/25 0016)   iohexoL (OMNIPAQUE 350) 350 mg iodine/mL injection (98 mLs Intravenous Given 4/28/25 2303)     Medical Decision Making  Medical Decision Making  Ranjeet Ball, a 39-year-old male with metastatic large cell neuroendocrine carcinoma, presents with a complex clinical picture of abdominal pain and emesis. Upon comprehensive medical evaluation, the patient's symptomatology suggests potential chemotherapy-induced gastrointestinal complications or disease progression. The patient's extensive oncologic history, including metastatic involvement of mediastinal, hilar, and supraclavicular lymphadenopathy, necessitates a systematic and thorough diagnostic approach.    Abdominal pain  Assessment: 39-year-old male with metastatic large cell neuroendocrine lung cancer presenting with 3-day history of progressive abdominal discomfort, associated nausea and vomiting. Clinical assessment reveals no concurrent fever or diarrhea. No prior abdominal surgical interventions noted.  Plan:  - Comprehensive abdominal examination  - Obtain complete blood count and comprehensive metabolic panel  -  we will consider re CT imaging of the abdomen contingent upon clinical findings  - Implement targeted symptomatic management with antiemetic therapy  - Optimize pain  management protocol  - Interdisciplinary oncology consultation after admission if necessary to evaluate potential treatment-related sequelae    Lung cancer  Assessment: Advanced large cell neuroendocrine carcinoma with documented metastatic lesions, currently staged cT4 cN3 M1c (stage IVB). Patient receiving C1D1 Tecentriq, Carboplatin & Etoposide chemotherapy regimen.  Plan:  - Comprehensive medical record review  - Collaborative oncology consultation  - Patient education regarding disease progression and treatment implications  - Multidisciplinary care coordination    Substance use  Assessment: Current marijuana usage documented. Comprehensive substance use history requires further clarification.  Plan:  - Provide evidence-based counseling regarding potential pharmacological interactions    Amount and/or Complexity of Data Reviewed  Labs: ordered.  Radiology: ordered.    Risk  Prescription drug management.  Decision regarding hospitalization.               ED Course as of 04/29/25 0046   Tue Apr 29, 2025   0045 I have spoken with the patient and available family/caregivers at the bedside.  I have explained the patient's condition, diagnosis, and treatment plan based on the information available to me at this time.  I have answered questions and addressed any concerns that we area able to do so adequately after this initial ER assessment.  The patient and available family members understand the patient's admission diagnosis, condition, and treatment plan well.  The patient has been stabilized within the capability of the emergency department.      I feel that admission is indicated for the patient.  The patient will be moved to an observation or inpatient service.   I have communicated with the inpatient medical practitioner taking over this patient's care and discussed the case.   [DB]      ED Course User Index  [DB] Olegario Culp MD     Total critical care time is 45 minutes.     Total critical care time  documented does not include time spent on separately billed procedures or the services of nurses, or mid-level providers.     I personally saw and examined the patient. I have reviewed all diagnostic interpretations and treatment plans as written. I was present for the key portions of any procedures performed and the inclusive time noted in any critical care statement. Critical care time includes patient management by me, time spent at the patient's bedside, time to review lab and imaging results, discussing patient care, documentation in the medical record, and time spent with the family or caregiver.    The high probability of sudden, clinically significant deterioration in the patient's condition required the highest level of preparedness to intervene urgently.         12:15 a.m.    Final assessment and plan     Leukocytosis with Suspected Atypical Lung Infection    Comprehensive Assessment:  The patient, Ranjeet Ball, presents with a complex clinical picture characterized by significant leukocytosis (WBC >35,000), suggestive of an underlying inflammatory or infectious process. The marked elevation in white blood cell count necessitates a thorough diagnostic and therapeutic approach.    Key Clinical Considerations:  1. Infectious Etiology  - Suspected atypical pneumonia with potential pulmonary infiltrative process  - Empiric antibiotic coverage targeting potential nosocomial and opportunistic pathogens  - Elevated inflammatory markers indicating potential systemic inflammatory response    2. Oncologic Context  - History of prior malignancy with residual radiographic findings  - Potential immunocompromised state increasing susceptibility to opportunistic infections  - Need for vigilant monitoring of potential disease progression or treatment-related complications    3. Gastrointestinal Manifestations  - Presenting symptoms of abdominal pain, nausea, and emesis  - Potential systemic inflammatory process with  gastrointestinal involvement  - Successful symptomatic management with ondansetron    comprehensive Treatment Plan:  - Pharmacological Interventions:    * Levaquin (levofloxacin) 750mg IV daily - broad-spectrum antibiotic targeting suspected atypical pneumonia    * Zosyn (piperacillin-tazobactam) - continued empiric coverage for potential intra-abdominal infectious process    * Ondansetron 4mg IV q8h PRN - management of gastrointestinal symptoms    - Diagnostic Monitoring:    * Serial complete blood count (CBC) to track leukocytosis trend    * Continuous respiratory and hemodynamic parameter assessment    * Potential chest imaging follow-up to evaluate pulmonary infiltrates    - Supportive Care:    * Intravenous normal saline hydration    * Electrolyte replacement as indicated    * Nutritional support considering potential infectious/inflammatory state    - Disposition:    * Admission to internal medicine service for comprehensive management    * Interdisciplinary consultation considering oncologic history    * Close monitoring for potential infectious complications    Prognostic Considerations:  - Guarded prognosis given leukocytosis, oncologic history, and potential systemic inflammatory process  - Require aggressive diagnostic and therapeutic intervention  - Continuous reassessment of clinical trajectory           Clinical Impression:  Final diagnoses:  [K92.2] GI bleed  [R11.2] Nausea and vomiting, unspecified vomiting type (Primary)  [J18.9] Atypical pneumonia  [D72.829] Leukocytosis, unspecified type  [E86.0] Dehydration  [C7A.8] Neuroendocrine carcinoma          ED Disposition Condition    Admit                     Olegario Culp MD  04/29/25 0045       [1]   Social History  Tobacco Use    Smoking status: Former     Current packs/day: 0.15     Average packs/day: 0.2 packs/day for 25.4 years (3.8 ttl pk-yrs)     Types: Cigarettes     Start date: 12/4/1999    Smokeless tobacco: Never    Tobacco comments:      Pt states he quit cigs   Substance Use Topics    Alcohol use: Not Currently     Comment: occasionally    Drug use: Yes     Frequency: 3.0 times per week     Types: Marijuana        Olegario Culp MD  04/29/25 0046

## 2025-04-30 ENCOUNTER — RESULTS FOLLOW-UP (OUTPATIENT)
Dept: HEMATOLOGY/ONCOLOGY | Facility: CLINIC | Age: 40
End: 2025-04-30

## 2025-04-30 VITALS
HEART RATE: 90 BPM | HEIGHT: 65 IN | DIASTOLIC BLOOD PRESSURE: 74 MMHG | SYSTOLIC BLOOD PRESSURE: 130 MMHG | WEIGHT: 111.5 LBS | BODY MASS INDEX: 18.58 KG/M2 | RESPIRATION RATE: 18 BRPM | TEMPERATURE: 98 F | OXYGEN SATURATION: 100 %

## 2025-04-30 PROBLEM — R11.2 CHEMOTHERAPY INDUCED NAUSEA AND VOMITING: Status: ACTIVE | Noted: 2025-04-30

## 2025-04-30 PROBLEM — T45.1X5A CHEMOTHERAPY INDUCED NAUSEA AND VOMITING: Status: ACTIVE | Noted: 2025-04-30

## 2025-04-30 PROBLEM — E44.0 MODERATE MALNUTRITION: Status: ACTIVE | Noted: 2025-04-30

## 2025-04-30 LAB
ABS NEUT CALC (OHS): 9.12 X10(3)/MCL (ref 2.1–9.2)
ALBUMIN SERPL-MCNC: 3.3 G/DL (ref 3.5–5)
ALBUMIN/GLOB SERPL: 0.7 RATIO (ref 1.1–2)
ALP SERPL-CCNC: 140 UNIT/L (ref 40–150)
ALT SERPL-CCNC: 21 UNIT/L (ref 0–55)
ANION GAP SERPL CALC-SCNC: 11 MEQ/L
ANISOCYTOSIS BLD QL SMEAR: ABNORMAL
AST SERPL-CCNC: 18 UNIT/L (ref 11–45)
BILIRUB SERPL-MCNC: 0.3 MG/DL
BUN SERPL-MCNC: 9.4 MG/DL (ref 8.9–20.6)
CALCIUM SERPL-MCNC: 9.6 MG/DL (ref 8.4–10.2)
CHLORIDE SERPL-SCNC: 102 MMOL/L (ref 98–107)
CO2 SERPL-SCNC: 24 MMOL/L (ref 22–29)
CREAT SERPL-MCNC: 0.67 MG/DL (ref 0.72–1.25)
CREAT/UREA NIT SERPL: 14
EOSINOPHIL NFR BLD MANUAL: 0.13 X10(3)/MCL (ref 0–0.9)
EOSINOPHIL NFR BLD MANUAL: 1 % (ref 0–8)
ERYTHROCYTE [DISTWIDTH] IN BLOOD BY AUTOMATED COUNT: 13.5 % (ref 11.5–17)
GFR SERPLBLD CREATININE-BSD FMLA CKD-EPI: >60 ML/MIN/1.73/M2
GLOBULIN SER-MCNC: 5 GM/DL (ref 2.4–3.5)
GLUCOSE SERPL-MCNC: 91 MG/DL (ref 74–100)
HCT VFR BLD AUTO: 37.3 % (ref 42–52)
HGB BLD-MCNC: 12.6 G/DL (ref 14–18)
LYMPHOCYTES NFR BLD MANUAL: 23 % (ref 13–40)
LYMPHOCYTES NFR BLD MANUAL: 3.04 X10(3)/MCL (ref 0.6–4.6)
MCH RBC QN AUTO: 31.8 PG (ref 27–31)
MCHC RBC AUTO-ENTMCNC: 33.8 G/DL (ref 33–36)
MCV RBC AUTO: 94.2 FL (ref 80–94)
MONOCYTES NFR BLD MANUAL: 0.93 X10(3)/MCL (ref 0.1–1.3)
MONOCYTES NFR BLD MANUAL: 7 % (ref 2–11)
NEUTROPHILS NFR BLD MANUAL: 65 % (ref 47–80)
NEUTS BAND NFR BLD MANUAL: 4 % (ref 0–11)
NRBC BLD AUTO-RTO: 0 %
PLATELET # BLD AUTO: 273 X10(3)/MCL (ref 130–400)
PLATELET # BLD EST: NORMAL 10*3/UL
PMV BLD AUTO: 8.5 FL (ref 7.4–10.4)
POTASSIUM SERPL-SCNC: 4.4 MMOL/L (ref 3.5–5.1)
PROT SERPL-MCNC: 8.3 GM/DL (ref 6.4–8.3)
RBC # BLD AUTO: 3.96 X10(6)/MCL (ref 4.7–6.1)
RBC MORPH BLD: ABNORMAL
SODIUM SERPL-SCNC: 137 MMOL/L (ref 136–145)
WBC # BLD AUTO: 13.22 X10(3)/MCL (ref 4.5–11.5)

## 2025-04-30 PROCEDURE — 96376 TX/PRO/DX INJ SAME DRUG ADON: CPT

## 2025-04-30 PROCEDURE — 85007 BL SMEAR W/DIFF WBC COUNT: CPT

## 2025-04-30 PROCEDURE — 63600175 PHARM REV CODE 636 W HCPCS

## 2025-04-30 PROCEDURE — 25000003 PHARM REV CODE 250

## 2025-04-30 PROCEDURE — 96365 THER/PROPH/DIAG IV INF INIT: CPT | Mod: 59

## 2025-04-30 PROCEDURE — 80053 COMPREHEN METABOLIC PANEL: CPT

## 2025-04-30 PROCEDURE — 36415 COLL VENOUS BLD VENIPUNCTURE: CPT

## 2025-04-30 PROCEDURE — G0378 HOSPITAL OBSERVATION PER HR: HCPCS

## 2025-04-30 RX ORDER — MEGESTROL ACETATE 40 MG/1
40 TABLET ORAL DAILY
Qty: 30 TABLET | Refills: 0 | Status: SHIPPED | OUTPATIENT
Start: 2025-05-01 | End: 2025-05-31

## 2025-04-30 RX ORDER — LEVOFLOXACIN 750 MG/1
750 TABLET, FILM COATED ORAL DAILY
Qty: 3 TABLET | Refills: 0 | Status: SHIPPED | OUTPATIENT
Start: 2025-05-01 | End: 2025-04-30

## 2025-04-30 RX ORDER — LEVOFLOXACIN 750 MG/1
750 TABLET, FILM COATED ORAL DAILY
Qty: 5 TABLET | Refills: 0 | Status: SHIPPED | OUTPATIENT
Start: 2025-05-01 | End: 2025-05-06

## 2025-04-30 RX ORDER — MEGESTROL ACETATE 40 MG/1
40 TABLET ORAL DAILY
Status: DISCONTINUED | OUTPATIENT
Start: 2025-04-30 | End: 2025-04-30 | Stop reason: HOSPADM

## 2025-04-30 RX ADMIN — HYDROMORPHONE HYDROCHLORIDE 0.5 MG: 1 INJECTION, SOLUTION INTRAMUSCULAR; INTRAVENOUS; SUBCUTANEOUS at 07:04

## 2025-04-30 RX ADMIN — ATORVASTATIN CALCIUM 40 MG: 40 TABLET, FILM COATED ORAL at 08:04

## 2025-04-30 RX ADMIN — HYDROCODONE BITARTRATE AND ACETAMINOPHEN 1 TABLET: 7.5; 325 TABLET ORAL at 09:04

## 2025-04-30 RX ADMIN — MEGESTROL ACETATE 40 MG: 40 TABLET ORAL at 10:04

## 2025-04-30 RX ADMIN — LEVOFLOXACIN 750 MG: 5 INJECTION, SOLUTION INTRAVENOUS at 01:04

## 2025-04-30 RX ADMIN — POTASSIUM CHLORIDE 40 MEQ: 1500 TABLET, EXTENDED RELEASE ORAL at 08:04

## 2025-04-30 NOTE — DISCHARGE INSTRUCTIONS
Our goal at [facility] is to always give you outstanding care and exceptional service. You may receive a survey from [employer] by mail, text or e-mail in the next 24-48 hours asking about the care you received with us. The survey should only take 5-10 minutes to complete and is very important to us.     Your feedback provides us with a way to recognize our staff who work tirelessly to provide the best care! Also, your responses help us learn how to improve when your experience was below our aspiration of excellence. We are always looking for ways to improve your stay. We WILL use your feedback to continue making improvements to help us provide the highest quality care. We keep your personal information and feedback confidential. We appreciate your time completing this survey and can't wait to hear from you!!!    We look forward to your continued care with us! Thanks so much for choosing [facility] for your healthcare needs!
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
## 2025-05-01 ENCOUNTER — TELEPHONE (OUTPATIENT)
Dept: HEMATOLOGY/ONCOLOGY | Facility: CLINIC | Age: 40
End: 2025-05-01
Payer: MEDICAID

## 2025-05-02 ENCOUNTER — LAB VISIT (OUTPATIENT)
Dept: HEMATOLOGY/ONCOLOGY | Facility: CLINIC | Age: 40
End: 2025-05-02
Attending: INTERNAL MEDICINE
Payer: MEDICAID

## 2025-05-02 VITALS
SYSTOLIC BLOOD PRESSURE: 100 MMHG | RESPIRATION RATE: 18 BRPM | HEART RATE: 109 BPM | DIASTOLIC BLOOD PRESSURE: 69 MMHG | BODY MASS INDEX: 20.33 KG/M2 | HEIGHT: 65 IN | OXYGEN SATURATION: 98 % | WEIGHT: 122 LBS | TEMPERATURE: 99 F

## 2025-05-02 DIAGNOSIS — C80.1 MALIGNANT NEOPLASM METASTATIC TO HILUS OF LUNG WITH UNKNOWN PRIMARY SITE, RIGHT: ICD-10-CM

## 2025-05-02 DIAGNOSIS — K62.9 RECTAL ABNORMALITY: ICD-10-CM

## 2025-05-02 DIAGNOSIS — C79.89 METASTATIC CANCER TO PELVIS: ICD-10-CM

## 2025-05-02 DIAGNOSIS — C7A.1 LARGE CELL NEUROENDOCRINE CARCINOMA: ICD-10-CM

## 2025-05-02 DIAGNOSIS — C78.01 MALIGNANT NEOPLASM METASTATIC TO HILUS OF LUNG WITH UNKNOWN PRIMARY SITE, RIGHT: ICD-10-CM

## 2025-05-02 DIAGNOSIS — R91.1 NODULE OF LOWER LOBE OF LEFT LUNG: Primary | ICD-10-CM

## 2025-05-02 DIAGNOSIS — C34.90 MALIGNANT NEOPLASM OF LUNG, UNSPECIFIED LATERALITY, UNSPECIFIED PART OF LUNG: ICD-10-CM

## 2025-05-02 DIAGNOSIS — Z51.11 CHEMOTHERAPY MANAGEMENT, ENCOUNTER FOR: ICD-10-CM

## 2025-05-02 DIAGNOSIS — D63.8 ANEMIA, CHRONIC DISEASE: ICD-10-CM

## 2025-05-02 DIAGNOSIS — E27.8 ADRENAL MASS, LEFT: ICD-10-CM

## 2025-05-02 DIAGNOSIS — C77.1 SECONDARY MALIGNANCY OF MEDIASTINAL LYMPH NODES: ICD-10-CM

## 2025-05-02 DIAGNOSIS — C77.0 SECONDARY MALIGNANCY OF SUPRACLAVICULAR LYMPH NODES: ICD-10-CM

## 2025-05-02 DIAGNOSIS — C7A.8 NEUROENDOCRINE CARCINOMA OF LUNG: ICD-10-CM

## 2025-05-02 DIAGNOSIS — R10.10 PAIN OF UPPER ABDOMEN: ICD-10-CM

## 2025-05-02 DIAGNOSIS — C79.72 SECONDARY MALIGNANT NEOPLASM OF CORTEX OF LEFT ADRENAL GLAND: ICD-10-CM

## 2025-05-02 LAB
ABS NEUT CALC (OHS): 23.59 X10(3)/MCL (ref 2.1–9.2)
ALBUMIN SERPL-MCNC: 3 G/DL (ref 3.5–5)
ALBUMIN/GLOB SERPL: 0.7 RATIO (ref 1.1–2)
ALP SERPL-CCNC: 132 UNIT/L (ref 40–150)
ALT SERPL-CCNC: 35 UNIT/L (ref 0–55)
ANION GAP SERPL CALC-SCNC: 7 MEQ/L
ANISOCYTOSIS BLD QL SMEAR: SLIGHT
AST SERPL-CCNC: 21 UNIT/L (ref 11–45)
BILIRUB SERPL-MCNC: 0.2 MG/DL
BUN SERPL-MCNC: 12 MG/DL (ref 8.9–20.6)
CALCIUM SERPL-MCNC: 8.8 MG/DL (ref 8.4–10.2)
CHLORIDE SERPL-SCNC: 108 MMOL/L (ref 98–107)
CO2 SERPL-SCNC: 23 MMOL/L (ref 22–29)
CREAT SERPL-MCNC: 0.71 MG/DL (ref 0.72–1.25)
CREAT/UREA NIT SERPL: 17
ERYTHROCYTE [DISTWIDTH] IN BLOOD BY AUTOMATED COUNT: 14 % (ref 11.5–17)
GFR SERPLBLD CREATININE-BSD FMLA CKD-EPI: >60 ML/MIN/1.73/M2
GLOBULIN SER-MCNC: 4.1 GM/DL (ref 2.4–3.5)
GLUCOSE SERPL-MCNC: 132 MG/DL (ref 74–100)
HCT VFR BLD AUTO: 31.8 % (ref 42–52)
HGB BLD-MCNC: 10.6 G/DL (ref 14–18)
LYMPHOCYTES NFR BLD MANUAL: 1.06 X10(3)/MCL (ref 0.6–4.6)
LYMPHOCYTES NFR BLD MANUAL: 4 % (ref 13–40)
MACROCYTES BLD QL SMEAR: SLIGHT
MCH RBC QN AUTO: 32.6 PG (ref 27–31)
MCHC RBC AUTO-ENTMCNC: 33.3 G/DL (ref 33–36)
MCV RBC AUTO: 97.8 FL (ref 80–94)
MONOCYTES NFR BLD MANUAL: 0.8 X10(3)/MCL (ref 0.1–1.3)
MONOCYTES NFR BLD MANUAL: 3 % (ref 2–11)
MYELOCYTES NFR BLD MANUAL: 4 %
NEUTROPHILS NFR BLD MANUAL: 77 % (ref 47–80)
NEUTS BAND NFR BLD MANUAL: 12 % (ref 0–11)
NRBC BLD AUTO-RTO: 0 %
PLATELET # BLD AUTO: 194 X10(3)/MCL (ref 130–400)
PLATELET # BLD EST: ADEQUATE 10*3/UL
PMV BLD AUTO: 8.6 FL (ref 7.4–10.4)
POIKILOCYTOSIS BLD QL SMEAR: SLIGHT
POTASSIUM SERPL-SCNC: 3.8 MMOL/L (ref 3.5–5.1)
PROT SERPL-MCNC: 7.1 GM/DL (ref 6.4–8.3)
RBC # BLD AUTO: 3.25 X10(6)/MCL (ref 4.7–6.1)
RBC MORPH BLD: ABNORMAL
SODIUM SERPL-SCNC: 138 MMOL/L (ref 136–145)
TEAR DROP CELL (OLG): SLIGHT
WBC # BLD AUTO: 26.5 X10(3)/MCL (ref 4.5–11.5)

## 2025-05-02 PROCEDURE — 80053 COMPREHEN METABOLIC PANEL: CPT

## 2025-05-02 PROCEDURE — 85007 BL SMEAR W/DIFF WBC COUNT: CPT

## 2025-05-02 PROCEDURE — 99214 OFFICE O/P EST MOD 30 MIN: CPT | Mod: PBBFAC | Performed by: INTERNAL MEDICINE

## 2025-05-02 RX ORDER — DIPHENHYDRAMINE HYDROCHLORIDE 50 MG/ML
50 INJECTION, SOLUTION INTRAMUSCULAR; INTRAVENOUS ONCE AS NEEDED
OUTPATIENT
Start: 2025-05-13

## 2025-05-02 RX ORDER — EPINEPHRINE 0.3 MG/.3ML
0.3 INJECTION SUBCUTANEOUS ONCE AS NEEDED
OUTPATIENT
Start: 2025-05-15

## 2025-05-02 RX ORDER — HEPARIN 100 UNIT/ML
500 SYRINGE INTRAVENOUS
OUTPATIENT
Start: 2025-05-14

## 2025-05-02 RX ORDER — SODIUM CHLORIDE 0.9 % (FLUSH) 0.9 %
10 SYRINGE (ML) INJECTION
OUTPATIENT
Start: 2025-05-14

## 2025-05-02 RX ORDER — PROCHLORPERAZINE EDISYLATE 5 MG/ML
10 INJECTION INTRAMUSCULAR; INTRAVENOUS ONCE AS NEEDED
OUTPATIENT
Start: 2025-05-13

## 2025-05-02 RX ORDER — SODIUM CHLORIDE 0.9 % (FLUSH) 0.9 %
10 SYRINGE (ML) INJECTION
OUTPATIENT
Start: 2025-05-13

## 2025-05-02 RX ORDER — EPINEPHRINE 0.3 MG/.3ML
0.3 INJECTION SUBCUTANEOUS ONCE AS NEEDED
OUTPATIENT
Start: 2025-05-13

## 2025-05-02 RX ORDER — EPINEPHRINE 0.3 MG/.3ML
0.3 INJECTION SUBCUTANEOUS ONCE AS NEEDED
OUTPATIENT
Start: 2025-05-14

## 2025-05-02 RX ORDER — HEPARIN 100 UNIT/ML
500 SYRINGE INTRAVENOUS
OUTPATIENT
Start: 2025-05-15

## 2025-05-02 RX ORDER — PROCHLORPERAZINE EDISYLATE 5 MG/ML
10 INJECTION INTRAMUSCULAR; INTRAVENOUS ONCE AS NEEDED
OUTPATIENT
Start: 2025-05-14

## 2025-05-02 RX ORDER — DIPHENHYDRAMINE HYDROCHLORIDE 50 MG/ML
50 INJECTION, SOLUTION INTRAMUSCULAR; INTRAVENOUS ONCE AS NEEDED
OUTPATIENT
Start: 2025-05-14

## 2025-05-02 RX ORDER — DIPHENHYDRAMINE HYDROCHLORIDE 50 MG/ML
50 INJECTION, SOLUTION INTRAMUSCULAR; INTRAVENOUS ONCE AS NEEDED
OUTPATIENT
Start: 2025-05-15

## 2025-05-02 RX ORDER — HEPARIN 100 UNIT/ML
500 SYRINGE INTRAVENOUS
OUTPATIENT
Start: 2025-05-13

## 2025-05-02 RX ORDER — SODIUM CHLORIDE 0.9 % (FLUSH) 0.9 %
10 SYRINGE (ML) INJECTION
OUTPATIENT
Start: 2025-05-15

## 2025-05-02 RX ORDER — PROCHLORPERAZINE EDISYLATE 5 MG/ML
10 INJECTION INTRAMUSCULAR; INTRAVENOUS ONCE AS NEEDED
OUTPATIENT
Start: 2025-05-15
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
## 2025-05-02 NOTE — Clinical Note
Orders for 05/02/2025:  Continue chemotherapy  Cycle 2 she will be due around 05/13/2025  Restaging brain MRI scan is pending  Re-stage with FDG PET-CT end of May  If repeat FDG PET-CT is denied, then, re-stage with contrast-enhanced CT scans of C/A/P/neck end of May  Check CBC and weekly monthly TSH and free T4 every 6 weeks GI referral for EGD and colonoscopy Follow-up with NP in a couple of weeks

## 2025-05-04 LAB
BACTERIA BLD CULT: NORMAL
BACTERIA BLD CULT: NORMAL

## 2025-05-07 DIAGNOSIS — C80.1 MALIGNANT NEOPLASM METASTATIC TO HILUS OF LUNG WITH UNKNOWN PRIMARY SITE, RIGHT: Primary | ICD-10-CM

## 2025-05-07 DIAGNOSIS — C78.01 MALIGNANT NEOPLASM METASTATIC TO HILUS OF LUNG WITH UNKNOWN PRIMARY SITE, RIGHT: Primary | ICD-10-CM

## 2025-05-07 DIAGNOSIS — C34.90 MALIGNANT NEOPLASM OF LUNG, UNSPECIFIED LATERALITY, UNSPECIFIED PART OF LUNG: Primary | ICD-10-CM

## 2025-05-13 ENCOUNTER — INFUSION (OUTPATIENT)
Dept: INFUSION THERAPY | Facility: HOSPITAL | Age: 40
End: 2025-05-13
Attending: INTERNAL MEDICINE
Payer: MEDICAID

## 2025-05-13 ENCOUNTER — OFFICE VISIT (OUTPATIENT)
Dept: HEMATOLOGY/ONCOLOGY | Facility: CLINIC | Age: 40
End: 2025-05-13
Attending: INTERNAL MEDICINE
Payer: MEDICAID

## 2025-05-13 VITALS
TEMPERATURE: 98 F | WEIGHT: 117 LBS | HEART RATE: 99 BPM | RESPIRATION RATE: 18 BRPM | SYSTOLIC BLOOD PRESSURE: 109 MMHG | HEIGHT: 65 IN | OXYGEN SATURATION: 99 % | BODY MASS INDEX: 19.49 KG/M2 | DIASTOLIC BLOOD PRESSURE: 85 MMHG

## 2025-05-13 VITALS
RESPIRATION RATE: 15 BRPM | TEMPERATURE: 98 F | SYSTOLIC BLOOD PRESSURE: 109 MMHG | DIASTOLIC BLOOD PRESSURE: 70 MMHG | HEART RATE: 100 BPM | BODY MASS INDEX: 19.49 KG/M2 | OXYGEN SATURATION: 99 % | WEIGHT: 117 LBS | HEIGHT: 65 IN

## 2025-05-13 DIAGNOSIS — T45.1X5A IMMUNODEFICIENCY DUE TO CHEMOTHERAPY: ICD-10-CM

## 2025-05-13 DIAGNOSIS — C7A.8 NEUROENDOCRINE CARCINOMA OF LUNG: Primary | ICD-10-CM

## 2025-05-13 DIAGNOSIS — C79.89 METASTATIC CANCER TO PELVIS: ICD-10-CM

## 2025-05-13 DIAGNOSIS — Z79.69 IMMUNODEFICIENCY DUE TO CHEMOTHERAPY: ICD-10-CM

## 2025-05-13 DIAGNOSIS — J18.9 ATYPICAL PNEUMONIA: ICD-10-CM

## 2025-05-13 DIAGNOSIS — R63.0 DECREASED APPETITE: ICD-10-CM

## 2025-05-13 DIAGNOSIS — R04.2 HEMOPTYSIS: ICD-10-CM

## 2025-05-13 DIAGNOSIS — G89.3 CANCER RELATED PAIN: ICD-10-CM

## 2025-05-13 DIAGNOSIS — C7A.8 NEUROENDOCRINE CARCINOMA OF LUNG: ICD-10-CM

## 2025-05-13 DIAGNOSIS — C34.90 MALIGNANT NEOPLASM OF LUNG, UNSPECIFIED LATERALITY, UNSPECIFIED PART OF LUNG: Primary | ICD-10-CM

## 2025-05-13 DIAGNOSIS — D84.821 IMMUNODEFICIENCY DUE TO CHEMOTHERAPY: ICD-10-CM

## 2025-05-13 PROCEDURE — 99215 OFFICE O/P EST HI 40 MIN: CPT | Mod: S$PBB,,,

## 2025-05-13 PROCEDURE — 96417 CHEMO IV INFUS EACH ADDL SEQ: CPT

## 2025-05-13 PROCEDURE — 63600175 PHARM REV CODE 636 W HCPCS: Performed by: INTERNAL MEDICINE

## 2025-05-13 PROCEDURE — 3078F DIAST BP <80 MM HG: CPT | Mod: CPTII,,,

## 2025-05-13 PROCEDURE — 3074F SYST BP LT 130 MM HG: CPT | Mod: CPTII,,,

## 2025-05-13 PROCEDURE — 25000003 PHARM REV CODE 250: Performed by: INTERNAL MEDICINE

## 2025-05-13 PROCEDURE — 96367 TX/PROPH/DG ADDL SEQ IV INF: CPT

## 2025-05-13 PROCEDURE — 3008F BODY MASS INDEX DOCD: CPT | Mod: CPTII,,,

## 2025-05-13 PROCEDURE — 96413 CHEMO IV INFUSION 1 HR: CPT

## 2025-05-13 PROCEDURE — 25000003 PHARM REV CODE 250

## 2025-05-13 PROCEDURE — 99213 OFFICE O/P EST LOW 20 MIN: CPT | Mod: PBBFAC

## 2025-05-13 PROCEDURE — 96375 TX/PRO/DX INJ NEW DRUG ADDON: CPT

## 2025-05-13 RX ORDER — HYDROCODONE BITARTRATE AND ACETAMINOPHEN 7.5; 325 MG/1; MG/1
1 TABLET ORAL EVERY 6 HOURS PRN
Status: DISCONTINUED | OUTPATIENT
Start: 2025-05-13 | End: 2025-05-13 | Stop reason: HOSPADM

## 2025-05-13 RX ORDER — PROCHLORPERAZINE EDISYLATE 5 MG/ML
10 INJECTION INTRAMUSCULAR; INTRAVENOUS ONCE AS NEEDED
Status: DISCONTINUED | OUTPATIENT
Start: 2025-05-13 | End: 2025-05-13 | Stop reason: HOSPADM

## 2025-05-13 RX ORDER — EPINEPHRINE 1 MG/ML
0.3 INJECTION INTRAMUSCULAR; INTRAVENOUS; SUBCUTANEOUS ONCE AS NEEDED
Status: DISCONTINUED | OUTPATIENT
Start: 2025-05-13 | End: 2025-05-13 | Stop reason: HOSPADM

## 2025-05-13 RX ORDER — DIPHENHYDRAMINE HYDROCHLORIDE 50 MG/ML
50 INJECTION, SOLUTION INTRAMUSCULAR; INTRAVENOUS ONCE AS NEEDED
Status: DISCONTINUED | OUTPATIENT
Start: 2025-05-13 | End: 2025-05-13 | Stop reason: HOSPADM

## 2025-05-13 RX ORDER — OLANZAPINE 2.5 MG/1
2.5 TABLET, FILM COATED ORAL DAILY
Qty: 84 TABLET | Refills: 0 | Status: SHIPPED | OUTPATIENT
Start: 2025-05-13 | End: 2025-05-13

## 2025-05-13 RX ORDER — HYDROCODONE BITARTRATE AND ACETAMINOPHEN 7.5; 325 MG/1; MG/1
1 TABLET ORAL EVERY 6 HOURS PRN
Refills: 0 | Status: CANCELLED
Start: 2025-05-13

## 2025-05-13 RX ORDER — AMOXICILLIN AND CLAVULANATE POTASSIUM 500; 125 MG/1; MG/1
1 TABLET, FILM COATED ORAL 2 TIMES DAILY
Qty: 20 TABLET | Refills: 0 | Status: SHIPPED | OUTPATIENT
Start: 2025-05-13 | End: 2025-05-13

## 2025-05-13 RX ORDER — AZITHROMYCIN 500 MG/1
500 TABLET, FILM COATED ORAL DAILY
Qty: 10 TABLET | Refills: 0 | Status: SHIPPED | OUTPATIENT
Start: 2025-05-13 | End: 2025-05-13

## 2025-05-13 RX ORDER — HYDROCODONE BITARTRATE AND ACETAMINOPHEN 7.5; 325 MG/1; MG/1
1 TABLET ORAL EVERY 6 HOURS PRN
Qty: 84 TABLET | Refills: 0 | Status: SHIPPED | OUTPATIENT
Start: 2025-05-13

## 2025-05-13 RX ORDER — OLANZAPINE 2.5 MG/1
2.5 TABLET, FILM COATED ORAL DAILY
Qty: 84 TABLET | Refills: 0 | Status: SHIPPED | OUTPATIENT
Start: 2025-05-13

## 2025-05-13 RX ORDER — AZITHROMYCIN 500 MG/1
500 TABLET, FILM COATED ORAL DAILY
Qty: 10 TABLET | Refills: 0 | Status: SHIPPED | OUTPATIENT
Start: 2025-05-13 | End: 2025-05-23

## 2025-05-13 RX ORDER — HEPARIN 100 UNIT/ML
500 SYRINGE INTRAVENOUS
Status: DISCONTINUED | OUTPATIENT
Start: 2025-05-13 | End: 2025-05-13 | Stop reason: HOSPADM

## 2025-05-13 RX ORDER — AMOXICILLIN AND CLAVULANATE POTASSIUM 500; 125 MG/1; MG/1
1 TABLET, FILM COATED ORAL 2 TIMES DAILY
Qty: 20 TABLET | Refills: 0 | Status: SHIPPED | OUTPATIENT
Start: 2025-05-13 | End: 2025-05-23

## 2025-05-13 RX ORDER — SODIUM CHLORIDE 0.9 % (FLUSH) 0.9 %
10 SYRINGE (ML) INJECTION
Status: DISCONTINUED | OUTPATIENT
Start: 2025-05-13 | End: 2025-05-13 | Stop reason: HOSPADM

## 2025-05-13 RX ADMIN — APREPITANT 130 MG: 130 INJECTION, EMULSION INTRAVENOUS at 12:05

## 2025-05-13 RX ADMIN — ATEZOLIZUMAB 1200 MG: 1200 INJECTION, SOLUTION INTRAVENOUS at 12:05

## 2025-05-13 RX ADMIN — SODIUM CHLORIDE: 9 INJECTION, SOLUTION INTRAVENOUS at 11:05

## 2025-05-13 RX ADMIN — HEPARIN 500 UNITS: 100 SYRINGE at 03:05

## 2025-05-13 RX ADMIN — SODIUM CHLORIDE: 9 INJECTION, SOLUTION INTRAVENOUS at 12:05

## 2025-05-13 RX ADMIN — CARBOPLATIN 540 MG: 10 INJECTION, SOLUTION INTRAVENOUS at 01:05

## 2025-05-13 RX ADMIN — HYDROCODONE BITARTRATE AND ACETAMINOPHEN 1 TABLET: 7.5; 325 TABLET ORAL at 12:05

## 2025-05-13 RX ADMIN — ETOPOSIDE 156 MG: 20 INJECTION, SOLUTION INTRAVENOUS at 02:05

## 2025-05-13 RX ADMIN — DEXAMETHASONE SODIUM PHOSPHATE 0.25 MG: 4 INJECTION, SOLUTION INTRA-ARTICULAR; INTRALESIONAL; INTRAMUSCULAR; INTRAVENOUS; SOFT TISSUE at 01:05
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
## 2025-05-13 NOTE — Clinical Note
Proceed with chemotherapy today in infusion Return to infusion on 05/14 and 5/15 for  16 no labs needed Schedule weekly labs CBC/CMP/Mag level-5/20 Move up PET-CT Schedule an appointment for patient to follow up with MD week of 5/27 RTC with me in 3 weeks 6/3 with labs prior (CBC/CMP/Mag level) followed by infusion for Tecentriq carboplatin and  16

## 2025-05-13 NOTE — NURSING
"1137 Patient is here for labs, NP visit & C2D1 Tecentriq, Carbo, Etoposide. He c/o pain to chest, abd, back and legs  #10.      Norco 7.5mgs po ordered for pain given at 12:06pm  13:38 patient resting and stated pain "much better" rating pain #7.5    1520 Tecentriq, Carbo & Etoposide given via R chest mediport. Mediport remains accessed and heparin locked. Patient is returnign for day 2 tomorrow.   "

## 2025-05-14 ENCOUNTER — INFUSION (OUTPATIENT)
Dept: INFUSION THERAPY | Facility: HOSPITAL | Age: 40
End: 2025-05-14
Attending: INTERNAL MEDICINE
Payer: MEDICAID

## 2025-05-14 VITALS
OXYGEN SATURATION: 100 % | SYSTOLIC BLOOD PRESSURE: 99 MMHG | TEMPERATURE: 98 F | DIASTOLIC BLOOD PRESSURE: 52 MMHG | RESPIRATION RATE: 18 BRPM | HEART RATE: 57 BPM

## 2025-05-14 DIAGNOSIS — G89.3 CANCER RELATED PAIN: ICD-10-CM

## 2025-05-14 DIAGNOSIS — C7A.8 NEUROENDOCRINE CARCINOMA OF LUNG: Primary | ICD-10-CM

## 2025-05-14 PROCEDURE — 25000003 PHARM REV CODE 250: Performed by: INTERNAL MEDICINE

## 2025-05-14 PROCEDURE — A4216 STERILE WATER/SALINE, 10 ML: HCPCS | Performed by: INTERNAL MEDICINE

## 2025-05-14 PROCEDURE — 63600175 PHARM REV CODE 636 W HCPCS: Performed by: INTERNAL MEDICINE

## 2025-05-14 PROCEDURE — 96413 CHEMO IV INFUSION 1 HR: CPT

## 2025-05-14 RX ORDER — PROCHLORPERAZINE EDISYLATE 5 MG/ML
10 INJECTION INTRAMUSCULAR; INTRAVENOUS ONCE AS NEEDED
Status: DISCONTINUED | OUTPATIENT
Start: 2025-05-14 | End: 2025-05-14 | Stop reason: HOSPADM

## 2025-05-14 RX ORDER — DIPHENHYDRAMINE HYDROCHLORIDE 50 MG/ML
50 INJECTION, SOLUTION INTRAMUSCULAR; INTRAVENOUS ONCE AS NEEDED
Status: DISCONTINUED | OUTPATIENT
Start: 2025-05-14 | End: 2025-05-14 | Stop reason: HOSPADM

## 2025-05-14 RX ORDER — SODIUM CHLORIDE 0.9 % (FLUSH) 0.9 %
10 SYRINGE (ML) INJECTION
Status: DISCONTINUED | OUTPATIENT
Start: 2025-05-14 | End: 2025-05-14 | Stop reason: HOSPADM

## 2025-05-14 RX ORDER — EPINEPHRINE 1 MG/ML
0.3 INJECTION INTRAMUSCULAR; INTRAVENOUS; SUBCUTANEOUS ONCE AS NEEDED
Status: DISCONTINUED | OUTPATIENT
Start: 2025-05-14 | End: 2025-05-14 | Stop reason: HOSPADM

## 2025-05-14 RX ORDER — HEPARIN 100 UNIT/ML
500 SYRINGE INTRAVENOUS
Status: DISCONTINUED | OUTPATIENT
Start: 2025-05-14 | End: 2025-05-14 | Stop reason: HOSPADM

## 2025-05-14 RX ADMIN — HEPARIN 500 UNITS: 100 SYRINGE at 10:05

## 2025-05-14 RX ADMIN — Medication 10 ML: at 10:05

## 2025-05-14 RX ADMIN — ETOPOSIDE 156 MG: 20 INJECTION, SOLUTION INTRAVENOUS at 09:05

## 2025-05-14 RX ADMIN — SODIUM CHLORIDE: 9 INJECTION, SOLUTION INTRAVENOUS at 09:05

## 2025-05-14 NOTE — NURSING
0905 Patient is here for C2D2 Etoposide . He voices no c/o today.     1030 Etoposide given via r chest mediport. Mediport remains accessed and hep locked.

## 2025-05-15 ENCOUNTER — INFUSION (OUTPATIENT)
Dept: INFUSION THERAPY | Facility: HOSPITAL | Age: 40
End: 2025-05-15
Attending: INTERNAL MEDICINE
Payer: MEDICAID

## 2025-05-15 VITALS
DIASTOLIC BLOOD PRESSURE: 52 MMHG | TEMPERATURE: 98 F | HEART RATE: 95 BPM | SYSTOLIC BLOOD PRESSURE: 104 MMHG | OXYGEN SATURATION: 97 % | RESPIRATION RATE: 18 BRPM

## 2025-05-15 DIAGNOSIS — G89.3 CANCER RELATED PAIN: ICD-10-CM

## 2025-05-15 DIAGNOSIS — C7A.8 NEUROENDOCRINE CARCINOMA OF LUNG: Primary | ICD-10-CM

## 2025-05-15 PROCEDURE — 96413 CHEMO IV INFUSION 1 HR: CPT

## 2025-05-15 PROCEDURE — 63600175 PHARM REV CODE 636 W HCPCS: Performed by: INTERNAL MEDICINE

## 2025-05-15 PROCEDURE — 25000003 PHARM REV CODE 250: Performed by: INTERNAL MEDICINE

## 2025-05-15 RX ORDER — HEPARIN 100 UNIT/ML
500 SYRINGE INTRAVENOUS
Status: DISCONTINUED | OUTPATIENT
Start: 2025-05-15 | End: 2025-05-15 | Stop reason: HOSPADM

## 2025-05-15 RX ORDER — PROCHLORPERAZINE EDISYLATE 5 MG/ML
10 INJECTION INTRAMUSCULAR; INTRAVENOUS ONCE AS NEEDED
Status: DISCONTINUED | OUTPATIENT
Start: 2025-05-15 | End: 2025-05-15 | Stop reason: HOSPADM

## 2025-05-15 RX ORDER — DIPHENHYDRAMINE HYDROCHLORIDE 50 MG/ML
50 INJECTION, SOLUTION INTRAMUSCULAR; INTRAVENOUS ONCE AS NEEDED
Status: DISCONTINUED | OUTPATIENT
Start: 2025-05-15 | End: 2025-05-15 | Stop reason: HOSPADM

## 2025-05-15 RX ORDER — SODIUM CHLORIDE 0.9 % (FLUSH) 0.9 %
10 SYRINGE (ML) INJECTION
Status: DISCONTINUED | OUTPATIENT
Start: 2025-05-15 | End: 2025-05-15 | Stop reason: HOSPADM

## 2025-05-15 RX ORDER — EPINEPHRINE 1 MG/ML
0.3 INJECTION INTRAMUSCULAR; INTRAVENOUS; SUBCUTANEOUS ONCE AS NEEDED
Status: DISCONTINUED | OUTPATIENT
Start: 2025-05-15 | End: 2025-05-15 | Stop reason: HOSPADM

## 2025-05-15 RX ADMIN — SODIUM CHLORIDE: 9 INJECTION, SOLUTION INTRAVENOUS at 09:05

## 2025-05-15 RX ADMIN — ETOPOSIDE 156 MG: 20 INJECTION, SOLUTION INTRAVENOUS at 09:05

## 2025-05-15 RX ADMIN — HEPARIN 500 UNITS: 100 SYRINGE at 10:05

## 2025-05-15 NOTE — NURSING
0858 Patient is here for C2D3 Etoposide. He voices no c/o and states he took all his meds this morning. R chest mediport remains acessed and intact; flushes easily w/good blood return.     Etoposide given via r chest mediport w/o incident.  Patient is scheduled for C2D4 Flynetra tomorrow. Appt/time provided to patient.

## 2025-05-16 ENCOUNTER — INFUSION (OUTPATIENT)
Dept: INFUSION THERAPY | Facility: HOSPITAL | Age: 40
End: 2025-05-16
Attending: INTERNAL MEDICINE
Payer: MEDICAID

## 2025-05-16 ENCOUNTER — HOSPITAL ENCOUNTER (OUTPATIENT)
Dept: RADIOLOGY | Facility: HOSPITAL | Age: 40
Discharge: HOME | End: 2025-05-16
Attending: INTERNAL MEDICINE
Payer: MEDICAID

## 2025-05-16 VITALS
DIASTOLIC BLOOD PRESSURE: 73 MMHG | SYSTOLIC BLOOD PRESSURE: 130 MMHG | OXYGEN SATURATION: 100 % | RESPIRATION RATE: 18 BRPM | TEMPERATURE: 98 F | HEART RATE: 94 BPM

## 2025-05-16 DIAGNOSIS — C7A.1 LARGE CELL NEUROENDOCRINE CARCINOMA: ICD-10-CM

## 2025-05-16 DIAGNOSIS — C7A.8 NEUROENDOCRINE CARCINOMA OF LUNG: Primary | ICD-10-CM

## 2025-05-16 DIAGNOSIS — C78.01 MALIGNANT NEOPLASM METASTATIC TO HILUS OF LUNG WITH UNKNOWN PRIMARY SITE, RIGHT: ICD-10-CM

## 2025-05-16 DIAGNOSIS — C79.31 LUNG CANCER METASTATIC TO BRAIN: Primary | ICD-10-CM

## 2025-05-16 DIAGNOSIS — G89.3 CANCER RELATED PAIN: ICD-10-CM

## 2025-05-16 DIAGNOSIS — C80.1 MALIGNANT NEOPLASM METASTATIC TO HILUS OF LUNG WITH UNKNOWN PRIMARY SITE, RIGHT: ICD-10-CM

## 2025-05-16 DIAGNOSIS — R91.1 NODULE OF LOWER LOBE OF LEFT LUNG: ICD-10-CM

## 2025-05-16 DIAGNOSIS — C34.90 LUNG CANCER METASTATIC TO BRAIN: Primary | ICD-10-CM

## 2025-05-16 PROCEDURE — 63600175 PHARM REV CODE 636 W HCPCS: Mod: JZ,TB | Performed by: INTERNAL MEDICINE

## 2025-05-16 PROCEDURE — 25500020 PHARM REV CODE 255

## 2025-05-16 PROCEDURE — 70553 MRI BRAIN STEM W/O & W/DYE: CPT | Mod: TC

## 2025-05-16 PROCEDURE — 96372 THER/PROPH/DIAG INJ SC/IM: CPT

## 2025-05-16 PROCEDURE — A9577 INJ MULTIHANCE: HCPCS

## 2025-05-16 RX ORDER — FAMOTIDINE 40 MG/1
40 TABLET, FILM COATED ORAL NIGHTLY
Qty: 30 TABLET | Refills: 1 | Status: SHIPPED | OUTPATIENT
Start: 2025-05-16

## 2025-05-16 RX ORDER — DEXAMETHASONE 4 MG/1
4 TABLET ORAL EVERY 6 HOURS
Qty: 120 TABLET | Refills: 0 | Status: SHIPPED | OUTPATIENT
Start: 2025-05-16

## 2025-05-16 RX ADMIN — GADOBENATE DIMEGLUMINE 11 ML: 529 INJECTION, SOLUTION INTRAVENOUS at 01:05

## 2025-05-16 RX ADMIN — PEGFILGRASTIM 6 MG: 6 INJECTION SUBCUTANEOUS at 12:05

## 2025-05-16 NOTE — NURSING
1201 Patient is here for [facility] D2D4. He c/o having x1 diarrhea stool yesterday & again this morning.  Instructed him to take Lomotil as directed on package for loose stools. He verbalized understanding.

## 2025-05-16 NOTE — PROGRESS NOTES
Spoke to the patient about his Brain MRI and his prescription for pepcid and decadron.  Reportable signs and symptoms were reviewed with the patient.  Patient verbalized understanding.

## 2025-05-16 NOTE — PROGRESS NOTES
Brain MRI reviewed.    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      Impression:     A 3.6 cm right cerebellar mass is concerning for metastatic disease.        Electronically signed by:Cece Jimenez  Date:                                            05/16/2025  Time:                                           13:29      Exam Ended: 05/16/25 13:18 CDT Last Resulted: 05/16/25 13:29 CDT

## 2025-05-18 ENCOUNTER — DOCUMENTATION ONLY (OUTPATIENT)
Dept: HEMATOLOGY/ONCOLOGY | Facility: CLINIC | Age: 40
End: 2025-05-18
Payer: MEDICAID

## 2025-05-18 NOTE — PROGRESS NOTES
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; known midline shift; no hydrocephalus; trace scattered paranasal sinus mucosal thickening; mild mass effect in the posterior fossa with crowding of the foramen magnum  >>>  Plan:   -already referral to Radiation Oncology; already started on Decadron; already started on Pepcid; ER precautions has been reinforced  -has failed carboplatin/etoposide/atezolizumab; we will discontinue  -now, patient has CNS disease, therefore, in this situation, irinotecan is preferred (see below)     Irinotecan:  Twenty-one day cycle until disease progression or unacceptable toxicity:  Irinotecan 350 mg per m2 IV day 1  Check pharmacogenomic spinal (UGT1A1 activity)

## 2025-05-21 DIAGNOSIS — C77.1 SECONDARY MALIGNANCY OF MEDIASTINAL LYMPH NODES: ICD-10-CM

## 2025-05-21 DIAGNOSIS — E27.8 ADRENAL MASS, LEFT: ICD-10-CM

## 2025-05-21 DIAGNOSIS — C34.90 LUNG CANCER METASTATIC TO BRAIN: Primary | ICD-10-CM

## 2025-05-21 DIAGNOSIS — C79.31 LUNG CANCER METASTATIC TO BRAIN: Primary | ICD-10-CM

## 2025-05-26 ENCOUNTER — TELEPHONE (OUTPATIENT)
Dept: HEMATOLOGY/ONCOLOGY | Facility: CLINIC | Age: 40
End: 2025-05-26
Payer: MEDICAID

## 2025-05-26 PROBLEM — C79.31: Status: ACTIVE | Noted: 2025-05-26

## 2025-05-26 PROBLEM — R59.0 RETROPERITONEAL LYMPHADENOPATHY: Status: ACTIVE | Noted: 2025-05-26

## 2025-05-26 PROBLEM — R59.0 CERVICAL LYMPHADENOPATHY: Status: ACTIVE | Noted: 2025-05-26

## 2025-05-26 PROBLEM — C79.51 SECONDARY ADENOCARCINOMA OF BONE: Status: ACTIVE | Noted: 2025-05-26

## 2025-05-28 ENCOUNTER — CLINICAL SUPPORT (OUTPATIENT)
Dept: RADIATION THERAPY | Facility: HOSPITAL | Age: 40
End: 2025-05-28
Attending: RADIOLOGY
Payer: MEDICAID

## 2025-05-28 DIAGNOSIS — C79.49 SECONDARY MALIGNANT NEOPLASM OF BRAIN AND SPINAL CORD: Primary | ICD-10-CM

## 2025-05-28 DIAGNOSIS — C34.90 LUNG CANCER METASTATIC TO BRAIN: ICD-10-CM

## 2025-05-28 DIAGNOSIS — C79.31 LUNG CANCER METASTATIC TO BRAIN: ICD-10-CM

## 2025-05-28 DIAGNOSIS — C79.31 SECONDARY MALIGNANT NEOPLASM OF BRAIN AND SPINAL CORD: Primary | ICD-10-CM

## 2025-05-28 PROCEDURE — 77290 THER RAD SIMULAJ FIELD CPLX: CPT | Performed by: RADIOLOGY

## 2025-05-28 PROCEDURE — 77334 RADIATION TREATMENT AID(S): CPT | Performed by: RADIOLOGY

## 2025-05-29 ENCOUNTER — TELEPHONE (OUTPATIENT)
Dept: HEMATOLOGY/ONCOLOGY | Facility: CLINIC | Age: 40
End: 2025-05-29
Payer: MEDICAID

## 2025-05-29 ENCOUNTER — TELEPHONE (OUTPATIENT)
Dept: NEUROSURGERY | Facility: CLINIC | Age: 40
End: 2025-05-29
Payer: MEDICAID

## 2025-05-29 DIAGNOSIS — C79.31 METASTASIS TO BRAIN: Primary | ICD-10-CM

## 2025-05-29 DIAGNOSIS — C34.91 MALIGNANT NEOPLASM OF UNSPECIFIED PART OF RIGHT BRONCHUS OR LUNG: ICD-10-CM

## 2025-05-29 NOTE — TELEPHONE ENCOUNTER
Patient urgently referred to Dr. Connolly by Dr. Spivey for:  Metastasis to brain [C79.31]  Malignant neoplasm of unspecified part of right bronchus or lung [C34.91]     MRI Brain W WO 5/16/25. Impression:  A 3.6 cm right cerebellar mass is concerning for metastatic disease.    Per referring 5/28/25 progress note:        Please review and advise. Dr. Spivey called yesterday and asked if the patient can be scheduled with Dr. Connolly ASAP, he has availability next week. Rad Onc notes available in media tab.

## 2025-05-30 ENCOUNTER — OFFICE VISIT (OUTPATIENT)
Dept: HEMATOLOGY/ONCOLOGY | Facility: CLINIC | Age: 40
End: 2025-05-30
Attending: INTERNAL MEDICINE
Payer: MEDICAID

## 2025-05-30 ENCOUNTER — TELEPHONE (OUTPATIENT)
Dept: HEMATOLOGY/ONCOLOGY | Facility: CLINIC | Age: 40
End: 2025-05-30
Payer: MEDICAID

## 2025-05-30 ENCOUNTER — INFUSION (OUTPATIENT)
Dept: INFUSION THERAPY | Facility: HOSPITAL | Age: 40
End: 2025-05-30
Attending: INTERNAL MEDICINE
Payer: MEDICAID

## 2025-05-30 VITALS
HEART RATE: 116 BPM | DIASTOLIC BLOOD PRESSURE: 83 MMHG | SYSTOLIC BLOOD PRESSURE: 125 MMHG | RESPIRATION RATE: 18 BRPM | TEMPERATURE: 98 F | OXYGEN SATURATION: 99 %

## 2025-05-30 VITALS
HEIGHT: 65 IN | TEMPERATURE: 98 F | DIASTOLIC BLOOD PRESSURE: 63 MMHG | BODY MASS INDEX: 18.13 KG/M2 | RESPIRATION RATE: 20 BRPM | HEART RATE: 114 BPM | SYSTOLIC BLOOD PRESSURE: 92 MMHG | OXYGEN SATURATION: 98 % | WEIGHT: 108.81 LBS

## 2025-05-30 DIAGNOSIS — C79.72 SECONDARY MALIGNANT NEOPLASM OF CORTEX OF LEFT ADRENAL GLAND: ICD-10-CM

## 2025-05-30 DIAGNOSIS — C80.1 MALIGNANT NEOPLASM METASTATIC TO HILUS OF LUNG WITH UNKNOWN PRIMARY SITE, RIGHT: ICD-10-CM

## 2025-05-30 DIAGNOSIS — C79.89 METASTATIC CANCER TO PELVIS: ICD-10-CM

## 2025-05-30 DIAGNOSIS — R91.1 NODULE OF LOWER LOBE OF LEFT LUNG: ICD-10-CM

## 2025-05-30 DIAGNOSIS — D63.8 ANEMIA, CHRONIC DISEASE: ICD-10-CM

## 2025-05-30 DIAGNOSIS — C7A.1 LARGE CELL NEUROENDOCRINE CARCINOMA: ICD-10-CM

## 2025-05-30 DIAGNOSIS — C7A.1 LARGE CELL NEUROENDOCRINE CARCINOMA: Primary | ICD-10-CM

## 2025-05-30 DIAGNOSIS — C77.1 SECONDARY MALIGNANCY OF MEDIASTINAL LYMPH NODES: ICD-10-CM

## 2025-05-30 DIAGNOSIS — C34.90 LUNG CANCER METASTATIC TO BRAIN: ICD-10-CM

## 2025-05-30 DIAGNOSIS — G89.3 CANCER RELATED PAIN: ICD-10-CM

## 2025-05-30 DIAGNOSIS — C79.31 LUNG CANCER METASTATIC TO BRAIN: ICD-10-CM

## 2025-05-30 DIAGNOSIS — C79.31: ICD-10-CM

## 2025-05-30 DIAGNOSIS — C78.01 MALIGNANT NEOPLASM METASTATIC TO HILUS OF LUNG WITH UNKNOWN PRIMARY SITE, RIGHT: ICD-10-CM

## 2025-05-30 DIAGNOSIS — K62.9 RECTAL ABNORMALITY: ICD-10-CM

## 2025-05-30 DIAGNOSIS — C79.51 SECONDARY ADENOCARCINOMA OF BONE: ICD-10-CM

## 2025-05-30 DIAGNOSIS — R51.9 FREQUENT HEADACHES: ICD-10-CM

## 2025-05-30 DIAGNOSIS — C34.90 MALIGNANT NEOPLASM OF LUNG, UNSPECIFIED LATERALITY, UNSPECIFIED PART OF LUNG: ICD-10-CM

## 2025-05-30 DIAGNOSIS — R59.0 RETROPERITONEAL LYMPHADENOPATHY: ICD-10-CM
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
## 2025-05-30 DIAGNOSIS — R04.2 HEMOPTYSIS: ICD-10-CM

## 2025-05-30 DIAGNOSIS — C77.0 SECONDARY MALIGNANCY OF SUPRACLAVICULAR LYMPH NODES: ICD-10-CM

## 2025-05-30 DIAGNOSIS — C7A.8 NEUROENDOCRINE CARCINOMA OF LUNG: ICD-10-CM

## 2025-05-30 DIAGNOSIS — E87.6 HYPOKALEMIA: ICD-10-CM

## 2025-05-30 DIAGNOSIS — R06.00 DYSPNEA, UNSPECIFIED TYPE: Primary | ICD-10-CM

## 2025-05-30 DIAGNOSIS — E27.8 ADRENAL MASS, LEFT: ICD-10-CM

## 2025-05-30 DIAGNOSIS — D75.839 THROMBOCYTOSIS: ICD-10-CM

## 2025-05-30 DIAGNOSIS — R59.0 CERVICAL LYMPHADENOPATHY: ICD-10-CM

## 2025-05-30 LAB
ALBUMIN SERPL-MCNC: 4 G/DL (ref 3.5–5)
ALBUMIN/GLOB SERPL: 0.7 RATIO (ref 1.1–2)
ALP SERPL-CCNC: 141 UNIT/L (ref 40–150)
ALT SERPL-CCNC: 15 UNIT/L (ref 0–55)
ANION GAP SERPL CALC-SCNC: 17 MEQ/L
AST SERPL-CCNC: 15 UNIT/L (ref 11–45)
BASOPHILS # BLD AUTO: 0.02 X10(3)/MCL
BASOPHILS NFR BLD AUTO: 0.3 %
BILIRUB SERPL-MCNC: 0.4 MG/DL
BUN SERPL-MCNC: 31.1 MG/DL (ref 8.9–20.6)
CALCIUM SERPL-MCNC: 10.5 MG/DL (ref 8.4–10.2)
CHLORIDE SERPL-SCNC: 95 MMOL/L (ref 98–107)
CO2 SERPL-SCNC: 21 MMOL/L (ref 22–29)
CREAT SERPL-MCNC: 1.48 MG/DL (ref 0.72–1.25)
CREAT/UREA NIT SERPL: 21
EOSINOPHIL # BLD AUTO: 0 X10(3)/MCL (ref 0–0.9)
EOSINOPHIL NFR BLD AUTO: 0 %
ERYTHROCYTE [DISTWIDTH] IN BLOOD BY AUTOMATED COUNT: 17.5 % (ref 11.5–17)
GFR SERPLBLD CREATININE-BSD FMLA CKD-EPI: >60 ML/MIN/1.73/M2
GLOBULIN SER-MCNC: 5.8 GM/DL (ref 2.4–3.5)
GLUCOSE SERPL-MCNC: 175 MG/DL (ref 74–100)
HCT VFR BLD AUTO: 43.6 % (ref 42–52)
HGB BLD-MCNC: 15 G/DL (ref 14–18)
IMM GRANULOCYTES # BLD AUTO: 0.17 X10(3)/MCL (ref 0–0.04)
IMM GRANULOCYTES NFR BLD AUTO: 2.5 %
LYMPHOCYTES # BLD AUTO: 1.63 X10(3)/MCL (ref 0.6–4.6)
LYMPHOCYTES NFR BLD AUTO: 24 %
MAGNESIUM SERPL-MCNC: 2.2 MG/DL (ref 1.6–2.6)
MCH RBC QN AUTO: 33.3 PG (ref 27–31)
MCHC RBC AUTO-ENTMCNC: 34.4 G/DL (ref 33–36)
MCV RBC AUTO: 96.9 FL (ref 80–94)
MONOCYTES # BLD AUTO: 0.69 X10(3)/MCL (ref 0.1–1.3)
MONOCYTES NFR BLD AUTO: 10.2 %
NEUTROPHILS # BLD AUTO: 4.28 X10(3)/MCL (ref 2.1–9.2)
NEUTROPHILS NFR BLD AUTO: 63 %
NRBC BLD AUTO-RTO: 0 %
PLATELET # BLD AUTO: 329 X10(3)/MCL (ref 130–400)
PMV BLD AUTO: 9.1 FL (ref 7.4–10.4)
POTASSIUM SERPL-SCNC: 3.4 MMOL/L (ref 3.5–5.1)
PROT SERPL-MCNC: 9.8 GM/DL (ref 6.4–8.3)
RBC # BLD AUTO: 4.5 X10(6)/MCL (ref 4.7–6.1)
SODIUM SERPL-SCNC: 133 MMOL/L (ref 136–145)
WBC # BLD AUTO: 6.79 X10(3)/MCL (ref 4.5–11.5)

## 2025-05-30 PROCEDURE — 3074F SYST BP LT 130 MM HG: CPT | Mod: CPTII,,, | Performed by: INTERNAL MEDICINE

## 2025-05-30 PROCEDURE — 3078F DIAST BP <80 MM HG: CPT | Mod: CPTII,,, | Performed by: INTERNAL MEDICINE

## 2025-05-30 PROCEDURE — 77295 3-D RADIOTHERAPY PLAN: CPT

## 2025-05-30 PROCEDURE — 3008F BODY MASS INDEX DOCD: CPT | Mod: CPTII,,, | Performed by: INTERNAL MEDICINE

## 2025-05-30 PROCEDURE — 99215 OFFICE O/P EST HI 40 MIN: CPT | Mod: S$PBB,,, | Performed by: INTERNAL MEDICINE

## 2025-05-30 PROCEDURE — 80053 COMPREHEN METABOLIC PANEL: CPT

## 2025-05-30 PROCEDURE — 25000003 PHARM REV CODE 250: Performed by: INTERNAL MEDICINE

## 2025-05-30 PROCEDURE — 85025 COMPLETE CBC W/AUTO DIFF WBC: CPT

## 2025-05-30 PROCEDURE — 77300 RADIATION THERAPY DOSE PLAN: CPT

## 2025-05-30 PROCEDURE — A4216 STERILE WATER/SALINE, 10 ML: HCPCS | Performed by: INTERNAL MEDICINE

## 2025-05-30 PROCEDURE — 1159F MED LIST DOCD IN RCRD: CPT | Mod: CPTII,,, | Performed by: INTERNAL MEDICINE

## 2025-05-30 PROCEDURE — 1160F RVW MEDS BY RX/DR IN RCRD: CPT | Mod: CPTII,,, | Performed by: INTERNAL MEDICINE

## 2025-05-30 PROCEDURE — 83735 ASSAY OF MAGNESIUM: CPT

## 2025-05-30 PROCEDURE — 99214 OFFICE O/P EST MOD 30 MIN: CPT | Mod: PBBFAC,25 | Performed by: INTERNAL MEDICINE

## 2025-05-30 PROCEDURE — 96360 HYDRATION IV INFUSION INIT: CPT

## 2025-05-30 PROCEDURE — 63600175 PHARM REV CODE 636 W HCPCS: Performed by: INTERNAL MEDICINE

## 2025-05-30 PROCEDURE — 77334 RADIATION TREATMENT AID(S): CPT

## 2025-05-30 RX ORDER — SODIUM CHLORIDE 0.9 % (FLUSH) 0.9 %
10 SYRINGE (ML) INJECTION
OUTPATIENT
Start: 2025-05-30

## 2025-05-30 RX ORDER — HEPARIN 100 UNIT/ML
500 SYRINGE INTRAVENOUS
Status: DISCONTINUED | OUTPATIENT
Start: 2025-05-30 | End: 2025-05-30 | Stop reason: HOSPADM

## 2025-05-30 RX ORDER — POTASSIUM CHLORIDE 1500 MG/1
40 TABLET, EXTENDED RELEASE ORAL DAILY
Qty: 28 TABLET | Refills: 0 | Status: SHIPPED | OUTPATIENT
Start: 2025-05-30 | End: 2025-06-13

## 2025-05-30 RX ORDER — OXYCODONE HYDROCHLORIDE 20 MG/1
20 TABLET ORAL EVERY 6 HOURS PRN
Qty: 56 TABLET | Refills: 0 | Status: SHIPPED | OUTPATIENT
Start: 2025-05-30

## 2025-05-30 RX ORDER — HEPARIN 100 UNIT/ML
500 SYRINGE INTRAVENOUS
OUTPATIENT
Start: 2025-05-30

## 2025-05-30 RX ORDER — SODIUM CHLORIDE 0.9 % (FLUSH) 0.9 %
10 SYRINGE (ML) INJECTION
Status: DISCONTINUED | OUTPATIENT
Start: 2025-05-30 | End: 2025-05-30 | Stop reason: HOSPADM

## 2025-05-30 RX ADMIN — HEPARIN 500 UNITS: 100 SYRINGE at 12:05

## 2025-05-30 RX ADMIN — Medication 10 ML: at 12:05

## 2025-05-30 RX ADMIN — SODIUM CHLORIDE 1000 ML: 9 INJECTION, SOLUTION INTRAVENOUS at 11:05

## 2025-06-01 ENCOUNTER — HOSPITAL ENCOUNTER (INPATIENT)
Facility: HOSPITAL | Age: 40
LOS: 2 days | Discharge: HOME | DRG: 682 | End: 2025-06-03
Attending: EMERGENCY MEDICINE
Payer: MEDICAID

## 2025-06-01 DIAGNOSIS — C7A.8 NEUROENDOCRINE CARCINOMA: ICD-10-CM

## 2025-06-01 DIAGNOSIS — N17.9 AKI (ACUTE KIDNEY INJURY): Primary | ICD-10-CM

## 2025-06-01 DIAGNOSIS — E87.20 LACTIC ACIDOSIS: ICD-10-CM

## 2025-06-01 DIAGNOSIS — R11.2 INTRACTABLE NAUSEA AND VOMITING: ICD-10-CM

## 2025-06-01 DIAGNOSIS — R07.9 CHEST PAIN: ICD-10-CM

## 2025-06-01 DIAGNOSIS — R06.02 SOB (SHORTNESS OF BREATH): ICD-10-CM

## 2025-06-01 DIAGNOSIS — E87.1 HYPO-OSMOLAR HYPONATREMIA: ICD-10-CM

## 2025-06-01 LAB
ACCEPTIBLE SP GR UR QL: 1.02 (ref 1–1.03)
ALBUMIN SERPL-MCNC: 4.6 G/DL (ref 3.5–5)
ALBUMIN/GLOB SERPL: 0.8 RATIO (ref 1.1–2)
ALP SERPL-CCNC: 163 UNIT/L (ref 40–150)
ALT SERPL-CCNC: 22 UNIT/L (ref 0–55)
AMPHET UR QL SCN: NEGATIVE
ANION GAP SERPL CALC-SCNC: 23 MEQ/L
AST SERPL-CCNC: 21 UNIT/L (ref 11–45)
BACTERIA #/AREA URNS AUTO: ABNORMAL /HPF
BARBITURATE SCN PRESENT UR: NEGATIVE
BASOPHILS # BLD AUTO: 0.03 X10(3)/MCL
BASOPHILS NFR BLD AUTO: 0.2 %
BENZODIAZ UR QL SCN: POSITIVE
BILIRUB SERPL-MCNC: 0.5 MG/DL
BILIRUB UR QL STRIP.AUTO: NEGATIVE
BUN SERPL-MCNC: 45.6 MG/DL (ref 8.9–20.6)
CALCIUM SERPL-MCNC: 10.9 MG/DL (ref 8.4–10.2)
CANNABINOIDS UR QL SCN: NEGATIVE
CHLORIDE SERPL-SCNC: 92 MMOL/L (ref 98–107)
CLARITY UR: ABNORMAL
CO2 SERPL-SCNC: 14 MMOL/L (ref 22–29)
COCAINE UR QL SCN: NEGATIVE
COLOR UR AUTO: YELLOW
CREAT SERPL-MCNC: 2.46 MG/DL (ref 0.72–1.25)
CREAT/UREA NIT SERPL: 19
EOSINOPHIL # BLD AUTO: 0 X10(3)/MCL (ref 0–0.9)
EOSINOPHIL NFR BLD AUTO: 0 %
ERYTHROCYTE [DISTWIDTH] IN BLOOD BY AUTOMATED COUNT: 16.5 % (ref 11.5–17)
FENTANYL UR QL SCN: NEGATIVE
FLUAV AG UPPER RESP QL IA.RAPID: NOT DETECTED
FLUBV AG UPPER RESP QL IA.RAPID: NOT DETECTED
GFR SERPLBLD CREATININE-BSD FMLA CKD-EPI: 33 ML/MIN/1.73/M2
GLOBULIN SER-MCNC: 5.9 GM/DL (ref 2.4–3.5)
GLUCOSE SERPL-MCNC: 138 MG/DL (ref 74–100)
GLUCOSE UR QL STRIP: NORMAL
HCT VFR BLD AUTO: 48 % (ref 42–52)
HGB BLD-MCNC: 16.4 G/DL (ref 14–18)
HGB UR QL STRIP: NEGATIVE
HOLD SPECIMEN: NORMAL
HYALINE CASTS #/AREA URNS LPF: >20 /LPF
IMM GRANULOCYTES # BLD AUTO: 0.15 X10(3)/MCL (ref 0–0.04)
IMM GRANULOCYTES NFR BLD AUTO: 1.1 %
KETONES UR QL STRIP: NEGATIVE
LACTATE SERPL-SCNC: 2.3 MMOL/L (ref 0.5–2.2)
LACTATE SERPL-SCNC: 3.1 MMOL/L (ref 0.5–2.2)
LACTATE SERPL-SCNC: 4.9 MMOL/L (ref 0.5–2.2)
LDH SERPL-CCNC: 380 U/L (ref 125–220)
LEUKOCYTE ESTERASE UR QL STRIP: NEGATIVE
LIPASE SERPL-CCNC: 19 U/L
LYMPHOCYTES # BLD AUTO: 1.12 X10(3)/MCL (ref 0.6–4.6)
LYMPHOCYTES NFR BLD AUTO: 7.9 %
MAGNESIUM SERPL-MCNC: 2.4 MG/DL (ref 1.6–2.6)
MCH RBC QN AUTO: 32.9 PG (ref 27–31)
MCHC RBC AUTO-ENTMCNC: 34.2 G/DL (ref 33–36)
MCV RBC AUTO: 96.2 FL (ref 80–94)
MDMA UR QL SCN: NEGATIVE
MONOCYTES # BLD AUTO: 0.81 X10(3)/MCL (ref 0.1–1.3)
MONOCYTES NFR BLD AUTO: 5.7 %
MUCOUS THREADS URNS QL MICRO: ABNORMAL /LPF
NEUTROPHILS # BLD AUTO: 12 X10(3)/MCL (ref 2.1–9.2)
NEUTROPHILS NFR BLD AUTO: 85.1 %
NITRITE UR QL STRIP: NEGATIVE
NRBC BLD AUTO-RTO: 0 %
OPIATES UR QL SCN: POSITIVE
PCP UR QL: NEGATIVE
PH UR STRIP: 5 [PH]
PH UR: 5 [PH] (ref 3–11)
PHOSPHATE SERPL-MCNC: 7 MG/DL (ref 2.3–4.7)
PLATELET # BLD AUTO: 373 X10(3)/MCL (ref 130–400)
PMV BLD AUTO: 8.5 FL (ref 7.4–10.4)
POTASSIUM SERPL-SCNC: 5.2 MMOL/L (ref 3.5–5.1)
PROT SERPL-MCNC: 10.5 GM/DL (ref 6.4–8.3)
PROT UR QL STRIP: ABNORMAL
RBC # BLD AUTO: 4.99 X10(6)/MCL (ref 4.7–6.1)
RBC #/AREA URNS AUTO: ABNORMAL /HPF
SARS-COV-2 RNA RESP QL NAA+PROBE: NOT DETECTED
SODIUM SERPL-SCNC: 129 MMOL/L (ref 136–145)
SP GR UR STRIP.AUTO: 1.02 (ref 1–1.03)
SQUAMOUS #/AREA URNS LPF: ABNORMAL /HPF
TROPONIN I SERPL-MCNC: <0.01 NG/ML (ref 0–0.04)
TSH SERPL-ACNC: 0.42 UIU/ML (ref 0.35–4.94)
URATE SERPL-MCNC: 11.4 MG/DL (ref 3.5–7.2)
UROBILINOGEN UR STRIP-ACNC: NORMAL
WBC # BLD AUTO: 14.11 X10(3)/MCL (ref 4.5–11.5)
WBC #/AREA URNS AUTO: ABNORMAL /HPF

## 2025-06-01 PROCEDURE — 25000003 PHARM REV CODE 250

## 2025-06-01 PROCEDURE — 84100 ASSAY OF PHOSPHORUS: CPT

## 2025-06-01 PROCEDURE — 84484 ASSAY OF TROPONIN QUANT: CPT | Performed by: EMERGENCY MEDICINE

## 2025-06-01 PROCEDURE — 83735 ASSAY OF MAGNESIUM: CPT | Performed by: EMERGENCY MEDICINE

## 2025-06-01 PROCEDURE — 83615 LACTATE (LD) (LDH) ENZYME: CPT

## 2025-06-01 PROCEDURE — 96360 HYDRATION IV INFUSION INIT: CPT

## 2025-06-01 PROCEDURE — 25000003 PHARM REV CODE 250: Performed by: EMERGENCY MEDICINE

## 2025-06-01 PROCEDURE — 84443 ASSAY THYROID STIM HORMONE: CPT

## 2025-06-01 PROCEDURE — 85025 COMPLETE CBC W/AUTO DIFF WBC: CPT | Performed by: EMERGENCY MEDICINE

## 2025-06-01 PROCEDURE — 0241U COVID/RSV/FLU A&B PCR: CPT

## 2025-06-01 PROCEDURE — 84550 ASSAY OF BLOOD/URIC ACID: CPT

## 2025-06-01 PROCEDURE — 83605 ASSAY OF LACTIC ACID: CPT

## 2025-06-01 PROCEDURE — 36415 COLL VENOUS BLD VENIPUNCTURE: CPT

## 2025-06-01 PROCEDURE — 83935 ASSAY OF URINE OSMOLALITY: CPT

## 2025-06-01 PROCEDURE — 0240U COVID/FLU A&B PCR: CPT | Performed by: EMERGENCY MEDICINE

## 2025-06-01 PROCEDURE — 63600175 PHARM REV CODE 636 W HCPCS

## 2025-06-01 PROCEDURE — 83930 ASSAY OF BLOOD OSMOLALITY: CPT

## 2025-06-01 PROCEDURE — 83605 ASSAY OF LACTIC ACID: CPT | Performed by: EMERGENCY MEDICINE

## 2025-06-01 PROCEDURE — 87040 BLOOD CULTURE FOR BACTERIA: CPT

## 2025-06-01 PROCEDURE — 84165 PROTEIN E-PHORESIS SERUM: CPT

## 2025-06-01 PROCEDURE — 99285 EMERGENCY DEPT VISIT HI MDM: CPT | Mod: 25

## 2025-06-01 PROCEDURE — 84300 ASSAY OF URINE SODIUM: CPT

## 2025-06-01 PROCEDURE — 11000001 HC ACUTE MED/SURG PRIVATE ROOM

## 2025-06-01 PROCEDURE — 82436 ASSAY OF URINE CHLORIDE: CPT

## 2025-06-01 PROCEDURE — 80053 COMPREHEN METABOLIC PANEL: CPT | Performed by: EMERGENCY MEDICINE

## 2025-06-01 PROCEDURE — 80307 DRUG TEST PRSMV CHEM ANLYZR: CPT | Performed by: EMERGENCY MEDICINE

## 2025-06-01 PROCEDURE — 81001 URINALYSIS AUTO W/SCOPE: CPT | Mod: XB | Performed by: EMERGENCY MEDICINE

## 2025-06-01 PROCEDURE — 21400001 HC TELEMETRY ROOM

## 2025-06-01 PROCEDURE — 83690 ASSAY OF LIPASE: CPT | Performed by: EMERGENCY MEDICINE

## 2025-06-01 PROCEDURE — 84133 ASSAY OF URINE POTASSIUM: CPT

## 2025-06-01 PROCEDURE — 82570 ASSAY OF URINE CREATININE: CPT

## 2025-06-01 RX ORDER — IBUPROFEN 200 MG
24 TABLET ORAL
Status: DISCONTINUED | OUTPATIENT
Start: 2025-06-01 | End: 2025-06-03 | Stop reason: HOSPADM

## 2025-06-01 RX ORDER — ACETAMINOPHEN 325 MG/1
650 TABLET ORAL EVERY 6 HOURS PRN
Status: DISCONTINUED | OUTPATIENT
Start: 2025-06-01 | End: 2025-06-03 | Stop reason: HOSPADM

## 2025-06-01 RX ORDER — OLANZAPINE 2.5 MG/1
2.5 TABLET, FILM COATED ORAL DAILY
Status: DISCONTINUED | OUTPATIENT
Start: 2025-06-02 | End: 2025-06-03 | Stop reason: HOSPADM

## 2025-06-01 RX ORDER — SODIUM CHLORIDE 0.9 % (FLUSH) 0.9 %
10 SYRINGE (ML) INJECTION EVERY 12 HOURS PRN
Status: DISCONTINUED | OUTPATIENT
Start: 2025-06-01 | End: 2025-06-03 | Stop reason: HOSPADM

## 2025-06-01 RX ORDER — GLUCAGON 1 MG
1 KIT INJECTION
Status: DISCONTINUED | OUTPATIENT
Start: 2025-06-01 | End: 2025-06-03 | Stop reason: HOSPADM

## 2025-06-01 RX ORDER — IBUPROFEN 200 MG
16 TABLET ORAL
Status: DISCONTINUED | OUTPATIENT
Start: 2025-06-01 | End: 2025-06-03 | Stop reason: HOSPADM

## 2025-06-01 RX ORDER — OXYCODONE HYDROCHLORIDE 5 MG/1
20 TABLET ORAL EVERY 6 HOURS PRN
Refills: 0 | Status: DISCONTINUED | OUTPATIENT
Start: 2025-06-01 | End: 2025-06-03 | Stop reason: HOSPADM

## 2025-06-01 RX ORDER — SODIUM CHLORIDE 9 MG/ML
INJECTION, SOLUTION INTRAVENOUS ONCE
Status: DISCONTINUED | OUTPATIENT
Start: 2025-06-01 | End: 2025-06-03

## 2025-06-01 RX ORDER — DEXAMETHASONE 4 MG/1
4 TABLET ORAL EVERY 6 HOURS
Status: DISCONTINUED | OUTPATIENT
Start: 2025-06-02 | End: 2025-06-03 | Stop reason: HOSPADM

## 2025-06-01 RX ORDER — ATORVASTATIN CALCIUM 40 MG/1
40 TABLET, FILM COATED ORAL DAILY
Status: DISCONTINUED | OUTPATIENT
Start: 2025-06-02 | End: 2025-06-03 | Stop reason: HOSPADM

## 2025-06-01 RX ORDER — HEPARIN SODIUM 5000 [USP'U]/ML
5000 INJECTION, SOLUTION INTRAVENOUS; SUBCUTANEOUS EVERY 8 HOURS
Status: DISCONTINUED | OUTPATIENT
Start: 2025-06-01 | End: 2025-06-03 | Stop reason: HOSPADM

## 2025-06-01 RX ORDER — NALOXONE HCL 0.4 MG/ML
0.02 VIAL (ML) INJECTION
Status: DISCONTINUED | OUTPATIENT
Start: 2025-06-01 | End: 2025-06-03 | Stop reason: HOSPADM

## 2025-06-01 RX ORDER — FAMOTIDINE 10 MG/ML
20 INJECTION, SOLUTION INTRAVENOUS 2 TIMES DAILY
Status: DISCONTINUED | OUTPATIENT
Start: 2025-06-01 | End: 2025-06-01

## 2025-06-01 RX ORDER — DEXTROSE MONOHYDRATE AND SODIUM CHLORIDE 5; .45 G/100ML; G/100ML
INJECTION, SOLUTION INTRAVENOUS CONTINUOUS PRN
Status: DISCONTINUED | OUTPATIENT
Start: 2025-06-01 | End: 2025-06-02

## 2025-06-01 RX ORDER — GABAPENTIN 300 MG/1
300 CAPSULE ORAL NIGHTLY
Status: DISCONTINUED | OUTPATIENT
Start: 2025-06-01 | End: 2025-06-03 | Stop reason: HOSPADM

## 2025-06-01 RX ORDER — SODIUM CHLORIDE 9 MG/ML
INJECTION, SOLUTION INTRAVENOUS CONTINUOUS
Status: DISCONTINUED | OUTPATIENT
Start: 2025-06-01 | End: 2025-06-01

## 2025-06-01 RX ORDER — FAMOTIDINE 10 MG/ML
20 INJECTION, SOLUTION INTRAVENOUS DAILY
Status: DISCONTINUED | OUTPATIENT
Start: 2025-06-01 | End: 2025-06-03 | Stop reason: HOSPADM

## 2025-06-01 RX ORDER — DEXTROSE MONOHYDRATE AND SODIUM CHLORIDE 5; .45 G/100ML; G/100ML
INJECTION, SOLUTION INTRAVENOUS CONTINUOUS PRN
Status: DISCONTINUED | OUTPATIENT
Start: 2025-06-01 | End: 2025-06-01

## 2025-06-01 RX ORDER — ONDANSETRON 4 MG/1
4 TABLET, FILM COATED ORAL EVERY 6 HOURS PRN
Status: DISCONTINUED | OUTPATIENT
Start: 2025-06-01 | End: 2025-06-03 | Stop reason: HOSPADM

## 2025-06-01 RX ORDER — ERGOCALCIFEROL 1.25 MG/1
50000 CAPSULE ORAL
Status: DISCONTINUED | OUTPATIENT
Start: 2025-06-02 | End: 2025-06-03 | Stop reason: HOSPADM

## 2025-06-01 RX ORDER — FAMOTIDINE 20 MG/1
40 TABLET, FILM COATED ORAL NIGHTLY
Status: DISCONTINUED | OUTPATIENT
Start: 2025-06-01 | End: 2025-06-01

## 2025-06-01 RX ORDER — DULOXETIN HYDROCHLORIDE 30 MG/1
30 CAPSULE, DELAYED RELEASE ORAL NIGHTLY
Status: DISCONTINUED | OUTPATIENT
Start: 2025-06-01 | End: 2025-06-03 | Stop reason: HOSPADM

## 2025-06-01 RX ADMIN — SODIUM BICARBONATE: 84 INJECTION, SOLUTION INTRAVENOUS at 09:06

## 2025-06-01 RX ADMIN — VANCOMYCIN HYDROCHLORIDE 1000 MG: 1 INJECTION, POWDER, LYOPHILIZED, FOR SOLUTION INTRAVENOUS at 09:06

## 2025-06-01 RX ADMIN — GABAPENTIN 300 MG: 300 CAPSULE ORAL at 09:06

## 2025-06-01 RX ADMIN — OXYCODONE HYDROCHLORIDE 20 MG: 5 TABLET ORAL at 09:06

## 2025-06-01 RX ADMIN — HEPARIN SODIUM 5000 UNITS: 5000 INJECTION, SOLUTION INTRAVENOUS; SUBCUTANEOUS at 09:06

## 2025-06-01 RX ADMIN — PIPERACILLIN AND TAZOBACTAM 4.5 G: 4; .5 INJECTION, POWDER, LYOPHILIZED, FOR SOLUTION INTRAVENOUS; PARENTERAL at 08:06

## 2025-06-01 RX ADMIN — SODIUM CHLORIDE 1000 ML: 9 INJECTION, SOLUTION INTRAVENOUS at 03:06

## 2025-06-01 RX ADMIN — DULOXETINE HYDROCHLORIDE 30 MG: 30 CAPSULE, DELAYED RELEASE ORAL at 09:06

## 2025-06-01 RX ADMIN — FAMOTIDINE 40 MG: 20 TABLET, FILM COATED ORAL at 09:06

## 2025-06-01 RX ADMIN — DEXAMETHASONE 4 MG: 4 TABLET ORAL at 11:06

## 2025-06-01 NOTE — ED PROVIDER NOTES
"Encounter Date: 6/1/2025       History     Chief Complaint   Patient presents with    Abdominal Pain    Diarrhea     To ED with C/O abdominal pain, N/V/D x 2 days. Pt said he has lung Ca. Pt is having some SOB at times.      Chief Complaint  Vomiting and feeling hot for 2 days, sweating    History of Present Illness  Mr. Ball, a young male with a history of large cell neuroendocrine carcinoma, metastatic, presents to the Emergency Department with a 2-day history of feeling unwell. His chief complaints include sweating, vomiting, and feeling hot.    Disease Details:  - Sites of disease: Mediastinal lymphadenopathy, right hilar lymphadenopathy, biopsy-proven right supraclavicular lymphadenopathy, 18 mm left adrenal nodule, left lower lobe lung nodule; lytic metastases anterior left ilium  - Staging: Assuming lung primary, and metastases to left adrenal and lytic metastases to anterior left ilium, cT4 cN3 M1c, stage IVB  - Additional Metastatic Sites: Right cerebellar metastases (later on), retroperitoneal lymphadenopathy, cervical lymphadenopathy, multiple lytic bone metastases    The patient reports that his symptoms began 2 days ago. He states that he experiences these types of problems frequently. The patient is notably sweaty during the encounter, which he reports is not typical for him. He denies having diarrhea but confirms vomiting. The patient also complains of feeling hot and requests that the air conditioning be turned on. Prior to the onset of these symptoms 2 days ago, the patient reports he was "perfectly fine" and able to walk around normally.    Mr. Ball's oncologist is identified as Dr. Denny. The Patient is a poor historian          Review of patient's allergies indicates:  No Known Allergies  Past Medical History:   Diagnosis Date    Cancer     Hyperlipidemia     Myocardial infarction      Past Surgical History:   Procedure Laterality Date    CORONARY ARTERY BYPASS GRAFT      INSERTION OF " TUNNELED CENTRAL VENOUS CATHETER (CVC) WITH SUBCUTANEOUS PORT Right 07/31/2024    Procedure: [id number]-PRMM-Y-CATH;  Surgeon: Renato Alvarado Jr., MD;  Location: [facility] OR;  Service: General;  Laterality: Right;    LAPAROTOMY, EXPLORATORY N/A 03/23/2023    Procedure: LAPAROTOMY, EXPLORATORY;  Surgeon: Alex Juárez MD;  Location: [facility] OR;  Service: General;  Laterality: N/A;    LYMPH NODE BIOPSY Right 06/14/2024    Procedure: BIOPSY, LYMPH NODE;  Surgeon: Renato Alvarado Jr., MD;  Location: [facility] OR;  Service: General;  Laterality: Right;  right supraclavicular lymph node    OPEN REDUCTION AND INTERNAL FIXATION (ORIF) OF FRACTURE OF DISTAL RADIUS Left 04/04/2024    Procedure: ORIF, FRACTURE, RADIUS, DISTAL;  Surgeon: Khoa Abdalla MD;  Location: [facility] OR;  Service: Orthopedics;  Laterality: Left;    ORIF FOREARM FRACTURE Right 12/07/2023    Procedure: ORIF, FRACTURE, RADIUS OR ULNA;  Surgeon: Khoa Abdalla MD;  Location: [facility] OR;  Service: Orthopedics;  Laterality: Right;  Call [place]     Family History   Problem Relation Name Age of Onset    Diabetes Mother      Heart disease Mother      Cancer Father      Diabetes Sister      Heart disease Brother      Colon cancer Maternal Aunt      Stroke Maternal Grandmother      Heart disease Maternal Grandfather       Social History[1]  Review of Systems   Constitutional:  Positive for diaphoresis and fatigue.   HENT: Negative.     Eyes: Negative.    Respiratory: Negative.     Cardiovascular:  Positive for palpitations.   Gastrointestinal:  Positive for abdominal pain, diarrhea, nausea and vomiting.   Genitourinary: Negative.    Musculoskeletal:  Positive for arthralgias, back pain and myalgias.   Skin: Negative.    Allergic/Immunologic: Negative.    Neurological: Negative.    Hematological: Negative.    Psychiatric/Behavioral: Negative.         Physical Exam     Initial Vitals [06/01/25 1339]   BP Pulse Resp Temp SpO2   111/76 (!) 125 20 97.3 °F (36.3 °C) 98 %       MAP       --         Physical Exam    Nursing note and vitals reviewed.  Constitutional: He is diaphoretic. He has a sickly appearance. He appears ill. He appears distressed.   HENT:   Head: Normocephalic and atraumatic. Mouth/Throat: Mucous membranes are dry.   Eyes: EOM are normal. Pupils are equal, round, and reactive to light.   Neck: Trachea normal. Neck supple.    Full passive range of motion without pain.     Cardiovascular:  Regular rhythm and normal pulses.   Tachycardia present.         Pulmonary/Chest: No respiratory distress.   Musculoskeletal:      Cervical back: Full passive range of motion without pain and neck supple.      Comments: No deformity, Nl ROM     Lymphadenopathy:     He has no cervical adenopathy.   Neurological: He is alert and oriented to person, place, and time. He has normal strength. GCS eye subscore is 4. GCS verbal subscore is 5. GCS motor subscore is 6.   Skin: Skin is warm. Capillary refill takes less than 2 seconds. Rash noted.   Psychiatric: He is not actively hallucinating. He expresses no homicidal and no suicidal ideation.         ED Course   Procedures  Labs Reviewed   COMPREHENSIVE METABOLIC PANEL - Abnormal       Result Value    Sodium 129 (*)     Potassium 5.2 (*)     Chloride 92 (*)     CO2 14 (*)     Glucose 138 (*)     Blood Urea Nitrogen 45.6 (*)     Creatinine 2.46 (*)     Calcium 10.9 (*)     Protein Total 10.5 (*)     Albumin 4.6      Globulin 5.9 (*)     Albumin/Globulin Ratio 0.8 (*)     Bilirubin Total 0.5       (*)     ALT 22      AST 21      eGFR 33      Anion Gap 23.0      BUN/Creatinine Ratio 19     URINALYSIS, REFLEX TO URINE CULTURE - Abnormal    Color, UA Yellow      Appearance, UA Turbid (*)     Specific Gravity, UA 1.021      pH, UA 5.0      Protein, UA 2+ (*)     Glucose, UA Normal      Ketones, UA Negative      Blood, UA Negative      Bilirubin, UA Negative      Urobilinogen, UA Normal      Nitrites, UA Negative      Leukocyte Esterase, UA  Negative      RBC, UA 0-5      WBC, UA 6-10 (*)     Bacteria, UA None Seen      Squamous Epithelial Cells, UA Many (*)     Mucous, UA Occasional (*)     Hyaline Casts, UA >20 (*)    CBC WITH DIFFERENTIAL - Abnormal    WBC 14.11 (*)     RBC 4.99      Hgb 16.4      Hct 48.0      MCV 96.2 (*)     MCH 32.9 (*)     MCHC 34.2      RDW 16.5      Platelet 373      MPV 8.5      Neut % 85.1      Lymph % 7.9      Mono % 5.7      Eos % 0.0      Basophil % 0.2      Imm Grans % 1.1      Neut # 12.00 (*)     Lymph # 1.12      Mono # 0.81      Eos # 0.00      Baso # 0.03      Imm Gran # 0.15 (*)     NRBC% 0.0     DRUG SCREEN, URINE (BEAKER) - Abnormal    Amphetamines, Urine Negative      Barbiturates, Urine Negative      Benzodiazepine, Urine Positive (*)     Cannabinoids, Urine Negative      Cocaine, Urine Negative      Fentanyl, Urine Negative      MDMA, Urine Negative      Opiates, Urine Positive (*)     Phencyclidine, Urine Negative      pH, Urine 5.0      Specific Gravity, Urine Auto 1.021      Narrative:     Cut off concentrations:    Amphetamines - 1000 ng/ml  Barbiturates - 200 ng/ml  Benzodiazepine - 200 ng/ml  Cannabinoids (THC) - 50 ng/ml  Cocaine - 300 ng/ml  Fentanyl - 1.0 ng/ml  MDMA - 500 ng/ml  Opiates - 300 ng/ml   Phencyclidine (PCP) - 25 ng/ml    Specimen submitted for drug analysis and tested for pH and specific gravity in order to evaluate sample integrity. Suspect tampering if specific gravity is <1.003 and/or pH is not within the range of 4.5 - 8.0  False negatives may result form substances such as bleach added to urine.  False positives may result for the presence of a substance with similar chemical structure to the drug or its metabolite.    This test provides only a PRELIMINARY analytical test result. A more specific alternate chemical method must be used in order to obtain a confirmed analytical result. Gas chromatography/mass spectrometry (GC/MS) is the preferred confirmatory method. Other chemical  confirmation methods are available. Clinical consideration and professional judgement should be applied to any drug of abuse test result, particularly when preliminary positive results are used.    Positive results will be confirmed only at the physicians request. Unconfirmed screening results are to be used only for medical purposes (treatment).        LACTIC ACID, PLASMA - Abnormal    Lactic Acid Level 4.9 (*)    LACTIC ACID, PLASMA - Abnormal    Lactic Acid Level 3.1 (*)    PHOSPHORUS - Abnormal    Phosphorus Level 7.0 (*)    URIC ACID - Abnormal    Uric Acid 11.4 (*)    COVID/FLU A&B PCR - Normal    Influenza A PCR Not Detected      Influenza B PCR Not Detected      SARS-CoV-2 PCR Not Detected      Narrative:     The Xpert Xpress SARS-CoV-2/FLU/RSV plus is a rapid, multiplexed real-time PCR test intended for the simultaneous qualitative detection and differentiation of SARS-CoV-2, Influenza A, Influenza B, and respiratory syncytial virus (RSV) viral RNA in either nasopharyngeal swab or nasal swab specimens.         LIPASE - Normal    Lipase Level 19     MAGNESIUM - Normal    Magnesium Level 2.40     TROPONIN I - Normal    Troponin-I <0.010     CBC W/ AUTO DIFFERENTIAL    Narrative:     The following orders were created for panel order CBC auto differential.  Procedure                               Abnormality         Status                     ---------                               -----------         ------                     CBC with Differential[[id number]]       Abnormal            Final result                 Please view results for these tests on the individual orders.   EXTRA TUBES    Narrative:     The following orders were created for panel order EXTRA TUBES.  Procedure                               Abnormality         Status                     ---------                               -----------         ------                     Light Blue Top Hold[[id number]]                             Final  result               Red Top Hold[[id number]]                                    Final result               Light Green Top Hold[[id number]]                            Final result               Lavender Top Hold[[id number]]                               Final result               Gold Top Hold[[id number]]                                   Final result                 Please view results for these tests on the individual orders.   LIGHT BLUE TOP HOLD    Extra Tube Hold for add-ons.     RED TOP HOLD    Extra Tube Hold for add-ons.     LIGHT GREEN TOP HOLD    Extra Tube Hold for add-ons.     LAVENDER TOP HOLD    Extra Tube Hold for add-ons.     GOLD TOP HOLD    Extra Tube Hold for add-ons.     PROTEIN ELECTROPHORESIS, TIMED URINE        ECG Results              EKG 12-lead (Final result)        Collection Time Result Time QRS Duration OHS QTC Calculation    06/02/25 01:00:13 06/02/25 08:53:25 88 460                     Final result by Interface, Lab In [facility] (06/02/25 08:53:32)                   Narrative:    Test Reason : R06.02,    Vent. Rate :  93 BPM     Atrial Rate :  93 BPM     P-R Int : 150 ms          QRS Dur :  88 ms      QT Int : 370 ms       P-R-T Axes :  46  64  63 degrees    QTcB Int : 460 ms    Normal sinus rhythm  Moderate voltage criteria for LVH, may be normal variant  ST elevation, consider early repolarization, pericarditis, or injury  Suggest serial electrocardiogram  Clinical Correlation Required    Confirmed by Mateo Suresh ([id number]) on 6/2/2025 8:53:22 AM    Referred By: AAAREFERRAL SELF           Confirmed By: Mateo Suresh                                  Imaging Results              X-Ray Chest PA And Lateral (Final result)  Result time 06/01/25 20:40:33      Final result by Edwin Pratt MD (06/01/25 20:40:33)                   Impression:      No acute disease is seen or interval changes from the previous exam      Electronically signed by: Edwin Pratt  MD  Date:    06/01/2025  Time:    20:40               Narrative:    EXAMINATION:  XR CHEST PA AND LATERAL    CLINICAL HISTORY:  shortness of breath;    TECHNIQUE:  PA and lateral views of the chest were performed.    COMPARISON:  01/18/2025    FINDINGS:  Port-A-Cath in place.  Lungs are clear.  Heart size is within normal limits.  Costophrenic angles are clear.                                       CT Abdomen Pelvis  Without Contrast (Final result)  Result time 06/01/25 18:31:47      Final result by Edwin Pratt MD (06/01/25 18:31:47)                   Impression:      Persistent changes in the lung bases.    Significantly limited study due the lack of IV contrast.    Persistent mass of the left adrenal gland comparison is difficult.    Lytic lesion in the head of the right femur stable from the prior exam      Electronically signed by: Edwin Pratt MD  Date:    06/01/2025  Time:    18:31               Narrative:    EXAMINATION:  CT ABDOMEN PELVIS WITHOUT CONTRAST    CLINICAL HISTORY:  Abdominal pain, acute, nonlocalized;    TECHNIQUE:  Low dose axial images, sagittal and coronal reformations were obtained from the lung bases to the pubic symphysis.  No oral contrast was given.    Automatic exposure control (AEC) was utilized for dose reduction.    Dose: Wound 1 0 mGycm    COMPARISON:  04/28/2025    FINDINGS:  There is increased density in both lower lobes improved or stable from the previous study.    Liver appears normal.  Spleen appears small in size.  Pancreas is not well demonstrated.  Biliary system appears normal.  There is a persistent mass in the left adrenal gland.  Kidneys appear normal.  Aorta shows no evidence of an aneurysm..    The lytic lesion in the right femoral head appears stable                                       Medications   sodium chloride 0.9% bolus 1,000 mL 1,000 mL (0 mLs Intravenous Stopped 6/1/25 1643)   piperacillin-tazobactam (ZOSYN) 4.5 g in D5W 100 mL IVPB (MB+) (0 g  "Intravenous Stopped 6/1/25 2105)   vancomycin (VANCOCIN) 1,000 mg in 0.9% NaCl 250 mL IVPB (admixture device) (0 mg Intravenous Stopped 6/1/25 2244)   lactated ringers bolus 500 mL (0 mLs Intravenous Stopped 6/3/25 1332)   magnesium sulfate in dextrose IVPB (premix) 1 g (0 g Intravenous Stopped 6/3/25 1332)     Medical Decision Making  Medical Decision Making  Lizzy is a young male with a history of lung cancer currently undergoing active chemotherapy, presenting with a 2-day history of feeling unwell, including vomiting, diarrhea, and sweating. The patient's presentation with these symptoms, along with his history of ongoing cancer treatment, raises concerns for potential chemotherapy-related side effects or an infectious process. Given the patient's immunocompromised state due to cancer and chemotherapy, there is a higher risk for severe infections. The presence of sweating and the patient's subjective feeling of being hot suggest the possibility of fever, although this was not confirmed at home. The duration of symptoms (2 days) and the apparent acute onset in a previously stable patient indicate a potentially serious condition requiring further investigation. Laboratory work and fluid resuscitation have been ordered to assess for dehydration, electrolyte imbalances, and potential infection markers, which are common complications in oncology patients undergoing chemotherapy.    Neuroendocrine cancer with acute illness  Assessment: 31-year-old male with lung cancer on active chemotherapy presenting with a 2-day history of acute illness. Patient reports vomiting, diarrhea, sweating, and feeling hot. No known fever at home. Patient appears visibly unwell, described as "super" sick and sweaty. History of lung cancer treated by Dr. Denny, currently on active therapy without known metastatic disease. Patient reports frequent episodes of feeling unwell.  Plan:  - Obtain laboratory studies, perhaps abdominal " imaging  - Initiate intravenous fluid therapy  - Provide symptomatic relief for vomiting and diarrhea  - Monitor for fever and other signs of infection  - Assess for potential chemotherapy-related complications  - Consider oncology consultation if condition does not improve with initial management        Amount and/or Complexity of Data Reviewed  Labs: ordered.  Radiology: ordered.    Risk  Decision regarding hospitalization.                                      Clinical Impression:  Final diagnoses:  [N17.9] ARYAN (acute kidney injury) (Primary)  [E87.1] Hypo-osmolar hyponatremia  [C7A.8] Neuroendocrine carcinoma  [R11.2] Intractable nausea and vomiting          ED Disposition Condition    Admit                       [1]   Social History  Tobacco Use    Smoking status: Former     Current packs/day: 0.15     Average packs/day: 0.2 packs/day for 25.5 years (3.8 ttl pk-yrs)     Types: Cigarettes     Start date: 12/4/1999     Passive exposure: Current    Smokeless tobacco: Never    Tobacco comments:     Pt states he quit cigs; smokes marijuana instead   Substance Use Topics    Alcohol use: Not Currently     Comment: occasionally    Drug use: Yes     Frequency: 3.0 times per week     Types: Marijuana        Olegario Culp MD  06/06/25 1524

## 2025-06-02 LAB
ALBUMIN SERPL-MCNC: 3.2 G/DL (ref 3.5–5)
ALBUMIN/GLOB SERPL: 0.7 RATIO (ref 1.1–2)
ALP SERPL-CCNC: 109 UNIT/L (ref 40–150)
ALT SERPL-CCNC: 12 UNIT/L (ref 0–55)
ANION GAP SERPL CALC-SCNC: 12 MEQ/L
AST SERPL-CCNC: 14 UNIT/L (ref 11–45)
BASOPHILS # BLD AUTO: 0.02 X10(3)/MCL
BASOPHILS NFR BLD AUTO: 0.2 %
BILIRUB SERPL-MCNC: 0.5 MG/DL
BUN SERPL-MCNC: 32.5 MG/DL (ref 8.9–20.6)
CALCIUM SERPL-MCNC: 9.3 MG/DL (ref 8.4–10.2)
CHLORIDE SERPL-SCNC: 96 MMOL/L (ref 98–107)
CHLORIDE UR-SCNC: <20 MMOL/L
CO2 SERPL-SCNC: 22 MMOL/L (ref 22–29)
CREAT SERPL-MCNC: 1.13 MG/DL (ref 0.72–1.25)
CREAT UR-MCNC: 108.5 MG/DL (ref 63–166)
CREAT/UREA NIT SERPL: 29
EOSINOPHIL # BLD AUTO: 0 X10(3)/MCL (ref 0–0.9)
EOSINOPHIL NFR BLD AUTO: 0 %
ERYTHROCYTE [DISTWIDTH] IN BLOOD BY AUTOMATED COUNT: 16.7 % (ref 11.5–17)
FLUAV AG UPPER RESP QL IA.RAPID: NOT DETECTED
FLUBV AG UPPER RESP QL IA.RAPID: NOT DETECTED
GFR SERPLBLD CREATININE-BSD FMLA CKD-EPI: >60 ML/MIN/1.73/M2
GLOBULIN SER-MCNC: 4.6 GM/DL (ref 2.4–3.5)
GLUCOSE SERPL-MCNC: 156 MG/DL (ref 74–100)
HCT VFR BLD AUTO: 37.6 % (ref 42–52)
HGB BLD-MCNC: 12.9 G/DL (ref 14–18)
HOLD SPECIMEN: NORMAL
IGA SERPL-MCNC: 231 MG/DL (ref 63–484)
IGG SERPL-MCNC: 1254 MG/DL (ref 540–1822)
IGM SERPL-MCNC: 69 MG/DL (ref 22–240)
IMM GRANULOCYTES # BLD AUTO: 0.05 X10(3)/MCL (ref 0–0.04)
IMM GRANULOCYTES NFR BLD AUTO: 0.5 %
LACTATE SERPL-SCNC: 2.5 MMOL/L (ref 0.5–2.2)
LACTATE SERPL-SCNC: 2.9 MMOL/L (ref 0.5–2.2)
LACTATE SERPL-SCNC: 3.3 MMOL/L (ref 0.5–2.2)
LYMPHOCYTES # BLD AUTO: 0.71 X10(3)/MCL (ref 0.6–4.6)
LYMPHOCYTES NFR BLD AUTO: 6.6 %
MAGNESIUM SERPL-MCNC: 2.2 MG/DL (ref 1.6–2.6)
MCH RBC QN AUTO: 33.4 PG (ref 27–31)
MCHC RBC AUTO-ENTMCNC: 34.3 G/DL (ref 33–36)
MCV RBC AUTO: 97.4 FL (ref 80–94)
MONOCYTES # BLD AUTO: 0.85 X10(3)/MCL (ref 0.1–1.3)
MONOCYTES NFR BLD AUTO: 7.9 %
MRSA PCR SCRN (OHS): NOT DETECTED
NEUTROPHILS # BLD AUTO: 9.07 X10(3)/MCL (ref 2.1–9.2)
NEUTROPHILS NFR BLD AUTO: 84.8 %
NRBC BLD AUTO-RTO: 0 %
OHS QRS DURATION: 88 MS
OHS QTC CALCULATION: 460 MS
OSMOLALITY SERPL: 292 MOSM/KG (ref 280–300)
OSMOLALITY UR: 558 MOSM/KG (ref 300–1300)
PHOSPHATE SERPL-MCNC: 1.7 MG/DL (ref 2.3–4.7)
PHOSPHATE SERPL-MCNC: 2.2 MG/DL (ref 2.3–4.7)
PHOSPHATE SERPL-MCNC: 2.7 MG/DL (ref 2.3–4.7)
PHOSPHATE SERPL-MCNC: 2.8 MG/DL (ref 2.3–4.7)
PHOSPHATE SERPL-MCNC: 2.9 MG/DL (ref 2.3–4.7)
PHOSPHATE SERPL-MCNC: 3 MG/DL (ref 2.3–4.7)
PLATELET # BLD AUTO: 317 X10(3)/MCL (ref 130–400)
PMV BLD AUTO: 8.8 FL (ref 7.4–10.4)
POTASSIUM SERPL-SCNC: 3.7 MMOL/L (ref 3.5–5.1)
POTASSIUM UR-SCNC: 54.1 MMOL/L
PROT SERPL-MCNC: 7.8 GM/DL (ref 6.4–8.3)
PROT UR STRIP-MCNC: 18.7 MG/DL
RBC # BLD AUTO: 3.86 X10(6)/MCL (ref 4.7–6.1)
RSV A 5' UTR RNA NPH QL NAA+PROBE: NOT DETECTED
SARS-COV-2 RNA RESP QL NAA+PROBE: NOT DETECTED
SODIUM SERPL-SCNC: 130 MMOL/L (ref 136–145)
SODIUM UR-SCNC: <20 MMOL/L
URATE SERPL-MCNC: 6.1 MG/DL (ref 3.5–7.2)
URATE SERPL-MCNC: 6.3 MG/DL (ref 3.5–7.2)
URATE SERPL-MCNC: 7.9 MG/DL (ref 3.5–7.2)
URINE PROTEIN/CREATININE RATIO (OLG): 0.2
VANCOMYCIN SERPL-MCNC: 1.9 UG/ML (ref 15–20)
WBC # BLD AUTO: 10.7 X10(3)/MCL (ref 4.5–11.5)

## 2025-06-02 PROCEDURE — 25000003 PHARM REV CODE 250

## 2025-06-02 PROCEDURE — 85025 COMPLETE CBC W/AUTO DIFF WBC: CPT

## 2025-06-02 PROCEDURE — 83605 ASSAY OF LACTIC ACID: CPT

## 2025-06-02 PROCEDURE — 93005 ELECTROCARDIOGRAM TRACING: CPT

## 2025-06-02 PROCEDURE — 63600175 PHARM REV CODE 636 W HCPCS

## 2025-06-02 PROCEDURE — 84550 ASSAY OF BLOOD/URIC ACID: CPT

## 2025-06-02 PROCEDURE — 87641 MR-STAPH DNA AMP PROBE: CPT

## 2025-06-02 PROCEDURE — 82784 ASSAY IGA/IGD/IGG/IGM EACH: CPT

## 2025-06-02 PROCEDURE — 83521 IG LIGHT CHAINS FREE EACH: CPT

## 2025-06-02 PROCEDURE — 84166 PROTEIN E-PHORESIS/URINE/CSF: CPT

## 2025-06-02 PROCEDURE — 84100 ASSAY OF PHOSPHORUS: CPT

## 2025-06-02 PROCEDURE — 21400001 HC TELEMETRY ROOM

## 2025-06-02 PROCEDURE — 36415 COLL VENOUS BLD VENIPUNCTURE: CPT

## 2025-06-02 PROCEDURE — 25000003 PHARM REV CODE 250: Performed by: INTERNAL MEDICINE

## 2025-06-02 PROCEDURE — 83735 ASSAY OF MAGNESIUM: CPT

## 2025-06-02 PROCEDURE — 80202 ASSAY OF VANCOMYCIN: CPT

## 2025-06-02 PROCEDURE — 80053 COMPREHEN METABOLIC PANEL: CPT

## 2025-06-02 PROCEDURE — 82955 ASSAY OF G6PD ENZYME: CPT

## 2025-06-02 RX ORDER — SODIUM CHLORIDE 9 MG/ML
INJECTION, SOLUTION INTRAVENOUS CONTINUOUS PRN
Status: DISCONTINUED | OUTPATIENT
Start: 2025-06-02 | End: 2025-06-03 | Stop reason: HOSPADM

## 2025-06-02 RX ORDER — MUPIROCIN 20 MG/G
OINTMENT TOPICAL 2 TIMES DAILY
Status: DISCONTINUED | OUTPATIENT
Start: 2025-06-02 | End: 2025-06-03 | Stop reason: HOSPADM

## 2025-06-02 RX ORDER — SODIUM CHLORIDE 9 MG/ML
INJECTION, SOLUTION INTRAVENOUS CONTINUOUS
Status: DISCONTINUED | OUTPATIENT
Start: 2025-06-02 | End: 2025-06-03

## 2025-06-02 RX ADMIN — SODIUM BICARBONATE: 84 INJECTION, SOLUTION INTRAVENOUS at 09:06

## 2025-06-02 RX ADMIN — DEXAMETHASONE 4 MG: 4 TABLET ORAL at 12:06

## 2025-06-02 RX ADMIN — MUPIROCIN: 20 OINTMENT TOPICAL at 09:06

## 2025-06-02 RX ADMIN — OXYCODONE HYDROCHLORIDE 20 MG: 5 TABLET ORAL at 06:06

## 2025-06-02 RX ADMIN — DEXAMETHASONE 4 MG: 4 TABLET ORAL at 06:06

## 2025-06-02 RX ADMIN — GABAPENTIN 300 MG: 300 CAPSULE ORAL at 08:06

## 2025-06-02 RX ADMIN — ERGOCALCIFEROL 50000 UNITS: 1.25 CAPSULE ORAL at 08:06

## 2025-06-02 RX ADMIN — PIPERACILLIN AND TAZOBACTAM 4.5 G: 4; .5 INJECTION, POWDER, LYOPHILIZED, FOR SOLUTION INTRAVENOUS; PARENTERAL at 12:06

## 2025-06-02 RX ADMIN — SODIUM CHLORIDE: 9 INJECTION, SOLUTION INTRAVENOUS at 11:06

## 2025-06-02 RX ADMIN — HEPARIN SODIUM 5000 UNITS: 5000 INJECTION, SOLUTION INTRAVENOUS; SUBCUTANEOUS at 02:06

## 2025-06-02 RX ADMIN — HEPARIN SODIUM 5000 UNITS: 5000 INJECTION, SOLUTION INTRAVENOUS; SUBCUTANEOUS at 09:06

## 2025-06-02 RX ADMIN — HEPARIN SODIUM 5000 UNITS: 5000 INJECTION, SOLUTION INTRAVENOUS; SUBCUTANEOUS at 05:06

## 2025-06-02 RX ADMIN — OLANZAPINE 2.5 MG: 2.5 TABLET, FILM COATED ORAL at 08:06

## 2025-06-02 RX ADMIN — OXYCODONE HYDROCHLORIDE 20 MG: 5 TABLET ORAL at 04:06

## 2025-06-02 RX ADMIN — VANCOMYCIN HYDROCHLORIDE 1000 MG: 1 INJECTION, POWDER, LYOPHILIZED, FOR SOLUTION INTRAVENOUS at 10:06

## 2025-06-02 RX ADMIN — OXYCODONE HYDROCHLORIDE 20 MG: 5 TABLET ORAL at 12:06

## 2025-06-02 RX ADMIN — PIPERACILLIN AND TAZOBACTAM 4.5 G: 4; .5 INJECTION, POWDER, LYOPHILIZED, FOR SOLUTION INTRAVENOUS; PARENTERAL at 04:06

## 2025-06-02 RX ADMIN — SODIUM BICARBONATE: 84 INJECTION, SOLUTION INTRAVENOUS at 10:06

## 2025-06-02 RX ADMIN — DULOXETINE HYDROCHLORIDE 30 MG: 30 CAPSULE, DELAYED RELEASE ORAL at 08:06

## 2025-06-02 RX ADMIN — DEXAMETHASONE 4 MG: 4 TABLET ORAL at 05:06

## 2025-06-02 RX ADMIN — PIPERACILLIN AND TAZOBACTAM 4.5 G: 4; .5 INJECTION, POWDER, LYOPHILIZED, FOR SOLUTION INTRAVENOUS; PARENTERAL at 08:06

## 2025-06-02 RX ADMIN — ATORVASTATIN CALCIUM 40 MG: 40 TABLET, FILM COATED ORAL at 08:06

## 2025-06-02 RX ADMIN — FAMOTIDINE 20 MG: 10 INJECTION, SOLUTION INTRAVENOUS at 08:06

## 2025-06-02 NOTE — PROGRESS NOTES
Pharmacokinetic Initial Assessment: IV Vancomycin    Assessment/Plan:    Initiate intravenous vancomycin with loading dose of 1000 mg once with subsequent doses when random concentrations are less than 20 mcg/mL  Desired empiric serum trough concentration is 15 to 20 mcg/mL  Draw vancomycin random level on 06/03 at 0430.  Pharmacy will continue to follow and monitor vancomycin.      Please contact pharmacy at extension [phone] with any questions regarding this assessment.     Thank you for the consult,   Alex Allisony       Patient brief summary:  Ranjeet Ball is a 39 y.o. male initiated on antimicrobial therapy with IV Vancomycin for treatment of suspected sepsis    Drug Allergies:   Review of patient's allergies indicates:  No Known Allergies    Actual Body Weight:   47.2kg    Renal Function:   Estimated Creatinine Clearance: 26.6 mL/min (A) (based on SCr of 2.46 mg/dL (H)).,     Dialysis Method (if applicable):  N/A    CBC (last 72 hours):  Recent Labs   Lab Result Units 05/30/25  0918 06/01/25  1457   WBC x10(3)/mcL 6.79 14.11*   Hgb g/dL 15.0 16.4   Hct % 43.6 48.0   Platelet x10(3)/mcL 329 373   Mono % % 10.2 5.7   Eos % % 0.0 0.0   Basophil % % 0.3 0.2       Metabolic Panel (last 72 hours):  Recent Labs   Lab Result Units 05/30/25  0918 06/01/25  1457 06/01/25  1507 06/01/25  1535   Sodium mmol/L 133* 129*  --   --    Potassium mmol/L 3.4* 5.2*  --   --    Chloride mmol/L 95* 92*  --   --    CO2 mmol/L 21* 14*  --   --    Glucose mg/dL 175* 138*  --   --    Glucose, UA   --   --   --  Normal   Blood Urea Nitrogen mg/dL 31.1* 45.6*  --   --    Creatinine mg/dL 1.48* 2.46*  --   --    Albumin g/dL 4.0 4.6  --   --    Bilirubin Total mg/dL 0.4 0.5  --   --    ALP unit/L 141 163*  --   --    AST unit/L 15 21  --   --    ALT unit/L 15 22  --   --    Magnesium Level mg/dL 2.20 2.40  --   --    Phosphorus Level mg/dL  --   --  7.0*  --        Drug levels (last 3 results):  No results for input(s):  ""VANCOMYCINRA", "VANCORANDOM", "VANCOMYCINPE", "VANCOPEAK", "VANCOMYCINTR", "VANCOTROUGH" in the last 72 hours.    Microbiologic Results:  Microbiology Results (last 7 days)       Procedure Component Value Units Date/Time    Blood Culture [[id number]] Collected: 06/01/25 2137    Order Status: Sent Specimen: Blood from Antecubital, Left Updated: 06/01/25 2143    Blood Culture (site 2) [[id number]] Collected: 06/01/25 2016    Order Status: Sent Specimen: Blood from Forearm, Right Updated: 06/01/25 2020    Blood Culture (site 1) [[id number]] Collected: 06/01/25 2016    Order Status: Sent Specimen: Blood from Arm, Right Updated: 06/01/25 2019            "

## 2025-06-02 NOTE — PROGRESS NOTES
Pharmacist Renal Dose Adjustment Note    Ranjeet Ball is a 39 y.o. male being treated with the medication famotidine    Patient Data:    Vital Signs (Most Recent):  Temp: 98.2 °F (36.8 °C) (06/01/25 2053)  Pulse: 110 (06/01/25 2053)  Resp: 18 (06/01/25 2103)  BP: 113/77 (06/01/25 2053)  SpO2: 100 % (06/01/25 2053) Vital Signs (72h Range):  Temp:  [97.3 °F (36.3 °C)-98.2 °F (36.8 °C)]   Pulse:  [106-130]   Resp:  [15-26]   BP: (111-136)/()   SpO2:  [98 %-100 %]      Recent Labs   Lab 05/30/25  0918 06/01/25  1457   CREATININE 1.48* 2.46*     Serum creatinine: 2.46 mg/dL (H) 06/01/25 1457  Estimated creatinine clearance: 26.6 mL/min (A)    Medication:famotidine dose: 20mg frequency q12hrs will be changed to medication:famotidine dose:20mg frequency:q24hrs    Pharmacist's Name: Alex Angel  Pharmacist's Extension: [phone]
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
## 2025-06-03 VITALS
RESPIRATION RATE: 18 BRPM | WEIGHT: 104.69 LBS | TEMPERATURE: 98 F | BODY MASS INDEX: 16.83 KG/M2 | HEIGHT: 66 IN | SYSTOLIC BLOOD PRESSURE: 118 MMHG | DIASTOLIC BLOOD PRESSURE: 62 MMHG | HEART RATE: 88 BPM | OXYGEN SATURATION: 98 %

## 2025-06-03 LAB
ALBUMIN % SPEP (OHS): 38.3 (ref 48.1–59.5)
ALBUMIN SERPL-MCNC: 2.6 G/DL (ref 3.5–5)
ALBUMIN SERPL-MCNC: 3.2 G/DL (ref 3.5–5)
ALBUMIN/GLOB SERPL: 0.6 RATIO (ref 1.1–2)
ALBUMIN/GLOB SERPL: 0.7 RATIO (ref 1.1–2)
ALP SERPL-CCNC: 82 UNIT/L (ref 40–150)
ALPHA 1 GLOB (OHS): 0.32 GM/DL (ref 0–0.4)
ALPHA 1 GLOB% (OHS): 3.87 (ref 2.3–4.9)
ALPHA 2 GLOB % (OHS): 20.1 (ref 6.9–13)
ALPHA 2 GLOB (OHS): 1.67 GM/DL (ref 0.4–1)
ALT SERPL-CCNC: 13 UNIT/L (ref 0–55)
ANION GAP SERPL CALC-SCNC: 8 MEQ/L
AST SERPL-CCNC: 12 UNIT/L (ref 11–45)
BASOPHILS # BLD AUTO: 0.01 X10(3)/MCL
BASOPHILS NFR BLD AUTO: 0.1 %
BETA GLOB (OHS): 1.5 GM/DL (ref 0.7–1.3)
BETA GLOB% (OHS): 18.05 (ref 13.8–19.7)
BILIRUB SERPL-MCNC: 0.3 MG/DL
BUN SERPL-MCNC: 17 MG/DL (ref 8.9–20.6)
CALCIUM SERPL-MCNC: 8.6 MG/DL (ref 8.4–10.2)
CHLORIDE SERPL-SCNC: 100 MMOL/L (ref 98–107)
CO2 SERPL-SCNC: 28 MMOL/L (ref 22–29)
CREAT SERPL-MCNC: 0.71 MG/DL (ref 0.72–1.25)
CREAT/UREA NIT SERPL: 24
EOSINOPHIL # BLD AUTO: 0 X10(3)/MCL (ref 0–0.9)
EOSINOPHIL NFR BLD AUTO: 0 %
ERYTHROCYTE [DISTWIDTH] IN BLOOD BY AUTOMATED COUNT: 16.7 % (ref 11.5–17)
G6PD RBC-CCNT: 1 U/G HB (ref 8–11.9)
GAMMA GLOBULIN % (OHS): 19.68 (ref 10.1–21.9)
GAMMA GLOBULIN (OHS): 1.63 GM/DL (ref 0.4–1.8)
GFR SERPLBLD CREATININE-BSD FMLA CKD-EPI: >60 ML/MIN/1.73/M2
GLOBULIN SER-MCNC: 3.5 GM/DL (ref 2.4–3.5)
GLOBULIN SER-MCNC: 5.1 GM/DL (ref 2.4–3.5)
GLUCOSE SERPL-MCNC: 136 MG/DL (ref 74–100)
HCT VFR BLD AUTO: 28.9 % (ref 42–52)
HGB BLD-MCNC: 9.8 G/DL (ref 14–18)
HOLD SPECIMEN: NORMAL
HOLD SPECIMEN: NORMAL
IMM GRANULOCYTES # BLD AUTO: 0.06 X10(3)/MCL (ref 0–0.04)
IMM GRANULOCYTES NFR BLD AUTO: 0.4 %
KAPPA LC FREE SER NEPH-MCNC: 1.73 MG/DL (ref 0.33–1.94)
KAPPA LC FREE/LAMBDA FREE SER NEPH: 1.37 {RATIO} (ref 0.26–1.65)
LACTATE SERPL-SCNC: 2.4 MMOL/L (ref 0.5–2.2)
LACTATE SERPL-SCNC: 3.5 MMOL/L (ref 0.5–2.2)
LAMBDA LC FREE SERPL-MCNC: 1.26 MG/DL (ref 0.57–2.63)
LYMPHOCYTES # BLD AUTO: 0.41 X10(3)/MCL (ref 0.6–4.6)
LYMPHOCYTES NFR BLD AUTO: 2.9 %
M SPIKE % (OHS): ABNORMAL
M SPIKE (OHS): ABNORMAL
MAGNESIUM SERPL-MCNC: 1.7 MG/DL (ref 1.6–2.6)
MAGNESIUM SERPL-MCNC: 1.9 MG/DL (ref 1.6–2.6)
MCH RBC QN AUTO: 33.3 PG (ref 27–31)
MCHC RBC AUTO-ENTMCNC: 33.9 G/DL (ref 33–36)
MCV RBC AUTO: 98.3 FL (ref 80–94)
MONOCYTES # BLD AUTO: 0.35 X10(3)/MCL (ref 0.1–1.3)
MONOCYTES NFR BLD AUTO: 2.5 %
NEUTROPHILS # BLD AUTO: 13.14 X10(3)/MCL (ref 2.1–9.2)
NEUTROPHILS NFR BLD AUTO: 94.1 %
NRBC BLD AUTO-RTO: 0 %
PATH REV: NORMAL
PHOSPHATE SERPL-MCNC: 1.4 MG/DL (ref 2.3–4.7)
PHOSPHATE SERPL-MCNC: 2 MG/DL (ref 2.3–4.7)
PLATELET # BLD AUTO: 250 X10(3)/MCL (ref 130–400)
PMV BLD AUTO: 8.8 FL (ref 7.4–10.4)
POTASSIUM SERPL-SCNC: 4.2 MMOL/L (ref 3.5–5.1)
PROT SERPL-MCNC: 6.1 GM/DL (ref 6.4–8.3)
PROT SERPL-MCNC: 8.3 GM/DL (ref 6.4–8.3)
RBC # BLD AUTO: 2.94 X10(6)/MCL (ref 4.7–6.1)
SODIUM SERPL-SCNC: 136 MMOL/L (ref 136–145)
WBC # BLD AUTO: 13.97 X10(3)/MCL (ref 4.5–11.5)

## 2025-06-03 PROCEDURE — 83605 ASSAY OF LACTIC ACID: CPT

## 2025-06-03 PROCEDURE — 25000003 PHARM REV CODE 250

## 2025-06-03 PROCEDURE — 84100 ASSAY OF PHOSPHORUS: CPT

## 2025-06-03 PROCEDURE — 63600175 PHARM REV CODE 636 W HCPCS

## 2025-06-03 PROCEDURE — 36415 COLL VENOUS BLD VENIPUNCTURE: CPT

## 2025-06-03 PROCEDURE — 83605 ASSAY OF LACTIC ACID: CPT | Performed by: INTERNAL MEDICINE

## 2025-06-03 PROCEDURE — 84100 ASSAY OF PHOSPHORUS: CPT | Performed by: INTERNAL MEDICINE

## 2025-06-03 PROCEDURE — 83735 ASSAY OF MAGNESIUM: CPT

## 2025-06-03 PROCEDURE — 85025 COMPLETE CBC W/AUTO DIFF WBC: CPT

## 2025-06-03 PROCEDURE — 80053 COMPREHEN METABOLIC PANEL: CPT

## 2025-06-03 PROCEDURE — 25000003 PHARM REV CODE 250: Performed by: INTERNAL MEDICINE

## 2025-06-03 PROCEDURE — 36415 COLL VENOUS BLD VENIPUNCTURE: CPT | Performed by: INTERNAL MEDICINE

## 2025-06-03 RX ORDER — MAGNESIUM SULFATE 1 G/100ML
1 INJECTION INTRAVENOUS ONCE
Status: COMPLETED | OUTPATIENT
Start: 2025-06-03 | End: 2025-06-03

## 2025-06-03 RX ORDER — SODIUM,POTASSIUM PHOSPHATES 280-250MG
1 POWDER IN PACKET (EA) ORAL
Status: DISCONTINUED | OUTPATIENT
Start: 2025-06-03 | End: 2025-06-03 | Stop reason: HOSPADM

## 2025-06-03 RX ADMIN — ATORVASTATIN CALCIUM 40 MG: 40 TABLET, FILM COATED ORAL at 08:06

## 2025-06-03 RX ADMIN — PIPERACILLIN AND TAZOBACTAM 4.5 G: 4; .5 INJECTION, POWDER, LYOPHILIZED, FOR SOLUTION INTRAVENOUS; PARENTERAL at 02:06

## 2025-06-03 RX ADMIN — PIPERACILLIN AND TAZOBACTAM 4.5 G: 4; .5 INJECTION, POWDER, LYOPHILIZED, FOR SOLUTION INTRAVENOUS; PARENTERAL at 04:06

## 2025-06-03 RX ADMIN — SODIUM CHLORIDE: 9 INJECTION, SOLUTION INTRAVENOUS at 12:06

## 2025-06-03 RX ADMIN — POTASSIUM & SODIUM PHOSPHATES POWDER PACK 280-160-250 MG 1 PACKET: 280-160-250 PACK at 12:06

## 2025-06-03 RX ADMIN — MUPIROCIN: 20 OINTMENT TOPICAL at 08:06

## 2025-06-03 RX ADMIN — MAGNESIUM SULFATE IN DEXTROSE 1 G: 10 INJECTION, SOLUTION INTRAVENOUS at 12:06

## 2025-06-03 RX ADMIN — HEPARIN SODIUM 5000 UNITS: 5000 INJECTION, SOLUTION INTRAVENOUS; SUBCUTANEOUS at 02:06

## 2025-06-03 RX ADMIN — OXYCODONE HYDROCHLORIDE 20 MG: 5 TABLET ORAL at 01:06

## 2025-06-03 RX ADMIN — FAMOTIDINE 20 MG: 10 INJECTION, SOLUTION INTRAVENOUS at 08:06

## 2025-06-03 RX ADMIN — OXYCODONE HYDROCHLORIDE 20 MG: 5 TABLET ORAL at 12:06

## 2025-06-03 RX ADMIN — DEXAMETHASONE 4 MG: 4 TABLET ORAL at 01:06

## 2025-06-03 RX ADMIN — DEXAMETHASONE 4 MG: 4 TABLET ORAL at 06:06

## 2025-06-03 RX ADMIN — OLANZAPINE 2.5 MG: 2.5 TABLET, FILM COATED ORAL at 08:06

## 2025-06-03 RX ADMIN — SODIUM CHLORIDE, POTASSIUM CHLORIDE, SODIUM LACTATE AND CALCIUM CHLORIDE 500 ML: 600; 310; 30; 20 INJECTION, SOLUTION INTRAVENOUS at 12:06

## 2025-06-03 RX ADMIN — DEXAMETHASONE 4 MG: 4 TABLET ORAL at 12:06

## 2025-06-03 RX ADMIN — HEPARIN SODIUM 5000 UNITS: 5000 INJECTION, SOLUTION INTRAVENOUS; SUBCUTANEOUS at 06:06

## 2025-06-03 RX ADMIN — OXYCODONE HYDROCHLORIDE 20 MG: 5 TABLET ORAL at 07:06

## 2025-06-03 NOTE — PROGRESS NOTES
VANCOMYCIN DOSING BY PHARMACY DISCONTINUATION NOTE    Ranjeet Ball is a 39 y.o. male who had been consulted for vancomycin dosing.    The pharmacy consult for vancomycin dosing has been discontinued.     Vancomycin Dosing by Pharmacy Consult will sign-off. Please reconsult if necessary. Thank you for allowing us to participate in this patient's care.     @SIG@

## 2025-06-03 NOTE — PROGRESS NOTES
Inpatient Nutrition Assessment    Admit Date: 6/1/2025   Total duration of encounter: 2 days   Patient Age: 39 y.o.    Nutrition Recommendation/Prescription     GI Soft diet  Boost Plus (provides 360 kcal, 14 g protein per serving) TID  Resume Megace stimulate appetite  Suggest daily MVI  Anti emetic  Monitor Weight Weekly     Communication of Recommendations: reviewed with nurse and reviewed with patient    Nutrition Assessment     Malnutrition Assessment/Nutrition-Focused Physical Exam    Malnutrition Context: chronic illness (06/03/25 1145)  Malnutrition Level: severe (06/03/25 1145)  Energy Intake (Malnutrition): less than or equal to 75% for greater than or equal to 1 month (06/03/25 1145)  Weight Loss (Malnutrition): greater than 5% in 1 month (06/03/25 1145)              Muscle Mass (Malnutrition): mild depletion (06/03/25 1145)  [place] (Muscle Loss): mild depletion     Clavicle and Acromion Bone Region (Muscle Loss): mild depletion                 Fluid Accumulation (Malnutrition): other (see comments) (Not present) (06/03/25 1145)        A minimum of two characteristics is recommended for diagnosis of either severe or non-severe malnutrition.    Chart Review    Reason Seen: continuous nutrition monitoring    Malnutrition Screening Tool Results   Have you recently lost weight without trying?: Yes: 2-13 lbs  Have you been eating poorly because of a decreased appetite?: No   MST Score: 1   Diagnosis:  Intractable nausea and vomiting -improving  Leukocytosis -resolved  Metabolic acidosis -resolved  ARYAN resolved  Metastatic Large Cell Neuroendocrine Carcinoma   Metastases to left adrenal and lytic mets to anterior left ilium   Recent Cerebellar mets     Relevant Medical History: Large neuroendocrine carcinoma, metastatic with lung primary    Scheduled Medications:  0.9% NaCl, , Once  atorvastatin, 40 mg, Daily  dexAMETHasone, 4 mg, Q6H  DULoxetine, 30 mg, QHS  ergocalciferol, 50,000 Units, Q7  Days  famotidine (PF), 20 mg, Daily  gabapentin, 300 mg, QHS  heparin (porcine), 5,000 Units, Q8H  lactated ringers, 500 mL, Once  mupirocin, , BID  OLANZapine, 2.5 mg, Daily  piperacillin-tazobactam (Zosyn) IV (PEDS and ADULTS) (extended infusion is not appropriate), 4.5 g, Q8H  potassium, sodium phosphates, 1 packet, QID (AC & HS)  vancomycin (VANCOCIN) IV (PEDS and ADULTS), 1,000 mg, Q24H    Continuous Infusions:  0.9% NaCl, Last Rate: Stopped (06/02/25 2308)  0.9% NaCl, Last Rate: 100 mL/hr at 06/03/25 0539    PRN Medications:  0.9% NaCl, , Continuous PRN  acetaminophen, 650 mg, Q6H PRN  dextrose 50%, 12.5 g, PRN  dextrose 50%, 25 g, PRN  glucagon (human recombinant), 1 mg, PRN  glucose, 16 g, PRN  glucose, 24 g, PRN  naloxone, 0.02 mg, PRN  ondansetron, 4 mg, Q6H PRN  oxyCODONE, 20 mg, Q6H PRN  sodium chloride 0.9%, 10 mL, Q12H PRN  vancomycin - pharmacy to dose, , pharmacy to manage frequency    Calorie Containing IV Medications: no significant kcals from medications at this time    Recent Labs   Lab 05/30/25  0918 06/01/25  1457 06/01/25  1507 06/02/25  0131 06/02/25  0433 06/02/25  0806 06/02/25  1222 06/02/25  1614 06/02/25  1943 06/03/25  0436   * 129*  --  130*  --   --   --   --   --  136   K 3.4* 5.2*  --  3.7  --   --   --   --   --  4.2   CALCIUM 10.5* 10.9*  --  9.3  --   --   --   --   --  8.6   PHOS  --   --    < > 3.0 2.7 2.9 2.8 2.2* 1.7* 2.0*   MG 2.20 2.40  --  2.20  --   --   --   --   --  1.70   CL 95* 92*  --  96*  --   --   --   --   --  100   CO2 21* 14*  --  22  --   --   --   --   --  28   BUN 31.1* 45.6*  --  32.5*  --   --   --   --   --  17.0   CREATININE 1.48* 2.46*  --  1.13  --   --   --   --   --  0.71*   EGFRNORACEVR >60 33  --  >60  --   --   --   --   --  >60   * 138*  --  156*  --   --   --   --   --  136*   BILITOT 0.4 0.5  --  0.5  --   --   --   --   --  0.3   ALKPHOS 141 163*  --  109  --   --   --   --   --  82   ALT 15 22  --  12  --   --   --   --   --  13  "  AST 15 21  --  14  --   --   --   --   --  12   ALBUMIN 4.0 4.6  --  3.2*  --   --   --   --   --  2.6*   LIPASE  --  19  --   --   --   --   --   --   --   --    WBC 6.79 14.11*  --  10.70  --   --   --   --   --  13.97*   HGB 15.0 16.4  --  12.9*  --   --   --   --   --  9.8*   HCT 43.6 48.0  --  37.6*  --   --   --   --   --  28.9*    < > = values in this interval not displayed.     Nutrition Orders:  Diet Adult Regular Standard Tray  Dietary nutrition supplements TID; Boost Plus Nutritional Drink - Very Vanilla    Appetite/Oral Intake: fair/50-75% of meals  Factors Affecting Nutritional Intake: abdominal pain, decreased appetite, and nausea  Social Needs Impacting Access to Food: none identified  Food/Yazidi/Cultural Preferences: none reported  Food Allergies: no known food allergies  Last Bowel Movement: 25  Wound(s):  skin intact    Comments    6/3/25 -- Pt with 2 day h/o abdominal pain, n/v; reports none since admit - continue antiemetic as needed; fair appetite, requesting to resume Megace to increase appetite; Significant weight loss over the last month, agreeable to Boost Plus, reports having Ensure at home for use    Anthropometrics    Height: 5' 6" (167.6 cm), Height Method: Stated  Last Weight: 47.5 kg (104 lb 11.2 oz) (25), Weight Method: Standard Scale  BMI (Calculated): 16.9  BMI Classification: underweight (BMI less than 18.5)        Ideal Body Weight (IBW), Male: 142 lb     % Ideal Body Weight, Male (lb): 73.73 %                 Usual Body Weight (UBW), k kg (115-120 lb)  % Usual Body Weight: 89.79     Usual Weight Provided By: patient and EMR weight history    Wt Readings from Last 5 Encounters:   25 47.5 kg (104 lb 11.2 oz)   25 49.4 kg (108 lb 12.8 oz)   25 53.1 kg (117 lb)   25 53.1 kg (117 lb)   25 55.3 kg (122 lb)     Weight Change(s) Since Admission: new admit  Wt Readings from Last 1 Encounters:   25 2145 47.5 kg (104 lb 11.2 " oz)   06/01/25 1339 47.2 kg (104 lb)   Admit Weight: 47.2 kg (104 lb) (06/01/25 1339), Weight Method: Standard Scale    Estimated Needs    Weight Used For Calorie Calculations: 48 kg (105 lb 13.1 oz)  Energy Calorie Requirements (kcal): 1680 kcal (35 kcal/kg)  Energy Need Method: Kcal/kg  Weight Used For Protein Calculations: 48 kg (105 lb 13.1 oz)  Protein Requirements: 62-72 gm (1.3 - 1.5 gm/kg)  Fluid Requirements (mL): 1680 ml (1ml/kcal)        Enteral Nutrition     Patient not receiving enteral nutrition at this time.    Parenteral Nutrition     Patient not receiving parenteral nutrition support at this time.    Evaluation of Received Nutrient Intake    Calories: not meeting estimated needs  Protein: not meeting estimated needs    Patient Education     Not applicable.    Nutrition Diagnosis     PES: Inadequate oral intake related to altered GI function as evidenced by n/v, abdominal pain, < 75% nutrition intake > 1 month. (new)     PES: Severe chronic disease or condition related malnutrition Related to chronic illness -- cancer As Evidenced by:  - weight loss: > 5% in 1 month - energy intake: <= 75% for 2 months (meets criteria for <= 75% >= 1 month (severe - chronic)) - muscle mass depletion: 3 areas of mild muscle loss (Temporalis, Deltoid, Acromion) new    Nutrition Interventions     Intervention(s): general/healthful diet, commercial beverage, multivitamin/mineral supplement therapy, prescription medication, and collaboration with other providers  Intervention(s): Care coordination or referral;Oral nutritional supplement;Oral diet/nutrient modifications (prescription medication, vitamin supplementation)    Goal: Meet greater than 80% of nutritional needs by follow-up. (new)  Goal: Maintain weight throughout hospitalization. (new)    Nutrition Goals & Monitoring     Dietitian will monitor: food and beverage intake, weight, electrolyte/renal panel, and gastrointestinal profile  Discharge planning: continue  Regular diet with Boost Plus or equivalent oral supplements  Nutrition Risk/Follow-Up: patient at increased nutrition risk; dietitian will follow-up twice weekly   Please consult if re-assessment needed sooner.

## 2025-06-03 NOTE — DISCHARGE INSTRUCTIONS
Our goal at [facility] is to always give you outstanding care and exceptional service. You may receive a survey from [employer] by mail, text or e-mail in the next 24-48 hours asking about the care you received with us. The survey should only take 5-10 minutes to complete and is very important to us.     Your feedback provides us with a way to recognize our staff who work tirelessly to provide the best care! Also, your responses help us learn how to improve when your experience was below our aspiration of excellence. We are always looking for ways to improve your stay. We WILL use your feedback to continue making improvements to help us provide the highest quality care. We keep your personal information and feedback confidential. We appreciate your time completing this survey and can't wait to hear from you!!!    We look forward to your continued care with us! Thanks so much for choosing [facility] for your healthcare needs!
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
## 2025-06-03 NOTE — PLAN OF CARE
Problem: Adult Inpatient Plan of Care  Goal: Plan of Care Review  Outcome: Progressing  Flowsheets (Taken 6/3/2025 9462)  Plan of Care Reviewed With: patient  Goal: Patient-Specific Goal (Individualized)  Outcome: Progressing  Goal: Absence of Hospital-Acquired Illness or Injury  Outcome: Progressing  Goal: Optimal Comfort and Wellbeing  Outcome: Progressing  Goal: Readiness for Transition of Care  Outcome: Progressing     Problem: Acute Kidney Injury/Impairment  Goal: Fluid and Electrolyte Balance  Outcome: Progressing  Goal: Improved Oral Intake  Outcome: Progressing     Problem: Infection  Goal: Absence of Infection Signs and Symptoms  Outcome: Progressing     Problem: Wound  Goal: Optimal Coping  Outcome: Progressing  Goal: Optimal Functional Ability  Outcome: Progressing  Goal: Absence of Infection Signs and Symptoms  Outcome: Progressing  Goal: Improved Oral Intake  Outcome: Progressing  Goal: Optimal Pain Control and Function  Outcome: Progressing  Goal: Skin Health and Integrity  Outcome: Progressing  Goal: Optimal Wound Healing  Outcome: Progressing

## 2025-06-04 ENCOUNTER — TELEPHONE (OUTPATIENT)
Dept: HEMATOLOGY/ONCOLOGY | Facility: CLINIC | Age: 40
End: 2025-06-04
Payer: MEDICAID

## 2025-06-05 ENCOUNTER — TELEPHONE (OUTPATIENT)
Dept: HEMATOLOGY/ONCOLOGY | Facility: CLINIC | Age: 40
End: 2025-06-05
Payer: MEDICAID

## 2025-06-06 ENCOUNTER — TELEPHONE (OUTPATIENT)
Dept: HEMATOLOGY/ONCOLOGY | Facility: CLINIC | Age: 40
End: 2025-06-06
Payer: MEDICAID

## 2025-06-06 ENCOUNTER — OFFICE VISIT (OUTPATIENT)
Dept: NEUROSURGERY | Facility: CLINIC | Age: 40
End: 2025-06-06
Payer: MEDICAID

## 2025-06-06 VITALS
DIASTOLIC BLOOD PRESSURE: 62 MMHG | SYSTOLIC BLOOD PRESSURE: 111 MMHG | HEART RATE: 89 BPM | BODY MASS INDEX: 21.16 KG/M2 | RESPIRATION RATE: 16 BRPM | WEIGHT: 131.63 LBS | HEIGHT: 66 IN

## 2025-06-06 DIAGNOSIS — C34.91 MALIGNANT NEOPLASM OF UNSPECIFIED PART OF RIGHT BRONCHUS OR LUNG: ICD-10-CM

## 2025-06-06 DIAGNOSIS — C79.31 METASTASIS TO BRAIN: Primary | ICD-10-CM

## 2025-06-07 LAB
BACTERIA BLD CULT: NORMAL

## 2025-06-09 ENCOUNTER — CLINICAL SUPPORT (OUTPATIENT)
Dept: RADIATION THERAPY | Facility: HOSPITAL | Age: 40
End: 2025-06-09
Attending: RADIOLOGY
Payer: MEDICAID

## 2025-06-09 LAB
ALBUMIN 24H UR ELPH-MRATE: 82.5 MG/24 H
ALBUMIN/GLOB 24H UR ELPH: 0.43 {RATIO}
ALPHA1 GLOB 24H UR ELPH-MRATE: 44 MG/24 H
ALPHA2 GLOB 24H UR ELPH-MRATE: 63.3 MG/24 H
B-GLOBULIN 24H UR ELPH-MRATE: 44 MG/24 H
COLLECT DURATION TIME UR: 24 H
GAMMA GLOB 24H UR ELPH-MRATE: 41.3 MG/24 H
PROT 24H UR-MRATE: 275 MG/24 H
PROT PATTERN 24H UR ELPH-IMP: ABNORMAL
SPECIMEN VOL 24H UR: 2500 ML

## 2025-06-09 RX ORDER — SODIUM CHLORIDE 0.9 % (FLUSH) 0.9 %
10 SYRINGE (ML) INJECTION
OUTPATIENT
Start: 2025-06-09

## 2025-06-09 RX ORDER — DIPHENHYDRAMINE HYDROCHLORIDE 50 MG/ML
50 INJECTION, SOLUTION INTRAMUSCULAR; INTRAVENOUS ONCE AS NEEDED
OUTPATIENT
Start: 2025-06-09

## 2025-06-09 RX ORDER — HEPARIN 100 UNIT/ML
500 SYRINGE INTRAVENOUS
OUTPATIENT
Start: 2025-06-09

## 2025-06-09 RX ORDER — ATROPINE SULFATE 0.4 MG/ML
0.4 INJECTION, SOLUTION ENDOTRACHEAL; INTRAMEDULLARY; INTRAMUSCULAR; INTRAVENOUS; SUBCUTANEOUS ONCE AS NEEDED
OUTPATIENT
Start: 2025-06-09

## 2025-06-09 RX ORDER — PROCHLORPERAZINE EDISYLATE 5 MG/ML
10 INJECTION INTRAMUSCULAR; INTRAVENOUS ONCE AS NEEDED
Start: 2025-06-09

## 2025-06-09 RX ORDER — EPINEPHRINE 0.3 MG/.3ML
0.3 INJECTION SUBCUTANEOUS ONCE AS NEEDED
OUTPATIENT
Start: 2025-06-09

## 2025-06-10 PROCEDURE — 77280 THER RAD SIMULAJ FIELD SMPL: CPT | Performed by: RADIOLOGY

## 2025-06-10 PROCEDURE — 77412 RADIATION TX DELIVERY LVL 3: CPT | Performed by: RADIOLOGY

## 2025-06-11 DIAGNOSIS — C7A.1 LARGE CELL NEUROENDOCRINE CARCINOMA: Primary | ICD-10-CM

## 2025-06-11 PROCEDURE — 77412 RADIATION TX DELIVERY LVL 3: CPT | Performed by: RADIOLOGY

## 2025-06-12 ENCOUNTER — TELEPHONE (OUTPATIENT)
Dept: HEMATOLOGY/ONCOLOGY | Facility: CLINIC | Age: 40
End: 2025-06-12
Payer: MEDICAID

## 2025-06-12 PROCEDURE — 77412 RADIATION TX DELIVERY LVL 3: CPT | Performed by: RADIOLOGY

## 2025-06-13 PROCEDURE — 77412 RADIATION TX DELIVERY LVL 3: CPT

## 2025-06-16 DIAGNOSIS — C7A.1 LARGE CELL NEUROENDOCRINE CARCINOMA: ICD-10-CM

## 2025-06-16 DIAGNOSIS — C79.51 SECONDARY ADENOCARCINOMA OF BONE: ICD-10-CM

## 2025-06-16 DIAGNOSIS — G89.3 CANCER RELATED PAIN: ICD-10-CM

## 2025-06-16 DIAGNOSIS — R51.9 FREQUENT HEADACHES: ICD-10-CM

## 2025-06-16 DIAGNOSIS — C34.90 MALIGNANT NEOPLASM OF LUNG, UNSPECIFIED LATERALITY, UNSPECIFIED PART OF LUNG: ICD-10-CM

## 2025-06-16 PROCEDURE — 77412 RADIATION TX DELIVERY LVL 3: CPT | Performed by: RADIOLOGY

## 2025-06-16 PROCEDURE — 77336 RADIATION PHYSICS CONSULT: CPT | Performed by: RADIOLOGY

## 2025-06-16 RX ORDER — OXYCODONE HYDROCHLORIDE 20 MG/1
20 TABLET ORAL EVERY 6 HOURS PRN
Qty: 56 TABLET | Refills: 0 | Status: SHIPPED | OUTPATIENT
Start: 2025-06-16

## 2025-06-17 DIAGNOSIS — C7A.1 LARGE CELL NEUROENDOCRINE CARCINOMA: Primary | ICD-10-CM

## 2025-06-17 PROCEDURE — 77417 THER RADIOLOGY PORT IMAGE(S): CPT | Performed by: RADIOLOGY

## 2025-06-17 PROCEDURE — 77412 RADIATION TX DELIVERY LVL 3: CPT | Performed by: RADIOLOGY

## 2025-06-18 ENCOUNTER — LAB VISIT (OUTPATIENT)
Dept: HEMATOLOGY/ONCOLOGY | Facility: CLINIC | Age: 40
End: 2025-06-18
Payer: MEDICAID

## 2025-06-18 ENCOUNTER — TELEPHONE (OUTPATIENT)
Dept: HEMATOLOGY/ONCOLOGY | Facility: CLINIC | Age: 40
End: 2025-06-18
Payer: MEDICAID

## 2025-06-18 ENCOUNTER — APPOINTMENT (OUTPATIENT)
Dept: RADIOLOGY | Facility: HOSPITAL | Age: 40
End: 2025-06-18
Attending: RADIOLOGY
Payer: MEDICAID

## 2025-06-18 DIAGNOSIS — C79.89 METASTATIC CANCER TO PELVIS: ICD-10-CM

## 2025-06-18 DIAGNOSIS — C79.49 SECONDARY MALIGNANT NEOPLASM OF BRAIN AND SPINAL CORD: ICD-10-CM

## 2025-06-18 DIAGNOSIS — C7A.1 LARGE CELL NEUROENDOCRINE CARCINOMA: ICD-10-CM

## 2025-06-18 DIAGNOSIS — C79.31 SECONDARY MALIGNANT NEOPLASM OF BRAIN AND SPINAL CORD: ICD-10-CM

## 2025-06-18 LAB
ALBUMIN SERPL-MCNC: 3.2 G/DL (ref 3.5–5)
ALBUMIN/GLOB SERPL: 0.8 RATIO (ref 1.1–2)
ALP SERPL-CCNC: 81 UNIT/L (ref 40–150)
ALT SERPL-CCNC: 24 UNIT/L (ref 0–55)
ANION GAP SERPL CALC-SCNC: 9 MEQ/L
AST SERPL-CCNC: 17 UNIT/L (ref 11–45)
BILIRUB SERPL-MCNC: 0.3 MG/DL
BUN SERPL-MCNC: 16.8 MG/DL (ref 8.9–20.6)
CALCIUM SERPL-MCNC: 9 MG/DL (ref 8.4–10.2)
CHLORIDE SERPL-SCNC: 103 MMOL/L (ref 98–107)
CO2 SERPL-SCNC: 26 MMOL/L (ref 22–29)
CREAT SERPL-MCNC: 0.81 MG/DL (ref 0.72–1.25)
CREAT/UREA NIT SERPL: 21
GFR SERPLBLD CREATININE-BSD FMLA CKD-EPI: >60 ML/MIN/1.73/M2
GLOBULIN SER-MCNC: 4 GM/DL (ref 2.4–3.5)
GLUCOSE SERPL-MCNC: 139 MG/DL (ref 74–100)
MAGNESIUM SERPL-MCNC: 1.8 MG/DL (ref 1.6–2.6)
POTASSIUM SERPL-SCNC: 3.6 MMOL/L (ref 3.5–5.1)
PROT SERPL-MCNC: 7.2 GM/DL (ref 6.4–8.3)
SODIUM SERPL-SCNC: 138 MMOL/L (ref 136–145)

## 2025-06-18 PROCEDURE — 83735 ASSAY OF MAGNESIUM: CPT

## 2025-06-18 PROCEDURE — 80053 COMPREHEN METABOLIC PANEL: CPT

## 2025-06-18 PROCEDURE — 25500020 PHARM REV CODE 255: Performed by: RADIOLOGY

## 2025-06-18 PROCEDURE — 70553 MRI BRAIN STEM W/O & W/DYE: CPT | Mod: TC

## 2025-06-18 PROCEDURE — 77412 RADIATION TX DELIVERY LVL 3: CPT | Performed by: RADIOLOGY

## 2025-06-18 PROCEDURE — A9577 INJ MULTIHANCE: HCPCS | Performed by: RADIOLOGY

## 2025-06-18 RX ADMIN — GADOBENATE DIMEGLUMINE 13 ML: 529 INJECTION, SOLUTION INTRAVENOUS at 02:06

## 2025-06-19 ENCOUNTER — TELEPHONE (OUTPATIENT)
Dept: HEMATOLOGY/ONCOLOGY | Facility: CLINIC | Age: 40
End: 2025-06-19
Payer: MEDICAID

## 2025-06-19 PROCEDURE — 77412 RADIATION TX DELIVERY LVL 3: CPT | Performed by: RADIOLOGY

## 2025-06-20 PROCEDURE — 77334 RADIATION TREATMENT AID(S): CPT | Performed by: RADIOLOGY

## 2025-06-20 PROCEDURE — 77412 RADIATION TX DELIVERY LVL 3: CPT | Performed by: RADIOLOGY

## 2025-06-23 ENCOUNTER — TELEPHONE (OUTPATIENT)
Dept: HEMATOLOGY/ONCOLOGY | Facility: CLINIC | Age: 40
End: 2025-06-23
Payer: MEDICAID

## 2025-06-23 ENCOUNTER — OFFICE VISIT (OUTPATIENT)
Dept: HEMATOLOGY/ONCOLOGY | Facility: CLINIC | Age: 40
End: 2025-06-23
Payer: MEDICAID

## 2025-06-23 VITALS — WEIGHT: 121 LBS | TEMPERATURE: 97 F | BODY MASS INDEX: 19.44 KG/M2 | HEIGHT: 66 IN | OXYGEN SATURATION: 98 %

## 2025-06-23 DIAGNOSIS — C7A.8 NEUROENDOCRINE CARCINOMA OF LUNG: ICD-10-CM

## 2025-06-23 DIAGNOSIS — G89.3 CANCER RELATED PAIN: Primary | ICD-10-CM

## 2025-06-23 PROBLEM — C79.31: Status: ACTIVE | Noted: 2025-06-23

## 2025-06-23 PROBLEM — C80.1: Status: ACTIVE | Noted: 2025-06-23

## 2025-06-23 PROBLEM — G91.1 OBSTRUCTIVE HYDROCEPHALUS: Status: ACTIVE | Noted: 2025-06-23

## 2025-06-23 PROCEDURE — 1111F DSCHRG MED/CURRENT MED MERGE: CPT | Mod: CPTII,,,

## 2025-06-23 PROCEDURE — 99215 OFFICE O/P EST HI 40 MIN: CPT | Mod: S$PBB,,,

## 2025-06-23 PROCEDURE — 77336 RADIATION PHYSICS CONSULT: CPT | Performed by: RADIOLOGY

## 2025-06-23 PROCEDURE — 77412 RADIATION TX DELIVERY LVL 3: CPT | Performed by: RADIOLOGY

## 2025-06-23 RX ORDER — OLANZAPINE 5 MG/1
TABLET, FILM COATED ORAL
Qty: 3 TABLET | Refills: 11 | Status: SHIPPED | OUTPATIENT
Start: 2025-06-23

## 2025-06-23 RX ORDER — LOPERAMIDE HYDROCHLORIDE 2 MG/1
CAPSULE ORAL
Qty: 30 CAPSULE | Refills: 11 | Status: SHIPPED | OUTPATIENT
Start: 2025-06-23

## 2025-06-23 RX ORDER — PROCHLORPERAZINE MALEATE 5 MG
10 TABLET ORAL EVERY 6 HOURS PRN
Qty: 40 TABLET | Refills: 11 | Status: SHIPPED | OUTPATIENT
Start: 2025-06-23

## 2025-06-23 NOTE — PROGRESS NOTES
THERAPY EDUCATION: IRINOTECAN      Chief Complaint: Therapy Education      Diagnosis: Neuroendocrine carcinoma of lung      Current Treatment: Irinotecan Q3W to start on 6/24/25     Interval History      6/23/25: Mr. Ball presents to the clinic by himself for therapy education. Patient reports no major complaints at today's visit. Denies fever, chills, SOB, N/V/D, constipation, recent infection, chest pain or unexplained bleeding or bruising.           PLAN     -MRI brain scheduled for 6/18/25  -Therapy education completed on 6/23/25.  -Plans to start Irinotecan Q3W on 6/24/25. Patient understood that he will receive premedications given 30 minutes prior to each treatment to help prevent nausea.  -Antiemetic regimen schedule given and calendar made for guidance    Olanzapine- Take 1 tablet by mouth daily in the evening on Days 1-3 of each treatment   -Mediport intact.  -Compazine PRN prescribed for at home with instructions to be taken by mouth every 6 hours as needed for nausea.   -Imodium AD prescribed for diarrhea (4-5 BMs a day). Take 2 tablets after the first loose bowel movement, and 1 tablet after each loose bowel movement after the first dose has been taken. No more than 4 tablets should be taken in any 24-hour period. If not working, Lomotil can be prescribed as 2nd choice.    -Mouth sore prevention with 1-quart warm water with 1 tsp of baking soda and salt and alcohol-free mouthwash.   -Emphasized adequate hand-hygiene and limited contact with people who are sick.   -Monitor and notify any bleeding in urine, stool, or sputum.  As well as unusual bleeding or bruising and stomach pain.   - Emphasized hydration with 4 16 oz bottles of water a day.    -Call clinic if fever >100.4, shakes or chills, shortness of breath, chest pain, uncontrolled vomiting or diarrhea, pain and tingling in the chest or arm, or just not feeling well.    -Plans to RTC ion 7/1/25 for same day labs and toxicity check with  NP.    DISCUSSION:    1.  A total of 60 minutes were spent in counseling today, in which 100% were face-to-face.  At today's therapy teaching session, we discussed the patient's cancer diagnosis as well as planned therapy regimen, protocol, side effects and toxicities.  A handout of each therapeutic agent in the regimen was provided and reviewed in detail.    2.  The following side effects were discussed but not limited to:    Irinotecan Side Effects:  Allergic Reactions  Bruising  Chest Pain  Chills  Constipation  Cough  Diarrhea  Fever  Flushing  Hair Loss  Headache  Infection  Itching  Low Blood Counts  Mouth Sores  Nausea  Pain  Rash  Runny Nose  Swelling  Vomiting                a.  Discussed the risk of infection while on therapy related to pancytopenia, specifically a decrease in their white blood cell count.  Instructed to contact our office for temperature >100.4 F, chills, sudden onset cough or shortness of breath, symptoms of a urinary tract infection.                b.  Discussed the risk of anemia. Instructed to contact our office for dizziness, heart palpitations, or extreme or sudden changes in weakness.                c.  Discussed the risk of thrombocytopenia, which increases the risk of bruising or bleeding.  Instructed the patient to contact our office for spontaneous signs of bleeding, including nose bleeds, bleeding from the gums or mouth, blood in sputum, urine or stool and unusual or excessive bruising or rash.                d.  Discussed GI side effects including weight changes, changes in appetite, altered sense of taste, stomatitis, nausea, vomiting, diarrhea, constipation, and heartburn.                e.  Discussed  side effects including painful urination, changes in the amount of urination, possible urine color changes.  Discussed fertility issues and to prevent  pregnancy if of child bearing age.                f.  Discussed neurological side effects including the risk of peripheral  neuropathy, either temporary or permanent.                g.  Discussed the potential for skin, hair, and nail changes.       3.  Instructed to contact our office for discussion of medication changes, the addition of vitamin and/or herbal supplementation as they may interact with some chemotherapy agents.    4.  Discussed dietary modifications and the need to maintain adequate caloric intake and proper oral hydration.  Recommended 64 ounces of fluid per day.    5.  Discussed anti-emetic protocol and bowel regimen protocol.    6.  Office contact information given including after hours number.  Discussed there is an oncologist on call 24/7, 365 days including weekends.  Provided primary nurse's information .    7.  In summary, the patient is in agreement with the plan of care.  Questions appeared to be answered to their satisfaction. Consented the patient to the treatment plan and the patient was educated on the planned duration of the treatment and schedule of the treatment administration. Copy to be scanned into the chart.    All questions answered to the satisfaction of the patient and family.     Follow up appointments given to patient.       JANAK BROWER  PATIENT EDUCATOR  [facility] [place] [place] [facility]

## 2025-06-24 ENCOUNTER — INFUSION (OUTPATIENT)
Dept: INFUSION THERAPY | Facility: HOSPITAL | Age: 40
End: 2025-06-24
Attending: INTERNAL MEDICINE
Payer: MEDICAID

## 2025-06-24 ENCOUNTER — OFFICE VISIT (OUTPATIENT)
Dept: HEMATOLOGY/ONCOLOGY | Facility: CLINIC | Age: 40
End: 2025-06-24
Attending: INTERNAL MEDICINE
Payer: MEDICAID

## 2025-06-24 VITALS
RESPIRATION RATE: 20 BRPM | HEART RATE: 85 BPM | BODY MASS INDEX: 19.51 KG/M2 | DIASTOLIC BLOOD PRESSURE: 67 MMHG | SYSTOLIC BLOOD PRESSURE: 108 MMHG | HEIGHT: 66 IN | WEIGHT: 121.38 LBS | TEMPERATURE: 98 F | OXYGEN SATURATION: 95 %

## 2025-06-24 VITALS
OXYGEN SATURATION: 95 % | HEART RATE: 94 BPM | TEMPERATURE: 98 F | RESPIRATION RATE: 20 BRPM | DIASTOLIC BLOOD PRESSURE: 63 MMHG | WEIGHT: 121.38 LBS | BODY MASS INDEX: 19.59 KG/M2 | SYSTOLIC BLOOD PRESSURE: 105 MMHG

## 2025-06-24 DIAGNOSIS — C7A.1 LARGE CELL NEUROENDOCRINE CARCINOMA: ICD-10-CM

## 2025-06-24 DIAGNOSIS — C79.89 METASTATIC CANCER TO PELVIS: ICD-10-CM

## 2025-06-24 DIAGNOSIS — C79.51 SECONDARY ADENOCARCINOMA OF BONE: ICD-10-CM

## 2025-06-24 DIAGNOSIS — C79.31: ICD-10-CM

## 2025-06-24 DIAGNOSIS — C80.1 MALIGNANT NEOPLASM METASTATIC TO HILUS OF LUNG WITH UNKNOWN PRIMARY SITE, RIGHT: ICD-10-CM

## 2025-06-24 DIAGNOSIS — D63.8 ANEMIA, CHRONIC DISEASE: ICD-10-CM

## 2025-06-24 DIAGNOSIS — R10.10 PAIN OF UPPER ABDOMEN: ICD-10-CM

## 2025-06-24 DIAGNOSIS — C77.1 SECONDARY MALIGNANCY OF MEDIASTINAL LYMPH NODES: ICD-10-CM

## 2025-06-24 DIAGNOSIS — E27.8 ADRENAL MASS, LEFT: ICD-10-CM

## 2025-06-24 DIAGNOSIS — C79.72 SECONDARY MALIGNANT NEOPLASM OF CORTEX OF LEFT ADRENAL GLAND: ICD-10-CM

## 2025-06-24 DIAGNOSIS — C80.1 MALIGNANT NEOPLASM METASTATIC TO HILUS OF LUNG WITH UNKNOWN PRIMARY SITE, RIGHT: Primary | ICD-10-CM

## 2025-06-24 DIAGNOSIS — C7A.8 NEUROENDOCRINE CARCINOMA OF LUNG: ICD-10-CM

## 2025-06-24 DIAGNOSIS — C80.1: ICD-10-CM

## 2025-06-24 DIAGNOSIS — C77.0 SECONDARY MALIGNANCY OF SUPRACLAVICULAR LYMPH NODES: ICD-10-CM

## 2025-06-24 DIAGNOSIS — R04.2 HEMOPTYSIS: Primary | ICD-10-CM

## 2025-06-24 DIAGNOSIS — R91.1 NODULE OF LOWER LOBE OF LEFT LUNG: ICD-10-CM

## 2025-06-24 DIAGNOSIS — C78.01 MALIGNANT NEOPLASM METASTATIC TO HILUS OF LUNG WITH UNKNOWN PRIMARY SITE, RIGHT: ICD-10-CM

## 2025-06-24 DIAGNOSIS — R59.0 RETROPERITONEAL LYMPHADENOPATHY: ICD-10-CM

## 2025-06-24 DIAGNOSIS — G89.3 CANCER RELATED PAIN: Primary | ICD-10-CM

## 2025-06-24 DIAGNOSIS — R59.0 CERVICAL LYMPHADENOPATHY: ICD-10-CM

## 2025-06-24 DIAGNOSIS — K92.1 BLOODY STOOL: ICD-10-CM

## 2025-06-24 DIAGNOSIS — C78.01 MALIGNANT NEOPLASM METASTATIC TO HILUS OF LUNG WITH UNKNOWN PRIMARY SITE, RIGHT: Primary | ICD-10-CM

## 2025-06-24 DIAGNOSIS — G91.1 OBSTRUCTIVE HYDROCEPHALUS: ICD-10-CM

## 2025-06-24 DIAGNOSIS — Z51.11 CHEMOTHERAPY MANAGEMENT, ENCOUNTER FOR: ICD-10-CM

## 2025-06-24 DIAGNOSIS — K62.9 RECTAL ABNORMALITY: ICD-10-CM

## 2025-06-24 PROCEDURE — 1111F DSCHRG MED/CURRENT MED MERGE: CPT | Mod: CPTII,,, | Performed by: INTERNAL MEDICINE

## 2025-06-24 PROCEDURE — 77338 DESIGN MLC DEVICE FOR IMRT: CPT | Performed by: RADIOLOGY

## 2025-06-24 PROCEDURE — 3078F DIAST BP <80 MM HG: CPT | Mod: CPTII,,, | Performed by: INTERNAL MEDICINE

## 2025-06-24 PROCEDURE — 96413 CHEMO IV INFUSION 1 HR: CPT

## 2025-06-24 PROCEDURE — 3074F SYST BP LT 130 MM HG: CPT | Mod: CPTII,,, | Performed by: INTERNAL MEDICINE

## 2025-06-24 PROCEDURE — 25000003 PHARM REV CODE 250: Performed by: INTERNAL MEDICINE

## 2025-06-24 PROCEDURE — 63600175 PHARM REV CODE 636 W HCPCS: Performed by: INTERNAL MEDICINE

## 2025-06-24 PROCEDURE — 77300 RADIATION THERAPY DOSE PLAN: CPT | Performed by: RADIOLOGY

## 2025-06-24 PROCEDURE — 96367 TX/PROPH/DG ADDL SEQ IV INF: CPT

## 2025-06-24 PROCEDURE — 99215 OFFICE O/P EST HI 40 MIN: CPT | Mod: PBBFAC | Performed by: INTERNAL MEDICINE

## 2025-06-24 PROCEDURE — 99215 OFFICE O/P EST HI 40 MIN: CPT | Mod: S$PBB,,, | Performed by: INTERNAL MEDICINE

## 2025-06-24 PROCEDURE — A4216 STERILE WATER/SALINE, 10 ML: HCPCS | Performed by: INTERNAL MEDICINE

## 2025-06-24 PROCEDURE — 1159F MED LIST DOCD IN RCRD: CPT | Mod: CPTII,,, | Performed by: INTERNAL MEDICINE

## 2025-06-24 PROCEDURE — 77301 RADIOTHERAPY DOSE PLAN IMRT: CPT | Performed by: RADIOLOGY

## 2025-06-24 PROCEDURE — 1160F RVW MEDS BY RX/DR IN RCRD: CPT | Mod: CPTII,,, | Performed by: INTERNAL MEDICINE

## 2025-06-24 PROCEDURE — 3008F BODY MASS INDEX DOCD: CPT | Mod: CPTII,,, | Performed by: INTERNAL MEDICINE

## 2025-06-24 RX ORDER — ATROPINE SULFATE 0.4 MG/ML
0.4 INJECTION, SOLUTION ENDOTRACHEAL; INTRAMEDULLARY; INTRAMUSCULAR; INTRAVENOUS; SUBCUTANEOUS ONCE AS NEEDED
Status: COMPLETED | OUTPATIENT
Start: 2025-06-24 | End: 2025-06-24

## 2025-06-24 RX ORDER — HEPARIN 100 UNIT/ML
500 SYRINGE INTRAVENOUS
Status: DISCONTINUED | OUTPATIENT
Start: 2025-06-24 | End: 2025-06-24 | Stop reason: HOSPADM

## 2025-06-24 RX ORDER — SODIUM CHLORIDE 0.9 % (FLUSH) 0.9 %
10 SYRINGE (ML) INJECTION
Status: DISCONTINUED | OUTPATIENT
Start: 2025-06-24 | End: 2025-06-24 | Stop reason: HOSPADM

## 2025-06-24 RX ORDER — DIPHENHYDRAMINE HYDROCHLORIDE 50 MG/ML
50 INJECTION, SOLUTION INTRAMUSCULAR; INTRAVENOUS ONCE AS NEEDED
Status: DISCONTINUED | OUTPATIENT
Start: 2025-06-24 | End: 2025-06-24 | Stop reason: HOSPADM

## 2025-06-24 RX ORDER — PROCHLORPERAZINE EDISYLATE 5 MG/ML
10 INJECTION INTRAMUSCULAR; INTRAVENOUS ONCE AS NEEDED
Status: DISCONTINUED | OUTPATIENT
Start: 2025-06-24 | End: 2025-06-24 | Stop reason: HOSPADM

## 2025-06-24 RX ADMIN — HEPARIN 500 UNITS: 100 SYRINGE at 01:06

## 2025-06-24 RX ADMIN — SODIUM CHLORIDE 540 MG: 9 INJECTION, SOLUTION INTRAVENOUS at 12:06

## 2025-06-24 RX ADMIN — SODIUM CHLORIDE: 9 INJECTION, SOLUTION INTRAVENOUS at 11:06

## 2025-06-24 RX ADMIN — DEXAMETHASONE SODIUM PHOSPHATE 0.25 MG: 4 INJECTION, SOLUTION INTRA-ARTICULAR; INTRALESIONAL; INTRAMUSCULAR; INTRAVENOUS; SOFT TISSUE at 11:06

## 2025-06-24 RX ADMIN — ATROPINE SULFATE 0.4 MG: 0.4 INJECTION, SOLUTION INTRAVENOUS at 11:06

## 2025-06-24 RX ADMIN — Medication 10 ML: at 01:06

## 2025-06-24 NOTE — Clinical Note
Orders for 06/24/2025:  Follow-up with Radiation Oncology for SRS to brain metastases  Start irinotecan ASAP  CBC and CMP weekly during chemotherapy  Re-stage with contrast-enhanced CT scans of C/A/P and bone scan at least 2-1/2 months after starting irinotecan Dental evaluation and preventive Dentistry before we can start him on denosumab or bisphosphonate for bone metastases  Continue gabapentin and Cymbalta for neuropathy  Continue narcotics for pain  Follow-up with NP in a couple of weeks

## 2025-06-24 NOTE — NURSING
1125  Pt did labs, saw Dr. Delatorre, and is here for C 1 irinotecan.  Pt amb with a limp.  Pt is undergoing radiation to pelvis.  Pt  rates LOP 8/10 to left pelvis.  Pt reports some SOB and fatigue.  Pt states he is picking up his compazine, zyprexa, and imodium after his infusion.
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
## 2025-06-25 ENCOUNTER — TELEPHONE (OUTPATIENT)
Dept: HEMATOLOGY/ONCOLOGY | Facility: CLINIC | Age: 40
End: 2025-06-25
Payer: MEDICAID

## 2025-06-25 PROCEDURE — 77373 STRTCTC BDY RAD THER TX DLVR: CPT | Performed by: RADIOLOGY

## 2025-06-25 NOTE — TELEPHONE ENCOUNTER
Received referral, spoke to patient. He is not interested in therapy at this time. He has a lot going on.

## 2025-06-26 PROCEDURE — 77373 STRTCTC BDY RAD THER TX DLVR: CPT | Performed by: RADIOLOGY

## 2025-06-27 DIAGNOSIS — C79.31 SECONDARY MALIGNANT NEOPLASM OF BRAIN AND SPINAL CORD: Primary | ICD-10-CM

## 2025-06-27 DIAGNOSIS — C79.49 SECONDARY MALIGNANT NEOPLASM OF BRAIN AND SPINAL CORD: Primary | ICD-10-CM

## 2025-06-27 PROCEDURE — 77373 STRTCTC BDY RAD THER TX DLVR: CPT

## 2025-06-30 ENCOUNTER — TELEPHONE (OUTPATIENT)
Dept: HEMATOLOGY/ONCOLOGY | Facility: CLINIC | Age: 40
End: 2025-06-30
Payer: MEDICAID

## 2025-06-30 NOTE — TELEPHONE ENCOUNTER
Called patient to confirm appointment for 7/1/25. Patient did not answer the phone left a voice message. Called patient sister Franca Ball did not answer the phone left a voice message. Called patient father Odilon Anguiano did not answer the phone and unable to leave a voice message.

## 2025-07-01 ENCOUNTER — LAB VISIT (OUTPATIENT)
Dept: HEMATOLOGY/ONCOLOGY | Facility: CLINIC | Age: 40
End: 2025-07-01
Payer: MEDICAID

## 2025-07-01 ENCOUNTER — OFFICE VISIT (OUTPATIENT)
Dept: HEMATOLOGY/ONCOLOGY | Facility: CLINIC | Age: 40
End: 2025-07-01
Payer: MEDICAID

## 2025-07-01 ENCOUNTER — TELEPHONE (OUTPATIENT)
Dept: HEMATOLOGY/ONCOLOGY | Facility: CLINIC | Age: 40
End: 2025-07-01
Payer: MEDICAID

## 2025-07-01 VITALS
HEART RATE: 118 BPM | BODY MASS INDEX: 17.62 KG/M2 | TEMPERATURE: 98 F | WEIGHT: 109.63 LBS | HEIGHT: 66 IN | SYSTOLIC BLOOD PRESSURE: 117 MMHG | DIASTOLIC BLOOD PRESSURE: 76 MMHG | OXYGEN SATURATION: 98 % | RESPIRATION RATE: 16 BRPM

## 2025-07-01 DIAGNOSIS — C79.89 METASTATIC CANCER TO PELVIS: ICD-10-CM

## 2025-07-01 DIAGNOSIS — R91.1 NODULE OF LOWER LOBE OF LEFT LUNG: ICD-10-CM

## 2025-07-01 DIAGNOSIS — C34.90 MALIGNANT NEOPLASM OF LUNG, UNSPECIFIED LATERALITY, UNSPECIFIED PART OF LUNG: ICD-10-CM

## 2025-07-01 DIAGNOSIS — C78.01 MALIGNANT NEOPLASM METASTATIC TO HILUS OF LUNG WITH UNKNOWN PRIMARY SITE, RIGHT: ICD-10-CM

## 2025-07-01 DIAGNOSIS — Z51.11 CHEMOTHERAPY MANAGEMENT, ENCOUNTER FOR: ICD-10-CM

## 2025-07-01 DIAGNOSIS — C7A.1 LARGE CELL NEUROENDOCRINE CARCINOMA: Primary | ICD-10-CM

## 2025-07-01 DIAGNOSIS — Z09 CHEMOTHERAPY FOLLOW-UP EXAMINATION: ICD-10-CM

## 2025-07-01 DIAGNOSIS — C7A.1 LARGE CELL NEUROENDOCRINE CARCINOMA: ICD-10-CM

## 2025-07-01 DIAGNOSIS — R51.9 FREQUENT HEADACHES: ICD-10-CM

## 2025-07-01 DIAGNOSIS — R63.0 DECREASED APPETITE: ICD-10-CM

## 2025-07-01 DIAGNOSIS — G47.01 INSOMNIA DUE TO MEDICAL CONDITION: ICD-10-CM

## 2025-07-01 DIAGNOSIS — G89.3 CANCER RELATED PAIN: ICD-10-CM

## 2025-07-01 DIAGNOSIS — C7A.8 NEUROENDOCRINE CARCINOMA OF LUNG: ICD-10-CM

## 2025-07-01 DIAGNOSIS — C80.1 MALIGNANT NEOPLASM METASTATIC TO HILUS OF LUNG WITH UNKNOWN PRIMARY SITE, RIGHT: ICD-10-CM

## 2025-07-01 DIAGNOSIS — C79.51 SECONDARY ADENOCARCINOMA OF BONE: ICD-10-CM

## 2025-07-01 LAB
ALBUMIN SERPL-MCNC: 3.5 G/DL (ref 3.5–5)
ALBUMIN/GLOB SERPL: 0.7 RATIO (ref 1.1–2)
ALP SERPL-CCNC: 101 UNIT/L (ref 40–150)
ALT SERPL-CCNC: 36 UNIT/L (ref 0–55)
ANION GAP SERPL CALC-SCNC: 9 MEQ/L
AST SERPL-CCNC: 20 UNIT/L (ref 11–45)
BASOPHILS # BLD AUTO: 0.01 X10(3)/MCL
BASOPHILS NFR BLD AUTO: 0.2 %
BILIRUB SERPL-MCNC: 0.4 MG/DL
BUN SERPL-MCNC: 15.7 MG/DL (ref 8.9–20.6)
CALCIUM SERPL-MCNC: 9.8 MG/DL (ref 8.4–10.2)
CHLORIDE SERPL-SCNC: 101 MMOL/L (ref 98–107)
CO2 SERPL-SCNC: 27 MMOL/L (ref 22–29)
CREAT SERPL-MCNC: 0.87 MG/DL (ref 0.72–1.25)
CREAT/UREA NIT SERPL: 18
EOSINOPHIL # BLD AUTO: 0.05 X10(3)/MCL (ref 0–0.9)
EOSINOPHIL NFR BLD AUTO: 0.9 %
ERYTHROCYTE [DISTWIDTH] IN BLOOD BY AUTOMATED COUNT: 18.8 % (ref 11.5–17)
GFR SERPLBLD CREATININE-BSD FMLA CKD-EPI: >60 ML/MIN/1.73/M2
GLOBULIN SER-MCNC: 4.8 GM/DL (ref 2.4–3.5)
GLUCOSE SERPL-MCNC: 130 MG/DL (ref 74–100)
HCT VFR BLD AUTO: 44.2 % (ref 42–52)
HGB BLD-MCNC: 14.7 G/DL (ref 14–18)
IMM GRANULOCYTES # BLD AUTO: 0.07 X10(3)/MCL (ref 0–0.04)
IMM GRANULOCYTES NFR BLD AUTO: 1.3 %
LYMPHOCYTES # BLD AUTO: 0.42 X10(3)/MCL (ref 0.6–4.6)
LYMPHOCYTES NFR BLD AUTO: 7.9 %
MAGNESIUM SERPL-MCNC: 2 MG/DL (ref 1.6–2.6)
MCH RBC QN AUTO: 34 PG (ref 27–31)
MCHC RBC AUTO-ENTMCNC: 33.3 G/DL (ref 33–36)
MCV RBC AUTO: 102.3 FL (ref 80–94)
MONOCYTES # BLD AUTO: 0.27 X10(3)/MCL (ref 0.1–1.3)
MONOCYTES NFR BLD AUTO: 5 %
NEUTROPHILS # BLD AUTO: 4.53 X10(3)/MCL (ref 2.1–9.2)
NEUTROPHILS NFR BLD AUTO: 84.7 %
NRBC BLD AUTO-RTO: 0 %
PLATELET # BLD AUTO: 226 X10(3)/MCL (ref 130–400)
PMV BLD AUTO: 8.7 FL (ref 7.4–10.4)
POTASSIUM SERPL-SCNC: 4.3 MMOL/L (ref 3.5–5.1)
PROT SERPL-MCNC: 8.3 GM/DL (ref 6.4–8.3)
RBC # BLD AUTO: 4.32 X10(6)/MCL (ref 4.7–6.1)
SODIUM SERPL-SCNC: 137 MMOL/L (ref 136–145)
T4 FREE SERPL-MCNC: 1.18 NG/DL (ref 0.7–1.48)
TSH SERPL-ACNC: 0.39 UIU/ML (ref 0.35–4.94)
WBC # BLD AUTO: 5.35 X10(3)/MCL (ref 4.5–11.5)

## 2025-07-01 PROCEDURE — 3008F BODY MASS INDEX DOCD: CPT | Mod: CPTII,,,

## 2025-07-01 PROCEDURE — 85025 COMPLETE CBC W/AUTO DIFF WBC: CPT

## 2025-07-01 PROCEDURE — 80053 COMPREHEN METABOLIC PANEL: CPT

## 2025-07-01 PROCEDURE — 84439 ASSAY OF FREE THYROXINE: CPT

## 2025-07-01 PROCEDURE — 1111F DSCHRG MED/CURRENT MED MERGE: CPT | Mod: CPTII,,,

## 2025-07-01 PROCEDURE — 3078F DIAST BP <80 MM HG: CPT | Mod: CPTII,,,

## 2025-07-01 PROCEDURE — 1159F MED LIST DOCD IN RCRD: CPT | Mod: CPTII,,,

## 2025-07-01 PROCEDURE — 83735 ASSAY OF MAGNESIUM: CPT

## 2025-07-01 PROCEDURE — 99215 OFFICE O/P EST HI 40 MIN: CPT | Mod: S$PBB,,,

## 2025-07-01 PROCEDURE — 1160F RVW MEDS BY RX/DR IN RCRD: CPT | Mod: CPTII,,,

## 2025-07-01 PROCEDURE — 84443 ASSAY THYROID STIM HORMONE: CPT

## 2025-07-01 PROCEDURE — 99214 OFFICE O/P EST MOD 30 MIN: CPT | Mod: PBBFAC

## 2025-07-01 PROCEDURE — 3074F SYST BP LT 130 MM HG: CPT | Mod: CPTII,,,

## 2025-07-01 RX ORDER — RAMELTEON 8 MG/1
8 TABLET ORAL NIGHTLY
Qty: 30 TABLET | Refills: 0 | Status: SHIPPED | OUTPATIENT
Start: 2025-07-01 | End: 2025-07-31

## 2025-07-01 RX ORDER — OXYCODONE HYDROCHLORIDE 20 MG/1
20 TABLET ORAL EVERY 6 HOURS PRN
Qty: 56 TABLET | Refills: 0 | Status: SHIPPED | OUTPATIENT
Start: 2025-07-01

## 2025-07-01 RX ORDER — LOPERAMIDE HYDROCHLORIDE 2 MG/1
CAPSULE ORAL
Qty: 30 CAPSULE | Refills: 11 | Status: SHIPPED | OUTPATIENT
Start: 2025-07-01

## 2025-07-01 RX ORDER — OLANZAPINE 2.5 MG/1
2.5 TABLET, FILM COATED ORAL DAILY
Qty: 84 TABLET | Refills: 0 | Status: SHIPPED | OUTPATIENT
Start: 2025-07-01

## 2025-07-01 NOTE — Clinical Note
-Schedule an appt with MD within the next week to FU regarding completion of XRT with labs prior (CBC/CMP/Mag level)  - RTC with me on 7/15 with labs (CBC/CMP/Mag level) followed by C2 of irinotecan
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
## 2025-07-01 NOTE — TELEPHONE ENCOUNTER
Called and informed patient he has an appointment with Dr. Delatorre on Thursday July 10 at 12:30 labs and 1:40 pm . Patient asked if he will get a text message to remind him and I informed him we will call the day before to remind about appointment. Patient verbalized understanding.

## 2025-07-08 ENCOUNTER — LAB VISIT (OUTPATIENT)
Dept: HEMATOLOGY/ONCOLOGY | Facility: CLINIC | Age: 40
End: 2025-07-08
Attending: INTERNAL MEDICINE
Payer: MEDICAID

## 2025-07-08 DIAGNOSIS — C7A.8 NEUROENDOCRINE CARCINOMA OF LUNG: ICD-10-CM

## 2025-07-08 DIAGNOSIS — C7A.1 LARGE CELL NEUROENDOCRINE CARCINOMA: ICD-10-CM

## 2025-07-08 DIAGNOSIS — R91.1 NODULE OF LOWER LOBE OF LEFT LUNG: ICD-10-CM

## 2025-07-08 DIAGNOSIS — E87.6 HYPOKALEMIA: Primary | ICD-10-CM

## 2025-07-08 LAB
ALBUMIN SERPL-MCNC: 3.2 G/DL (ref 3.5–5)
ALBUMIN/GLOB SERPL: 0.8 RATIO (ref 1.1–2)
ALP SERPL-CCNC: 96 UNIT/L (ref 40–150)
ALT SERPL-CCNC: 21 UNIT/L (ref 0–55)
ANION GAP SERPL CALC-SCNC: 11 MEQ/L
AST SERPL-CCNC: 14 UNIT/L (ref 11–45)
BASOPHILS # BLD AUTO: 0.02 X10(3)/MCL
BASOPHILS NFR BLD AUTO: 0.3 %
BILIRUB SERPL-MCNC: 0.2 MG/DL
BUN SERPL-MCNC: 11.1 MG/DL (ref 8.9–20.6)
CALCIUM SERPL-MCNC: 9 MG/DL (ref 8.4–10.2)
CHLORIDE SERPL-SCNC: 107 MMOL/L (ref 98–107)
CO2 SERPL-SCNC: 22 MMOL/L (ref 22–29)
CREAT SERPL-MCNC: 0.8 MG/DL (ref 0.72–1.25)
CREAT/UREA NIT SERPL: 14
EOSINOPHIL # BLD AUTO: 0.01 X10(3)/MCL (ref 0–0.9)
EOSINOPHIL NFR BLD AUTO: 0.1 %
ERYTHROCYTE [DISTWIDTH] IN BLOOD BY AUTOMATED COUNT: 19.2 % (ref 11.5–17)
GFR SERPLBLD CREATININE-BSD FMLA CKD-EPI: >60 ML/MIN/1.73/M2
GLOBULIN SER-MCNC: 4.1 GM/DL (ref 2.4–3.5)
GLUCOSE SERPL-MCNC: 186 MG/DL (ref 74–100)
HCT VFR BLD AUTO: 42.9 % (ref 42–52)
HGB BLD-MCNC: 14.1 G/DL (ref 14–18)
IMM GRANULOCYTES # BLD AUTO: 0.09 X10(3)/MCL (ref 0–0.04)
IMM GRANULOCYTES NFR BLD AUTO: 1.2 %
LYMPHOCYTES # BLD AUTO: 0.23 X10(3)/MCL (ref 0.6–4.6)
LYMPHOCYTES NFR BLD AUTO: 3.1 %
MCH RBC QN AUTO: 34.7 PG (ref 27–31)
MCHC RBC AUTO-ENTMCNC: 32.9 G/DL (ref 33–36)
MCV RBC AUTO: 105.7 FL (ref 80–94)
MONOCYTES # BLD AUTO: 0.19 X10(3)/MCL (ref 0.1–1.3)
MONOCYTES NFR BLD AUTO: 2.6 %
NEUTROPHILS # BLD AUTO: 6.91 X10(3)/MCL (ref 2.1–9.2)
NEUTROPHILS NFR BLD AUTO: 92.7 %
NRBC BLD AUTO-RTO: 0.7 %
PLATELET # BLD AUTO: 164 X10(3)/MCL (ref 130–400)
PMV BLD AUTO: 8.4 FL (ref 7.4–10.4)
POTASSIUM SERPL-SCNC: 3.3 MMOL/L (ref 3.5–5.1)
PROT SERPL-MCNC: 7.3 GM/DL (ref 6.4–8.3)
RBC # BLD AUTO: 4.06 X10(6)/MCL (ref 4.7–6.1)
SODIUM SERPL-SCNC: 140 MMOL/L (ref 136–145)
WBC # BLD AUTO: 7.45 X10(3)/MCL (ref 4.5–11.5)

## 2025-07-08 PROCEDURE — 85025 COMPLETE CBC W/AUTO DIFF WBC: CPT

## 2025-07-08 PROCEDURE — 80053 COMPREHEN METABOLIC PANEL: CPT

## 2025-07-08 RX ORDER — POTASSIUM CHLORIDE 20 MEQ/1
20 TABLET, EXTENDED RELEASE ORAL DAILY
Qty: 14 TABLET | Refills: 0 | Status: SHIPPED | OUTPATIENT
Start: 2025-07-08 | End: 2025-07-22

## 2025-07-10 ENCOUNTER — TELEPHONE (OUTPATIENT)
Dept: HEMATOLOGY/ONCOLOGY | Facility: CLINIC | Age: 40
End: 2025-07-10
Payer: MEDICAID

## 2025-07-10 PROBLEM — T45.1X5A CHEMOTHERAPY INDUCED DIARRHEA: Status: ACTIVE | Noted: 2025-07-10

## 2025-07-10 PROBLEM — K52.1 CHEMOTHERAPY INDUCED DIARRHEA: Status: ACTIVE | Noted: 2025-07-10

## 2025-07-10 PROBLEM — R63.0 ANOREXIA: Status: ACTIVE | Noted: 2025-07-10

## 2025-07-11 ENCOUNTER — TELEPHONE (OUTPATIENT)
Dept: HEMATOLOGY/ONCOLOGY | Facility: CLINIC | Age: 40
End: 2025-07-11
Payer: MEDICAID
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
## 2025-07-14 ENCOUNTER — OFFICE VISIT (OUTPATIENT)
Dept: HEMATOLOGY/ONCOLOGY | Facility: CLINIC | Age: 40
End: 2025-07-14
Attending: INTERNAL MEDICINE
Payer: MEDICAID

## 2025-07-14 VITALS
WEIGHT: 121 LBS | DIASTOLIC BLOOD PRESSURE: 62 MMHG | BODY MASS INDEX: 19.53 KG/M2 | HEART RATE: 103 BPM | TEMPERATURE: 98 F | OXYGEN SATURATION: 97 % | RESPIRATION RATE: 18 BRPM | SYSTOLIC BLOOD PRESSURE: 103 MMHG

## 2025-07-14 DIAGNOSIS — C78.01 MALIGNANT NEOPLASM METASTATIC TO HILUS OF LUNG WITH UNKNOWN PRIMARY SITE, RIGHT: ICD-10-CM

## 2025-07-14 DIAGNOSIS — C79.51 SECONDARY ADENOCARCINOMA OF BONE: ICD-10-CM

## 2025-07-14 DIAGNOSIS — C7A.8 NEUROENDOCRINE CARCINOMA OF LUNG: ICD-10-CM

## 2025-07-14 DIAGNOSIS — C34.90 MALIGNANT NEOPLASM OF LUNG, UNSPECIFIED LATERALITY, UNSPECIFIED PART OF LUNG: ICD-10-CM

## 2025-07-14 DIAGNOSIS — C79.31: ICD-10-CM

## 2025-07-14 DIAGNOSIS — D63.8 ANEMIA, CHRONIC DISEASE: ICD-10-CM

## 2025-07-14 DIAGNOSIS — E87.6 HYPOKALEMIA: Primary | ICD-10-CM

## 2025-07-14 DIAGNOSIS — C77.1 SECONDARY MALIGNANCY OF MEDIASTINAL LYMPH NODES: ICD-10-CM

## 2025-07-14 DIAGNOSIS — K92.1 BLOODY STOOL: ICD-10-CM

## 2025-07-14 DIAGNOSIS — C80.1: ICD-10-CM

## 2025-07-14 DIAGNOSIS — E87.6 HYPOKALEMIA: ICD-10-CM

## 2025-07-14 DIAGNOSIS — R51.9 FREQUENT HEADACHES: ICD-10-CM

## 2025-07-14 DIAGNOSIS — R91.1 NODULE OF LOWER LOBE OF LEFT LUNG: ICD-10-CM

## 2025-07-14 DIAGNOSIS — C77.0 SECONDARY MALIGNANCY OF SUPRACLAVICULAR LYMPH NODES: ICD-10-CM

## 2025-07-14 DIAGNOSIS — R59.0 RETROPERITONEAL LYMPHADENOPATHY: ICD-10-CM

## 2025-07-14 DIAGNOSIS — C7A.1 LARGE CELL NEUROENDOCRINE CARCINOMA: ICD-10-CM

## 2025-07-14 DIAGNOSIS — G89.3 CANCER RELATED PAIN: ICD-10-CM

## 2025-07-14 DIAGNOSIS — T45.1X5A IMMUNOSUPPRESSED DUE TO CHEMOTHERAPY: ICD-10-CM

## 2025-07-14 DIAGNOSIS — R07.9 CHEST PAIN, UNSPECIFIED TYPE: ICD-10-CM

## 2025-07-14 DIAGNOSIS — C79.89 METASTATIC CANCER TO PELVIS: ICD-10-CM

## 2025-07-14 DIAGNOSIS — C80.1 MALIGNANT NEOPLASM METASTATIC TO HILUS OF LUNG WITH UNKNOWN PRIMARY SITE, RIGHT: ICD-10-CM

## 2025-07-14 DIAGNOSIS — D84.821 IMMUNOSUPPRESSED DUE TO CHEMOTHERAPY: ICD-10-CM

## 2025-07-14 DIAGNOSIS — R59.0 CERVICAL LYMPHADENOPATHY: ICD-10-CM

## 2025-07-14 DIAGNOSIS — E27.8 ADRENAL MASS, LEFT: ICD-10-CM

## 2025-07-14 DIAGNOSIS — R06.00 DYSPNEA, UNSPECIFIED TYPE: ICD-10-CM

## 2025-07-14 DIAGNOSIS — G91.1 OBSTRUCTIVE HYDROCEPHALUS: Primary | ICD-10-CM

## 2025-07-14 DIAGNOSIS — Z51.11 CHEMOTHERAPY MANAGEMENT, ENCOUNTER FOR: ICD-10-CM

## 2025-07-14 DIAGNOSIS — K62.9 RECTAL ABNORMALITY: ICD-10-CM

## 2025-07-14 DIAGNOSIS — C79.72 SECONDARY MALIGNANT NEOPLASM OF CORTEX OF LEFT ADRENAL GLAND: ICD-10-CM

## 2025-07-14 DIAGNOSIS — R04.2 HEMOPTYSIS: ICD-10-CM

## 2025-07-14 DIAGNOSIS — Z79.69 IMMUNOSUPPRESSED DUE TO CHEMOTHERAPY: ICD-10-CM

## 2025-07-14 PROCEDURE — 1160F RVW MEDS BY RX/DR IN RCRD: CPT | Mod: CPTII,,, | Performed by: INTERNAL MEDICINE

## 2025-07-14 PROCEDURE — 99214 OFFICE O/P EST MOD 30 MIN: CPT | Mod: PBBFAC | Performed by: INTERNAL MEDICINE

## 2025-07-14 PROCEDURE — 3074F SYST BP LT 130 MM HG: CPT | Mod: CPTII,,, | Performed by: INTERNAL MEDICINE

## 2025-07-14 PROCEDURE — 3078F DIAST BP <80 MM HG: CPT | Mod: CPTII,,, | Performed by: INTERNAL MEDICINE

## 2025-07-14 PROCEDURE — 1159F MED LIST DOCD IN RCRD: CPT | Mod: CPTII,,, | Performed by: INTERNAL MEDICINE

## 2025-07-14 PROCEDURE — 99215 OFFICE O/P EST HI 40 MIN: CPT | Mod: S$PBB,,, | Performed by: INTERNAL MEDICINE

## 2025-07-14 PROCEDURE — 3008F BODY MASS INDEX DOCD: CPT | Mod: CPTII,,, | Performed by: INTERNAL MEDICINE

## 2025-07-14 RX ORDER — DIPHENHYDRAMINE HYDROCHLORIDE 50 MG/ML
50 INJECTION, SOLUTION INTRAMUSCULAR; INTRAVENOUS ONCE AS NEEDED
Status: CANCELLED | OUTPATIENT
Start: 2025-07-15

## 2025-07-14 RX ORDER — POTASSIUM CHLORIDE 1500 MG/1
40 TABLET, EXTENDED RELEASE ORAL DAILY
Qty: 28 TABLET | Refills: 0 | Status: SHIPPED | OUTPATIENT
Start: 2025-07-14 | End: 2025-07-28

## 2025-07-14 RX ORDER — EPINEPHRINE 0.3 MG/.3ML
0.3 INJECTION SUBCUTANEOUS ONCE AS NEEDED
Status: CANCELLED | OUTPATIENT
Start: 2025-07-15

## 2025-07-14 RX ORDER — HEPARIN 100 UNIT/ML
500 SYRINGE INTRAVENOUS
Status: CANCELLED | OUTPATIENT
Start: 2025-07-15

## 2025-07-14 RX ORDER — OXYCODONE HYDROCHLORIDE 20 MG/1
20 TABLET ORAL EVERY 6 HOURS PRN
Qty: 56 TABLET | Refills: 0 | Status: SHIPPED | OUTPATIENT
Start: 2025-07-14

## 2025-07-14 RX ORDER — PROCHLORPERAZINE EDISYLATE 5 MG/ML
10 INJECTION INTRAMUSCULAR; INTRAVENOUS ONCE AS NEEDED
Status: CANCELLED
Start: 2025-07-15

## 2025-07-14 RX ORDER — SODIUM CHLORIDE 0.9 % (FLUSH) 0.9 %
10 SYRINGE (ML) INJECTION
Status: CANCELLED | OUTPATIENT
Start: 2025-07-15

## 2025-07-14 RX ORDER — ATROPINE SULFATE 0.4 MG/ML
0.4 INJECTION, SOLUTION ENDOTRACHEAL; INTRAMEDULLARY; INTRAMUSCULAR; INTRAVENOUS; SUBCUTANEOUS ONCE AS NEEDED
Status: CANCELLED | OUTPATIENT
Start: 2025-07-15

## 2025-07-14 NOTE — Clinical Note
Orders for 07/14/2025: -start potassium chloride 40 mEq p.o. q.d. x2 weeks; no refills; in 2 weeks, recheck CMP and magnesium level  Continue irinotecan every 3 weeks Check CBC and CMP weekly  Re-stage with contrast-enhanced CT scans of C/A/P and whole-body nuclear medicine bone scan end of August Dental evaluation and preventive Dentistry  Continue Zyprexa boost appetite  Lomotil PRN for diarrhea Remeron 8 mg p.o. q.h.s. to help with sleep  Continue gabapentin and Cymbalta for neuropathy Continue narcotics for cancer related pain Follow-up with NP in 2 weeks for toxicity check

## 2025-07-15 ENCOUNTER — INFUSION (OUTPATIENT)
Dept: INFUSION THERAPY | Facility: HOSPITAL | Age: 40
End: 2025-07-15
Attending: INTERNAL MEDICINE
Payer: MEDICAID

## 2025-07-15 VITALS
HEIGHT: 66 IN | BODY MASS INDEX: 19.22 KG/M2 | OXYGEN SATURATION: 98 % | HEART RATE: 104 BPM | WEIGHT: 119.63 LBS | TEMPERATURE: 98 F | SYSTOLIC BLOOD PRESSURE: 105 MMHG | DIASTOLIC BLOOD PRESSURE: 65 MMHG | RESPIRATION RATE: 20 BRPM

## 2025-07-15 DIAGNOSIS — C7A.8 NEUROENDOCRINE CARCINOMA OF LUNG: ICD-10-CM

## 2025-07-15 DIAGNOSIS — G89.3 CANCER RELATED PAIN: Primary | ICD-10-CM

## 2025-07-15 PROCEDURE — 96413 CHEMO IV INFUSION 1 HR: CPT

## 2025-07-15 PROCEDURE — 25000003 PHARM REV CODE 250: Performed by: INTERNAL MEDICINE

## 2025-07-15 PROCEDURE — 96375 TX/PRO/DX INJ NEW DRUG ADDON: CPT

## 2025-07-15 PROCEDURE — 63600175 PHARM REV CODE 636 W HCPCS: Performed by: INTERNAL MEDICINE

## 2025-07-15 PROCEDURE — 96367 TX/PROPH/DG ADDL SEQ IV INF: CPT

## 2025-07-15 PROCEDURE — A4216 STERILE WATER/SALINE, 10 ML: HCPCS | Performed by: INTERNAL MEDICINE

## 2025-07-15 RX ORDER — EPINEPHRINE 1 MG/ML
0.3 INJECTION INTRAMUSCULAR; INTRAVENOUS; SUBCUTANEOUS ONCE AS NEEDED
Status: DISCONTINUED | OUTPATIENT
Start: 2025-07-15 | End: 2025-07-15 | Stop reason: HOSPADM

## 2025-07-15 RX ORDER — HEPARIN 100 UNIT/ML
500 SYRINGE INTRAVENOUS
Status: DISCONTINUED | OUTPATIENT
Start: 2025-07-15 | End: 2025-07-15 | Stop reason: HOSPADM

## 2025-07-15 RX ORDER — ATROPINE SULFATE 0.4 MG/ML
0.4 INJECTION, SOLUTION ENDOTRACHEAL; INTRAMEDULLARY; INTRAMUSCULAR; INTRAVENOUS; SUBCUTANEOUS ONCE AS NEEDED
Status: COMPLETED | OUTPATIENT
Start: 2025-07-15 | End: 2025-07-15

## 2025-07-15 RX ORDER — SODIUM CHLORIDE 0.9 % (FLUSH) 0.9 %
10 SYRINGE (ML) INJECTION
Status: DISCONTINUED | OUTPATIENT
Start: 2025-07-15 | End: 2025-07-15 | Stop reason: HOSPADM

## 2025-07-15 RX ORDER — PROCHLORPERAZINE EDISYLATE 5 MG/ML
10 INJECTION INTRAMUSCULAR; INTRAVENOUS ONCE AS NEEDED
Status: DISCONTINUED | OUTPATIENT
Start: 2025-07-15 | End: 2025-07-15 | Stop reason: HOSPADM

## 2025-07-15 RX ORDER — DIPHENHYDRAMINE HYDROCHLORIDE 50 MG/ML
50 INJECTION, SOLUTION INTRAMUSCULAR; INTRAVENOUS ONCE AS NEEDED
Status: DISCONTINUED | OUTPATIENT
Start: 2025-07-15 | End: 2025-07-15 | Stop reason: HOSPADM

## 2025-07-15 RX ADMIN — DEXAMETHASONE SODIUM PHOSPHATE 0.25 MG: 4 INJECTION, SOLUTION INTRA-ARTICULAR; INTRALESIONAL; INTRAMUSCULAR; INTRAVENOUS; SOFT TISSUE at 12:07

## 2025-07-15 RX ADMIN — HEPARIN 500 UNITS: 100 SYRINGE at 02:07

## 2025-07-15 RX ADMIN — ATROPINE SULFATE 0.4 MG: 0.4 INJECTION, SOLUTION INTRAVENOUS at 12:07

## 2025-07-15 RX ADMIN — SODIUM CHLORIDE: 9 INJECTION, SOLUTION INTRAVENOUS at 12:07

## 2025-07-15 RX ADMIN — Medication 10 ML: at 02:07

## 2025-07-15 RX ADMIN — SODIUM CHLORIDE 540 MG: 9 INJECTION, SOLUTION INTRAVENOUS at 12:07

## 2025-07-22 ENCOUNTER — LAB VISIT (OUTPATIENT)
Dept: HEMATOLOGY/ONCOLOGY | Facility: CLINIC | Age: 40
End: 2025-07-22
Attending: INTERNAL MEDICINE
Payer: MEDICAID

## 2025-07-22 DIAGNOSIS — C7A.8 NEUROENDOCRINE CARCINOMA OF LUNG: ICD-10-CM

## 2025-07-22 DIAGNOSIS — C7A.1 LARGE CELL NEUROENDOCRINE CARCINOMA: ICD-10-CM

## 2025-07-22 DIAGNOSIS — R91.1 NODULE OF LOWER LOBE OF LEFT LUNG: ICD-10-CM

## 2025-07-22 LAB
ALBUMIN SERPL-MCNC: 3 G/DL (ref 3.5–5)
ALBUMIN/GLOB SERPL: 0.6 RATIO (ref 1.1–2)
ALP SERPL-CCNC: 79 UNIT/L (ref 40–150)
ALT SERPL-CCNC: 12 UNIT/L (ref 0–55)
ANION GAP SERPL CALC-SCNC: 12 MEQ/L
AST SERPL-CCNC: 20 UNIT/L (ref 11–45)
BASOPHILS # BLD AUTO: 0.01 X10(3)/MCL
BASOPHILS NFR BLD AUTO: 0.2 %
BILIRUB SERPL-MCNC: 0.2 MG/DL
BUN SERPL-MCNC: 11.5 MG/DL (ref 8.9–20.6)
CALCIUM SERPL-MCNC: 9.1 MG/DL (ref 8.4–10.2)
CHLORIDE SERPL-SCNC: 105 MMOL/L (ref 98–107)
CO2 SERPL-SCNC: 19 MMOL/L (ref 22–29)
CREAT SERPL-MCNC: 0.86 MG/DL (ref 0.72–1.25)
CREAT/UREA NIT SERPL: 13
EOSINOPHIL # BLD AUTO: 0.01 X10(3)/MCL (ref 0–0.9)
EOSINOPHIL NFR BLD AUTO: 0.2 %
ERYTHROCYTE [DISTWIDTH] IN BLOOD BY AUTOMATED COUNT: 17.9 % (ref 11.5–17)
GFR SERPLBLD CREATININE-BSD FMLA CKD-EPI: >60 ML/MIN/1.73/M2
GLOBULIN SER-MCNC: 4.8 GM/DL (ref 2.4–3.5)
GLUCOSE SERPL-MCNC: 232 MG/DL (ref 74–100)
HCT VFR BLD AUTO: 37.7 % (ref 42–52)
HGB BLD-MCNC: 12.6 G/DL (ref 14–18)
IMM GRANULOCYTES # BLD AUTO: 0.09 X10(3)/MCL (ref 0–0.04)
IMM GRANULOCYTES NFR BLD AUTO: 2 %
LYMPHOCYTES # BLD AUTO: 0.59 X10(3)/MCL (ref 0.6–4.6)
LYMPHOCYTES NFR BLD AUTO: 12.8 %
MAGNESIUM SERPL-MCNC: 1.8 MG/DL (ref 1.6–2.6)
MCH RBC QN AUTO: 34.7 PG (ref 27–31)
MCHC RBC AUTO-ENTMCNC: 33.4 G/DL (ref 33–36)
MCV RBC AUTO: 103.9 FL (ref 80–94)
MONOCYTES # BLD AUTO: 0.42 X10(3)/MCL (ref 0.1–1.3)
MONOCYTES NFR BLD AUTO: 9.1 %
NEUTROPHILS # BLD AUTO: 3.48 X10(3)/MCL (ref 2.1–9.2)
NEUTROPHILS NFR BLD AUTO: 75.7 %
NRBC BLD AUTO-RTO: 0.4 %
PLATELET # BLD AUTO: 257 X10(3)/MCL (ref 130–400)
PMV BLD AUTO: 9.6 FL (ref 7.4–10.4)
POTASSIUM SERPL-SCNC: 4.3 MMOL/L (ref 3.5–5.1)
PROT SERPL-MCNC: 7.8 GM/DL (ref 6.4–8.3)
RBC # BLD AUTO: 3.63 X10(6)/MCL (ref 4.7–6.1)
SODIUM SERPL-SCNC: 136 MMOL/L (ref 136–145)
WBC # BLD AUTO: 4.6 X10(3)/MCL (ref 4.5–11.5)

## 2025-07-22 PROCEDURE — 85025 COMPLETE CBC W/AUTO DIFF WBC: CPT

## 2025-07-22 PROCEDURE — 83735 ASSAY OF MAGNESIUM: CPT

## 2025-07-22 PROCEDURE — 80053 COMPREHEN METABOLIC PANEL: CPT

## 2025-07-25 ENCOUNTER — HOSPITAL ENCOUNTER (INPATIENT)
Facility: HOSPITAL | Age: 40
LOS: 2 days | Discharge: HOME | DRG: 948 | End: 2025-07-27
Attending: INTERNAL MEDICINE | Admitting: INTERNAL MEDICINE
Payer: MEDICAID

## 2025-07-25 DIAGNOSIS — E87.1 HYPONATREMIA: ICD-10-CM

## 2025-07-25 DIAGNOSIS — E83.52 HYPERCALCEMIA: Primary | ICD-10-CM

## 2025-07-25 DIAGNOSIS — C78.01 MALIGNANT NEOPLASM METASTATIC TO HILUS OF LUNG WITH UNKNOWN PRIMARY SITE, RIGHT: ICD-10-CM

## 2025-07-25 DIAGNOSIS — R94.31 QT PROLONGATION: ICD-10-CM

## 2025-07-25 DIAGNOSIS — R11.2 NAUSEA & VOMITING: ICD-10-CM

## 2025-07-25 DIAGNOSIS — C80.1 MALIGNANT NEOPLASM METASTATIC TO HILUS OF LUNG WITH UNKNOWN PRIMARY SITE, RIGHT: ICD-10-CM

## 2025-07-25 DIAGNOSIS — R07.9 CHEST PAIN: ICD-10-CM

## 2025-07-25 DIAGNOSIS — C7A.1 LARGE CELL NEUROENDOCRINE CARCINOMA: Primary | ICD-10-CM

## 2025-07-25 DIAGNOSIS — C79.51 SECONDARY ADENOCARCINOMA OF BONE: ICD-10-CM

## 2025-07-25 DIAGNOSIS — C34.90 MALIGNANT NEOPLASM OF LUNG, UNSPECIFIED LATERALITY, UNSPECIFIED PART OF LUNG: ICD-10-CM

## 2025-07-25 DIAGNOSIS — N17.9 AKI (ACUTE KIDNEY INJURY): ICD-10-CM

## 2025-07-25 DIAGNOSIS — R51.9 FREQUENT HEADACHES: ICD-10-CM

## 2025-07-25 DIAGNOSIS — C7A.1 LARGE CELL NEUROENDOCRINE CARCINOMA: ICD-10-CM

## 2025-07-25 DIAGNOSIS — G89.3 CANCER RELATED PAIN: ICD-10-CM

## 2025-07-25 LAB
ALBUMIN SERPL-MCNC: 4.1 G/DL (ref 3.5–5)
ALBUMIN/GLOB SERPL: 0.7 RATIO (ref 1.1–2)
ALP SERPL-CCNC: 128 UNIT/L (ref 40–150)
ALT SERPL-CCNC: 17 UNIT/L (ref 0–55)
ANION GAP SERPL CALC-SCNC: 15 MEQ/L
AST SERPL-CCNC: 12 UNIT/L (ref 11–45)
BACTERIA #/AREA URNS AUTO: ABNORMAL /HPF
BASOPHILS # BLD AUTO: 0.02 X10(3)/MCL
BASOPHILS NFR BLD AUTO: 0.3 %
BILIRUB SERPL-MCNC: 0.3 MG/DL
BILIRUB UR QL STRIP.AUTO: NEGATIVE
BUN SERPL-MCNC: 45.3 MG/DL (ref 8.9–20.6)
CALCIUM SERPL-MCNC: 10.9 MG/DL (ref 8.4–10.2)
CASTS URNS MICRO: ABNORMAL /LPF
CHLORIDE SERPL-SCNC: 94 MMOL/L (ref 98–107)
CLARITY UR: ABNORMAL
CO2 SERPL-SCNC: 18 MMOL/L (ref 22–29)
COLOR UR AUTO: YELLOW
CREAT SERPL-MCNC: 1.8 MG/DL (ref 0.72–1.25)
CREAT/UREA NIT SERPL: 25
EOSINOPHIL # BLD AUTO: 0 X10(3)/MCL (ref 0–0.9)
EOSINOPHIL NFR BLD AUTO: 0 %
ERYTHROCYTE [DISTWIDTH] IN BLOOD BY AUTOMATED COUNT: 16.2 % (ref 11.5–17)
GFR SERPLBLD CREATININE-BSD FMLA CKD-EPI: 48 ML/MIN/1.73/M2
GLOBULIN SER-MCNC: 6 GM/DL (ref 2.4–3.5)
GLUCOSE SERPL-MCNC: 145 MG/DL (ref 74–100)
GLUCOSE UR QL STRIP: NORMAL
HCT VFR BLD AUTO: 46.5 % (ref 42–52)
HGB BLD-MCNC: 16.4 G/DL (ref 14–18)
HGB UR QL STRIP: ABNORMAL
HOLD SPECIMEN: NORMAL
HYALINE CASTS #/AREA URNS LPF: >20 /LPF
IMM GRANULOCYTES # BLD AUTO: 0.13 X10(3)/MCL (ref 0–0.04)
IMM GRANULOCYTES NFR BLD AUTO: 1.9 %
KETONES UR QL STRIP: ABNORMAL
LEUKOCYTE ESTERASE UR QL STRIP: NEGATIVE
LIPASE SERPL-CCNC: 22 U/L
LYMPHOCYTES # BLD AUTO: 0.74 X10(3)/MCL (ref 0.6–4.6)
LYMPHOCYTES NFR BLD AUTO: 11.1 %
MAGNESIUM SERPL-MCNC: 2.9 MG/DL (ref 1.6–2.6)
MCH RBC QN AUTO: 35.1 PG (ref 27–31)
MCHC RBC AUTO-ENTMCNC: 35.3 G/DL (ref 33–36)
MCV RBC AUTO: 99.6 FL (ref 80–94)
MONOCYTES # BLD AUTO: 0.66 X10(3)/MCL (ref 0.1–1.3)
MONOCYTES NFR BLD AUTO: 9.9 %
MUCOUS THREADS URNS QL MICRO: ABNORMAL /LPF
NEUTROPHILS # BLD AUTO: 5.12 X10(3)/MCL (ref 2.1–9.2)
NEUTROPHILS NFR BLD AUTO: 76.8 %
NITRITE UR QL STRIP: NEGATIVE
NRBC BLD AUTO-RTO: 0 %
PH UR STRIP: 5.5 [PH]
PHOSPHATE SERPL-MCNC: 5.2 MG/DL (ref 2.3–4.7)
PLATELET # BLD AUTO: 339 X10(3)/MCL (ref 130–400)
PMV BLD AUTO: 10.2 FL (ref 7.4–10.4)
POTASSIUM SERPL-SCNC: 3.9 MMOL/L (ref 3.5–5.1)
PROT SERPL-MCNC: 10.1 GM/DL (ref 6.4–8.3)
PROT UR QL STRIP: ABNORMAL
RBC # BLD AUTO: 4.67 X10(6)/MCL (ref 4.7–6.1)
RBC #/AREA URNS AUTO: ABNORMAL /HPF
SODIUM SERPL-SCNC: 127 MMOL/L (ref 136–145)
SP GR UR STRIP.AUTO: 1.02 (ref 1–1.03)
SQUAMOUS #/AREA URNS LPF: ABNORMAL /HPF
URATE SERPL-MCNC: 11.6 MG/DL (ref 3.5–7.2)
UROBILINOGEN UR STRIP-ACNC: NORMAL
WBC # BLD AUTO: 6.67 X10(3)/MCL (ref 4.5–11.5)
WBC #/AREA URNS AUTO: ABNORMAL /HPF

## 2025-07-25 PROCEDURE — 83690 ASSAY OF LIPASE: CPT | Performed by: INTERNAL MEDICINE

## 2025-07-25 PROCEDURE — 25000003 PHARM REV CODE 250: Performed by: INTERNAL MEDICINE

## 2025-07-25 PROCEDURE — 99285 EMERGENCY DEPT VISIT HI MDM: CPT | Mod: 25

## 2025-07-25 PROCEDURE — 93005 ELECTROCARDIOGRAM TRACING: CPT

## 2025-07-25 PROCEDURE — 96374 THER/PROPH/DIAG INJ IV PUSH: CPT

## 2025-07-25 PROCEDURE — 85025 COMPLETE CBC W/AUTO DIFF WBC: CPT | Performed by: INTERNAL MEDICINE

## 2025-07-25 PROCEDURE — 63600175 PHARM REV CODE 636 W HCPCS

## 2025-07-25 PROCEDURE — 96361 HYDRATE IV INFUSION ADD-ON: CPT

## 2025-07-25 PROCEDURE — 25000003 PHARM REV CODE 250

## 2025-07-25 PROCEDURE — 21400001 HC TELEMETRY ROOM

## 2025-07-25 PROCEDURE — 81001 URINALYSIS AUTO W/SCOPE: CPT | Performed by: INTERNAL MEDICINE

## 2025-07-25 PROCEDURE — 63600175 PHARM REV CODE 636 W HCPCS: Performed by: INTERNAL MEDICINE

## 2025-07-25 PROCEDURE — 80053 COMPREHEN METABOLIC PANEL: CPT | Performed by: INTERNAL MEDICINE

## 2025-07-25 PROCEDURE — 83735 ASSAY OF MAGNESIUM: CPT | Performed by: INTERNAL MEDICINE

## 2025-07-25 PROCEDURE — 84550 ASSAY OF BLOOD/URIC ACID: CPT | Performed by: INTERNAL MEDICINE

## 2025-07-25 PROCEDURE — 84100 ASSAY OF PHOSPHORUS: CPT | Performed by: INTERNAL MEDICINE

## 2025-07-25 RX ORDER — LOPERAMIDE HYDROCHLORIDE 2 MG/1
4 CAPSULE ORAL 3 TIMES DAILY PRN
Status: DISCONTINUED | OUTPATIENT
Start: 2025-07-25 | End: 2025-07-27 | Stop reason: HOSPADM

## 2025-07-25 RX ORDER — OXYCODONE HYDROCHLORIDE 5 MG/1
15 TABLET ORAL EVERY 4 HOURS PRN
Refills: 0 | Status: DISCONTINUED | OUTPATIENT
Start: 2025-07-25 | End: 2025-07-27 | Stop reason: HOSPADM

## 2025-07-25 RX ORDER — SODIUM CHLORIDE 0.9 % (FLUSH) 0.9 %
10 SYRINGE (ML) INJECTION EVERY 12 HOURS PRN
Status: DISCONTINUED | OUTPATIENT
Start: 2025-07-25 | End: 2025-07-27 | Stop reason: HOSPADM

## 2025-07-25 RX ORDER — ONDANSETRON HYDROCHLORIDE 2 MG/ML
4 INJECTION, SOLUTION INTRAVENOUS EVERY 6 HOURS PRN
Status: DISCONTINUED | OUTPATIENT
Start: 2025-07-25 | End: 2025-07-27 | Stop reason: HOSPADM

## 2025-07-25 RX ORDER — SODIUM CHLORIDE 9 MG/ML
INJECTION, SOLUTION INTRAVENOUS CONTINUOUS
Status: DISCONTINUED | OUTPATIENT
Start: 2025-07-25 | End: 2025-07-27

## 2025-07-25 RX ORDER — DEXAMETHASONE 4 MG/1
4 TABLET ORAL EVERY 6 HOURS
Status: DISCONTINUED | OUTPATIENT
Start: 2025-07-25 | End: 2025-07-27 | Stop reason: HOSPADM

## 2025-07-25 RX ORDER — FAMOTIDINE 20 MG/1
40 TABLET, FILM COATED ORAL NIGHTLY
Status: DISCONTINUED | OUTPATIENT
Start: 2025-07-25 | End: 2025-07-26

## 2025-07-25 RX ORDER — FAMOTIDINE 10 MG/ML
20 INJECTION, SOLUTION INTRAVENOUS
Status: COMPLETED | OUTPATIENT
Start: 2025-07-25 | End: 2025-07-25

## 2025-07-25 RX ORDER — IBUPROFEN 200 MG
16 TABLET ORAL
Status: DISCONTINUED | OUTPATIENT
Start: 2025-07-25 | End: 2025-07-27 | Stop reason: HOSPADM

## 2025-07-25 RX ORDER — NALOXONE HCL 0.4 MG/ML
0.02 VIAL (ML) INJECTION
Status: DISCONTINUED | OUTPATIENT
Start: 2025-07-25 | End: 2025-07-27 | Stop reason: HOSPADM

## 2025-07-25 RX ORDER — SODIUM CHLORIDE 1 G/1
3000 TABLET ORAL ONCE
Status: COMPLETED | OUTPATIENT
Start: 2025-07-25 | End: 2025-07-25

## 2025-07-25 RX ORDER — HEPARIN SODIUM 5000 [USP'U]/ML
5000 INJECTION, SOLUTION INTRAVENOUS; SUBCUTANEOUS EVERY 12 HOURS
Status: DISCONTINUED | OUTPATIENT
Start: 2025-07-25 | End: 2025-07-27 | Stop reason: HOSPADM

## 2025-07-25 RX ORDER — HYDROMORPHONE HYDROCHLORIDE 1 MG/ML
1 INJECTION, SOLUTION INTRAMUSCULAR; INTRAVENOUS; SUBCUTANEOUS EVERY 6 HOURS PRN
Status: DISCONTINUED | OUTPATIENT
Start: 2025-07-25 | End: 2025-07-26

## 2025-07-25 RX ORDER — ONDANSETRON 4 MG/1
4 TABLET, ORALLY DISINTEGRATING ORAL
Status: COMPLETED | OUTPATIENT
Start: 2025-07-25 | End: 2025-07-25

## 2025-07-25 RX ORDER — IBUPROFEN 200 MG
24 TABLET ORAL
Status: DISCONTINUED | OUTPATIENT
Start: 2025-07-25 | End: 2025-07-27 | Stop reason: HOSPADM

## 2025-07-25 RX ORDER — ATORVASTATIN CALCIUM 40 MG/1
40 TABLET, FILM COATED ORAL DAILY
Status: DISCONTINUED | OUTPATIENT
Start: 2025-07-25 | End: 2025-07-27 | Stop reason: HOSPADM

## 2025-07-25 RX ORDER — POTASSIUM CHLORIDE 20 MEQ/1
40 TABLET, EXTENDED RELEASE ORAL DAILY
Status: DISCONTINUED | OUTPATIENT
Start: 2025-07-25 | End: 2025-07-27 | Stop reason: HOSPADM

## 2025-07-25 RX ORDER — GLUCAGON 1 MG
1 KIT INJECTION
Status: DISCONTINUED | OUTPATIENT
Start: 2025-07-25 | End: 2025-07-27 | Stop reason: HOSPADM

## 2025-07-25 RX ORDER — MUPIROCIN 20 MG/G
OINTMENT TOPICAL 2 TIMES DAILY
Status: DISCONTINUED | OUTPATIENT
Start: 2025-07-25 | End: 2025-07-27 | Stop reason: HOSPADM

## 2025-07-25 RX ORDER — OLANZAPINE 2.5 MG/1
2.5 TABLET, FILM COATED ORAL DAILY
Status: DISCONTINUED | OUTPATIENT
Start: 2025-07-25 | End: 2025-07-27 | Stop reason: HOSPADM

## 2025-07-25 RX ADMIN — ATORVASTATIN CALCIUM 40 MG: 40 TABLET, FILM COATED ORAL at 02:07

## 2025-07-25 RX ADMIN — DEXAMETHASONE 4 MG: 4 TABLET ORAL at 11:07

## 2025-07-25 RX ADMIN — DEXAMETHASONE 4 MG: 4 TABLET ORAL at 06:07

## 2025-07-25 RX ADMIN — ONDANSETRON 4 MG: 4 TABLET, ORALLY DISINTEGRATING ORAL at 09:07

## 2025-07-25 RX ADMIN — FAMOTIDINE 20 MG: 10 INJECTION, SOLUTION INTRAVENOUS at 12:07

## 2025-07-25 RX ADMIN — MUPIROCIN: 20 OINTMENT TOPICAL at 08:07

## 2025-07-25 RX ADMIN — SODIUM CHLORIDE: 9 INJECTION, SOLUTION INTRAVENOUS at 01:07

## 2025-07-25 RX ADMIN — ONDANSETRON 4 MG: 4 TABLET, ORALLY DISINTEGRATING ORAL at 12:07

## 2025-07-25 RX ADMIN — OXYCODONE HYDROCHLORIDE 15 MG: 5 TABLET ORAL at 06:07

## 2025-07-25 RX ADMIN — HEPARIN SODIUM 5000 UNITS: 5000 INJECTION, SOLUTION INTRAVENOUS; SUBCUTANEOUS at 08:07

## 2025-07-25 RX ADMIN — HYDROMORPHONE HYDROCHLORIDE 1 MG: 1 INJECTION, SOLUTION INTRAMUSCULAR; INTRAVENOUS; SUBCUTANEOUS at 09:07

## 2025-07-25 RX ADMIN — TRAZODONE HYDROCHLORIDE 25 MG: 50 TABLET ORAL at 08:07

## 2025-07-25 RX ADMIN — SODIUM CHLORIDE: 9 INJECTION, SOLUTION INTRAVENOUS at 09:07

## 2025-07-25 RX ADMIN — SODIUM CHLORIDE, POTASSIUM CHLORIDE, SODIUM LACTATE AND CALCIUM CHLORIDE 1000 ML: 600; 310; 30; 20 INJECTION, SOLUTION INTRAVENOUS at 11:07

## 2025-07-25 RX ADMIN — OLANZAPINE 2.5 MG: 2.5 TABLET, FILM COATED ORAL at 02:07

## 2025-07-25 RX ADMIN — Medication 3000 MG: at 11:07

## 2025-07-25 RX ADMIN — HYDROMORPHONE HYDROCHLORIDE 1 MG: 1 INJECTION, SOLUTION INTRAMUSCULAR; INTRAVENOUS; SUBCUTANEOUS at 01:07

## 2025-07-25 RX ADMIN — ALLOPURINOL 50 MG: 300 TABLET ORAL at 02:07

## 2025-07-25 RX ADMIN — POTASSIUM CHLORIDE 40 MEQ: 1500 TABLET, EXTENDED RELEASE ORAL at 02:07

## 2025-07-25 RX ADMIN — FAMOTIDINE 40 MG: 20 TABLET, FILM COATED ORAL at 08:07

## 2025-07-25 NOTE — PLAN OF CARE
07/25/25 1430   Discharge Assessment   Assessment Type Discharge Planning Assessment   Confirmed/corrected address, phone number and insurance Yes   Confirmed Demographics Correct on Facesheet   Source of Information patient   People in Home alone   Facility Arrived From: Home   Do you expect to return to your current living situation? Yes   Do you have help at home or someone to help you manage your care at home? Yes   Who are your caregiver(s) and their phone number(s)? Franca Ball (Sister)  [phone]; Father, Odlion Anguiano ([phone])   Prior to hospitilization cognitive status: Alert/Oriented   Current cognitive status: Alert/Oriented   Walking or Climbing Stairs Difficulty no   Dressing/Bathing Difficulty no   Home Accessibility wheelchair accessible   Home Layout Able to live on 1st floor   Equipment Currently Used at Home none   Readmission within 30 days? No   Patient currently being followed by outpatient case management? No   Do you currently have service(s) that help you manage your care at home? No   Do you take prescription medications? Yes   Do you have prescription coverage? Yes   Who is going to help you get home at discharge? Family   How do you get to doctors appointments? car, drives self;family or friend will provide   Are you on dialysis? No   Discharge Plan A Home   DME Needed Upon Discharge  none   Discharge Plan discussed with: Patient   Transition of Care Barriers None     Pt single with a 19 yr old dghtr; Resides alone; Independent with ADL's; Receives Disability & SNAP benefits; Emergency contact is sisterFranca ([phone]); Father, Odilon Anguiano [phone]) alternate contact; CM to follow.

## 2025-07-25 NOTE — ED PROVIDER NOTES
"Encounter Date: 7/25/2025       History     Chief Complaint   Patient presents with    Emesis    Fatigue     Pt reports nausea and vomiting x 2 days.      Patient is a 39 year old male with pmhx of stage 4 lung cancer (on chemotherapy), MI (s/p CABG) presenting for full body aches and vomiting that started last night. Patient states he has been unable to hold anything down since 7 pm. Last bowel movement was this morning. Says the pain is worse in his stomach and both of his legs. Took pain medication yesterday but nothing today. Says "I'm dehydrated." Still nauseous at time of exam. Denies fever, peripheral edema, vision changes, headache, LOC, AMS. Denies CP or SOB worse than baseline.    The history is provided by the patient.     Review of patient's allergies indicates:  No Known Allergies  Past Medical History:   Diagnosis Date    Cancer     Hyperlipidemia     Myocardial infarction      Past Surgical History:   Procedure Laterality Date    CORONARY ARTERY BYPASS GRAFT      INSERTION OF TUNNELED CENTRAL VENOUS CATHETER (CVC) WITH SUBCUTANEOUS PORT Right 07/31/2024    Procedure: [id number];  Surgeon: Renato Alvarado Jr., MD;  Location: [facility] OR;  Service: General;  Laterality: Right;    LAPAROTOMY, EXPLORATORY N/A 03/23/2023    Procedure: LAPAROTOMY, EXPLORATORY;  Surgeon: Alex Juárez MD;  Location: [facility] OR;  Service: General;  Laterality: N/A;    LYMPH NODE BIOPSY Right 06/14/2024    Procedure: BIOPSY, LYMPH NODE;  Surgeon: Renato Alvarado Jr., MD;  Location: [facility] OR;  Service: General;  Laterality: Right;  right supraclavicular lymph node    OPEN REDUCTION AND INTERNAL FIXATION (ORIF) OF FRACTURE OF DISTAL RADIUS Left 04/04/2024    Procedure: ORIF, FRACTURE, RADIUS, DISTAL;  Surgeon: Khoa Abdalla MD;  Location: [facility] OR;  Service: Orthopedics;  Laterality: Left;    ORIF FOREARM FRACTURE Right 12/07/2023    Procedure: ORIF, FRACTURE, RADIUS OR ULNA;  Surgeon: Khoa Abdalla MD;  " Location: [facility];  Service: Orthopedics;  Laterality: Right;  Call Wilmer     Family History   Problem Relation Name Age of Onset    Diabetes Mother      Heart disease Mother      Cancer Father      Diabetes Sister      Heart disease Brother      Colon cancer Maternal Aunt      Stroke Maternal Grandmother      Heart disease Maternal Grandfather       Social History[1]  Review of Systems   Respiratory:  Negative for shortness of breath.    Cardiovascular:  Negative for chest pain.   Gastrointestinal:  Positive for abdominal pain, nausea and vomiting. Negative for constipation.   Genitourinary:  Negative for dysuria.   Musculoskeletal:  Positive for arthralgias and myalgias.       Physical Exam     Initial Vitals [07/25/25 0832]   BP Pulse Resp Temp SpO2   (!) 152/100 (!) 122 18 98.2 °F (36.8 °C) 99 %      MAP       --         Physical Exam    Vitals reviewed.  Constitutional: He is not diaphoretic. He appears cachectic. He appears ill. No distress.   HENT:   Head: Normocephalic and atraumatic. Mouth/Throat: Mucous membranes are dry.   Eyes: Conjunctivae and EOM are normal. Pupils are equal, round, and reactive to light.   Neck: Neck supple.   Normal range of motion.  Cardiovascular:  Regular rhythm, normal heart sounds and intact distal pulses.   Tachycardia present.         Pulmonary/Chest: Breath sounds normal. No respiratory distress.   Abdominal: Bowel sounds are normal. He exhibits no distension. A surgical scar is present. There is generalized abdominal tenderness.   No right CVA tenderness.  No left CVA tenderness. There is no rebound and no guarding.   Musculoskeletal:         General: No edema. Normal range of motion.      Cervical back: Normal range of motion and neck supple.      Right lower leg: No bony tenderness. No edema.      Left lower leg: No bony tenderness. No edema.     Neurological: He is alert and oriented to person, place, and time.   Skin: Skin is warm and dry. No rash noted.   Psychiatric:  Thought content normal.         ED Course   Procedures  Labs Reviewed   COMPREHENSIVE METABOLIC PANEL - Abnormal       Result Value    Sodium 127 (*)     Potassium 3.9      Chloride 94 (*)     CO2 18 (*)     Glucose 145 (*)     Blood Urea Nitrogen 45.3 (*)     Creatinine 1.80 (*)     Calcium 10.9 (*)     Protein Total 10.1 (*)     Albumin 4.1      Globulin 6.0 (*)     Albumin/Globulin Ratio 0.7 (*)     Bilirubin Total 0.3            ALT 17      AST 12      eGFR 48      Anion Gap 15.0      BUN/Creatinine Ratio 25     URINALYSIS, REFLEX TO URINE CULTURE - Abnormal    Color, UA Yellow      Appearance, UA Turbid (*)     Specific Gravity, UA 1.024      pH, UA 5.5      Protein, UA 2+ (*)     Glucose, UA Normal      Ketones, UA Trace (*)     Blood, UA Trace (*)     Bilirubin, UA Negative      Urobilinogen, UA Normal      Nitrites, UA Negative      Leukocyte Esterase, UA Negative      RBC, UA 0-5      WBC, UA 0-5      Bacteria, UA Trace (*)     Squamous Epithelial Cells, UA Few (*)     Mucous, UA Few (*)     Unclassified Cast, UA 3-5 (*)     Hyaline Casts, UA >20 (*)    CBC WITH DIFFERENTIAL - Abnormal    WBC 6.67      RBC 4.67 (*)     Hgb 16.4      Hct 46.5      MCV 99.6 (*)     MCH 35.1 (*)     MCHC 35.3      RDW 16.2      Platelet 339      MPV 10.2      Neut % 76.8      Lymph % 11.1      Mono % 9.9      Eos % 0.0      Basophil % 0.3      Imm Grans % 1.9      Neut # 5.12      Lymph # 0.74      Mono # 0.66      Eos # 0.00      Baso # 0.02      Imm Gran # 0.13 (*)     NRBC% 0.0     MAGNESIUM - Abnormal    Magnesium Level 2.90 (*)    PHOSPHORUS - Abnormal    Phosphorus Level 5.2 (*)    URIC ACID - Abnormal    Uric Acid 11.6 (*)    LIPASE - Normal    Lipase Level 22     CBC W/ AUTO DIFFERENTIAL    Narrative:     The following orders were created for panel order CBC auto differential.  Procedure                               Abnormality         Status                     ---------                                -----------         ------                     CBC with Differential[[id number]]       Abnormal            Final result                 Please view results for these tests on the individual orders.   EXTRA TUBES    Narrative:     The following orders were created for panel order EXTRA TUBES.  Procedure                               Abnormality         Status                     ---------                               -----------         ------                     Light Blue Top Hold[[id number]]                             Final result               Red Top Hold[[id number]]                                    Final result               Gold Top Hold[[id number]]                                   Final result               Pink Top Hold[[id number]]                                   Final result               Encarnacion Top Hold[[id number]]                                   Final result                 Please view results for these tests on the individual orders.   LIGHT BLUE TOP HOLD    Extra Tube Hold for add-ons.     RED TOP HOLD    Extra Tube Hold for add-ons.     GOLD TOP HOLD    Extra Tube Hold for add-ons.     PINK TOP HOLD    Extra Tube Hold for add-ons.     GREY TOP HOLD    Extra Tube Hold for add-ons.     EXTRA TUBES    Narrative:     The following orders were created for panel order EXTRA TUBES.  Procedure                               Abnormality         Status                     ---------                               -----------         ------                     Light Green Top Hold[[id number]]                            Final result               Lavender Top Hold[[id number]]                               Final result                 Please view results for these tests on the individual orders.   LIGHT GREEN TOP HOLD    Extra Tube Hold for add-ons.     LAVENDER TOP HOLD    Extra Tube Hold for add-ons.          ECG Results              EKG 12-lead (In process)        Collection Time Result Time QRS Duration OHS  QTC Calculation    07/25/25 13:01:26 07/25/25 13:05:45 84 464                     In process by Interface, Lab In [facility] (07/25/25 13:05:49)                   Narrative:    Test Reason : R94.31,    Vent. Rate : 106 BPM     Atrial Rate : 106 BPM     P-R Int : 130 ms          QRS Dur :  84 ms      QT Int : 350 ms       P-R-T Axes :  63  56  56 degrees    QTcB Int : 464 ms    Sinus tachycardia  Possible Left atrial enlargement  Nonspecific ST abnormality  Abnormal ECG  When compared with ECG of 02-Jun-2025 01:00,  No significant change was found    Referred By: AAAREFERRAL SELF           Confirmed By:                                   Imaging Results              X-Ray Chest 1 View (Final result)  Result time 07/25/25 14:03:17      Final result by Jerry Christianson MD (07/25/25 14:03:17)                   Impression:      Findings suggest mild vascular congestive changes and there are left lower lung zone atelectatic changes.      Electronically signed by: Jerry Christianson  Date:    07/25/2025  Time:    14:03               Narrative:    EXAMINATION:  XR CHEST 1 VIEW    CLINICAL HISTORY:  sob;    TECHNIQUE:  One view    COMPARISON:  June 1, 2025    FINDINGS:  Cardiopericardial silhouette is within normal limits.  Lungs interstitial markings are accentuated with suggest mild vascular congestive process.  Within left lower lung zone relatively denser opacification  could be related to atelectasis.  Follow-up exams are recommended.  No significant fluid within the pleural spaces.  No pneumothorax.  MediPort tip is positioned within the superior vena cava.                                       Medications   sodium chloride 0.9% flush 10 mL (has no administration in time range)   naloxone 0.4 mg/mL injection 0.02 mg (has no administration in time range)   glucose chewable tablet 16 g (has no administration in time range)   glucose chewable tablet 24 g (has no administration in time range)   dextrose 50% injection 12.5 g (has no  administration in time range)   dextrose 50% injection 25 g (has no administration in time range)   glucagon (human recombinant) injection 1 mg (has no administration in time range)   heparin (porcine) injection 5,000 Units (has no administration in time range)   0.9% NaCl infusion ( Intravenous Verify Only 7/25/25 1845)   mupirocin 2 % ointment (has no administration in time range)   atorvastatin tablet 40 mg (40 mg Oral Given 7/25/25 1432)   dexAMETHasone tablet 4 mg (4 mg Oral Given 7/25/25 1821)   famotidine tablet 40 mg (has no administration in time range)   loperamide capsule 4 mg (has no administration in time range)   OLANZapine tablet 2.5 mg (2.5 mg Oral Given 7/25/25 1432)   potassium chloride SA CR tablet 40 mEq (40 mEq Oral Given 7/25/25 1432)   HYDROmorphone injection 1 mg (1 mg Intravenous Given 7/25/25 1349)   oxyCODONE immediate release tablet 15 mg (15 mg Oral Given 7/25/25 1823)   allopurinol split tablet 50 mg (50 mg Oral Given 7/25/25 1432)   ondansetron injection 4 mg (has no administration in time range)   ondansetron disintegrating tablet 4 mg (4 mg Oral Given 7/25/25 0933)   lactated ringers bolus 1,000 mL (0 mLs Intravenous Stopped 7/25/25 1203)   sodium chloride oral tablet 3,000 mg (3,000 mg Oral Given 7/25/25 1143)   famotidine (PF) injection 20 mg (20 mg Intravenous Given 7/25/25 1209)   ondansetron disintegrating tablet 4 mg (4 mg Oral Given 7/25/25 1213)     Medical Decision Making  Labs significant for Na 127, CO 18, calcium 10.9, BUN 45, Creatinine 1.8, mag 2.9, phos 5.2. Nausea temporarily improved with Zofran. Sodium supplementation given in ED.     Amount and/or Complexity of Data Reviewed  Labs: ordered. Decision-making details documented in ED Course.  Discussion of management or test interpretation with external provider(s): Consulted internal medicine for potential admission secondary to ARYAN, hypercalcemia, hyponatremia.    Risk  OTC drugs.  Prescription drug  management.  Decision regarding hospitalization.                                          Clinical Impression:  Final diagnoses:  [R11.2] Nausea & vomiting  [E83.52] Hypercalcemia (Primary)  [E87.1] Hyponatremia  [N17.9] ARYAN (acute kidney injury)  [C34.90] Malignant neoplasm of lung, unspecified laterality, unspecified part of lung          ED Disposition Condition    Admit                       [1]   Social History  Tobacco Use    Smoking status: Former     Current packs/day: 0.15     Average packs/day: 0.2 packs/day for 25.6 years (3.8 ttl pk-yrs)     Types: Cigarettes     Start date: 12/4/1999     Passive exposure: Current    Smokeless tobacco: Never    Tobacco comments:     Pt states he quit cigs; smokes marijuana instead   Substance Use Topics    Alcohol use: Not Currently     Comment: occasionally    Drug use: Yes     Frequency: 3.0 times per week     Types: Marijuana        Jeremiah Herring MD  07/25/25 2012
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
## 2025-07-26 LAB
ALBUMIN SERPL-MCNC: 3 G/DL (ref 3.5–5)
ALBUMIN/GLOB SERPL: 0.8 RATIO (ref 1.1–2)
ALP SERPL-CCNC: 85 UNIT/L (ref 40–150)
ALT SERPL-CCNC: 13 UNIT/L (ref 0–55)
ANION GAP SERPL CALC-SCNC: 10 MEQ/L
ANION GAP SERPL CALC-SCNC: 10 MEQ/L
AST SERPL-CCNC: 14 UNIT/L (ref 11–45)
BASOPHILS # BLD AUTO: 0.01 X10(3)/MCL
BASOPHILS NFR BLD AUTO: 0.3 %
BILIRUB SERPL-MCNC: 0.4 MG/DL
BUN SERPL-MCNC: 16.7 MG/DL (ref 8.9–20.6)
BUN SERPL-MCNC: 16.7 MG/DL (ref 8.9–20.6)
CALCIUM SERPL-MCNC: 8.6 MG/DL (ref 8.4–10.2)
CALCIUM SERPL-MCNC: 8.6 MG/DL (ref 8.4–10.2)
CHLORIDE SERPL-SCNC: 104 MMOL/L (ref 98–107)
CHLORIDE SERPL-SCNC: 104 MMOL/L (ref 98–107)
CO2 SERPL-SCNC: 17 MMOL/L (ref 22–29)
CO2 SERPL-SCNC: 17 MMOL/L (ref 22–29)
CREAT SERPL-MCNC: 0.78 MG/DL (ref 0.72–1.25)
CREAT SERPL-MCNC: 0.78 MG/DL (ref 0.72–1.25)
CREAT/UREA NIT SERPL: 21
CREAT/UREA NIT SERPL: 21
EOSINOPHIL # BLD AUTO: 0 X10(3)/MCL (ref 0–0.9)
EOSINOPHIL NFR BLD AUTO: 0 %
ERYTHROCYTE [DISTWIDTH] IN BLOOD BY AUTOMATED COUNT: 16.2 % (ref 11.5–17)
GFR SERPLBLD CREATININE-BSD FMLA CKD-EPI: >60 ML/MIN/1.73/M2
GFR SERPLBLD CREATININE-BSD FMLA CKD-EPI: >60 ML/MIN/1.73/M2
GLOBULIN SER-MCNC: 4 GM/DL (ref 2.4–3.5)
GLUCOSE SERPL-MCNC: 163 MG/DL (ref 74–100)
GLUCOSE SERPL-MCNC: 163 MG/DL (ref 74–100)
HCT VFR BLD AUTO: 35.8 % (ref 42–52)
HGB BLD-MCNC: 12.3 G/DL (ref 14–18)
HOLD SPECIMEN: NORMAL
IMM GRANULOCYTES # BLD AUTO: 0.04 X10(3)/MCL (ref 0–0.04)
IMM GRANULOCYTES NFR BLD AUTO: 1.3 %
LYMPHOCYTES # BLD AUTO: 0.21 X10(3)/MCL (ref 0.6–4.6)
LYMPHOCYTES NFR BLD AUTO: 7 %
MAGNESIUM SERPL-MCNC: 2.4 MG/DL (ref 1.6–2.6)
MCH RBC QN AUTO: 35 PG (ref 27–31)
MCHC RBC AUTO-ENTMCNC: 34.4 G/DL (ref 33–36)
MCV RBC AUTO: 102 FL (ref 80–94)
MONOCYTES # BLD AUTO: 0.09 X10(3)/MCL (ref 0.1–1.3)
MONOCYTES NFR BLD AUTO: 3 %
NEUTROPHILS # BLD AUTO: 2.63 X10(3)/MCL (ref 2.1–9.2)
NEUTROPHILS NFR BLD AUTO: 88.4 %
NRBC BLD AUTO-RTO: 0 %
PHOSPHATE SERPL-MCNC: 1.7 MG/DL (ref 2.3–4.7)
PLATELET # BLD AUTO: 303 X10(3)/MCL (ref 130–400)
PMV BLD AUTO: 9.5 FL (ref 7.4–10.4)
POTASSIUM SERPL-SCNC: 4.4 MMOL/L (ref 3.5–5.1)
POTASSIUM SERPL-SCNC: 4.4 MMOL/L (ref 3.5–5.1)
PROT SERPL-MCNC: 7 GM/DL (ref 6.4–8.3)
RBC # BLD AUTO: 3.51 X10(6)/MCL (ref 4.7–6.1)
SODIUM SERPL-SCNC: 131 MMOL/L (ref 136–145)
SODIUM SERPL-SCNC: 131 MMOL/L (ref 136–145)
WBC # BLD AUTO: 2.98 X10(3)/MCL (ref 4.5–11.5)

## 2025-07-26 PROCEDURE — 83735 ASSAY OF MAGNESIUM: CPT

## 2025-07-26 PROCEDURE — 63600175 PHARM REV CODE 636 W HCPCS

## 2025-07-26 PROCEDURE — 80053 COMPREHEN METABOLIC PANEL: CPT

## 2025-07-26 PROCEDURE — 25000003 PHARM REV CODE 250

## 2025-07-26 PROCEDURE — 36415 COLL VENOUS BLD VENIPUNCTURE: CPT

## 2025-07-26 PROCEDURE — 80048 BASIC METABOLIC PNL TOTAL CA: CPT

## 2025-07-26 PROCEDURE — 21400001 HC TELEMETRY ROOM

## 2025-07-26 PROCEDURE — 84100 ASSAY OF PHOSPHORUS: CPT

## 2025-07-26 PROCEDURE — 85025 COMPLETE CBC W/AUTO DIFF WBC: CPT

## 2025-07-26 RX ORDER — FAMOTIDINE 20 MG/1
20 TABLET, FILM COATED ORAL 2 TIMES DAILY
Status: DISCONTINUED | OUTPATIENT
Start: 2025-07-26 | End: 2025-07-27 | Stop reason: HOSPADM

## 2025-07-26 RX ORDER — HYDROMORPHONE HYDROCHLORIDE 2 MG/ML
2 INJECTION, SOLUTION INTRAMUSCULAR; INTRAVENOUS; SUBCUTANEOUS EVERY 4 HOURS PRN
Status: DISCONTINUED | OUTPATIENT
Start: 2025-07-26 | End: 2025-07-27

## 2025-07-26 RX ORDER — LIDOCAINE 50 MG/G
1 PATCH TOPICAL ONCE AS NEEDED
Status: COMPLETED | OUTPATIENT
Start: 2025-07-26 | End: 2025-07-27

## 2025-07-26 RX ORDER — HYDROMORPHONE HYDROCHLORIDE 1 MG/ML
1 INJECTION, SOLUTION INTRAMUSCULAR; INTRAVENOUS; SUBCUTANEOUS EVERY 4 HOURS PRN
Status: DISCONTINUED | OUTPATIENT
Start: 2025-07-26 | End: 2025-07-26

## 2025-07-26 RX ADMIN — DEXAMETHASONE 4 MG: 4 TABLET ORAL at 06:07

## 2025-07-26 RX ADMIN — HYDROMORPHONE HYDROCHLORIDE 2 MG: 2 INJECTION, SOLUTION INTRAMUSCULAR; INTRAVENOUS; SUBCUTANEOUS at 08:07

## 2025-07-26 RX ADMIN — OXYCODONE HYDROCHLORIDE 15 MG: 5 TABLET ORAL at 02:07

## 2025-07-26 RX ADMIN — FAMOTIDINE 20 MG: 20 TABLET, FILM COATED ORAL at 12:07

## 2025-07-26 RX ADMIN — SODIUM CHLORIDE: 9 INJECTION, SOLUTION INTRAVENOUS at 05:07

## 2025-07-26 RX ADMIN — POTASSIUM CHLORIDE 40 MEQ: 1500 TABLET, EXTENDED RELEASE ORAL at 08:07

## 2025-07-26 RX ADMIN — ALLOPURINOL 50 MG: 300 TABLET ORAL at 08:07

## 2025-07-26 RX ADMIN — DEXAMETHASONE 4 MG: 4 TABLET ORAL at 12:07

## 2025-07-26 RX ADMIN — OLANZAPINE 2.5 MG: 2.5 TABLET, FILM COATED ORAL at 08:07

## 2025-07-26 RX ADMIN — LIDOCAINE 1 PATCH: 50 PATCH CUTANEOUS at 06:07

## 2025-07-26 RX ADMIN — HYDROMORPHONE HYDROCHLORIDE 1 MG: 1 INJECTION, SOLUTION INTRAMUSCULAR; INTRAVENOUS; SUBCUTANEOUS at 04:07

## 2025-07-26 RX ADMIN — MUPIROCIN: 20 OINTMENT TOPICAL at 08:07

## 2025-07-26 RX ADMIN — HYDROMORPHONE HYDROCHLORIDE 1 MG: 1 INJECTION, SOLUTION INTRAMUSCULAR; INTRAVENOUS; SUBCUTANEOUS at 08:07

## 2025-07-26 RX ADMIN — ATORVASTATIN CALCIUM 40 MG: 40 TABLET, FILM COATED ORAL at 08:07

## 2025-07-26 RX ADMIN — SODIUM CHLORIDE: 9 INJECTION, SOLUTION INTRAVENOUS at 09:07

## 2025-07-26 RX ADMIN — OXYCODONE HYDROCHLORIDE 15 MG: 5 TABLET ORAL at 01:07

## 2025-07-26 RX ADMIN — HEPARIN SODIUM 5000 UNITS: 5000 INJECTION, SOLUTION INTRAVENOUS; SUBCUTANEOUS at 08:07

## 2025-07-26 RX ADMIN — DEXAMETHASONE 4 MG: 4 TABLET ORAL at 11:07

## 2025-07-26 RX ADMIN — OXYCODONE HYDROCHLORIDE 15 MG: 5 TABLET ORAL at 09:07

## 2025-07-26 RX ADMIN — FAMOTIDINE 20 MG: 20 TABLET, FILM COATED ORAL at 08:07

## 2025-07-26 RX ADMIN — TRAZODONE HYDROCHLORIDE 25 MG: 50 TABLET ORAL at 08:07

## 2025-07-26 RX ADMIN — SODIUM CHLORIDE: 9 INJECTION, SOLUTION INTRAVENOUS at 01:07

## 2025-07-26 RX ADMIN — DEXAMETHASONE 4 MG: 4 TABLET ORAL at 05:07

## 2025-07-26 RX ADMIN — HYDROMORPHONE HYDROCHLORIDE 1 MG: 1 INJECTION, SOLUTION INTRAMUSCULAR; INTRAVENOUS; SUBCUTANEOUS at 12:07

## 2025-07-26 NOTE — CONSULTS
Received call from Dr. Arceo, he spoke with Sierra about hospice care, would like  referral sent to [facility]. Call placed to the pt, his emergency contact Franca Ball (Sister)[phone], & Father Odilon Anguiano [phone], no answer. Referral submitted to [facility] via [email or web address], spoke with Sierra.

## 2025-07-26 NOTE — PROGRESS NOTES
Pharmacist Renal Dose Adjustment Note    Ranjeet Ball is a 39 y.o. male being treated with the medication famotidine    Patient Data:    Vital Signs (Most Recent):  Temp: 98.1 °F (36.7 °C) (07/26/25 1121)  Pulse: 101 (07/26/25 1121)  Resp: 18 (07/26/25 0933)  BP: (!) 145/78 (07/26/25 1121)  SpO2: 99 % (07/26/25 1121) Vital Signs (72h Range):  Temp:  [97.9 °F (36.6 °C)-98.2 °F (36.8 °C)]   Pulse:  []   Resp:  [14-22]   BP: (111-152)/()   SpO2:  [95 %-99 %]      Recent Labs   Lab 07/22/25  0942 07/25/25  1019 07/26/25  0401   CREATININE 0.86 1.80* 0.78  0.78     Serum creatinine: 0.78 mg/dL 07/26/25 0401  Estimated creatinine clearance: 92.6 mL/min    Famotidine 40 mg QD will be changed to famotidine 20 mg BID    Pharmacist's Name: Sosa Kaur PharmD  Pharmacist's Extension: [phone]
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
## 2025-07-27 VITALS
BODY MASS INDEX: 18.42 KG/M2 | SYSTOLIC BLOOD PRESSURE: 138 MMHG | RESPIRATION RATE: 18 BRPM | OXYGEN SATURATION: 95 % | TEMPERATURE: 98 F | WEIGHT: 114.63 LBS | HEIGHT: 66 IN | HEART RATE: 96 BPM | DIASTOLIC BLOOD PRESSURE: 87 MMHG

## 2025-07-27 LAB
ANION GAP SERPL CALC-SCNC: 9 MEQ/L
BASOPHILS # BLD AUTO: 0 X10(3)/MCL
BASOPHILS NFR BLD AUTO: 0 %
BUN SERPL-MCNC: 13 MG/DL (ref 8.9–20.6)
CALCIUM SERPL-MCNC: 8.5 MG/DL (ref 8.4–10.2)
CHLORIDE SERPL-SCNC: 107 MMOL/L (ref 98–107)
CO2 SERPL-SCNC: 19 MMOL/L (ref 22–29)
CREAT SERPL-MCNC: 0.7 MG/DL (ref 0.72–1.25)
CREAT/UREA NIT SERPL: 19
EOSINOPHIL # BLD AUTO: 0 X10(3)/MCL (ref 0–0.9)
EOSINOPHIL NFR BLD AUTO: 0 %
ERYTHROCYTE [DISTWIDTH] IN BLOOD BY AUTOMATED COUNT: 15.9 % (ref 11.5–17)
GFR SERPLBLD CREATININE-BSD FMLA CKD-EPI: >60 ML/MIN/1.73/M2
GLUCOSE SERPL-MCNC: 150 MG/DL (ref 74–100)
HCT VFR BLD AUTO: 31.8 % (ref 42–52)
HGB BLD-MCNC: 10.8 G/DL (ref 14–18)
IMM GRANULOCYTES # BLD AUTO: 0.04 X10(3)/MCL (ref 0–0.04)
IMM GRANULOCYTES NFR BLD AUTO: 0.5 %
LYMPHOCYTES # BLD AUTO: 0.31 X10(3)/MCL (ref 0.6–4.6)
LYMPHOCYTES NFR BLD AUTO: 3.7 %
MAGNESIUM SERPL-MCNC: 1.9 MG/DL (ref 1.6–2.6)
MCH RBC QN AUTO: 34.4 PG (ref 27–31)
MCHC RBC AUTO-ENTMCNC: 34 G/DL (ref 33–36)
MCV RBC AUTO: 101.3 FL (ref 80–94)
MONOCYTES # BLD AUTO: 0.38 X10(3)/MCL (ref 0.1–1.3)
MONOCYTES NFR BLD AUTO: 4.6 %
NEUTROPHILS # BLD AUTO: 7.58 X10(3)/MCL (ref 2.1–9.2)
NEUTROPHILS NFR BLD AUTO: 91.2 %
NRBC BLD AUTO-RTO: 0 %
OHS QRS DURATION: 84 MS
OHS QTC CALCULATION: 464 MS
PHOSPHATE SERPL-MCNC: 1.5 MG/DL (ref 2.3–4.7)
PLATELET # BLD AUTO: 280 X10(3)/MCL (ref 130–400)
PMV BLD AUTO: 9.7 FL (ref 7.4–10.4)
POTASSIUM SERPL-SCNC: 4.3 MMOL/L (ref 3.5–5.1)
RBC # BLD AUTO: 3.14 X10(6)/MCL (ref 4.7–6.1)
SODIUM SERPL-SCNC: 135 MMOL/L (ref 136–145)
WBC # BLD AUTO: 8.31 X10(3)/MCL (ref 4.5–11.5)

## 2025-07-27 PROCEDURE — 84100 ASSAY OF PHOSPHORUS: CPT

## 2025-07-27 PROCEDURE — 80048 BASIC METABOLIC PNL TOTAL CA: CPT

## 2025-07-27 PROCEDURE — 63600175 PHARM REV CODE 636 W HCPCS

## 2025-07-27 PROCEDURE — 25000003 PHARM REV CODE 250

## 2025-07-27 PROCEDURE — 36415 COLL VENOUS BLD VENIPUNCTURE: CPT

## 2025-07-27 PROCEDURE — 85025 COMPLETE CBC W/AUTO DIFF WBC: CPT

## 2025-07-27 PROCEDURE — 83735 ASSAY OF MAGNESIUM: CPT

## 2025-07-27 RX ORDER — HYDROMORPHONE HYDROCHLORIDE 2 MG/1
2 TABLET ORAL EVERY 4 HOURS PRN
Refills: 0 | Status: DISCONTINUED | OUTPATIENT
Start: 2025-07-27 | End: 2025-07-27 | Stop reason: HOSPADM

## 2025-07-27 RX ORDER — AMOXICILLIN 250 MG
2 CAPSULE ORAL DAILY
Status: DISCONTINUED | OUTPATIENT
Start: 2025-07-27 | End: 2025-07-27 | Stop reason: HOSPADM

## 2025-07-27 RX ORDER — OXYCODONE HYDROCHLORIDE 20 MG/1
20 TABLET ORAL EVERY 6 HOURS PRN
Qty: 21 TABLET | Refills: 0 | Status: SHIPPED | OUTPATIENT
Start: 2025-07-27 | End: 2025-07-31 | Stop reason: SDUPTHER

## 2025-07-27 RX ORDER — MORPHINE SULFATE 30 MG/1
30 TABLET, FILM COATED, EXTENDED RELEASE ORAL EVERY 12 HOURS
Qty: 12 TABLET | Refills: 0 | Status: SHIPPED | OUTPATIENT
Start: 2025-07-27 | End: 2025-07-27

## 2025-07-27 RX ORDER — NALOXONE HYDROCHLORIDE 4 MG/.1ML
1 SPRAY NASAL ONCE
Qty: 1 EACH | Refills: 0 | Status: SHIPPED | OUTPATIENT
Start: 2025-07-27 | End: 2025-07-27

## 2025-07-27 RX ORDER — MORPHINE SULFATE 30 MG/1
30 TABLET, FILM COATED, EXTENDED RELEASE ORAL EVERY 12 HOURS
Refills: 0 | Status: DISCONTINUED | OUTPATIENT
Start: 2025-07-27 | End: 2025-07-27 | Stop reason: HOSPADM

## 2025-07-27 RX ORDER — HYDROMORPHONE HYDROCHLORIDE 2 MG/1
2 TABLET ORAL EVERY 4 HOURS PRN
Qty: 30 TABLET | Refills: 0 | Status: SHIPPED | OUTPATIENT
Start: 2025-07-27 | End: 2025-07-29

## 2025-07-27 RX ORDER — MORPHINE SULFATE 30 MG/1
30 TABLET, FILM COATED, EXTENDED RELEASE ORAL EVERY 12 HOURS
Qty: 12 TABLET | Refills: 0 | Status: SHIPPED | OUTPATIENT
Start: 2025-07-27

## 2025-07-27 RX ADMIN — MUPIROCIN: 20 OINTMENT TOPICAL at 08:07

## 2025-07-27 RX ADMIN — MORPHINE SULFATE 30 MG: 30 TABLET, FILM COATED, EXTENDED RELEASE ORAL at 08:07

## 2025-07-27 RX ADMIN — OXYCODONE HYDROCHLORIDE 15 MG: 5 TABLET ORAL at 02:07

## 2025-07-27 RX ADMIN — ALLOPURINOL 50 MG: 300 TABLET ORAL at 08:07

## 2025-07-27 RX ADMIN — HEPARIN SODIUM 5000 UNITS: 5000 INJECTION, SOLUTION INTRAVENOUS; SUBCUTANEOUS at 08:07

## 2025-07-27 RX ADMIN — DEXAMETHASONE 4 MG: 4 TABLET ORAL at 05:07

## 2025-07-27 RX ADMIN — OLANZAPINE 2.5 MG: 2.5 TABLET, FILM COATED ORAL at 08:07

## 2025-07-27 RX ADMIN — HYDROMORPHONE HYDROCHLORIDE 2 MG: 2 INJECTION, SOLUTION INTRAMUSCULAR; INTRAVENOUS; SUBCUTANEOUS at 12:07

## 2025-07-27 RX ADMIN — HYDROMORPHONE HYDROCHLORIDE 2 MG: 2 INJECTION, SOLUTION INTRAMUSCULAR; INTRAVENOUS; SUBCUTANEOUS at 04:07

## 2025-07-27 RX ADMIN — FAMOTIDINE 20 MG: 20 TABLET, FILM COATED ORAL at 08:07

## 2025-07-27 RX ADMIN — ATORVASTATIN CALCIUM 40 MG: 40 TABLET, FILM COATED ORAL at 08:07

## 2025-07-27 RX ADMIN — POTASSIUM CHLORIDE 40 MEQ: 1500 TABLET, EXTENDED RELEASE ORAL at 08:07

## 2025-07-27 NOTE — PLAN OF CARE
Problem: Adult Inpatient Plan of Care  Goal: Plan of Care Review  Outcome: Met  Goal: Patient-Specific Goal (Individualized)  Outcome: Met  Goal: Absence of Hospital-Acquired Illness or Injury  Outcome: Met  Goal: Optimal Comfort and Wellbeing  Outcome: Met  Goal: Readiness for Transition of Care  Outcome: Met     Problem: Acute Kidney Injury/Impairment  Goal: Fluid and Electrolyte Balance  Outcome: Met  Goal: Improved Oral Intake  Outcome: Met  Goal: Effective Renal Function  Outcome: Met     Problem: Infection  Goal: Absence of Infection Signs and Symptoms  Outcome: Met     Problem: Wound  Goal: Optimal Coping  Outcome: Met  Goal: Optimal Functional Ability  Outcome: Met  Goal: Absence of Infection Signs and Symptoms  Outcome: Met  Goal: Improved Oral Intake  Outcome: Met  Goal: Optimal Pain Control and Function  Outcome: Met  Goal: Skin Health and Integrity  Outcome: Met  Goal: Optimal Wound Healing  Outcome: Met     Problem: Pain Acute  Goal: Optimal Pain Control and Function  Outcome: Met     Problem: Fall Injury Risk  Goal: Absence of Fall and Fall-Related Injury  Outcome: Met

## 2025-07-27 NOTE — PLAN OF CARE
Problem: Adult Inpatient Plan of Care  Goal: Plan of Care Review  7/26/2025 2142 by Charleen Mckeon RN  Outcome: Progressing  7/26/2025 2142 by Charleen Mckeon RN  Outcome: Progressing  Goal: Patient-Specific Goal (Individualized)  7/26/2025 2142 by Charleen Mckeon RN  Outcome: Progressing  7/26/2025 2142 by Charleen Mckeon RN  Outcome: Progressing  Goal: Absence of Hospital-Acquired Illness or Injury  7/26/2025 2142 by Charleen Mckeon RN  Outcome: Progressing  7/26/2025 2142 by Charleen Mckeon RN  Outcome: Progressing  Goal: Optimal Comfort and Wellbeing  7/26/2025 2142 by Charleen Mckeon RN  Outcome: Progressing  7/26/2025 2142 by Charleen Mckeon RN  Outcome: Progressing  Goal: Readiness for Transition of Care  7/26/2025 2142 by Charleen Mckeon RN  Outcome: Progressing  7/26/2025 2142 by Charleen Mckeon RN  Outcome: Progressing     Problem: Acute Kidney Injury/Impairment  Goal: Fluid and Electrolyte Balance  7/26/2025 2142 by Charleen Mckeon RN  Outcome: Progressing  7/26/2025 2142 by Charleen Mckeon RN  Outcome: Progressing  Goal: Improved Oral Intake  7/26/2025 2142 by Charleen Mckeon RN  Outcome: Progressing  7/26/2025 2142 by Charleen Mckeon RN  Outcome: Progressing  Goal: Effective Renal Function  7/26/2025 2142 by Charleen Mckeon RN  Outcome: Progressing  7/26/2025 2142 by Charleen Mckeon RN  Outcome: Progressing     Problem: Infection  Goal: Absence of Infection Signs and Symptoms  7/26/2025 2142 by Charleen Mckeon RN  Outcome: Progressing  7/26/2025 2142 by Charleen Mckeon RN  Outcome: Progressing     Problem: Wound  Goal: Optimal Coping  7/26/2025 2142 by Charleen Mckeon RN  Outcome: Progressing  7/26/2025 2142 by Charleen Mckeon RN  Outcome: Progressing  Goal: Optimal Functional Ability  7/26/2025 2142 by Charleen Mckeon RN  Outcome: Progressing  7/26/2025 2142 by Charleen Mckeon RN  Outcome: Progressing  Goal: Absence of Infection Signs  and Symptoms  7/26/2025 2142 by Charleen Mckeon RN  Outcome: Progressing  7/26/2025 2142 by Charleen Mckeon RN  Outcome: Progressing  Goal: Improved Oral Intake  7/26/2025 2142 by Charleen Mckeon RN  Outcome: Progressing  7/26/2025 2142 by Charleen Mckeon RN  Outcome: Progressing  Goal: Optimal Pain Control and Function  7/26/2025 2142 by Charleen Mckeon RN  Outcome: Progressing  7/26/2025 2142 by Charleen Mckeon RN  Outcome: Progressing  Goal: Skin Health and Integrity  7/26/2025 2142 by Charleen Mckeon RN  Outcome: Progressing  7/26/2025 2142 by Charleen Mckeon RN  Outcome: Progressing  Goal: Optimal Wound Healing  7/26/2025 2142 by Charleen Mckeon RN  Outcome: Progressing  7/26/2025 2142 by Charleen Mckeon RN  Outcome: Progressing     Problem: Pain Acute  Goal: Optimal Pain Control and Function  7/26/2025 2142 by Charleen Mckeon RN  Outcome: Progressing  7/26/2025 2142 by Charleen Mckeon RN  Outcome: Progressing     Problem: Fall Injury Risk  Goal: Absence of Fall and Fall-Related Injury  7/26/2025 2142 by Charleen Mckeon RN  Outcome: Progressing  7/26/2025 2142 by Charleen Mckeon RN  Outcome: Progressing

## 2025-07-27 NOTE — DISCHARGE SUMMARY
".[facility] Internal Medicine Discharge Summary  [facility] [place]    Admitting Physician: Mitchell Park MD  Attending Physician: No att. providers found  Date of admit: 7/25/2025  Date of discharge: No discharge date for patient encounter.    Condition: Stable  Outcome: Patient tolerated treatment/procedure well without complication and is now ready for discharge.  Disposition: Home or Self Care    Hospital Course Summary and Problem List     Pt admitted due to uncontrolled pain secondary to stage IV neuroendocrine lung cancer.  Patient stated his home regimen of pain medication was not enough and he was in constant pain.  Upon admission he was started on Dilaudid IV to control his initial pain.  During his stay he stated he had not spoken to anyone palliative care or hospice.  We consulted hospice and connected with the company.  I think patient would greatly benefit from the service.  Meanwhile he was started on MS Contin, Dilaudid p.o., oxycodone for breakthrough pain.  Patient reports his pain is controlled at this time.    Hospice referral has been placed.  We will continue to follow patient outpatient.  He is now stable for discharge.      No discharge procedures on file.    To address at [facility] Visit:  Significant medication changes:  MS Contin, Dilaudid p.o., oxycodone, Narcan  Results not resulted during admission:  None  Recommended Labs:  None  Recommended Imaging:  None    Problem List  Uncontrolled pain secondary to lung cancer malignancy   Hospice referral given.    Objective     Vital Signs:  Vitals  BP: 138/87  Temp: 98 °F (36.7 °C)  Temp Source: Oral  Pulse: 95  Resp: 18  SpO2: (!) 93 %  Height: 5' 6.14" (168 cm)  Weight: 52 kg (114 lb 10.2 oz)    Physical Exam  Physical Exam    Discharge Medications        Medication List        START taking these medications      HYDROmorphone 2 MG tablet  Commonly known as: DILAUDID  Take 1 tablet (2 mg total) by mouth every 4 " (four) hours as needed for Pain.     morphine 30 MG 12 hr tablet  Commonly known as: MS CONTIN  Take 1 tablet (30 mg total) by mouth every 12 (twelve) hours.     naloxone 4 mg/actuation Spry  Commonly known as: NARCAN  1 spray (4 mg total) by Nasal route once. for 1 dose            CONTINUE taking these medications      atorvastatin 40 MG tablet  Commonly known as: LIPITOR     dexAMETHasone 4 MG Tab  Commonly known as: DECADRON  Take 1 tablet (4 mg total) by mouth every 6 (six) hours.     ergocalciferol 50,000 unit Cap  Commonly known as: ERGOCALCIFEROL  Take 1 capsule (50,000 Units total) by mouth every 7 days.     famotidine 40 MG tablet  Commonly known as: PEPCID  Take 1 tablet (40 mg total) by mouth every evening.     gabapentin 300 MG capsule  Commonly known as: NEURONTIN  Take 1 capsule (300 mg total) by mouth every evening.     loperamide 2 mg capsule  Commonly known as: IMODIUM  Take 2 tablets (4mg) by mouth as needed after first loose stool, 1 tablet every 2 hours until diarrhea free for 12 hours. May take 2 tablets (4mg) by mouth every 4 hours at night. May require more than the package labeling maximum dose of 16mg/day.     OLANZapine 2.5 MG tablet  Commonly known as: ZyPREXA  Take 1 tablet (2.5 mg total) by mouth once daily.     ondansetron 4 MG tablet  Commonly known as: ZOFRAN  Take 1 tablet (4 mg total) by mouth every 6 (six) hours as needed for Nausea.     oxyCODONE 20 mg Tab immediate release tablet  Commonly known as: ROXICODONE  Take 1 tablet (20 mg total) by mouth every 6 (six) hours as needed for Pain.     potassium chloride 20 mEq  Commonly known as: K-TAB  Take 2 tablets (40 mEq total) by mouth once daily. for 14 doses     prochlorperazine 5 MG tablet  Commonly known as: COMPAZINE  Take 2 tablets (10 mg total) by mouth every 6 (six) hours as needed (nausea).     ramelteon 8 mg tablet  Commonly known as: ROZEREM  Take 1 tablet (8 mg total) by mouth every evening.               Where to Get Your  Medications        These medications were sent to [facility]/pharmacy #[id number] - [place], [place] - [address]  [address], [place] [address]      Phone: [phone]   HYDROmorphone 2 MG tablet  naloxone 4 mg/actuation Spry  oxyCODONE 20 mg Tab immediate release tablet       You can get these medications from any pharmacy    Bring a paper prescription for each of these medications  morphine 30 MG 12 hr tablet         Outpatient Follow Up       At this time, Ranjeet Ball is determined to have maximized benefits of IP hospitalization. he is discharged in stable condition with outpatient f/u recommendations and instructions. All questions were answered, and patient verbalized agreement with the POC. They were given return precautions prior to discharge including symptoms that should prompt return to ED or to call PCP.     Total time spent at discharge: >30 minutes    Turner Arceo  [facility]
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
## 2025-07-29 ENCOUNTER — LAB VISIT (OUTPATIENT)
Dept: HEMATOLOGY/ONCOLOGY | Facility: CLINIC | Age: 40
End: 2025-07-29
Attending: INTERNAL MEDICINE
Payer: MEDICAID

## 2025-07-29 VITALS
HEART RATE: 108 BPM | RESPIRATION RATE: 17 BRPM | TEMPERATURE: 99 F | OXYGEN SATURATION: 99 % | WEIGHT: 116.38 LBS | HEIGHT: 66 IN | DIASTOLIC BLOOD PRESSURE: 69 MMHG | SYSTOLIC BLOOD PRESSURE: 108 MMHG | BODY MASS INDEX: 18.7 KG/M2

## 2025-07-29 DIAGNOSIS — E87.6 HYPOKALEMIA: ICD-10-CM

## 2025-07-29 DIAGNOSIS — C7A.1 LARGE CELL NEUROENDOCRINE CARCINOMA: ICD-10-CM

## 2025-07-29 DIAGNOSIS — G47.00 INSOMNIA, UNSPECIFIED TYPE: ICD-10-CM

## 2025-07-29 DIAGNOSIS — C7A.8 NEUROENDOCRINE CARCINOMA OF LUNG: ICD-10-CM

## 2025-07-29 DIAGNOSIS — G89.3 CANCER RELATED PAIN: Primary | ICD-10-CM

## 2025-07-29 DIAGNOSIS — R19.7 DIARRHEA, UNSPECIFIED TYPE: ICD-10-CM

## 2025-07-29 LAB
ALBUMIN SERPL-MCNC: 3 G/DL (ref 3.5–5)
ALBUMIN/GLOB SERPL: 0.8 RATIO (ref 1.1–2)
ALP SERPL-CCNC: 76 UNIT/L (ref 40–150)
ALT SERPL-CCNC: 12 UNIT/L (ref 0–55)
ANION GAP SERPL CALC-SCNC: 8 MEQ/L
AST SERPL-CCNC: 13 UNIT/L (ref 11–45)
BASOPHILS # BLD AUTO: 0.01 X10(3)/MCL
BASOPHILS NFR BLD AUTO: 0.1 %
BILIRUB SERPL-MCNC: 0.2 MG/DL
BUN SERPL-MCNC: 12.2 MG/DL (ref 8.9–20.6)
CALCIUM SERPL-MCNC: 8.7 MG/DL (ref 8.4–10.2)
CHLORIDE SERPL-SCNC: 105 MMOL/L (ref 98–107)
CO2 SERPL-SCNC: 25 MMOL/L (ref 22–29)
CREAT SERPL-MCNC: 0.78 MG/DL (ref 0.72–1.25)
CREAT/UREA NIT SERPL: 16
EOSINOPHIL # BLD AUTO: 0.05 X10(3)/MCL (ref 0–0.9)
EOSINOPHIL NFR BLD AUTO: 0.6 %
ERYTHROCYTE [DISTWIDTH] IN BLOOD BY AUTOMATED COUNT: 16.5 % (ref 11.5–17)
GFR SERPLBLD CREATININE-BSD FMLA CKD-EPI: >60 ML/MIN/1.73/M2
GLOBULIN SER-MCNC: 3.8 GM/DL (ref 2.4–3.5)
GLUCOSE SERPL-MCNC: 109 MG/DL (ref 74–100)
HCT VFR BLD AUTO: 38.1 % (ref 42–52)
HGB BLD-MCNC: 12.6 G/DL (ref 14–18)
IMM GRANULOCYTES # BLD AUTO: 0.04 X10(3)/MCL (ref 0–0.04)
IMM GRANULOCYTES NFR BLD AUTO: 0.5 %
LYMPHOCYTES # BLD AUTO: 0.73 X10(3)/MCL (ref 0.6–4.6)
LYMPHOCYTES NFR BLD AUTO: 9.4 %
MAGNESIUM SERPL-MCNC: 1.8 MG/DL (ref 1.6–2.6)
MCH RBC QN AUTO: 34.7 PG (ref 27–31)
MCHC RBC AUTO-ENTMCNC: 33.1 G/DL (ref 33–36)
MCV RBC AUTO: 105 FL (ref 80–94)
MONOCYTES # BLD AUTO: 0.86 X10(3)/MCL (ref 0.1–1.3)
MONOCYTES NFR BLD AUTO: 11.1 %
NEUTROPHILS # BLD AUTO: 6.04 X10(3)/MCL (ref 2.1–9.2)
NEUTROPHILS NFR BLD AUTO: 78.3 %
NRBC BLD AUTO-RTO: 0 %
PLATELET # BLD AUTO: 297 X10(3)/MCL (ref 130–400)
PMV BLD AUTO: 9.2 FL (ref 7.4–10.4)
POTASSIUM SERPL-SCNC: 3.3 MMOL/L (ref 3.5–5.1)
PROT SERPL-MCNC: 6.8 GM/DL (ref 6.4–8.3)
RBC # BLD AUTO: 3.63 X10(6)/MCL (ref 4.7–6.1)
SODIUM SERPL-SCNC: 138 MMOL/L (ref 136–145)
WBC # BLD AUTO: 7.73 X10(3)/MCL (ref 4.5–11.5)

## 2025-07-29 PROCEDURE — 3078F DIAST BP <80 MM HG: CPT | Mod: CPTII,,,

## 2025-07-29 PROCEDURE — 1159F MED LIST DOCD IN RCRD: CPT | Mod: CPTII,,,

## 2025-07-29 PROCEDURE — 99215 OFFICE O/P EST HI 40 MIN: CPT | Mod: S$PBB,,,

## 2025-07-29 PROCEDURE — 3008F BODY MASS INDEX DOCD: CPT | Mod: CPTII,,,

## 2025-07-29 PROCEDURE — 1111F DSCHRG MED/CURRENT MED MERGE: CPT | Mod: CPTII,,,

## 2025-07-29 PROCEDURE — 83735 ASSAY OF MAGNESIUM: CPT

## 2025-07-29 PROCEDURE — 80053 COMPREHEN METABOLIC PANEL: CPT

## 2025-07-29 PROCEDURE — 1160F RVW MEDS BY RX/DR IN RCRD: CPT | Mod: CPTII,,,

## 2025-07-29 PROCEDURE — 85025 COMPLETE CBC W/AUTO DIFF WBC: CPT

## 2025-07-29 PROCEDURE — 3074F SYST BP LT 130 MM HG: CPT | Mod: CPTII,,,

## 2025-07-29 PROCEDURE — 99215 OFFICE O/P EST HI 40 MIN: CPT | Mod: PBBFAC

## 2025-07-29 RX ORDER — MIRTAZAPINE 15 MG/1
15 TABLET, FILM COATED ORAL NIGHTLY
Qty: 30 TABLET | Refills: 2 | Status: SHIPPED | OUTPATIENT
Start: 2025-07-29

## 2025-07-31 DIAGNOSIS — G89.3 CANCER RELATED PAIN: ICD-10-CM

## 2025-07-31 DIAGNOSIS — R51.9 FREQUENT HEADACHES: ICD-10-CM

## 2025-07-31 DIAGNOSIS — C34.90 MALIGNANT NEOPLASM OF LUNG, UNSPECIFIED LATERALITY, UNSPECIFIED PART OF LUNG: ICD-10-CM

## 2025-07-31 DIAGNOSIS — C79.51 SECONDARY ADENOCARCINOMA OF BONE: ICD-10-CM

## 2025-07-31 DIAGNOSIS — C7A.1 LARGE CELL NEUROENDOCRINE CARCINOMA: ICD-10-CM

## 2025-07-31 RX ORDER — OXYCODONE HYDROCHLORIDE 20 MG/1
20 TABLET ORAL EVERY 6 HOURS PRN
Qty: 21 TABLET | Refills: 0 | Status: SHIPPED | OUTPATIENT
Start: 2025-07-31

## 2025-08-04 ENCOUNTER — TELEPHONE (OUTPATIENT)
Dept: HEMATOLOGY/ONCOLOGY | Facility: CLINIC | Age: 40
End: 2025-08-04
Payer: MEDICAID

## 2025-08-04 ENCOUNTER — OFFICE VISIT (OUTPATIENT)
Dept: PALLIATIVE MEDICINE | Facility: CLINIC | Age: 40
End: 2025-08-04
Payer: MEDICAID

## 2025-08-04 VITALS
RESPIRATION RATE: 16 BRPM | BODY MASS INDEX: 17.89 KG/M2 | WEIGHT: 111.31 LBS | OXYGEN SATURATION: 96 % | SYSTOLIC BLOOD PRESSURE: 108 MMHG | HEART RATE: 116 BPM | TEMPERATURE: 99 F | DIASTOLIC BLOOD PRESSURE: 76 MMHG | HEIGHT: 66 IN

## 2025-08-04 DIAGNOSIS — C34.90 MALIGNANT NEOPLASM OF LUNG, UNSPECIFIED LATERALITY, UNSPECIFIED PART OF LUNG: ICD-10-CM

## 2025-08-04 DIAGNOSIS — C34.90 MALIGNANT NEOPLASM OF LUNG, UNSPECIFIED LATERALITY, UNSPECIFIED PART OF LUNG: Primary | ICD-10-CM

## 2025-08-04 DIAGNOSIS — C7A.1 LARGE CELL NEUROENDOCRINE CARCINOMA: ICD-10-CM

## 2025-08-04 DIAGNOSIS — G89.3 CANCER RELATED PAIN: ICD-10-CM

## 2025-08-04 DIAGNOSIS — C78.01 MALIGNANT NEOPLASM METASTATIC TO HILUS OF LUNG WITH UNKNOWN PRIMARY SITE, RIGHT: ICD-10-CM

## 2025-08-04 DIAGNOSIS — R11.0 NAUSEA: ICD-10-CM

## 2025-08-04 DIAGNOSIS — C79.51 SECONDARY ADENOCARCINOMA OF BONE: ICD-10-CM

## 2025-08-04 DIAGNOSIS — R11.2 NAUSEA AND VOMITING, UNSPECIFIED VOMITING TYPE: ICD-10-CM

## 2025-08-04 DIAGNOSIS — R51.9 FREQUENT HEADACHES: ICD-10-CM

## 2025-08-04 DIAGNOSIS — G89.3 NEOPLASM RELATED PAIN: ICD-10-CM

## 2025-08-04 DIAGNOSIS — C80.1 MALIGNANT NEOPLASM METASTATIC TO HILUS OF LUNG WITH UNKNOWN PRIMARY SITE, RIGHT: ICD-10-CM

## 2025-08-04 DIAGNOSIS — Z51.5 ENCOUNTER FOR PALLIATIVE CARE: Primary | ICD-10-CM

## 2025-08-04 DIAGNOSIS — C79.89 METASTATIC CANCER TO PELVIS: ICD-10-CM

## 2025-08-04 PROCEDURE — 3078F DIAST BP <80 MM HG: CPT | Mod: CPTII,,,

## 2025-08-04 PROCEDURE — 1111F DSCHRG MED/CURRENT MED MERGE: CPT | Mod: CPTII,,,

## 2025-08-04 PROCEDURE — 3074F SYST BP LT 130 MM HG: CPT | Mod: CPTII,,,

## 2025-08-04 PROCEDURE — 3008F BODY MASS INDEX DOCD: CPT | Mod: CPTII,,,

## 2025-08-04 PROCEDURE — 99205 OFFICE O/P NEW HI 60 MIN: CPT | Mod: S$PBB,,,

## 2025-08-04 PROCEDURE — 99999 PR PBB SHADOW E&M-EST. PATIENT-LVL IV: CPT | Mod: PBBFAC,,,

## 2025-08-04 PROCEDURE — 99214 OFFICE O/P EST MOD 30 MIN: CPT | Mod: PBBFAC

## 2025-08-04 RX ORDER — NALOXONE HYDROCHLORIDE 4 MG/.1ML
1 SPRAY NASAL ONCE
COMMUNITY
Start: 2025-07-27

## 2025-08-04 RX ORDER — MORPHINE SULFATE 15 MG/1
15 TABLET, FILM COATED, EXTENDED RELEASE ORAL EVERY 12 HOURS
Qty: 28 TABLET | Refills: 0 | Status: SHIPPED | OUTPATIENT
Start: 2025-08-04

## 2025-08-04 RX ORDER — MORPHINE SULFATE 30 MG/1
30 TABLET, FILM COATED, EXTENDED RELEASE ORAL EVERY 12 HOURS
Qty: 28 TABLET | Refills: 0 | Status: SHIPPED | OUTPATIENT
Start: 2025-08-04

## 2025-08-04 RX ORDER — ONDANSETRON 4 MG/1
4 TABLET, FILM COATED ORAL EVERY 6 HOURS PRN
Qty: 30 TABLET | Refills: 1 | Status: SHIPPED | OUTPATIENT
Start: 2025-08-04

## 2025-08-04 RX ORDER — OXYCODONE HYDROCHLORIDE 20 MG/1
20 TABLET ORAL EVERY 6 HOURS PRN
Qty: 60 TABLET | Refills: 0 | Status: SHIPPED | OUTPATIENT
Start: 2025-08-06

## 2025-08-04 RX ORDER — NALOXONE HYDROCHLORIDE 4 MG/.1ML
1 SPRAY NASAL ONCE
Qty: 1 EACH | Refills: 0 | Status: SHIPPED | OUTPATIENT
Start: 2025-08-04 | End: 2025-08-04

## 2025-08-05 ENCOUNTER — DOCUMENTATION ONLY (OUTPATIENT)
Dept: INFUSION THERAPY | Facility: HOSPITAL | Age: 40
End: 2025-08-05
Payer: MEDICAID

## 2025-08-06 ENCOUNTER — OFFICE VISIT (OUTPATIENT)
Dept: HEMATOLOGY/ONCOLOGY | Facility: CLINIC | Age: 40
End: 2025-08-06
Payer: MEDICAID

## 2025-08-06 ENCOUNTER — LAB VISIT (OUTPATIENT)
Dept: HEMATOLOGY/ONCOLOGY | Facility: CLINIC | Age: 40
End: 2025-08-06
Payer: MEDICAID

## 2025-08-06 ENCOUNTER — INFUSION (OUTPATIENT)
Dept: INFUSION THERAPY | Facility: HOSPITAL | Age: 40
End: 2025-08-06
Attending: INTERNAL MEDICINE
Payer: MEDICAID

## 2025-08-06 VITALS
SYSTOLIC BLOOD PRESSURE: 83 MMHG | OXYGEN SATURATION: 100 % | RESPIRATION RATE: 17 BRPM | BODY MASS INDEX: 17.68 KG/M2 | DIASTOLIC BLOOD PRESSURE: 55 MMHG | TEMPERATURE: 98 F | HEIGHT: 66 IN | HEART RATE: 135 BPM | WEIGHT: 110 LBS

## 2025-08-06 VITALS
TEMPERATURE: 98 F | HEART RATE: 115 BPM | RESPIRATION RATE: 20 BRPM | SYSTOLIC BLOOD PRESSURE: 138 MMHG | DIASTOLIC BLOOD PRESSURE: 85 MMHG | OXYGEN SATURATION: 96 %

## 2025-08-06 DIAGNOSIS — G89.3 CANCER RELATED PAIN: ICD-10-CM

## 2025-08-06 DIAGNOSIS — T45.1X5A IMMUNODEFICIENCY DUE TO CHEMOTHERAPY: ICD-10-CM

## 2025-08-06 DIAGNOSIS — C34.90 MALIGNANT NEOPLASM OF LUNG, UNSPECIFIED LATERALITY, UNSPECIFIED PART OF LUNG: Primary | ICD-10-CM

## 2025-08-06 DIAGNOSIS — E87.6 HYPOKALEMIA: ICD-10-CM

## 2025-08-06 DIAGNOSIS — C34.90 MALIGNANT NEOPLASM OF LUNG, UNSPECIFIED LATERALITY, UNSPECIFIED PART OF LUNG: ICD-10-CM

## 2025-08-06 DIAGNOSIS — C7A.1 LARGE CELL NEUROENDOCRINE CARCINOMA: ICD-10-CM

## 2025-08-06 DIAGNOSIS — Z79.69 IMMUNODEFICIENCY DUE TO CHEMOTHERAPY: ICD-10-CM

## 2025-08-06 DIAGNOSIS — D84.821 IMMUNODEFICIENCY DUE TO CHEMOTHERAPY: ICD-10-CM

## 2025-08-06 DIAGNOSIS — R19.7 DIARRHEA, UNSPECIFIED TYPE: ICD-10-CM

## 2025-08-06 DIAGNOSIS — G47.00 INSOMNIA, UNSPECIFIED TYPE: ICD-10-CM

## 2025-08-06 DIAGNOSIS — R07.9 CHEST PAIN, UNSPECIFIED TYPE: ICD-10-CM

## 2025-08-06 DIAGNOSIS — C7A.1 LARGE CELL NEUROENDOCRINE CARCINOMA: Primary | ICD-10-CM

## 2025-08-06 LAB
ALBUMIN SERPL-MCNC: 2.9 G/DL (ref 3.5–5)
ALBUMIN/GLOB SERPL: 0.6 RATIO (ref 1.1–2)
ALP SERPL-CCNC: 91 UNIT/L (ref 40–150)
ALT SERPL-CCNC: 13 UNIT/L (ref 0–55)
ANION GAP SERPL CALC-SCNC: 11 MEQ/L
AST SERPL-CCNC: 14 UNIT/L (ref 11–45)
BASOPHILS # BLD AUTO: 0.03 X10(3)/MCL
BASOPHILS NFR BLD AUTO: 0.4 %
BILIRUB SERPL-MCNC: 0.2 MG/DL
BUN SERPL-MCNC: 8.5 MG/DL (ref 8.9–20.6)
CALCIUM SERPL-MCNC: 9.6 MG/DL (ref 8.4–10.2)
CHLORIDE SERPL-SCNC: 106 MMOL/L (ref 98–107)
CO2 SERPL-SCNC: 22 MMOL/L (ref 22–29)
CREAT SERPL-MCNC: 0.74 MG/DL (ref 0.72–1.25)
CREAT/UREA NIT SERPL: 11
EOSINOPHIL # BLD AUTO: 0.02 X10(3)/MCL (ref 0–0.9)
EOSINOPHIL NFR BLD AUTO: 0.3 %
ERYTHROCYTE [DISTWIDTH] IN BLOOD BY AUTOMATED COUNT: 15.9 % (ref 11.5–17)
GFR SERPLBLD CREATININE-BSD FMLA CKD-EPI: >60 ML/MIN/1.73/M2
GLOBULIN SER-MCNC: 4.9 GM/DL (ref 2.4–3.5)
GLUCOSE SERPL-MCNC: 141 MG/DL (ref 74–100)
HCT VFR BLD AUTO: 39.1 % (ref 42–52)
HGB BLD-MCNC: 12.9 G/DL (ref 14–18)
IMM GRANULOCYTES # BLD AUTO: 0.07 X10(3)/MCL (ref 0–0.04)
IMM GRANULOCYTES NFR BLD AUTO: 0.9 %
LYMPHOCYTES # BLD AUTO: 0.99 X10(3)/MCL (ref 0.6–4.6)
LYMPHOCYTES NFR BLD AUTO: 12.6 %
MAGNESIUM SERPL-MCNC: 1.8 MG/DL (ref 1.6–2.6)
MCH RBC QN AUTO: 34.8 PG (ref 27–31)
MCHC RBC AUTO-ENTMCNC: 33 G/DL (ref 33–36)
MCV RBC AUTO: 105.4 FL (ref 80–94)
MONOCYTES # BLD AUTO: 1 X10(3)/MCL (ref 0.1–1.3)
MONOCYTES NFR BLD AUTO: 12.7 %
NEUTROPHILS # BLD AUTO: 5.75 X10(3)/MCL (ref 2.1–9.2)
NEUTROPHILS NFR BLD AUTO: 73.1 %
NRBC BLD AUTO-RTO: 0 %
OHS QRS DURATION: 84 MS
OHS QTC CALCULATION: 454 MS
PLATELET # BLD AUTO: 310 X10(3)/MCL (ref 130–400)
PMV BLD AUTO: 8.6 FL (ref 7.4–10.4)
POTASSIUM SERPL-SCNC: 3 MMOL/L (ref 3.5–5.1)
PROT SERPL-MCNC: 7.8 GM/DL (ref 6.4–8.3)
RBC # BLD AUTO: 3.71 X10(6)/MCL (ref 4.7–6.1)
SODIUM SERPL-SCNC: 139 MMOL/L (ref 136–145)
WBC # BLD AUTO: 7.86 X10(3)/MCL (ref 4.5–11.5)

## 2025-08-06 PROCEDURE — 3008F BODY MASS INDEX DOCD: CPT | Mod: CPTII,,,

## 2025-08-06 PROCEDURE — 1159F MED LIST DOCD IN RCRD: CPT | Mod: CPTII,,,

## 2025-08-06 PROCEDURE — 3078F DIAST BP <80 MM HG: CPT | Mod: CPTII,,,

## 2025-08-06 PROCEDURE — 25000003 PHARM REV CODE 250: Performed by: INTERNAL MEDICINE

## 2025-08-06 PROCEDURE — 85025 COMPLETE CBC W/AUTO DIFF WBC: CPT

## 2025-08-06 PROCEDURE — 1111F DSCHRG MED/CURRENT MED MERGE: CPT | Mod: CPTII,,,

## 2025-08-06 PROCEDURE — 96366 THER/PROPH/DIAG IV INF ADDON: CPT

## 2025-08-06 PROCEDURE — 25000003 PHARM REV CODE 250

## 2025-08-06 PROCEDURE — 99214 OFFICE O/P EST MOD 30 MIN: CPT | Mod: PBBFAC,25

## 2025-08-06 PROCEDURE — 99215 OFFICE O/P EST HI 40 MIN: CPT | Mod: S$PBB,,,

## 2025-08-06 PROCEDURE — 1160F RVW MEDS BY RX/DR IN RCRD: CPT | Mod: CPTII,,,

## 2025-08-06 PROCEDURE — 96365 THER/PROPH/DIAG IV INF INIT: CPT

## 2025-08-06 PROCEDURE — 63600175 PHARM REV CODE 636 W HCPCS

## 2025-08-06 PROCEDURE — A4216 STERILE WATER/SALINE, 10 ML: HCPCS | Performed by: INTERNAL MEDICINE

## 2025-08-06 PROCEDURE — 80053 COMPREHEN METABOLIC PANEL: CPT

## 2025-08-06 PROCEDURE — 93005 ELECTROCARDIOGRAM TRACING: CPT

## 2025-08-06 PROCEDURE — 83735 ASSAY OF MAGNESIUM: CPT

## 2025-08-06 PROCEDURE — 3074F SYST BP LT 130 MM HG: CPT | Mod: CPTII,,,

## 2025-08-06 RX ORDER — SODIUM CHLORIDE 0.9 % (FLUSH) 0.9 %
10 SYRINGE (ML) INJECTION
Status: CANCELLED | OUTPATIENT
Start: 2025-08-06

## 2025-08-06 RX ORDER — DIPHENHYDRAMINE HYDROCHLORIDE 50 MG/ML
50 INJECTION, SOLUTION INTRAMUSCULAR; INTRAVENOUS ONCE AS NEEDED
Status: CANCELLED | OUTPATIENT
Start: 2025-08-06

## 2025-08-06 RX ORDER — HEPARIN 100 UNIT/ML
500 SYRINGE INTRAVENOUS
OUTPATIENT
Start: 2025-08-06

## 2025-08-06 RX ORDER — ATROPINE SULFATE 0.4 MG/ML
0.4 INJECTION, SOLUTION ENDOTRACHEAL; INTRAMEDULLARY; INTRAMUSCULAR; INTRAVENOUS; SUBCUTANEOUS ONCE AS NEEDED
Status: CANCELLED | OUTPATIENT
Start: 2025-08-06

## 2025-08-06 RX ORDER — SODIUM CHLORIDE 0.9 % (FLUSH) 0.9 %
10 SYRINGE (ML) INJECTION
OUTPATIENT
Start: 2025-08-06

## 2025-08-06 RX ORDER — HEPARIN 100 UNIT/ML
500 SYRINGE INTRAVENOUS
Status: CANCELLED | OUTPATIENT
Start: 2025-08-06

## 2025-08-06 RX ORDER — EPINEPHRINE 0.3 MG/.3ML
0.3 INJECTION SUBCUTANEOUS ONCE AS NEEDED
Status: CANCELLED | OUTPATIENT
Start: 2025-08-06

## 2025-08-06 RX ORDER — SODIUM CHLORIDE 0.9 % (FLUSH) 0.9 %
10 SYRINGE (ML) INJECTION
Status: DISCONTINUED | OUTPATIENT
Start: 2025-08-06 | End: 2025-08-06 | Stop reason: HOSPADM

## 2025-08-06 RX ORDER — PROCHLORPERAZINE EDISYLATE 5 MG/ML
10 INJECTION INTRAMUSCULAR; INTRAVENOUS ONCE AS NEEDED
Status: CANCELLED
Start: 2025-08-06

## 2025-08-06 RX ORDER — HEPARIN 100 UNIT/ML
500 SYRINGE INTRAVENOUS
Status: DISCONTINUED | OUTPATIENT
Start: 2025-08-06 | End: 2025-08-06 | Stop reason: HOSPADM

## 2025-08-06 RX ADMIN — POTASSIUM CHLORIDE 255 ML/HR: 2 INJECTION, SOLUTION, CONCENTRATE INTRAVENOUS at 12:08

## 2025-08-06 RX ADMIN — Medication 10 ML: at 03:08

## 2025-08-06 NOTE — PROGRESS NOTES
Reason for Follow-up:  -large cell neuroendocrine carcinoma, metastatic   -sites of disease: Mediastinal lymphadenopathy, right hilar lymphadenopathy, biopsy-proven right supraclavicular lymphadenopathy, 18 mm left adrenal nodule, left lower lobe lung nodule; lytic metastases anterior left ilium  -assuming lung primary, and metastases to left adrenal and lytic metastases to anterior left ilium, cT4 cN3 M1c, stage IVB  -right cerebellar metastases (later on)  -retroperitoneal lymphadenopathy  -cervical lymphadenopathy  -multiple lytic bone metastases  -hemoptysis, hematemesis, bloody stools, dyspnea, abdominal pain, 15 lb weight loss  -rectal wall thickening on CT  -thrombocytosis, reactive  -anemia of chronic disease    History:      History of Present Illness:   malignant neoplasm of lung     Oncologic/Hematologic History:  Oncology History   Neuroendocrine carcinoma of lung   6/14/2024 Cancer Staged    Staging form: Lung, AJCC 8th Edition  - Clinical stage from 6/14/2024: Stage IVB (cT4, cN3, cM1c)     6/22/2024 Initial Diagnosis    Neuroendocrine carcinoma of lung     8/7/2024 - 3/18/2025 Chemotherapy    Treatment Summary   Plan Name: OP SCLC atezolizumab CARBOplatin etoposide Q3W   Treatment Goal: Palliative  Status: Inactive  Start Date: 8/7/2024  End Date: 3/18/2025  Provider: Artemio Delatorre MD  Chemotherapy: CARBOplatin (PARAPLATIN) 560 mg in 0.9% NaCl 341 mL chemo infusion, 540 mg (100 % of original dose 541 mg), Intravenous, Clinic/HOD 1 time, 4 of 4 cycles  Dose modification:   (original dose 541 mg, Cycle 1)  Administration: 560 mg (8/7/2024), 560 mg (8/28/2024), 560 mg (9/18/2024), 585 mg (10/9/2024)  etoposide (VEPESID) 160 mg in 0.9% NaCl 573 mL chemo infusion, 156 mg, Intravenous, Clinic/HOD 1 time, 4 of 4 cycles  Administration: 160 mg (8/7/2024), 156 mg (8/8/2024), 160 mg (8/28/2024), 160 mg (8/29/2024), 156 mg (8/9/2024), 160 mg (8/30/2024), 160 mg (9/18/2024), 160 mg (9/19/2024),  160 mg (9/20/2024), 160 mg (10/9/2024), 160 mg (10/10/2024), 160 mg (10/11/2024)     4/22/2025 - 5/16/2025 Chemotherapy    Treatment Summary   Plan Name: OP SCLC atezolizumab CARBOplatin etoposide Q3W   Treatment Goal: Control  Status: Inactive  Start Date: 4/22/2025  End Date: 5/16/2025  Provider: Artemio Delatorre MD  Chemotherapy: CARBOplatin (PARAPLATIN) 600 mg in 0.9% NaCl 285 mL chemo infusion, 600 mg (114.8 % of original dose 522 mg), Intravenous, Clinic/HOD 1 time, 2 of 4 cycles  Dose modification:   (original dose 522 mg, Cycle 1)  Administration: 600 mg (4/22/2025), 540 mg (5/13/2025)  etoposide (VEPESID) 100 mg/m2 = 156 mg in 0.9% NaCl 572.8 mL chemo infusion, 100 mg/m2 = 156 mg, Intravenous, Clinic/HOD 1 time, 2 of 4 cycles  Administration: 156 mg (4/22/2025), 156 mg (4/23/2025), 156 mg (5/13/2025), 156 mg (5/14/2025), 156 mg (4/24/2025), 156 mg (5/15/2025)     6/24/2025 -  Chemotherapy    Treatment Summary   Plan Name: OP SCLC IRINOTECAN Q3W  Treatment Goal: Palliative  Status: Active  Start Date: 6/24/2025  End Date: 5/27/2026 (Planned)  Provider: Artemio Delatorre MD  Chemotherapy: irinotecan (CAMPTOSAR) 540 mg in 0.9% NaCl 592 mL chemo infusion, 546 mg (100 % of original dose 350 mg/m2), Intravenous, Clinic/HOD 1 time, 2 of 17 cycles  Dose modification: 350 mg/m2 (original dose 350 mg/m2, Cycle 1, Reason: Other (see comments), Comment: per protocol)  Administration: 540 mg (6/24/2025), 540 mg (7/15/2025)           38-year-old gentleman, referred from [facility], with large cell neuroendocrine carcinoma of lung.  We are following him for large cell neuroendocrine carcinoma of lung.    Please refer to assessment and plan section for details.    06/24/2024:   Thinly built but otherwise healthy-appearing young man presents for initial medical oncology consultation.  In no acute discomfort.  Very pleasant.  Says that he had small amount hemoptysis only 1 time.  Says that he had hematemesis only  1 time.  Says that he had melanotic stool only 1 time.  ECOG 0-1.  Overall, feels well.  Mild weakness and fatigue.  Mild night sweats and hot flashes.  Occasional mild chest pain and some exertional dyspnea but not severe.  Some constipation.  Some numbness.  Great appetite.  No unusual headaches, focal neurological symptoms, vision impairment, loss of consciousness, seizures, or stroke-like symptoms.    No significant chest pain.  No significant cough or dyspnea.  No hemoptysis.  No recurrent bouts of pneumonia or bronchitis.  No abdominal pain, nausea, or vomiting.  After 1 time episode of hematemesis and melena, no GI bleeding whatsoever.  Good appetite.    No bone pains.  No urinary problems.    # Large cell neuroendocrine carcinoma, metastatic:  -presentation:  06/2024:  Hemoptysis, hematemesis, bloody stool, dyspnea, abdominal pain; 15 lb weight loss  -large cell neuroendocrine carcinoma   -mediastinal lymphadenopathy, right hilar lymphadenopathy, biopsy-proven (06/14/2024) right supraclavicular lymphadenopathy  -left adrenal 18 mm nodule, likely metastases  -hemoptysis, hematemesis, bloody stool   -rectal wall thickening on CT  -NGS testing on right supraclavicular lymph node excisional biopsy 06/14/2024:  TMB high (15.1); rest, negative   -06/24/2024:  Iron stores normal, ESR elevated, CRP elevated  -staging brain MRI 07/03/2024: No brain metastases  -staging PET-CT 07/16/2024:  Sites of disease:  Mediastinal and right hilar lymphadenopathy; large gwen conglomerate masses with central necrosis; right middle lobe bronchus compressed by right hilar mass 7.2 cm; left hilar lymphadenopathy; left lower lobe lung nodule, small, 6 mm; mildly FDG avid left adrenal nodule; possible lytic metastases anterior left ilium  -no brain metastases on baseline brain MRI 07/03/2024  -assuming lung primary, and metastases to left adrenal and lytic metastases to anterior left ilium, cT4 cN3 M1c, stage IVB  (Pending EGD and  colonoscopy)  -07/19/2024: ECOG 1; still ambivalent about pursuing palliative chemotherapy; still hoping to get a 2nd opinion at [facility] which, so far, has not materialized  -right IJ MediPort placed 07/31/2024  -plan: Just like extensive stage small-cell lung cancer  -S/P chemotherapy (carboplatin/etoposide/atezolizumab) x4 cycles (08/07/2024-10/11/2024)  -CT neck 08/12/2024: No cervical lymphadenopathy   -bone scan 08/12/2024: No bone metastases (however, possible lytic metastases to anterior left ilium per PET-CT 07/16/2024)  -S/P palliative radiotherapy to right lung 07/24/2024-08/30/2024  -restaging CTs C/A/P 10/22/2024, S/P chemotherapy x4 cycles: Positive response  -atezolizumab maintenance 1000 mg IV every 3 weeks, started 10/30/2024  -08/14/2024: TSH, free T4 normal   -10/09/2024: TSH, free T4 normal  -11/04/2024: ECOG 0; overall, doing extremely well  -ultrasound scrotum 12/02/2024:  Left orchitis; bilateral hydroceles, largest 3.0 cm  -CTs C/A/P without contrast 12/14/2024:  Epigastric pain, ARYAN:  No metastatic progression  Tecentriq:  C #8 on 01/02/2025  -restaging CTs C/A/P 01/07/2025: Maybe, mild progression (left hilar lymph node 15 mm, previously 5 mm; rest, unchanged/improved)  -CTs abdomen pelvis 01/19/2025:  Abdominal pain:  Left adrenal gland lesion 2.3 x 2.8 cm, enlarged  -CTA chest 01/19/2025:  No PE; no metastatic progression  (between 12/2024-01/2025, minimal, if at all, progression)  -TTE 01/19/2025: Chest pain: LVEF 60-65%  -TSH and free T4 normal on 01/02/2025, 12/11/2024, 10/09/2024, etc.  Hospitalized [facility] 12/14/2024-12/18/2024: Severe sepsis, pneumonia, ARYAN, etc.; presented with abdominal pain, nausea, vomiting, productive cough, brown mucus; poor oral intake; fluid resuscitated; Levaquin x5 days; improved  Hospitalized [facility] 01/19/2025-01/22/2025: Chest pain, abdominal pain, nausea, vomiting; history of CAD, S/P stents/MI in the past  >>>  # Disease  progression on atezolizumab maintenance:  -restaging CTs C/A/P 04/04/2025:  Disease progression  (Chemotherapy free interval is almost> 6 months)  -02/25/2025: TSH and free T4 normal  -(repeat) carboplatin/etoposide/atezolizumab started 04/22/2025) with Neulasta support)  -restaging FDG PET-CT 04/25/2025) was supposed to be performed before start of chemotherapy):  [Per secure chat message from Dr. Dali Jimenez, radiologist, stable from prior staging chest/abd/pelvis except for the lungs]  Metastatic disease (lower cervical, mediastinal, hilar lymphadenopathy; enlarging right middle lung lobe nodule; infectious/inflammatory scattered consolidative changes left lower lung lobe; left adrenal metastases, adjacent retroperitoneal lymphadenopathy, multiple lytic bone metastases)  -hospitalized 04/28/2025-04/30/2025: Abdominal pain, left lower lobe pneumonia, CTs unrevealing except for possible left lower lung lobe pneumonia  >>>  # Developed brain metastasis:  -S/P cycle 2 of (re-challenge) carboplatin/etoposide/atezolizumab 05/13/2025-05/15/2025  -restaging brain MRI 05/16/2025:  New 3.6 cm right cerebellar metastases, mild mass effect posterior fossa  -05/16/2025:  Telephone call to patient; he has been fine; started on Decadron and Pepcid; referred to Radiation Oncology for palliative radiotherapy to brain metastases, ASAP; ER precautions reinforced  -05/18/2025:  Now patient has CNS disease; in this situation, chemotherapy with irinotecan is preferred (per NCCN guidelines)  -plan of next line chemotherapy: Irinotecan:  350 mg per m2 IV every 21 days until disease progression or unacceptable toxicity  -check pharmacogenetic panel before starting irinotecan (UGT1A1 activity)  -peripheral neuropathy: On gabapentin 300 mg p.o. nightly; on 05/13/2025, elevated Cymbalta 30 mg p.o. q.h.s. for 7 days, then 60 mg p.o. q.h.s.  -on narcotics for cancer-related pain (leg, back, left groin area  -neurosurgery consultation  06/06/2025: Recommended SRS to cerebellar metastasis  -S/P palliative radiotherapy to left pubis and sacrum: 06/10/2025-06/23/2025 (3000 cGy; 10/10 fractions; 13 elapsed days)  -brain MRI 06/18/2025:  Right cerebellar metastases enlarged, 4 cm; worsening inferior cerebellar herniation due to mass effect; mild obstructive hydrocephalus; new 6 mm x 5 mm metastases left occipital cortex  -irinotecan started 06/24/2025 (every 3 weeks)  -S/P SRS left occipital metastasis (x1) and right cerebellar metastasis (x3): 06/25/2025-06/27/2025  -for anorexia, Zyprexa prescribed 07/01/2025  -for chemotherapy-induced diarrhea, Lomotil prescribed 07/01/2025  -for insomnia, Remeron 8 mg p.o. q.h.s., prescribed 07/01/2025  -for neuropathy, on gabapentin and Cymbalta    Interval History 8/6/25:  Patient presented to the clinic today for a scheduled clinic visit.  He is due to receive cycle 3 of irinotecan today and infusion.  Patient complains of being in the bed for the last week.  He reports being depressed but he is not homicidal or suicidal.  He refused to be referred for counseling or to be placed on any depression medication.  He reports that he can talk to his sister about his problems and that is good enough for him.  He reports compliance with taking his potassium supplements hours prescribed at the last visit.  He states he has a couple of days left.  He reports having left-sided abdominal pain that appears to be worsening.  He reports eating and drinking well.  He reports compliance with his palliative Medicine visits and states that they were very nice to him there.  He denies any fever, chills, shortness for breath or any signs and symptoms associated with infection.  Denies any constipation.  But he does admit to having a few episodes of diarrhea that has been controlled with prescribed medication.  Denies any bleeding associated with the diarrhea.  Lab work was reviewed with the patient.  All future appointments were  discussed.      06/24/2025:  -06/01/2025: CT abdomen pelvis without contrast (comparison 04/28/2025; abdominal pain):  Persistent changes in the lung bases.   Significantly limited study due the lack of IV contrast.   Persistent mass of the left adrenal gland comparison is difficult.   Lytic lesion in the head of the right femur stable from the prior exam  -06/06/2025:  Neurosurgery consultation (Sagar Gonzáles MD):  Proceed with a stereotactic radiosurgery  -S/P palliative radiotherapy to left pubis and sacrum: 06/10/2025-06/23/2025 (3000 cGy; 10/10 fractions; 13 elapsed days)  -06/18/2025:  Brain MRI with and without contrast (comparison:  Brain MRI 05/16/2025):  Mild interval growth of the mixed solid/cystic intra-axial mass of the superior right cerebellar hemisphere currently measuring up to 4 cm on today's examination, previously measuring up to 3.7 cm on 05/16/2025.  There is worsening inferior cerebellar herniation due to the mass effect.  Given provided history, this likely represents a metastatic lesion.   Slight interval enlargement of the ventricles, concerning for development of mild obstructive hydrocephalus.   New 6 mm x 5 mm enhancing nodule within the left occipital cortex, likely representing an additional metastasis.  This is best seen on series 10, image 51.  -WBC 13.64, hemoglobin 12.0, platelets 269 K, neutrophils 90.9%, CMP reviewed  Presents for a follow-up visit.  Ambulates independently.  In no acute discomfort.  Pain in the left hemipelvis where he has metastases.  It is too soon to extra benefit from palliative radiotherapy.  Still has not had radiotherapy to brain metastases, done.  Some headaches but not severe.  No loss of consciousness, seizures, ataxia, vision disturbances, or any other focal neurological symptoms.  Some exertional dyspnea but not severe.  No hemoptysis.  No fevers or chills.  No new lumps or lymphadenopathy.  Appetite is fair.  ECOG 1.  We will start  chemotherapy with irinotecan today.  When he sits up, he has to take it easy for a while before standing because of dizziness.  As usual, main symptom is severe pain in the left iliac bone secondary to metastatic lesion in his location.  Requesting refill of narcotics which is reasonable.    Has been smoking marijuana for 25 years  Did not experienced any immune related adverse events with atezolizumab like immune dermatitis, immune colitis, immune pneumonitis, immune myocarditis, immune endocrinopathies, immune Hepatitis, immune ophthalmitis, cerebritis, etc..     07/14/2025:  -irinotecan started 06/24/2025 (every 3 weeks)  -S/P SRS (x1) to left occipital metastasis; s/p SRS (x3) to right cerebellar metastasis (06/25/2025-06/27/2025)  -for anorexia, Zyprexa prescribed 07/01/2025  -for chemotherapy-induced diarrhea, Lomotil prescribed 07/01/2025  -for insomnia, Remeron 8 mg p.o. q.h.s., prescribed 07/01/2025  -for neuropathy, on gabapentin and Cymbalta  -no showed 07/10/2025  -07/14/2025:  Hemoglobin 12.5 more or less stable, CBC unremarkable, ANC 6.60, potassium 3.3, rest of CMP without any acute concern, magnesium 1.6 normal  >>>  -start potassium chloride 40 mEq p.o. q.d. x2 weeks; no refills; in 2 weeks, recheck CMP and magnesium level   Presents for a follow-up visit.  Overall, doing surprisingly well.  ECOG 0-1.  Some headaches.  Some vision blurriness.  No loss of consciousness, seizures, or focal neurological symptoms.  Ambulates normal.  No gait abnormality.  Apparently, on tapering dose of Decadron.  Mild exertional dyspnea.  Mild nausea and vomiting.  Mild constipation.  Appetite is fair.  No significant side effects with 1st dose of irinotecan.  No severe cytopenias.  Residual pain in left hemipelvis where he has metastases; S/P radiotherapy; requesting refill of pain medications which is reasonable.  No new lumps or lymphadenopathy.  No anorexia or unintentional weight loss.  Has been smoking marijuana  for 25 years  Scheduled for cycle 2 of irinotecan tomorrow.    Immunization History   Administered Date(s) Administered    Tdap 03/23/2023     Review of patient's allergies indicates:  No Known Allergies    Medications:  Current Outpatient Medications on File Prior to Visit   Medication Sig Dispense Refill    atorvastatin (LIPITOR) 40 MG tablet Take 40 mg by mouth once daily.      dexAMETHasone (DECADRON) 4 MG Tab Take 1 tablet (4 mg total) by mouth every 6 (six) hours. 120 tablet 0    ergocalciferol (ERGOCALCIFEROL) 50,000 unit Cap Take 1 capsule (50,000 Units total) by mouth every 7 days. 8 capsule 0    famotidine (PEPCID) 40 MG tablet Take 1 tablet (40 mg total) by mouth every evening. 30 tablet 1    gabapentin (NEURONTIN) 300 MG capsule Take 1 capsule (300 mg total) by mouth every evening. 30 capsule 1    loperamide (IMODIUM) 2 mg capsule Take 2 tablets (4mg) by mouth as needed after first loose stool, 1 tablet every 2 hours until diarrhea free for 12 hours. May take 2 tablets (4mg) by mouth every 4 hours at night. May require more than the package labeling maximum dose of 16mg/day. 30 capsule 11    mirtazapine (REMERON) 15 MG tablet Take 1 tablet (15 mg total) by mouth nightly. 30 tablet 2    morphine (MS CONTIN) 15 MG 12 hr tablet Take 1 tablet (15 mg total) by mouth every 12 (twelve) hours. Take 1 tablet (15 mg) by mouth every 12 hours in addition to 1 tablet of MS Contin 30 mg every 12 hours for a total dose of MS Contin 45 mg every 12 hours. 28 tablet 0    morphine (MS CONTIN) 30 MG 12 hr tablet Take 1 tablet (30 mg total) by mouth every 12 (twelve) hours. 28 tablet 0    naloxone (NARCAN) 4 mg/actuation Spry 1 spray by Nasal route once.      OLANZapine (ZYPREXA) 2.5 MG tablet Take 1 tablet (2.5 mg total) by mouth once daily. 84 tablet 0    ondansetron (ZOFRAN) 4 MG tablet Take 1 tablet (4 mg total) by mouth every 6 (six) hours as needed for Nausea. 30 tablet 1    oxyCODONE (ROXICODONE) 20 mg Tab immediate  release tablet Take 1 tablet (20 mg total) by mouth every 6 (six) hours as needed for Pain. 60 tablet 0    prochlorperazine (COMPAZINE) 5 MG tablet Take 2 tablets (10 mg total) by mouth every 6 (six) hours as needed (nausea). 40 tablet 11     Current Facility-Administered Medications on File Prior to Visit   Medication Dose Route Frequency Provider Last Rate Last Admin    0.9%  NaCl infusion   Intravenous Continuous Lexi Lynn MD        0.9%  NaCl infusion   Intravenous Continuous Lexi Lynn MD        LIDOcaine (PF) 10 mg/ml (1%) injection 10 mg  1 mL Intradermal Once Lexi Lynn MD         Review of Systems:   All systems reviewed and found to be negative except for the symptoms detailed above    Physical Examination:   VITAL SIGNS:   Vitals:    08/06/25 1120   BP: (!) 83/55   Pulse: (!) 135   Resp: 17   Temp: 98.3 °F (36.8 °C)       Physical Exam  Vitals reviewed.   Constitutional:       Appearance: He is toxic-appearing.   HENT:      Head: Normocephalic.   Cardiovascular:      Rate and Rhythm: Normal rate and regular rhythm.   Pulmonary:      Effort: Pulmonary effort is normal.      Breath sounds: Normal breath sounds.   Abdominal:      General: Bowel sounds are normal.      Palpations: Abdomen is soft.   Musculoskeletal:      Comments: Abnormal gait   Skin:     General: Skin is warm and dry.      Capillary Refill: Capillary refill takes less than 2 seconds.   Neurological:      Mental Status: He is alert and oriented to person, place, and time.   Psychiatric:         Mood and Affect: Mood normal.         Behavior: Behavior normal.         Thought Content: Thought content normal.         Judgment: Judgment normal.          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]    [Cerebellar metastasis]    [Secondary adenocarcinoma of bone]    [Malignant neoplasm metastatic to occipital lobe with unknown primary site]     Other Visit Diagnoses         Diarrhea, unspecified type          Insomnia, unspecified type          Cancer related pain                  ===================================    # Large cell neuroendocrine carcinoma, metastatic:  Briefly:  -metastatic large cell neuroendocrine carcinoma  -experienced positive response to 4 cycles of chemotherapy with  carboplatin/etoposide/atezolizumab which he tolerated very well   -S/P palliative radiotherapy to right lung 07/24/2024-08/30/2024  -maintenance atezolizumab started 10/30/2024  -between 12/2024-01/2025, minimal, if at all, progression  >>>  -disease progression on restaging CTs 04/04/2025, on atezolizumab maintenance  (chemotherapy free interval is almost > 6 months)  -plan: Rechallenge with carboplatin/etoposide/atezolizumab  -rechallenge chemo started 04/22/2025); received 2 cycles  -baseline FDG PET-CT 04/25/2025: Extensive metastases  >>>  -restaging brain MRI 05/16/2025:  Cerebellar metastasis  -s/p palliative radiotherapy to left pubis and sacrum 06/2025  -brain MRI 06/18/2025:  Worsening brain metastases  -irinotecan started 06/24/2025 (every 3 weeks)  -S/P SRS left occipital metastasis (x1) and right cerebellar metastasis (x3)  >>>  Plan:   Metastatic large cell neuroendocrine carcinoma:   Continue irinotecan every 3 weeks  Proceed with cycle 3 tomorrow 8/7 in infusion use labs from 8/6  Check CBC and CMP weekly   Re-stage with contrast-enhanced CT scans of C/A/P and whole-body nuclear medicine bone scan end of August  Dental evaluation and preventive Dentistry     Low Blood  pressure:   83/55    Order EKG in office   Give 1 L of NS today in infusion   Recheck BP after the completion of fluids     Cancer related pain:   Continue narcotics for cancer related pain  Referral to palliative medicine was sent today       Hypokalemia:   Continue potassium chloride 40 mEq p.o. q.d. until completion  Give 40 mEq of potassium IV today and infusion  Tomorrow, 8/7 recheck potassium level   No refills needed     Decrease Appetite:   Continue Zyprexa boost appetite     Diarrhea:   Continue Lomotil PRN for diarrhea      Peripheral Neuropathy:   Continue gabapentin and Cymbalta for neuropathy    Insomnia:   Increased dose of Remeron to 15 mg p.o. q.h.s. to help with sleep         -S/P SRS to brain metastases (see above)  -needs to continue systemic therapy for widely metastatic disease  -per NCCN guidelines, chemotherapy with irinotecan is preferred for CNS disease  -irinotecan:  350 mg/m2 IV every 21 days until disease progression or unacceptable toxicity  -irinotecan started 06/24/2025; continue every 3 weeks until disease progression or unacceptable toxicity  -check CBC and CMP weekly during chemotherapy  -re-stage with contrast-enhanced CT scans of C/A/P and whole-body nuclear medicine bone scan end of August  -now, has multiple lytic bone metastases as well; need dental evaluation and preventive Dentistry before starting him on denosumab or bisphosphonate to prevent skeletal events from bone metastases  -pharmacogenomics panel 05/30/2025: UGT1A1 normal metabolizer  -for anorexia, Zyprexa prescribed 07/01/2025  -for chemotherapy-induced diarrhea, Lomotil prescribed 07/01/2025  -for insomnia, Remeron 8 mg p.o. q.h.s., prescribed 07/01/2025  -for neuropathy, on gabapentin and Cymbalta  -on narcotics for cancer-related pain (leg, back, left groin area)  -EGD and colonoscopy for evaluation of hematemesis and for evaluation of rectal wall thickening, have already been requested  -07/14/2025: Refill  pain medications    -05/30/2025: Severe pain in left iliac bone, not relieved with Norco 7.5 mg prn; we will discontinue Norco and start oxycodone 20 mg p.o. q.6 hours prn which can be titrated up as needed  -underwent palliative radiotherapy left pubis and sacrum 06/2025  -07/14/2025: Refill pain medications    Chemotherapy regimen:   Irinotecan 350 mg/m2 every 21 days until disease progression or unacceptable toxicity      -07/14/2025:  Hemoglobin 12.5 more or less stable, CBC unremarkable, ANC 6.60, potassium 3.3, rest of CMP without any acute concern, magnesium 1.6 normal  >>>  -start potassium chloride 40 mEq p.o. q.d. x2 weeks; no refills; in 2 weeks, recheck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

## 2025-08-06 NOTE — NURSING
JOSLYN Hollins NP escorted pt to Infusion, chemo held today, orders for 40 meq KCL today and 1L normal saline & check bp again after infusion & pt to return tomorrow for chemo if ok with JOSLYN Hollins NP.; pt verbalized understanding of today's plan, when told infusion would take 4 hours, pt asked to go downstairs & get his medicine; when pt returned he did not receive Oxycodone; JOSLYN Alford MA called downstairs to pharmacy & they said they are working on it; potassium infusion initiated.

## 2025-08-06 NOTE — Clinical Note
Today give 1L of NS & potassium in infusion  Return to infusion tomorrow for chemotherapy (redraw potassium level)  RTC with MD in 3 weeks to discuss CT scans/Brain MRI followed by chemotherapy in infusion- Irinotecan

## 2025-08-07 ENCOUNTER — INFUSION (OUTPATIENT)
Dept: INFUSION THERAPY | Facility: HOSPITAL | Age: 40
End: 2025-08-07
Attending: INTERNAL MEDICINE
Payer: MEDICAID

## 2025-08-07 VITALS
RESPIRATION RATE: 20 BRPM | DIASTOLIC BLOOD PRESSURE: 65 MMHG | HEART RATE: 113 BPM | SYSTOLIC BLOOD PRESSURE: 104 MMHG | OXYGEN SATURATION: 93 % | TEMPERATURE: 98 F

## 2025-08-07 DIAGNOSIS — C7A.8 NEUROENDOCRINE CARCINOMA OF LUNG: ICD-10-CM

## 2025-08-07 DIAGNOSIS — G89.3 CANCER-RELATED BREAKTHROUGH PAIN: Primary | ICD-10-CM

## 2025-08-07 PROCEDURE — 96375 TX/PRO/DX INJ NEW DRUG ADDON: CPT

## 2025-08-07 PROCEDURE — 63600175 PHARM REV CODE 636 W HCPCS

## 2025-08-07 PROCEDURE — 25000003 PHARM REV CODE 250

## 2025-08-07 PROCEDURE — 96413 CHEMO IV INFUSION 1 HR: CPT

## 2025-08-07 PROCEDURE — 96367 TX/PROPH/DG ADDL SEQ IV INF: CPT

## 2025-08-07 PROCEDURE — A4216 STERILE WATER/SALINE, 10 ML: HCPCS

## 2025-08-07 RX ORDER — HEPARIN 100 UNIT/ML
500 SYRINGE INTRAVENOUS
Status: DISCONTINUED | OUTPATIENT
Start: 2025-08-07 | End: 2025-08-07 | Stop reason: HOSPADM

## 2025-08-07 RX ORDER — EPINEPHRINE 1 MG/ML
0.3 INJECTION INTRAMUSCULAR; INTRAVENOUS; SUBCUTANEOUS ONCE AS NEEDED
Status: DISCONTINUED | OUTPATIENT
Start: 2025-08-07 | End: 2025-08-07 | Stop reason: HOSPADM

## 2025-08-07 RX ORDER — SODIUM CHLORIDE 0.9 % (FLUSH) 0.9 %
10 SYRINGE (ML) INJECTION
Status: DISCONTINUED | OUTPATIENT
Start: 2025-08-07 | End: 2025-08-07 | Stop reason: HOSPADM

## 2025-08-07 RX ORDER — ATROPINE SULFATE 0.4 MG/ML
0.4 INJECTION, SOLUTION ENDOTRACHEAL; INTRAMEDULLARY; INTRAMUSCULAR; INTRAVENOUS; SUBCUTANEOUS ONCE AS NEEDED
Status: COMPLETED | OUTPATIENT
Start: 2025-08-07 | End: 2025-08-07

## 2025-08-07 RX ORDER — DIPHENHYDRAMINE HYDROCHLORIDE 50 MG/ML
50 INJECTION, SOLUTION INTRAMUSCULAR; INTRAVENOUS ONCE AS NEEDED
Status: DISCONTINUED | OUTPATIENT
Start: 2025-08-07 | End: 2025-08-07 | Stop reason: HOSPADM

## 2025-08-07 RX ORDER — PROCHLORPERAZINE EDISYLATE 5 MG/ML
10 INJECTION INTRAMUSCULAR; INTRAVENOUS ONCE AS NEEDED
Status: DISCONTINUED | OUTPATIENT
Start: 2025-08-07 | End: 2025-08-07 | Stop reason: HOSPADM

## 2025-08-07 RX ADMIN — PALONOSETRON HYDROCHLORIDE 0.25 MG: 0.25 INJECTION, SOLUTION INTRAVENOUS at 11:08

## 2025-08-07 RX ADMIN — Medication 10 ML: at 01:08

## 2025-08-07 RX ADMIN — ATROPINE SULFATE 0.4 MG: 0.4 INJECTION, SOLUTION INTRAVENOUS at 11:08

## 2025-08-07 RX ADMIN — SODIUM CHLORIDE: 9 INJECTION, SOLUTION INTRAVENOUS at 11:08

## 2025-08-07 RX ADMIN — IRINOTECAN HYDROCHLORIDE 540 MG: 20 INJECTION, SOLUTION INTRAVENOUS at 11:08

## 2025-08-07 RX ADMIN — HEPARIN 500 UNITS: 100 SYRINGE at 01:08

## 2025-08-07 NOTE — NURSING
Pt ambulates with steady gait, alert & oriented x4. Denies complaints at this time. Pt arrived with mp still accessed from prior day, flushed and received blood return. Tolerated tx well.

## 2025-08-16 ENCOUNTER — HOSPITAL ENCOUNTER (EMERGENCY)
Facility: HOSPITAL | Age: 40
Discharge: HOME | End: 2025-08-16
Attending: INTERNAL MEDICINE
Payer: MEDICAID

## 2025-08-16 VITALS
RESPIRATION RATE: 20 BRPM | TEMPERATURE: 99 F | SYSTOLIC BLOOD PRESSURE: 140 MMHG | BODY MASS INDEX: 17.84 KG/M2 | DIASTOLIC BLOOD PRESSURE: 90 MMHG | OXYGEN SATURATION: 95 % | HEIGHT: 66 IN | WEIGHT: 111 LBS | HEART RATE: 99 BPM

## 2025-08-16 DIAGNOSIS — R07.9 NONSPECIFIC CHEST PAIN: ICD-10-CM

## 2025-08-16 DIAGNOSIS — G89.3 CANCER RELATED PAIN: Primary | ICD-10-CM

## 2025-08-16 LAB
ALBUMIN SERPL-MCNC: 3.4 G/DL (ref 3.5–5)
ALBUMIN/GLOB SERPL: 0.6 RATIO (ref 1.1–2)
ALP SERPL-CCNC: 110 UNIT/L (ref 40–150)
ALT SERPL-CCNC: 10 UNIT/L (ref 0–55)
ANION GAP SERPL CALC-SCNC: 15 MEQ/L
AST SERPL-CCNC: 20 UNIT/L (ref 11–45)
BASOPHILS # BLD AUTO: 0.01 X10(3)/MCL
BASOPHILS NFR BLD AUTO: 0.2 %
BILIRUB SERPL-MCNC: 0.3 MG/DL
BUN SERPL-MCNC: 12.7 MG/DL (ref 8.9–20.6)
CALCIUM SERPL-MCNC: 10.6 MG/DL (ref 8.4–10.2)
CHLORIDE SERPL-SCNC: 99 MMOL/L (ref 98–107)
CO2 SERPL-SCNC: 21 MMOL/L (ref 22–29)
CREAT SERPL-MCNC: 1.1 MG/DL (ref 0.72–1.25)
CREAT/UREA NIT SERPL: 12
EOSINOPHIL # BLD AUTO: 0.01 X10(3)/MCL (ref 0–0.9)
EOSINOPHIL NFR BLD AUTO: 0.2 %
ERYTHROCYTE [DISTWIDTH] IN BLOOD BY AUTOMATED COUNT: 15.6 % (ref 11.5–17)
ETHANOL SERPL-MCNC: <10 MG/DL
GFR SERPLBLD CREATININE-BSD FMLA CKD-EPI: >60 ML/MIN/1.73/M2
GLOBULIN SER-MCNC: 5.3 GM/DL (ref 2.4–3.5)
GLUCOSE SERPL-MCNC: 110 MG/DL (ref 74–100)
HCT VFR BLD AUTO: 42.5 % (ref 42–52)
HGB BLD-MCNC: 14.2 G/DL (ref 14–18)
HOLD SPECIMEN: NORMAL
HOLD SPECIMEN: NORMAL
IMM GRANULOCYTES # BLD AUTO: 0.05 X10(3)/MCL (ref 0–0.04)
IMM GRANULOCYTES NFR BLD AUTO: 0.9 %
LIPASE SERPL-CCNC: 11 U/L
LYMPHOCYTES # BLD AUTO: 0.76 X10(3)/MCL (ref 0.6–4.6)
LYMPHOCYTES NFR BLD AUTO: 13.7 %
MCH RBC QN AUTO: 34.8 PG (ref 27–31)
MCHC RBC AUTO-ENTMCNC: 33.4 G/DL (ref 33–36)
MCV RBC AUTO: 104.2 FL (ref 80–94)
MONOCYTES # BLD AUTO: 0.74 X10(3)/MCL (ref 0.1–1.3)
MONOCYTES NFR BLD AUTO: 13.3 %
NEUTROPHILS # BLD AUTO: 3.99 X10(3)/MCL (ref 2.1–9.2)
NEUTROPHILS NFR BLD AUTO: 71.7 %
NRBC BLD AUTO-RTO: 0 %
NT-PROBNP SERPL-MCNC: 115 PG/ML
PLATELET # BLD AUTO: 413 X10(3)/MCL (ref 130–400)
PMV BLD AUTO: 9 FL (ref 7.4–10.4)
POTASSIUM SERPL-SCNC: 4.2 MMOL/L (ref 3.5–5.1)
PROT SERPL-MCNC: 8.7 GM/DL (ref 6.4–8.3)
RBC # BLD AUTO: 4.08 X10(6)/MCL (ref 4.7–6.1)
SODIUM SERPL-SCNC: 135 MMOL/L (ref 136–145)
TROPONIN I SERPL HS-MCNC: 4 NG/L
WBC # BLD AUTO: 5.56 X10(3)/MCL (ref 4.5–11.5)

## 2025-08-16 PROCEDURE — 63600175 PHARM REV CODE 636 W HCPCS: Performed by: INTERNAL MEDICINE

## 2025-08-16 PROCEDURE — 93005 ELECTROCARDIOGRAM TRACING: CPT

## 2025-08-16 PROCEDURE — 80053 COMPREHEN METABOLIC PANEL: CPT | Performed by: INTERNAL MEDICINE

## 2025-08-16 PROCEDURE — 84484 ASSAY OF TROPONIN QUANT: CPT | Performed by: INTERNAL MEDICINE

## 2025-08-16 PROCEDURE — 99284 EMERGENCY DEPT VISIT MOD MDM: CPT | Mod: 25

## 2025-08-16 PROCEDURE — 96361 HYDRATE IV INFUSION ADD-ON: CPT

## 2025-08-16 PROCEDURE — 83880 ASSAY OF NATRIURETIC PEPTIDE: CPT | Performed by: INTERNAL MEDICINE

## 2025-08-16 PROCEDURE — 82077 ASSAY SPEC XCP UR&BREATH IA: CPT | Performed by: INTERNAL MEDICINE

## 2025-08-16 PROCEDURE — 83690 ASSAY OF LIPASE: CPT | Performed by: INTERNAL MEDICINE

## 2025-08-16 PROCEDURE — 96374 THER/PROPH/DIAG INJ IV PUSH: CPT

## 2025-08-16 PROCEDURE — 85025 COMPLETE CBC W/AUTO DIFF WBC: CPT | Performed by: INTERNAL MEDICINE

## 2025-08-16 PROCEDURE — 96376 TX/PRO/DX INJ SAME DRUG ADON: CPT

## 2025-08-16 RX ORDER — HYDROMORPHONE HYDROCHLORIDE 1 MG/ML
0.5 INJECTION, SOLUTION INTRAMUSCULAR; INTRAVENOUS; SUBCUTANEOUS
Status: COMPLETED | OUTPATIENT
Start: 2025-08-16 | End: 2025-08-16

## 2025-08-16 RX ADMIN — SODIUM CHLORIDE, POTASSIUM CHLORIDE, SODIUM LACTATE AND CALCIUM CHLORIDE 1000 ML: 600; 310; 30; 20 INJECTION, SOLUTION INTRAVENOUS at 03:08

## 2025-08-16 RX ADMIN — HYDROMORPHONE HYDROCHLORIDE 0.5 MG: 1 INJECTION, SOLUTION INTRAMUSCULAR; INTRAVENOUS; SUBCUTANEOUS at 04:08

## 2025-08-16 RX ADMIN — HYDROMORPHONE HYDROCHLORIDE 0.5 MG: 1 INJECTION, SOLUTION INTRAMUSCULAR; INTRAVENOUS; SUBCUTANEOUS at 03:08

## 2025-08-18 ENCOUNTER — TELEPHONE (OUTPATIENT)
Dept: PALLIATIVE MEDICINE | Facility: CLINIC | Age: 40
End: 2025-08-18
Payer: MEDICAID

## 2025-08-18 PROBLEM — G89.3 NEOPLASM RELATED PAIN: Status: ACTIVE | Noted: 2025-08-18

## 2025-08-18 PROBLEM — R11.2 NAUSEA AND VOMITING: Status: ACTIVE | Noted: 2025-08-18

## 2025-08-18 PROBLEM — Z51.5 ENCOUNTER FOR PALLIATIVE CARE: Status: ACTIVE | Noted: 2025-08-18

## 2025-08-18 PROBLEM — G62.9 PERIPHERAL POLYNEUROPATHY: Status: ACTIVE | Noted: 2025-08-18

## 2025-08-18 PROBLEM — G47.00 INSOMNIA: Status: ACTIVE | Noted: 2025-08-18

## 2025-08-18 LAB
OHS QRS DURATION: 82 MS
OHS QTC CALCULATION: 456 MS

## 2025-08-19 ENCOUNTER — OFFICE VISIT (OUTPATIENT)
Dept: PALLIATIVE MEDICINE | Facility: CLINIC | Age: 40
End: 2025-08-19
Payer: MEDICAID

## 2025-08-19 VITALS
BODY MASS INDEX: 16.79 KG/M2 | TEMPERATURE: 98 F | HEIGHT: 66 IN | HEART RATE: 98 BPM | WEIGHT: 104.5 LBS | RESPIRATION RATE: 16 BRPM | OXYGEN SATURATION: 97 % | SYSTOLIC BLOOD PRESSURE: 105 MMHG | DIASTOLIC BLOOD PRESSURE: 72 MMHG

## 2025-08-19 DIAGNOSIS — C78.01 MALIGNANT NEOPLASM METASTATIC TO HILUS OF LUNG WITH UNKNOWN PRIMARY SITE, RIGHT: ICD-10-CM

## 2025-08-19 DIAGNOSIS — C7A.1 LARGE CELL NEUROENDOCRINE CARCINOMA: ICD-10-CM

## 2025-08-19 DIAGNOSIS — G89.3 NEOPLASM RELATED PAIN: ICD-10-CM

## 2025-08-19 DIAGNOSIS — R11.2 NAUSEA AND VOMITING, UNSPECIFIED VOMITING TYPE: ICD-10-CM

## 2025-08-19 DIAGNOSIS — Z51.5 ENCOUNTER FOR PALLIATIVE CARE: Primary | ICD-10-CM

## 2025-08-19 DIAGNOSIS — G62.9 PERIPHERAL POLYNEUROPATHY: ICD-10-CM

## 2025-08-19 DIAGNOSIS — C79.51 SECONDARY ADENOCARCINOMA OF BONE: ICD-10-CM

## 2025-08-19 DIAGNOSIS — C80.1 MALIGNANT NEOPLASM METASTATIC TO HILUS OF LUNG WITH UNKNOWN PRIMARY SITE, RIGHT: ICD-10-CM

## 2025-08-19 DIAGNOSIS — G47.00 INSOMNIA, UNSPECIFIED TYPE: ICD-10-CM

## 2025-08-19 DIAGNOSIS — C34.90 MALIGNANT NEOPLASM OF LUNG, UNSPECIFIED LATERALITY, UNSPECIFIED PART OF LUNG: ICD-10-CM

## 2025-08-19 DIAGNOSIS — C79.89 METASTATIC CANCER TO PELVIS: ICD-10-CM

## 2025-08-19 PROCEDURE — 1159F MED LIST DOCD IN RCRD: CPT | Mod: CPTII,,,

## 2025-08-19 PROCEDURE — 99214 OFFICE O/P EST MOD 30 MIN: CPT | Mod: S$PBB,,,

## 2025-08-19 PROCEDURE — 1160F RVW MEDS BY RX/DR IN RCRD: CPT | Mod: CPTII,,,

## 2025-08-19 PROCEDURE — 1111F DSCHRG MED/CURRENT MED MERGE: CPT | Mod: CPTII,,,

## 2025-08-19 PROCEDURE — 3008F BODY MASS INDEX DOCD: CPT | Mod: CPTII,,,

## 2025-08-19 PROCEDURE — 3078F DIAST BP <80 MM HG: CPT | Mod: CPTII,,,

## 2025-08-19 PROCEDURE — 99999 PR PBB SHADOW E&M-EST. PATIENT-LVL IV: CPT | Mod: PBBFAC,,,

## 2025-08-19 PROCEDURE — 3074F SYST BP LT 130 MM HG: CPT | Mod: CPTII,,,

## 2025-08-19 PROCEDURE — 99214 OFFICE O/P EST MOD 30 MIN: CPT | Mod: PBBFAC

## 2025-08-19 RX ORDER — MORPHINE SULFATE 60 MG/1
60 TABLET, FILM COATED, EXTENDED RELEASE ORAL EVERY 12 HOURS
Qty: 30 TABLET | Refills: 0 | Status: SHIPPED | OUTPATIENT
Start: 2025-08-19

## 2025-08-19 RX ORDER — OXYCODONE HYDROCHLORIDE 20 MG/1
20 TABLET ORAL EVERY 6 HOURS PRN
Qty: 60 TABLET | Refills: 0 | Status: SHIPPED | OUTPATIENT
Start: 2025-08-21

## 2025-08-21 ENCOUNTER — APPOINTMENT (OUTPATIENT)
Dept: RADIOLOGY | Facility: HOSPITAL | Age: 40
End: 2025-08-21
Attending: RADIOLOGY
Payer: MEDICAID

## 2025-08-22 DIAGNOSIS — C34.90 MALIGNANT NEOPLASM OF LUNG, UNSPECIFIED LATERALITY, UNSPECIFIED PART OF LUNG: Primary | ICD-10-CM

## 2025-08-25 PROBLEM — T45.1X5A CHEMOTHERAPY INDUCED DIARRHEA: Status: RESOLVED | Noted: 2025-07-10 | Resolved: 2025-08-25

## 2025-08-25 PROBLEM — K52.1 CHEMOTHERAPY INDUCED DIARRHEA: Status: RESOLVED | Noted: 2025-07-10 | Resolved: 2025-08-25

## 2025-08-26 PROBLEM — C80.1: Status: ACTIVE | Noted: 2025-08-26

## 2025-08-26 PROBLEM — C79.31: Status: ACTIVE | Noted: 2025-08-26

## 2025-08-28 ENCOUNTER — OFFICE VISIT (OUTPATIENT)
Dept: HEMATOLOGY/ONCOLOGY | Facility: CLINIC | Age: 40
End: 2025-08-28
Attending: INTERNAL MEDICINE
Payer: MEDICAID

## 2025-08-28 ENCOUNTER — INFUSION (OUTPATIENT)
Dept: INFUSION THERAPY | Facility: HOSPITAL | Age: 40
End: 2025-08-28
Attending: INTERNAL MEDICINE
Payer: MEDICAID

## 2025-08-28 ENCOUNTER — DOCUMENTATION ONLY (OUTPATIENT)
Dept: HEMATOLOGY/ONCOLOGY | Facility: CLINIC | Age: 40
End: 2025-08-28
Payer: MEDICAID

## 2025-08-28 DIAGNOSIS — R63.0 DECREASED APPETITE: ICD-10-CM

## 2025-08-28 DIAGNOSIS — C7A.8 NEUROENDOCRINE CARCINOMA OF LUNG: ICD-10-CM

## 2025-08-28 DIAGNOSIS — G89.3 CANCER-RELATED BREAKTHROUGH PAIN: Primary | ICD-10-CM

## 2025-08-28 DIAGNOSIS — R04.2 HEMOPTYSIS: Primary | ICD-10-CM

## 2025-08-28 DIAGNOSIS — E87.6 HYPOKALEMIA: ICD-10-CM

## 2025-08-28 PROCEDURE — 63600175 PHARM REV CODE 636 W HCPCS: Performed by: INTERNAL MEDICINE

## 2025-08-28 PROCEDURE — 96375 TX/PRO/DX INJ NEW DRUG ADDON: CPT

## 2025-08-28 PROCEDURE — 96413 CHEMO IV INFUSION 1 HR: CPT

## 2025-08-28 PROCEDURE — 25000003 PHARM REV CODE 250: Performed by: INTERNAL MEDICINE

## 2025-08-28 PROCEDURE — A4216 STERILE WATER/SALINE, 10 ML: HCPCS | Performed by: INTERNAL MEDICINE

## 2025-08-28 PROCEDURE — 96367 TX/PROPH/DG ADDL SEQ IV INF: CPT

## 2025-08-28 RX ORDER — DIPHENHYDRAMINE HYDROCHLORIDE 50 MG/ML
50 INJECTION, SOLUTION INTRAMUSCULAR; INTRAVENOUS ONCE AS NEEDED
Status: DISCONTINUED | OUTPATIENT
Start: 2025-08-28 | End: 2025-08-28 | Stop reason: HOSPADM

## 2025-08-28 RX ORDER — OLANZAPINE 2.5 MG/1
2.5 TABLET, FILM COATED ORAL DAILY
Qty: 30 TABLET | Refills: 2 | Status: SHIPPED | OUTPATIENT
Start: 2025-08-28 | End: 2025-11-20

## 2025-08-28 RX ORDER — SODIUM CHLORIDE 0.9 % (FLUSH) 0.9 %
10 SYRINGE (ML) INJECTION
Status: DISCONTINUED | OUTPATIENT
Start: 2025-08-28 | End: 2025-08-28 | Stop reason: HOSPADM

## 2025-08-28 RX ORDER — ATROPINE SULFATE 0.4 MG/ML
0.4 INJECTION, SOLUTION ENDOTRACHEAL; INTRAMEDULLARY; INTRAMUSCULAR; INTRAVENOUS; SUBCUTANEOUS ONCE AS NEEDED
Status: COMPLETED | OUTPATIENT
Start: 2025-08-28 | End: 2025-08-28

## 2025-08-28 RX ORDER — EPINEPHRINE 1 MG/ML
0.3 INJECTION INTRAMUSCULAR; INTRAVENOUS; SUBCUTANEOUS ONCE AS NEEDED
Status: DISCONTINUED | OUTPATIENT
Start: 2025-08-28 | End: 2025-08-28 | Stop reason: HOSPADM

## 2025-08-28 RX ORDER — PROCHLORPERAZINE EDISYLATE 5 MG/ML
10 INJECTION INTRAMUSCULAR; INTRAVENOUS ONCE AS NEEDED
Status: COMPLETED | OUTPATIENT
Start: 2025-08-28 | End: 2025-08-28

## 2025-08-28 RX ORDER — POTASSIUM CHLORIDE 20 MEQ/1
40 TABLET, EXTENDED RELEASE ORAL DAILY
Qty: 28 TABLET | Refills: 0 | Status: SHIPPED | OUTPATIENT
Start: 2025-08-28 | End: 2025-09-11

## 2025-08-28 RX ORDER — HEPARIN 100 UNIT/ML
500 SYRINGE INTRAVENOUS
Status: DISCONTINUED | OUTPATIENT
Start: 2025-08-28 | End: 2025-08-28 | Stop reason: HOSPADM

## 2025-08-28 RX ADMIN — DEXAMETHASONE SODIUM PHOSPHATE 0.25 MG: 4 INJECTION, SOLUTION INTRA-ARTICULAR; INTRALESIONAL; INTRAMUSCULAR; INTRAVENOUS; SOFT TISSUE at 12:08

## 2025-08-28 RX ADMIN — HEPARIN 500 UNITS: 100 SYRINGE at 02:08

## 2025-08-28 RX ADMIN — ATROPINE SULFATE 0.4 MG: 0.4 INJECTION, SOLUTION INTRAVENOUS at 01:08

## 2025-08-28 RX ADMIN — SODIUM CHLORIDE: 9 INJECTION, SOLUTION INTRAVENOUS at 12:08

## 2025-08-28 RX ADMIN — PROCHLORPERAZINE EDISYLATE 10 MG: 5 INJECTION INTRAMUSCULAR; INTRAVENOUS at 12:08

## 2025-08-28 RX ADMIN — SODIUM CHLORIDE, PRESERVATIVE FREE 10 ML: 5 INJECTION INTRAVENOUS at 02:08

## 2025-08-28 RX ADMIN — IRINOTECAN HYDROCHLORIDE 540 MG: 20 INJECTION, SOLUTION INTRAVENOUS at 01:08

## 2025-08-29 ENCOUNTER — DOCUMENTATION ONLY (OUTPATIENT)
Dept: HEMATOLOGY/ONCOLOGY | Facility: CLINIC | Age: 40
End: 2025-08-29
Payer: MEDICAID

## 2025-09-02 ENCOUNTER — OFFICE VISIT (OUTPATIENT)
Dept: PALLIATIVE MEDICINE | Facility: CLINIC | Age: 40
End: 2025-09-02
Payer: MEDICAID

## 2025-09-02 VITALS
WEIGHT: 103.38 LBS | TEMPERATURE: 98 F | SYSTOLIC BLOOD PRESSURE: 111 MMHG | DIASTOLIC BLOOD PRESSURE: 79 MMHG | BODY MASS INDEX: 16.61 KG/M2 | HEIGHT: 66 IN | RESPIRATION RATE: 16 BRPM | OXYGEN SATURATION: 97 % | HEART RATE: 112 BPM

## 2025-09-02 DIAGNOSIS — C78.01 MALIGNANT NEOPLASM METASTATIC TO HILUS OF LUNG WITH UNKNOWN PRIMARY SITE, RIGHT: ICD-10-CM

## 2025-09-02 DIAGNOSIS — G47.00 INSOMNIA, UNSPECIFIED TYPE: ICD-10-CM

## 2025-09-02 DIAGNOSIS — C80.1 MALIGNANT NEOPLASM METASTATIC TO HILUS OF LUNG WITH UNKNOWN PRIMARY SITE, RIGHT: ICD-10-CM

## 2025-09-02 DIAGNOSIS — C79.51 SECONDARY ADENOCARCINOMA OF BONE: ICD-10-CM

## 2025-09-02 DIAGNOSIS — R11.2 NAUSEA AND VOMITING, UNSPECIFIED VOMITING TYPE: ICD-10-CM

## 2025-09-02 DIAGNOSIS — Z51.5 ENCOUNTER FOR PALLIATIVE CARE: Primary | ICD-10-CM

## 2025-09-02 DIAGNOSIS — C7A.1 LARGE CELL NEUROENDOCRINE CARCINOMA: ICD-10-CM

## 2025-09-02 DIAGNOSIS — C34.90 MALIGNANT NEOPLASM OF LUNG, UNSPECIFIED LATERALITY, UNSPECIFIED PART OF LUNG: ICD-10-CM

## 2025-09-02 DIAGNOSIS — G62.9 PERIPHERAL POLYNEUROPATHY: ICD-10-CM

## 2025-09-02 DIAGNOSIS — G89.3 NEOPLASM RELATED PAIN: ICD-10-CM

## 2025-09-02 DIAGNOSIS — C79.89 METASTATIC CANCER TO PELVIS: ICD-10-CM

## 2025-09-02 PROCEDURE — 99999 PR PBB SHADOW E&M-EST. PATIENT-LVL IV: CPT | Mod: PBBFAC,,,

## 2025-09-02 PROCEDURE — 1159F MED LIST DOCD IN RCRD: CPT | Mod: CPTII,,,

## 2025-09-02 PROCEDURE — 99214 OFFICE O/P EST MOD 30 MIN: CPT | Mod: PBBFAC

## 2025-09-02 PROCEDURE — 99214 OFFICE O/P EST MOD 30 MIN: CPT | Mod: S$PBB,,,

## 2025-09-02 PROCEDURE — 3008F BODY MASS INDEX DOCD: CPT | Mod: CPTII,,,

## 2025-09-02 PROCEDURE — 1160F RVW MEDS BY RX/DR IN RCRD: CPT | Mod: CPTII,,,

## 2025-09-02 PROCEDURE — 3074F SYST BP LT 130 MM HG: CPT | Mod: CPTII,,,

## 2025-09-02 PROCEDURE — 3078F DIAST BP <80 MM HG: CPT | Mod: CPTII,,,

## 2025-09-02 RX ORDER — MORPHINE SULFATE 60 MG/1
60 TABLET, FILM COATED, EXTENDED RELEASE ORAL EVERY 12 HOURS
Qty: 30 TABLET | Refills: 0 | Status: ON HOLD | OUTPATIENT
Start: 2025-09-02

## 2025-09-02 RX ORDER — MORPHINE SULFATE 15 MG/1
15 TABLET, FILM COATED, EXTENDED RELEASE ORAL 2 TIMES DAILY
Qty: 30 TABLET | Refills: 0 | Status: ON HOLD | OUTPATIENT
Start: 2025-09-02

## 2025-09-02 RX ORDER — OXYCODONE HYDROCHLORIDE 20 MG/1
20 TABLET ORAL EVERY 6 HOURS PRN
Qty: 60 TABLET | Refills: 0 | Status: ON HOLD | OUTPATIENT
Start: 2025-09-04

## 2025-09-03 ENCOUNTER — OFFICE VISIT (OUTPATIENT)
Dept: NEUROSURGERY | Facility: CLINIC | Age: 40
End: 2025-09-03
Payer: MEDICAID

## 2025-09-03 ENCOUNTER — DOCUMENTATION ONLY (OUTPATIENT)
Dept: HEMATOLOGY/ONCOLOGY | Facility: CLINIC | Age: 40
End: 2025-09-03
Payer: MEDICAID

## 2025-09-03 VITALS — BODY MASS INDEX: 16.69 KG/M2 | HEIGHT: 66 IN

## 2025-09-03 DIAGNOSIS — C79.31 METASTASIS TO BRAIN: Primary | ICD-10-CM

## 2025-09-03 PROCEDURE — 1159F MED LIST DOCD IN RCRD: CPT | Mod: CPTII,95,, | Performed by: STUDENT IN AN ORGANIZED HEALTH CARE EDUCATION/TRAINING PROGRAM

## 2025-09-03 PROCEDURE — 98004 SYNCH AUDIO-VIDEO EST SF 10: CPT | Mod: 95,,, | Performed by: STUDENT IN AN ORGANIZED HEALTH CARE EDUCATION/TRAINING PROGRAM

## 2025-09-03 PROCEDURE — 1160F RVW MEDS BY RX/DR IN RCRD: CPT | Mod: CPTII,95,, | Performed by: STUDENT IN AN ORGANIZED HEALTH CARE EDUCATION/TRAINING PROGRAM

## 2025-09-06 PROBLEM — J18.9 PNEUMONIA OF BOTH LUNGS DUE TO INFECTIOUS ORGANISM, UNSPECIFIED PART OF LUNG: Status: ACTIVE | Noted: 2025-09-06

## (undated) DEVICE — GLOVE SIGNATURE MICRO LTX 7

## (undated) DEVICE — KIT SURGICAL TURNOVER

## (undated) DEVICE — GOWN POLY REINF BRTH SLV XL

## (undated) DEVICE — KIT BASIC ORTHO UNIVERSITY

## (undated) DEVICE — Device

## (undated) DEVICE — BLADE SURG STAINLESS STEEL #15

## (undated) DEVICE — SPONGE GAUZE 16PLY 4X4

## (undated) DEVICE — BANDAGE ESMARK ELASTIC ST 6X9

## (undated) DEVICE — SUT MCRYL PLUS 4-0 PS2 27IN

## (undated) DEVICE — STRIP MEDI WND CLSR 1/2X4IN

## (undated) DEVICE — ADHESIVE DERMABOND ADVANCED

## (undated) DEVICE — PEG THREADED PEG 2.3X18MM
Type: IMPLANTABLE DEVICE | Site: WRIST | Status: NON-FUNCTIONAL
Removed: 2024-04-04

## (undated) DEVICE — COVER MAYO STAND REINFRCD 30

## (undated) DEVICE — BUCKET PLASTER DISPOSABLE

## (undated) DEVICE — GLOVE SENSICARE PI GRN 6.5

## (undated) DEVICE — DRAPE C-ARM COVER EZ 36X28IN

## (undated) DEVICE — TOURNIQUET SB QC DP 18X4IN

## (undated) DEVICE — SPONGE LAP STRL 18X18IN

## (undated) DEVICE — DRESSING GAUZE XEROFORM 5X9

## (undated) DEVICE — SUT SILK 3-0 STRANDS 30IN

## (undated) DEVICE — DRAPE FLUID WARMER ORS 44X44IN

## (undated) DEVICE — BLADE MEDIUM 9MM X 25MM

## (undated) DEVICE — SPLINT PLASTER FAST SET 5X30IN

## (undated) DEVICE — BANDAGE SWIFTWRAP ELAS 4INX5YD

## (undated) DEVICE — SPONGE SURGIFOAM 100 8.5X12X10

## (undated) DEVICE — SUT 1 48IN PDS II VIO MONO

## (undated) DEVICE — APPLICATOR CHLORAPREP ORN 26ML

## (undated) DEVICE — DRAPE HAND STERILE

## (undated) DEVICE — GLOVE SIGNATURE MICRO LTX 6.5

## (undated) DEVICE — SUT VICRYL 3-0 27 SH

## (undated) DEVICE — COVER SITE-RITE PROBE 96IN

## (undated) DEVICE — BLADE SURG STAINLESS STEEL #11

## (undated) DEVICE — BANDAGE ROLL COTTN 4.5INX4.1YD

## (undated) DEVICE — SPONGE LAP 18X18 PREWASHED

## (undated) DEVICE — GLOVE SIGNATURE MICRO LTX 7.5

## (undated) DEVICE — SYR 10CC LUER LOCK

## (undated) DEVICE — GAUZE SPONGE XRAY 4X4

## (undated) DEVICE — GOWN POLY REINF X-LONG 2XL

## (undated) DEVICE — GLOVE SENSICARE PI GRN 7

## (undated) DEVICE — SUT MONO PLUS 2-0 CP-1 27IN

## (undated) DEVICE — BIT DRILL 2.5

## (undated) DEVICE — DRIVER SQUARE TIP 2.0MM

## (undated) DEVICE — SUT CTD VICRYL 0 UND BR CT

## (undated) DEVICE — GEL AQUASONIC 100 STERILE20GM

## (undated) DEVICE — SOL NACL IRR 1000ML BTL

## (undated) DEVICE — SPONGE GAUZE 4X4 12 PLY STRL

## (undated) DEVICE — PEG GEMINUS SMTH LOK 2X18MM
Type: IMPLANTABLE DEVICE | Site: WRIST | Status: NON-FUNCTIONAL
Removed: 2024-04-04

## (undated) DEVICE — DRESSING ABSRBNT ISLAND 3.6X8

## (undated) DEVICE — TAPE MEDIPORE 4IN X 2YDS

## (undated) DEVICE — GLOVE SENSICARE PI GRN 8

## (undated) DEVICE — CORD BIPOLAR 12 FOOT

## (undated) DEVICE — GLOVE PROTEXIS HYDROGEL SZ7.5

## (undated) DEVICE — BANDAGE ESMARK 3INX9YD

## (undated) DEVICE — CORD CAUTERY BIPOLAR STERILE

## (undated) DEVICE — MANIFOLD 4 PORT

## (undated) DEVICE — SUT 3-0 ETHILON 18 FS-1

## (undated) DEVICE — SOL 9P NACL IRR PIC IL

## (undated) DEVICE — DECANTER FLUID TRNSF WHITE 9IN

## (undated) DEVICE — GLOVE SIGNATURE ESSNTL LTX 7

## (undated) DEVICE — DISCONTINUED HS CLEANUP

## (undated) DEVICE — TOWEL OR WHT XRAY 16X26 2/PK

## (undated) DEVICE — ELECTRODE PATIENT RETURN DISP

## (undated) DEVICE — GLOVE SIGNATURE ESSNTL LTX 6.5

## (undated) DEVICE — SUT MONOCRYL PLUS UD 3-0 27

## (undated) DEVICE — ELECTRODE BLD EXT 6.50 ST DISP

## (undated) DEVICE — SUT SILK 3-0 SH DETACH 30IN

## (undated) DEVICE — SUT SILK 3-0 SH 18IN BLACK

## (undated) DEVICE — GUIDE AIMING 1.5MM

## (undated) DEVICE — SUT PDSII 4-0 PS-2 CLEAR MO

## (undated) DEVICE — DRILL GEMINUS 2.3X40MM

## (undated) DEVICE — GLOVE SENSICARE PI GRN 7.5

## (undated) DEVICE — SUT 2/0 30IN PROLENE MONO

## (undated) DEVICE — NDL HYPO REG 25G X 1 1/2

## (undated) DEVICE — CONTAINER SPECIMEN OR STER 4OZ

## (undated) DEVICE — SUT SILK 2-0 STRANDS 30IN

## (undated) DEVICE — SUT 2-0 VICRYL / SH (J417)

## (undated) DEVICE — DRILL SURG GEMINUS 40X2MM

## (undated) DEVICE — GLOVE SENSICARE NEOPRENE 6.5

## (undated) DEVICE — SUT ETHILON 3-0 FS-1 30

## (undated) DEVICE — TRAY CATH FOL SIL URIMTR 16FR

## (undated) DEVICE — SYR DISP LL 5CC

## (undated) DEVICE — COVER TABLE HVY DTY 60X90IN

## (undated) DEVICE — SUT 3-0 MONOCRYL PLUS PS-2

## (undated) DEVICE — PAD CAST SPECIALIST STRL 4

## (undated) DEVICE — SUT SILK 0 STRANDS 30IN BLK

## (undated) DEVICE — SUT BONE WAX 2.5 GRMS 12/BX

## (undated) DEVICE — SUT VICRYL PLUS 3-0 SH 18IN

## (undated) DEVICE — ELECTRODE REM POLYHESIVE II

## (undated) DEVICE — HANDLE DEVON RIGID OR LIGHT

## (undated) DEVICE — SCREW T10 SHAFT DRIVER N CANN

## (undated) DEVICE — DRILL SURG GEMINUS 40X2.5MM

## (undated) DEVICE — STAPLER SKIN PROXIMATE WIDE

## (undated) DEVICE — DRSNG POLYSKIN TRNSPAR 4X4.75

## (undated) DEVICE — SUT VICRYL 1 OB 36 CTX